# Patient Record
Sex: FEMALE | Race: WHITE
[De-identification: names, ages, dates, MRNs, and addresses within clinical notes are randomized per-mention and may not be internally consistent; named-entity substitution may affect disease eponyms.]

---

## 2017-09-17 NOTE — XR
EXAMINATION TYPE: XR chest 2V

 

DATE OF EXAM: 9/17/2017

 

COMPARISON: [facility] 3, 2015

 

HISTORY: Chest pain

 

TECHNIQUE:  Frontal and lateral views of the chest are obtained.

 

FINDINGS:  Artificial heart valve is again noted. There is a 2-lead pacer device identified. There is
 been a sternotomy. There is no pneumothorax or pleural effusion. The cardiac silhouette is within no
rmal limits.

 

IMPRESSION:  No acute cardiopulmonary process.

## 2017-09-17 NOTE — ED
SOB HPI





- General


Chief Complaint: Shortness of Breath


Stated Complaint: sob, hX CHF


Time Seen by Provider: 17 13:26


Source: patient, EMS


Mode of arrival: EMS


Limitations: no limitations





- History of Present Illness


Initial Comments: 





This is an 86-year-old female with a history of CHF, diabetes, hypertension or 

presents here department for shortness of breath or she states has been 

gradually worsening over the last day.  She says that she typically does not 

wear oxygen however has required it today.  She states it is worse with 

exertion and better with rest.  Does not seem to be related to position.  She 

has no associated chest pain however does complain of some lightheadedness at 

times.  She does admit to some lower extremity swelling however mild.  She 

states that she has not had a CHF exacerbation in 2 years.  Her cardiologist is 

Dr. Nina.  She denies any recent travel or surgeries.  No history of PE 

or DVT.  No other complaints.





- Related Data


 Home Medications











 Medication  Instructions  Recorded  Confirmed


 


Bimatoprost [Lumigan .01% Ophth 1 drop RIGHT EYE HS 14





Soln]   


 


Carvedilol [Coreg] 3.125 mg PO BID 14


 


Furosemide [Lasix] 20 mg PO DAILY 14


 


Levothyroxine Sodium [Synthroid] 50 mcg PO DAILY 14


 


Methocarbamol [Robaxin] 500 mg PO TID PRN 14


 


glipiZIDE [Glucotrol] 10 mg PO AC-BID 14


 


sitaGLIPtin [Januvia] 100 mg PO DAILY 14


 


Allopurinol [Allopurinol] 200 mg PO DAILY 06/03/15 09/17/17


 


Vit C/E/Zn/Coppr/Lutein/Zeaxan 1 cap PO BID 06/03/15 09/17/17





[Preservision Areds 2 Softgel]   


 


oxyCODONE-APAP 5-325MG [Percocet 1 tab PO TID PRN 17





5-325 mg]   











 Allergies











Allergy/AdvReac Type Severity Reaction Status Date / Time


 


aspirin Allergy  Unknown Verified 17 14:22


 


bee pollen [Bee Pollen] Allergy  Anaphylaxis Verified 17 14:22


 


celecoxib [From Celebrex] Allergy  Unknown Verified 17 14:22


 


URIC ACIDS AdvReac  GOUT Uncoded 17 13:23














Review of Systems


ROS Statement: 


Those systems with pertinent positive or pertinent negative responses have been 

documented in the HPI.





ROS Other: All systems not noted in ROS Statement are negative.





Past Medical History


Past Medical History: Coronary Artery Disease (CAD), Cancer, Heart Failure, 

Diabetes Mellitus, GI Bleed, Hyperlipidemia, Hypertension, Osteoarthritis (OA), 

Pulmonary Embolus (PE), Sleep Apnea/CPAP/BIPAP, Thyroid Disorder


Additional Past Medical History / Comment(s): gout, chronic pain in back hip 

and leg, diverticulitis, LT EYE BLIND,GLAUCOMA, MAC DEGEBERATION, MURMUR, 

DIVERTICULAR DX.OVARIAN CYSTS,ANEMIA,SKIN CA,ANEMIA, TRACHEBRONCHITIS WITH 

REACTIVE BRONCHOSPSM.used to use cpap machine but stopped  1 year ago d/t mask 

not fitting well.


History of Any Multi-Drug Resistant Organisms: None Reported


Past Surgical History: Appendectomy, Bowel Resection, Breast Surgery, 

Cholecystectomy, Coronary Bypass/CABG, Hysterectomy, Orthopedic Surgery, 

Pacemaker, Tonsillectomy


Additional Past Surgical History / Comment(s): pacemaker, AORTIC VALVE 

REPLACEMENT(TISSUE), CYST REMOVED FROM HAND/HEAD,BOWEL RESECTION D/T 

DIVERTICULITIS.GEENFIELD FILTER


Past Anesthesia/Blood Transfusion Reactions: No Reported Reaction


Type of Cardiac Device: Permanent Pacemaker


Device Placement Date:: 


Past Psychological History: Depression


Smoking Status: Former smoker


Past Alcohol Use History: None Reported


Past Drug Use History: None Reported





- Past Family History


  ** Father


Family Medical History: Cancer


Additional Family Medical History / Comment(s): ALCOHOLIC--  





  ** Mother


Family Medical History: Cancer, Coronary Artery Disease (CAD), Dementia, 

Diabetes Mellitus


Additional Family Medical History / Comment(s):  





General Exam





- General Exam Comments


Initial Comments: 





Constitutional: Awake alert Appears comfortable


Head: Normocephalic atraumatic 


Eyes: no conjunctival injection No scleral icterus EOMI


Neck: Mild JVD Supple


Heart: Regular rate rhythm normal S1-S2 no murmurs


Lungs: Clear to auscultation bilaterally No wheezing patient has decreased 

breath sounds bilaterally


Abdomen: Soft nondistended nontender


Extremities: Bilateral lower extremity edema that appears chronic DP pulses 

intact Radial pulses intact


Neuro: A&Ox3 No focal neurologic deficits


Psych: Appropriate mood and affect





Limitations: no limitations





Course


 Vital Signs











  17





  13:19


 


Temperature 98.1 F


 


Pulse Rate 84


 


Respiratory 20





Rate 


 


Blood Pressure 158/68


 


O2 Sat by Pulse 98





Oximetry 














- Reevaluation(s)


Reevaluation #1: 





17 14:13


EKG showing what appears to be atrial flutter with a controlled rate of 83.  

Rimma right bundle-branch block.  No abnormal ST segment changes or T-wave 

inversions.  QTC is 524.  Other intervals are normal


Reevaluation #2: 





17 15:07


Patient is comfortable at rest however is requiring 4 L of oxygen.  Her BNP is 

elevated from her baseline.  Chest x-ray was unremarkable.  She does have a 

history of PE and I am considering this as an etiology however the patient is 

unable to undergo a CTA because of her renal function.  I did put in for a V/Q 

however since she is also in what appears to be atrial flutter going to start 

her on heparin anyway.  Her rate is controlled.  Going to admit her for CHF 

exacerbation and possible PE for a VQ scan.





Medical Decision Making





- Medical Decision Making





This is an 86 show female who presents emergency department for shortness of 

breath.  She was requiring quite a bit of oxygen on arrival.  She is 

comfortable on 4 L at rest however gets very dyspneic with exertion.  BNP was 

elevated however chest x-ray was unremarkable.  With history of PID consider 

pulmonary embolism however the patient was unable to undergo CTA because of her 

renal function.  V/Q was put in for evaluation.  The patient was started on 

heparin because of what appears to be atrial flutter with a controlled rate and 

possibility of PE.  Otherwise she is hemodynamically stable.  We'll admit her 

for suspected CHF and possible PE.  She was given Lasix.  Dr. Nina was 

placed on consult.  Dr. Silvestre accepts the admission.





- Lab Data


Result diagrams: 


 17 13:55





 17 13:55


 Lab Results











  17 Range/Units





  13:55 13:55 13:55 


 


WBC    9.8  (3.8-10.6)  k/uL


 


RBC    3.69 L  (3.80-5.40)  m/uL


 


Hgb    10.2 L  (11.4-16.0)  gm/dL


 


Hct    31.7 L  (34.0-46.0)  %


 


MCV    85.8  (80.0-100.0)  fL


 


MCH    27.6  (25.0-35.0)  pg


 


MCHC    32.1  (31.0-37.0)  g/dL


 


RDW    15.8 H  (11.5-15.5)  %


 


Plt Count    197  (150-450)  k/uL


 


Neutrophils %    72  %


 


Lymphocytes %    16  %


 


Monocytes %    5  %


 


Eosinophils %    4  %


 


Basophils %    1  %


 


Neutrophils #    7.1  (1.3-7.7)  k/uL


 


Lymphocytes #    1.6  (1.0-4.8)  k/uL


 


Monocytes #    0.5  (0-1.0)  k/uL


 


Eosinophils #    0.4  (0-0.7)  k/uL


 


Basophils #    0.1  (0-0.2)  k/uL


 


Hypochromasia    Marked  


 


Poikilocytosis    Moderate  


 


PT  11.3    (9.0-12.0)  sec


 


INR  1.1    (<1.2)  


 


D-Dimer     (<0.60)  mg/L FEU


 


Sodium     (137-145)  mmol/L


 


Potassium     (3.5-5.1)  mmol/L


 


Chloride     ()  mmol/L


 


Carbon Dioxide     (22-30)  mmol/L


 


Anion Gap     mmol/L


 


BUN     (7-17)  mg/dL


 


Creatinine     (0.52-1.04)  mg/dL


 


Est GFR (MDRD) Af Amer     (>60 ml/min/1.73 sqM)  


 


Est GFR (MDRD) Non-Af     (>60 ml/min/1.73 sqM)  


 


Glucose     (74-99)  mg/dL


 


Calcium     (8.4-10.2)  mg/dL


 


Magnesium     (1.6-2.3)  mg/dL


 


Total Bilirubin     (0.2-1.3)  mg/dL


 


AST     (14-36)  U/L


 


ALT     (9-52)  U/L


 


Alkaline Phosphatase     ()  U/L


 


CK-MB (CK-2)   1.2   (0.0-2.4)  ng/mL


 


Troponin I   0.034   (0.000-0.034)  ng/mL


 


NT-Pro-B Natriuret Pep     pg/mL


 


Total Protein     (6.3-8.2)  g/dL


 


Albumin     (3.5-5.0)  g/dL














  17 Range/Units





  13:55 13:55 13:55 


 


WBC     (3.8-10.6)  k/uL


 


RBC     (3.80-5.40)  m/uL


 


Hgb     (11.4-16.0)  gm/dL


 


Hct     (34.0-46.0)  %


 


MCV     (80.0-100.0)  fL


 


MCH     (25.0-35.0)  pg


 


MCHC     (31.0-37.0)  g/dL


 


RDW     (11.5-15.5)  %


 


Plt Count     (150-450)  k/uL


 


Neutrophils %     %


 


Lymphocytes %     %


 


Monocytes %     %


 


Eosinophils %     %


 


Basophils %     %


 


Neutrophils #     (1.3-7.7)  k/uL


 


Lymphocytes #     (1.0-4.8)  k/uL


 


Monocytes #     (0-1.0)  k/uL


 


Eosinophils #     (0-0.7)  k/uL


 


Basophils #     (0-0.2)  k/uL


 


Hypochromasia     


 


Poikilocytosis     


 


PT     (9.0-12.0)  sec


 


INR     (<1.2)  


 


D-Dimer    0.92 H  (<0.60)  mg/L FEU


 


Sodium  138    (137-145)  mmol/L


 


Potassium  3.4 L    (3.5-5.1)  mmol/L


 


Chloride  106    ()  mmol/L


 


Carbon Dioxide  20 L    (22-30)  mmol/L


 


Anion Gap  12    mmol/L


 


BUN  24 H    (7-17)  mg/dL


 


Creatinine  1.20 H    (0.52-1.04)  mg/dL


 


Est GFR (MDRD) Af Amer  52    (>60 ml/min/1.73 sqM)  


 


Est GFR (MDRD) Non-Af  43    (>60 ml/min/1.73 sqM)  


 


Glucose  324 H    (74-99)  mg/dL


 


Calcium  8.8    (8.4-10.2)  mg/dL


 


Magnesium  2.0    (1.6-2.3)  mg/dL


 


Total Bilirubin  1.2    (0.2-1.3)  mg/dL


 


AST  38 H    (14-36)  U/L


 


ALT  29    (9-52)  U/L


 


Alkaline Phosphatase  179 H    ()  U/L


 


CK-MB (CK-2)     (0.0-2.4)  ng/mL


 


Troponin I     (0.000-0.034)  ng/mL


 


NT-Pro-B Natriuret Pep   2270   pg/mL


 


Total Protein  6.1 L    (6.3-8.2)  g/dL


 


Albumin  3.6    (3.5-5.0)  g/dL














Disposition


Clinical Impression: 


 Atrial flutter, Systolic congestive heart failure, Dyspnea





Disposition: ADMITTED AS IP TO THIS HOSP


Condition: Stable


Referrals: 


Ariela Mckay MD [Primary Care Provider] - 1-2 days

## 2017-09-17 NOTE — NM
EXAMINATION TYPE: NM pul vent and perfuse

 

DATE OF EXAM: 9/17/2017

 

COMPARISON: Chest x-ray obtained on the same day.

 

HISTORY: Chest pain

 

TECHNIQUE:  Utilizing inhalation of 66.4 mCi Tc 99m DTPA aerosol and intravenous injection of 5.5 mCi
 of Tc 99m MAA, ventilation and perfusion images are acquired post injection in multiple projections.


 

FINDINGS: 

Normal radiotracer distribution is noted in the lungs. There is no evidence of mismatched defects.

 

IMPRESSION: 

Low probability of pulmonary embolism.

## 2017-09-18 NOTE — P.CRDCN
History of Present Illness


Consult date: 17


Chief complaint: Shortness of breath


History of present illness: 





This is a pleasant 86-year-old  female patient who sees Dr. Nina in the office on regular basis with a past medical history 

significant for CAD and prior coronary artery bypass grafting with unknown 

details at this point, known severe ischemic cardiomyopathy and status post ICD

, diabetes, hypertension, dyslipidemia, and chronic atrial fibrillation 

presented to the hospital complaining of progressive dyspnea.





The patient has been experiencing progressive exertional dyspnea as well as 

orthopnea and also bilateral lower extremities edema started a few weeks ago.  

Her symptoms got worse over the last several days and she decided to come to 

the hospital.


She states that she was not having any chest discomfort to yesterday when she 

developed a brief episode of chest discomfort.





The patient stated that she was taking all her medications.  She was not 

compliant with her diet.  She did not have any recent history of upper 

respiratory infection.





The chest x-ray came in to be unremarkable.  The BNP came in to be elevated.  

The EKG showed atrial fibrillation was controlled heart rate with a right 

bundle branch block and nonspecific changes most prominent in the lateral leads.





The patient was started on Lasix.  Her hemoglobin is low.  She is not on any 

anticoagulation for the atrial fibrillation.  We will obtain the previous 

medical records from the office for further clarification white the patient is 

not on anticoagulation.  Currently she is on aspirin and heparin and we will 

continue both of them.  I would continue the heparin for additional 24 hours.  

She is on Coreg and we'll continue that.











Past Medical History


Past Medical History: Coronary Artery Disease (CAD), Cancer, Heart Failure, 

Diabetes Mellitus, GI Bleed, Hypertension, Osteoarthritis (OA), Pulmonary 

Embolus (PE), Sleep Apnea/CPAP/BIPAP, Thyroid Disorder


Additional Past Medical History / Comment(s): gout, chronic pain in back hip 

and leg, diverticulitis, LT EYE BLIND,GLAUCOMA, MAC DEGEBERATION, MURMUR, 

DIVERTICULAR DX.OVARIAN CYSTS,ANEMIA,SKIN CA,ANEMIA, TRACHEBRONCHITIS WITH 

REACTIVE BRONCHOSPSM.used to use cpap machine but stopped  1 year ago d/t mask 

not fitting well.


History of Any Multi-Drug Resistant Organisms: None Reported


Past Surgical History: Appendectomy, Bowel Resection, Breast Surgery, 

Cholecystectomy, Coronary Bypass/CABG, Hysterectomy, Orthopedic Surgery, 

Pacemaker, Tonsillectomy


Additional Past Surgical History / Comment(s): pacemaker, AORTIC VALVE 

REPLACEMENT(TISSUE), CYST REMOVED FROM HAND/HEAD,BOWEL RESECTION D/T 

DIVERTICULITIS.GEENFIELD FILTER


Past Anesthesia/Blood Transfusion Reactions: No Reported Reaction


Type of Cardiac Device: Permanent Pacemaker


Device Placement Date:: 


Smoking Status: Former smoker





- Past Family History


  ** Father


Family Medical History: Cancer


Additional Family Medical History / Comment(s): ALCOHOLIC--  





  ** Mother


Family Medical History: Cancer, Coronary Artery Disease (CAD), Dementia, 

Diabetes Mellitus


Additional Family Medical History / Comment(s):  





Medications and Allergies


 Home Medications











 Medication  Instructions  Recorded  Confirmed  Type


 


Bimatoprost [Lumigan .01% Ophth 1 drop RIGHT EYE HS 14 History





Soln]    


 


Carvedilol [Coreg] 3.125 mg PO BID 14 History


 


Furosemide [Lasix] 80 mg PO BID 14 History


 


glipiZIDE [Glucotrol] 10 mg PO AC-BID 14 History


 


sitaGLIPtin [Januvia] 100 mg PO DAILY 14 History


 


Allopurinol [Allopurinol] 200 mg PO BID 06/03/15 09/17/17 History


 


Vit C/E/Zn/Coppr/Lutein/Zeaxan 2 tab PO BID 06/03/15 09/17/17 History





[Preservision Areds 2 Softgel]    


 


Insulin Detemir [Levemir] 100 unit SQ AC-SUPPER 17 History











 Allergies











Allergy/AdvReac Type Severity Reaction Status Date / Time


 


aspirin Allergy  Unknown Verified 17 14:22


 


bee pollen [Bee Pollen] Allergy  Anaphylaxis Verified 17 14:22


 


celecoxib [From Celebrex] Allergy  Unknown Verified 17 14:22


 


URIC ACIDS AdvReac  GOUT Uncoded 17 13:23














Physical Exam


Vitals: 


 Vital Signs











  Temp Pulse Pulse Resp BP BP Pulse Ox


 


 17 08:00  97.3 F L   78  20   118/73  97


 


 17 04:16  97.9 F   82  20   133/67  97


 


 17 00:00    76  18   137/66  97


 


 17 20:00  97.1 F L   74  18   164/70  98


 


 17 17:20  97 F L   71  20   160/72  99


 


 17 15:27  98.0 F  88   18  154/72   99


 


 17 13:19  98.1 F  84   20  158/68   98








 Intake and Output











 17





 22:59 06:59 14:59


 


Intake Total 40 334.532 


 


Output Total 500 0 


 


Balance -460 334.532 


 


Intake:   


 


  IV 40  


 


    Heparin Sodium,Porcine/ 40  





    D5w Pmx 25,000 unit In   





    Dextrose/Water 1 500ml.   





    bag @ 11 UNITS/KG/HR 19.   





    95 mls/hr IV .Q24H [facility] Rx   





    #:[id number]   


 


  Intake, IV Titration  334.532 





  Amount   


 


    Heparin Sodium,Porcine/  334.532 





    D5w Pmx 25,000 unit In   





    Dextrose/Water 1 500ml.   





    bag @ 11 UNITS/KG/HR 19.   





    95 mls/hr IV .Q24H [facility] Rx   





    #:[id number]   


 


Output:   


 


  Urine 500 0 


 


Other:   


 


  Weight  93.8 kg 














- Constitutional


General appearance: no acute distress





- Respiratory


Respiratory: bilateral: CTA





- Cardiovascular


Rhythm: irregularly irregular


Heart sounds: normal: S1, S2


Abnormal Heart Sounds: systolic murmur





Results





 17 05:09





 17 05:09


 Cardiac Enzymes











  17 Range/Units





  13:55 13:55 22:05 


 


AST   38 H   (14-36)  U/L


 


CK-MB (CK-2)  1.2   1.6  (0.0-2.4)  ng/mL


 


Troponin I  0.034   0.037 H*  (0.000-0.034)  ng/mL














  17 Range/Units





  01:34 


 


AST   (14-36)  U/L


 


CK-MB (CK-2)  1.6  (0.0-2.4)  ng/mL


 


Troponin I  0.046 H*  (0.000-0.034)  ng/mL








 Coagulation











  17 Range/Units





  13:55 22:05 05:09 


 


PT  11.3    (9.0-12.0)  sec


 


APTT   35.0 H  40.2 H  (22.0-30.0)  sec








 CBC











  17 Range/Units





  13:55 05:09 


 


WBC  9.8  8.0  (3.8-10.6)  k/uL


 


RBC  3.69 L  3.15 L  (3.80-5.40)  m/uL


 


Hgb  10.2 L  8.7 L D  (11.4-16.0)  gm/dL


 


Hct  31.7 L  27.1 L  (34.0-46.0)  %


 


Plt Count  197  169  (150-450)  k/uL








 Comprehensive Metabolic Panel











  17 Range/Units





  13:55 05:09 


 


Sodium  138  139  (137-145)  mmol/L


 


Potassium  3.4 L  3.5  (3.5-5.1)  mmol/L


 


Chloride  106  107  ()  mmol/L


 


Carbon Dioxide  20 L  25  (22-30)  mmol/L


 


BUN  24 H  23 H  (7-17)  mg/dL


 


Creatinine  1.20 H  1.20 H  (0.52-1.04)  mg/dL


 


Glucose  324 H  166 H  (74-99)  mg/dL


 


Calcium  8.8  8.8  (8.4-10.2)  mg/dL


 


AST  38 H   (14-36)  U/L


 


ALT  29   (9-52)  U/L


 


Alkaline Phosphatase  179 H   ()  U/L


 


Total Protein  6.1 L   (6.3-8.2)  g/dL


 


Albumin  3.6   (3.5-5.0)  g/dL








 Current Medications











Generic Name Dose Route Start Last Admin





  Trade Name Freq  PRN Reason Stop Dose Admin


 


Acetaminophen  650 mg  17 03:57  17 04:30





  Tylenol Tab  PO   650 mg





  Q6HR PRN   Administration





  Fever and/ or Pain   


 


Allopurinol  200 mg  17 21:00  17 07:47





  Zyloprim  PO   200 mg





  BID [facility]   Administration


 


Carvedilol  3.125 mg  17 18:30  17 07:47





  Coreg  PO   3.125 mg





  BID-W/MEALS [facility]   Administration


 


Furosemide  20 mg  17 09:00  17 08:46





  Lasix  IV   20 mg





  Q12HR [facility]   Administration


 


Glipizide  10 mg  17 07:30  17 08:46





  Glucotrol  PO   10 mg





  AC-BID [facility]   Administration


 


Heparin Sodium (Porcine)  0 unit  17 18:17  





  Heparin  IV   





  PER PROTOCOL PRN   





  PER PROTOCOL   





  Protocol   


 


Heparin Sodium/Dextrose 25,000  500 mls @ 19.95 mls/hr  17 15:15   06:28





  unit/ IV Solution  IV   16 units/kg/hr





  .Q24H [facility]   29.02 mls/hr





  Protocol   Titration





  11 UNITS/KG/HR   


 


Insulin Detemir  100 unit  17 18:30  17 18:29





  Levemir  SQ   100 unit





  AC-SUPPER [facility]   Administration


 


Insulin Human Lispro  0 unit  17 21:00  17 07:48





  Humalog  SQ   2 unit





  ACHS [facility]   Administration





  Protocol   


 


Latanoprost  1 drops  17 21:00  17 20:40





  Xalatan 0.005%  RIGHT EYE   1 drops





  HS [facility]   Administration


 


Linagliptin  5 mg  17 09:00  17 08:46





  Tradjenta  PO   5 mg





  DAILY [facility]   Administration


 


Melatonin  6 mg  17 21:00  17 23:00





  Melatonin  PO   6 mg





  HS [facility]   Administration


 


Miscellaneous Information  1 each  17 14:54  





  Rx Info: Iv Contrast Was Given  MISCELLANE  17 14:55  





  DAILY PRN   





  Per Protocol   


 


Multivitamins/Minerals  2 each  17 18:30  17 07:47





  Ivite  PO   2 each





  BID-W/MEALS [facility]   Administration


 


Naloxone HCl  0.2 mg  17 15:12  





  Narcan  IV   





  Q2M PRN   





  Opioid Reversal   


 


Potassium Chloride  20 meq  17 09:00  17 07:47





  K-Dur 20  PO  17 09:01  20 meq





  BID [facility]   Administration








 Intake and Output











 17





 22:59 06:59 14:59


 


Intake Total 40 334.532 


 


Output Total 500 0 


 


Balance -460 334.532 


 


Intake:   


 


  IV 40  


 


    Heparin Sodium,Porcine/ 40  





    D5w Pmx 25,000 unit In   





    Dextrose/Water 1 500ml.   





    bag @ 11 UNITS/KG/HR 19.   





    95 mls/hr IV .Q24H [facility] Rx   





    #:[id number]   


 


  Intake, IV Titration  334.532 





  Amount   


 


    Heparin Sodium,Porcine/  334.532 





    D5w Pmx 25,000 unit In   





    Dextrose/Water 1 500ml.   





    bag @ 11 UNITS/KG/HR 19.   





    95 mls/hr IV .Q24H [facility] Rx   





    #:[id number]   


 


Output:   


 


  Urine 500 0 


 


Other:   


 


  Weight  93.8 kg 








 





 17 05:09 





 17 05:09 











Assessment and Plan


Plan: 





This is a pleasant 86-year-old female patient was known CAD and prior CABG as 

well as multiple comorbid conditions who presented to the hospital was 

progressive dyspnea and was diagnosed was congestive heart failure exacerbation.





I will obtain an echocardiogram to assess the LV function.  Continue diuresing 

the patient gently.  Continue monitor the kidney function and electrolytes.  

Obtain the previous medical records from the office.  Continue following up 

with her

## 2017-09-18 NOTE — P.PN
Subjective


Principal diagnosis: 





Acute CHF exacerbation.





Patient is a 86-year-old female with a known history of coronary artery disease 

status post bypass graft in 2011 and ACD placement, hypertension, diabetes type 

2 non-insulin-dependent and multiple other medical problems came to the 

hospital with complaints of worsening Short of breath since yesterday.  

Apparently patient has been having short of breath and dizziness for the past 

few months.  Denied any fever or chills.  Denied any chest pain.  No nausea or 

vomiting abdominal pain. She says that she typically does not wear oxygen 

however has required it today.  She states it is worse with exertion and better 

with rest.  Does not seem to be related to position.  She has no associated 

chest pain however does complain of some lightheadedness at times.  She does 

admit to some lower extremity swelling however mild.  She states that she has 

not had a CHF exacerbation in 2 years.  Her cardiologist is Dr. Nina.  

She denies any recent travel or surgeries.  No history of PE or DVT.  No other 

complaints.





BNP 2270, troponin 0.037.  Hemoglobin 10.2 potassium 3.4


EKG showed atrial flutter


Chest x-ray showed no acute process


VQ scan showed low powered for PE





09/18/2017


Patient says that her breathing is better today.  Continued on IV diuresis with 

20 mg of Lasix twice daily.  2-D echo report is pending.


Patient was found to have elevated TSH level and was started on thyroid 

supplements


Patient denied any chest pain or short of breath worsening.  No nausea vomiting 

or abdominal pain.  No acute otherwise overnight issues.





Objective





- Vital Signs


Vital signs: 


 Vital Signs











Temp  98.4 F   09/18/17 20:00


 


Pulse  90   09/18/17 20:00


 


Resp  18   09/18/17 20:00


 


BP  124/64   09/18/17 20:00


 


Pulse Ox  99   09/18/17 20:00








 Intake & Output











 09/18/17 09/18/17 09/19/17





 06:59 18:59 06:59


 


Intake Total 334.532 180.433 


 


Output Total 0  160


 


Balance 334.532 180.433 -160


 


Weight 93.8 kg 93.8 kg 


 


Intake:   


 


  IV  60 


 


    0.9 ns  60 


 


  Intake, IV Titration 334.532 120.433 





  Amount   


 


    Heparin Sodium,Porcine/ 334.532 120.433 





    D5w Pmx 25,000 unit In   





    Dextrose/Water 1 500ml.   





    bag @ 11 UNITS/KG/HR 19.   





    95 mls/hr IV .Q24H [facility] Rx   





    #:[id number]   


 


Output:   


 


  Urine 0  160


 


Other:   


 


  Voiding Method  Bedside Commode 


 


  # Voids  1 


 


  # Bowel Movements  0 














- Exam





PHYSICAL EXAMINATION:


 Patient is lying in the bed comfortably, no acute distress, awake alert and 

oriented.. 


HEENT: Normocephalic. Neck is supple. Pupils reactive. Nostrils clear. Oral 

cavity is moist. Ears reveal no drainage. 


Neck reveals no JVD, carotid bruits, or thyromegaly. 


CHEST EXAMINATION: Trachea is central. Symmetrical expansion. Lung fields clear 

to auscultation and percussion. 


CARDIAC: Normal S1, S2 with no gallops. No murmurs 


 ABDOMEN: Soft. Bowel sounds normal. No organomegaly. No abdominal bruits. 


Extremities 1+ edema.  No clubbing or cyanosis 


Neurologically awake, alert, oriented x3 with well-coordinated movements. 


Skin: no rash or skin lesions


Musculoskeletal: no joint swelling or deformity.








- Labs


CBC & Chem 7: 


 09/18/17 05:09





 09/18/17 05:09


Labs: 


 Abnormal Lab Results - Last 24 Hours (Table)











  09/17/17 09/17/17 09/17/17 Range/Units





  13:55 22:05 22:05 


 


RBC     (3.80-5.40)  m/uL


 


Hgb     (11.4-16.0)  gm/dL


 


Hct     (34.0-46.0)  %


 


RDW     (11.5-15.5)  %


 


APTT    35.0 H  (22.0-30.0)  sec


 


BUN     (7-17)  mg/dL


 


Creatinine     (0.52-1.04)  mg/dL


 


Glucose     (74-99)  mg/dL


 


POC Glucose (mg/dL)     (75-99)  mg/dL


 


Hemoglobin A1c  9.8 H    (4.2-6.1)  %


 


Total Creatine Kinase     ()  U/L


 


Troponin I   0.037 H*   (0.000-0.034)  ng/mL


 


TSH     (0.465-4.680)  mIU/L














  09/18/17 09/18/17 09/18/17 Range/Units





  01:34 05:06 05:09 


 


RBC     (3.80-5.40)  m/uL


 


Hgb     (11.4-16.0)  gm/dL


 


Hct     (34.0-46.0)  %


 


RDW     (11.5-15.5)  %


 


APTT    40.2 H  (22.0-30.0)  sec


 


BUN     (7-17)  mg/dL


 


Creatinine     (0.52-1.04)  mg/dL


 


Glucose     (74-99)  mg/dL


 


POC Glucose (mg/dL)   177 H   (75-99)  mg/dL


 


Hemoglobin A1c     (4.2-6.1)  %


 


Total Creatine Kinase  29 L    ()  U/L


 


Troponin I  0.046 H*    (0.000-0.034)  ng/mL


 


TSH     (0.465-4.680)  mIU/L














  09/18/17 09/18/17 09/18/17 Range/Units





  05:09 05:09 12:11 


 


RBC  3.15 L    (3.80-5.40)  m/uL


 


Hgb  8.7 L D    (11.4-16.0)  gm/dL


 


Hct  27.1 L    (34.0-46.0)  %


 


RDW  15.6 H    (11.5-15.5)  %


 


APTT    48.3 H  (22.0-30.0)  sec


 


BUN   23 H   (7-17)  mg/dL


 


Creatinine   1.20 H   (0.52-1.04)  mg/dL


 


Glucose   166 H   (74-99)  mg/dL


 


POC Glucose (mg/dL)     (75-99)  mg/dL


 


Hemoglobin A1c     (4.2-6.1)  %


 


Total Creatine Kinase     ()  U/L


 


Troponin I     (0.000-0.034)  ng/mL


 


TSH   7.800 H   (0.465-4.680)  mIU/L














  09/18/17 09/18/17 09/18/17 Range/Units





  12:33 16:40 20:48 


 


RBC     (3.80-5.40)  m/uL


 


Hgb     (11.4-16.0)  gm/dL


 


Hct     (34.0-46.0)  %


 


RDW     (11.5-15.5)  %


 


APTT     (22.0-30.0)  sec


 


BUN     (7-17)  mg/dL


 


Creatinine     (0.52-1.04)  mg/dL


 


Glucose     (74-99)  mg/dL


 


POC Glucose (mg/dL)  241 H  167 H  185 H  (75-99)  mg/dL


 


Hemoglobin A1c     (4.2-6.1)  %


 


Total Creatine Kinase     ()  U/L


 


Troponin I     (0.000-0.034)  ng/mL


 


TSH     (0.465-4.680)  mIU/L














Assessment and Plan


Plan: 





#1 shortness of breath secondary to acute CHF exacerbation.  EF ejection 

fraction unknown


#2 atrial flutter 


#3 history of coronary artery disease and bypass graft.  AICD placement as well


#4 history of aortic valve replacement


#4 hypokalemia


#5 hypertension


#6 diabetes type 2 non-insulin-dependent


#7 hypothyroidism


#8 obstructive sleep apnea on CPAP at home


#9 acute on chronic kidney disease stage III . creatinine 1.2


#10 obesity with BMI 34.3


#11 history of gout stable


#12 hypothyroidism.  Started on levothyroxine 75 g daily.





Plan:


Patient will be continued on IV Lasix 20 mg twice a day.  Continue with heparin 

drip for atrial flutter.  Continue with Coreg.  Patient is ALLERGIC to aspirin.

  Continue with telemetry monitoring and serial EKGs and troponins.  Cardiology 

is following.  VQ scan showed low probability for PE.  Continue to follow 

closely and further recommendations based on the clinical course.  Cardiology 

has been consulted.  Prognosis is guarded with multiple medical problems and 

comorbid conditions.





Time with Patient: Greater than 30

## 2017-09-18 NOTE — P.HPIM
History of Present Illness


H&P Date: 17


Chief Complaint: Shortness of breath





Recent is a 86-year-old female with a known history of coronary artery disease 

status post bypass graft in  and ACD placement, hypertension, diabetes type 

2 non-insulin-dependent and multiple other medical problems came to the 

hospital with complaints of worsening Short of breath since yesterday.  

Apparently patient has been having short of breath and dizziness for the past 

few months.  Denied any fever or chills.  Denied any chest pain.  No nausea or 

vomiting abdominal pain. She says that she typically does not wear oxygen 

however has required it today.  She states it is worse with exertion and better 

with rest.  Does not seem to be related to position.  She has no associated 

chest pain however does complain of some lightheadedness at times.  She does 

admit to some lower extremity swelling however mild.  She states that she has 

not had a CHF exacerbation in 2 years.  Her cardiologist is Dr. Nina.  

She denies any recent travel or surgeries.  No history of PE or DVT.  No other 

complaints.





BNP 2270, troponin 0.037.  Hemoglobin 10.2 potassium 3.4


EKG showed atrial flutter


Chest x-ray showed no acute process


VQ scan showed low powered for PE











Review of Systems








CONSTITUTIONAL: No fever, no loss of appetite.  Patient feels tired. 


HEENT: No recent visual problems or hearing problems. Denied any sore throat. 


CARDIOVASCULAR: No chest pain, shortness of breath present.  orthopnea, PND, no 

palpitations, no syncope. 


PULMONARY: , No cough or sputum production no hemoptysis.  Patient does have 

shortness of breath


GASTROINTESTINAL: No diarrhea, no nausea, no vomiting, no abdominal pain. 

Normoactive bowel sounds. 


NEUROLOGICAL: No headaches, no weakness, no numbness.  Dizziness.  No 

lightheadedness no numbness or tingling.


HEMATOLOGICAL: Denies any bleeding or petechiae. 


GENITOURINARY: Denies any burning micturition, frequency, or urgency. 


MUSCULOSKELETAL/RHEUMATOLOGICAL: Denies any joint pain, swelling, or any muscle 

pain. 


ENDOCRINE: Denies any polyuria or polydipsia. 





The rest of the 14-point review of systems is negative.





Past Medical History


Past Medical History: Coronary Artery Disease (CAD), Cancer, Heart Failure, 

Diabetes Mellitus, GI Bleed, Hypertension, Osteoarthritis (OA), Pulmonary 

Embolus (PE), Sleep Apnea/CPAP/BIPAP, Thyroid Disorder


Additional Past Medical History / Comment(s): gout, chronic pain in back hip 

and leg, diverticulitis, LT EYE BLIND,GLAUCOMA, MAC DEGEBERATION, MURMUR, 

DIVERTICULAR DX.OVARIAN CYSTS,ANEMIA,SKIN CA,ANEMIA, TRACHEBRONCHITIS WITH 

REACTIVE BRONCHOSPSM.used to use cpap machine but stopped  1 year ago d/t mask 

not fitting well.


History of Any Multi-Drug Resistant Organisms: None Reported


Past Surgical History: Appendectomy, Bowel Resection, Breast Surgery, 

Cholecystectomy, Coronary Bypass/CABG, Hysterectomy, Orthopedic Surgery, 

Pacemaker, Tonsillectomy


Additional Past Surgical History / Comment(s): pacemaker, AORTIC VALVE 

REPLACEMENT(TISSUE), CYST REMOVED FROM HAND/HEAD,BOWEL RESECTION D/T 

DIVERTICULITIS.GEENFIELD FILTER


Past Anesthesia/Blood Transfusion Reactions: No Reported Reaction


Type of Cardiac Device: Permanent Pacemaker


Device Placement Date:: 


Smoking Status: Former smoker





- Past Family History


  ** Father


Family Medical History: Cancer


Additional Family Medical History / Comment(s): ALCOHOLIC--  





  ** Mother


Family Medical History: Cancer, Coronary Artery Disease (CAD), Dementia, 

Diabetes Mellitus


Additional Family Medical History / Comment(s):  





Medications and Allergies


 Home Medications











 Medication  Instructions  Recorded  Confirmed  Type


 


Bimatoprost [Lumigan .01% Ophth 1 drop RIGHT EYE HS 14 History





Soln]    


 


Carvedilol [Coreg] 3.125 mg PO BID 14 History


 


Furosemide [Lasix] 80 mg PO BID 14 History


 


glipiZIDE [Glucotrol] 10 mg PO AC-BID 14 History


 


sitaGLIPtin [Januvia] 100 mg PO DAILY 14 History


 


Allopurinol [Allopurinol] 200 mg PO BID 06/03/15 09/17/17 History


 


Vit C/E/Zn/Coppr/Lutein/Zeaxan 2 tab PO BID 06/03/15 09/17/17 History





[Preservision Areds 2 Softgel]    


 


Insulin Detemir [Levemir] 100 unit SQ AC-SUPPER 17 History











 Allergies











Allergy/AdvReac Type Severity Reaction Status Date / Time


 


aspirin Allergy  Unknown Verified 17 14:22


 


bee pollen [Bee Pollen] Allergy  Anaphylaxis Verified 17 14:22


 


celecoxib [From Celebrex] Allergy  Unknown Verified 17 14:22


 


URIC ACIDS AdvReac  GOUT Uncoded 17 13:23














Physical Exam


Vitals: 


 Vital Signs











  Temp Pulse Pulse Resp BP BP Pulse Ox


 


 17 17:20  97 F L   71  20   160/72  99


 


 17 15:27  98.0 F  88   18  154/72   99


 


 17 13:19  98.1 F  84   20  158/68   98








 Intake and Output











 17





 06:59 14:59 22:59


 


Intake Total   40


 


Output Total   500


 


Balance   -460


 


Intake:   


 


  IV   40


 


    Heparin Sodium,Porcine/   40





    D5w Pmx 25,000 unit In   





    Dextrose/Water 1 500ml.   





    bag @ 11 UNITS/KG/HR 19.   





    95 mls/hr IV .Q24H [facility] Rx   





    #:[id number]   


 


Output:   


 


  Urine   500


 


Other:   


 


  Weight  90.718 kg 








 Patient Weight











 17





 06:59


 


Weight 90.718 kg














PHYSICAL EXAMINATION:


 Patient is lying in the bed comfortably, no acute distress, awake alert and 

oriented.. 


HEENT: Normocephalic. Neck is supple. Pupils reactive. Nostrils clear. Oral 

cavity is moist. Ears reveal no drainage. 


Neck reveals no JVD, carotid bruits, or thyromegaly. 


CHEST EXAMINATION: Trachea is central. Symmetrical expansion. Lung fields clear 

to auscultation and percussion. 


CARDIAC: Normal S1, S2 with no gallops. No murmurs 


 ABDOMEN: Soft. Bowel sounds normal. No organomegaly. No abdominal bruits. 


Extremities 1+ edema.  No clubbing or cyanosis 


Neurologically awake, alert, oriented x3 with well-coordinated movements. 


Skin: no rash or skin lesions


Musculoskeletal: no joint swelling or deformity.








Results


CBC & Chem 7: 


 17 13:55





 17 13:55


Labs: 


 Abnormal Lab Results - Last 24 Hours (Table)











  17 Range/Units





  13:55 13:55 13:55 


 


RBC  3.69 L    (3.80-5.40)  m/uL


 


Hgb  10.2 L    (11.4-16.0)  gm/dL


 


Hct  31.7 L    (34.0-46.0)  %


 


RDW  15.8 H    (11.5-15.5)  %


 


D-Dimer    0.92 H  (<0.60)  mg/L FEU


 


Potassium   3.4 L   (3.5-5.1)  mmol/L


 


Carbon Dioxide   20 L   (22-30)  mmol/L


 


BUN   24 H   (7-17)  mg/dL


 


Creatinine   1.20 H   (0.52-1.04)  mg/dL


 


Glucose   324 H   (74-99)  mg/dL


 


POC Glucose (mg/dL)     (75-99)  mg/dL


 


AST   38 H   (14-36)  U/L


 


Alkaline Phosphatase   179 H   ()  U/L


 


Total Protein   6.1 L   (6.3-8.2)  g/dL














  17 Range/Units





  17:16 20:28 


 


RBC    (3.80-5.40)  m/uL


 


Hgb    (11.4-16.0)  gm/dL


 


Hct    (34.0-46.0)  %


 


RDW    (11.5-15.5)  %


 


D-Dimer    (<0.60)  mg/L FEU


 


Potassium    (3.5-5.1)  mmol/L


 


Carbon Dioxide    (22-30)  mmol/L


 


BUN    (7-17)  mg/dL


 


Creatinine    (0.52-1.04)  mg/dL


 


Glucose    (74-99)  mg/dL


 


POC Glucose (mg/dL)  241 H  297 H  (75-99)  mg/dL


 


AST    (14-36)  U/L


 


Alkaline Phosphatase    ()  U/L


 


Total Protein    (6.3-8.2)  g/dL














Thrombosis Risk Factor Assmnt





- Choose All That Apply


Each Factor Represents 1 point: Heart failure (<1month)


Each Risk Factor Represents 3 Points: Age 75 years or older


Thrombosis Risk Factor Assessment Total Risk Factor Score: 4


Thrombosis Risk Factor Assessment Level: Moderate Risk





Assessment and Plan


Plan: 





#1 shortness of breath secondary to acute CHF exacerbation.  EF ejection 

fraction unknown


#2 atrial flutter 


#3 history of coronary artery disease and bypass graft.  AICD placement as well


#4 history of aortic valve replacement


#4 hypokalemia


#5 hypertension


#6 diabetes type 2 non-insulin-dependent


#7 hypothyroidism


#8 obstructive sleep apnea on CPAP at home


#9 acute on chronic kidney disease stage III . creatinine 1.2


#10 obesity with BMI 34.3


#11 history of gout stable





Plan:


Patient will be continued on IV Lasix 20 mg twice a day.  Continue with heparin 

drip for atrial flutter.  Continue with Coreg.  Patient is ALLERGIC to aspirin.

  Continue with telemetry monitoring and serial EKGs and troponins.  Cardiology 

has been consulted.  VQ scan showed low probability for PE.  We'll replace 

potassium.  Will check TSH level.  Continue to follow closely and further 

recommendations based on the clinical course.  Cardiology has been consulted.  

Prognosis is guarded with multiple medical problems and comorbid conditions.





Time with Patient: Greater than 30

## 2017-09-19 NOTE — P.PN
Subjective


Principal diagnosis: 





Acute CHF exacerbation.





Patient is a 86-year-old female with a known history of coronary artery disease 

status post bypass graft in 2011 and ACD placement, hypertension, diabetes type 

2 non-insulin-dependent and multiple other medical problems came to the 

hospital with complaints of worsening Short of breath since yesterday.  

Apparently patient has been having short of breath and dizziness for the past 

few months.  Denied any fever or chills.  Denied any chest pain.  No nausea or 

vomiting abdominal pain. She says that she typically does not wear oxygen 

however has required it today.  She states it is worse with exertion and better 

with rest.  Does not seem to be related to position.  She has no associated 

chest pain however does complain of some lightheadedness at times.  She does 

admit to some lower extremity swelling however mild.  She states that she has 

not had a CHF exacerbation in 2 years.  Her cardiologist is Dr. Nina.  

She denies any recent travel or surgeries.  No history of PE or DVT.  No other 

complaints.





BNP 2270, troponin 0.037.  Hemoglobin 10.2 potassium 3.4


EKG showed atrial flutter


Chest x-ray showed no acute process


VQ scan showed low powered for PE





09/18/2017


Patient says that her breathing is better today.  Continued on IV diuresis with 

20 mg of Lasix twice daily.  2-D echo report is pending.


Patient was found to have elevated TSH level and was started on thyroid 

supplements


Patient denied any chest pain or short of breath worsening.  No nausea vomiting 

or abdominal pain.  No acute otherwise overnight issues.





09/19/2017


Patient says that she has been having dizziness whenever she gets up and go to 

the bathroom and also complaints of neck pain.  Dizziness has been going on for 

a while as per the patient.  Patient says that she's been feeling grinding 

sensation in the lower neck.  


Orthostatic vitals as ordered.  We'll order carotid duplex and CT cervical 

spine without contrast.


We will also consult neurology for further evaluation of dizziness.  


No compressive chest pain.  No other acute overnight issues.





Objective





- Vital Signs


Vital signs: 


 Vital Signs











Temp  97.4 F L  09/19/17 08:20


 


Pulse  69   09/19/17 08:20


 


Resp  18   09/19/17 08:20


 


BP  102/54   09/19/17 08:20


 


Pulse Ox  96   09/19/17 08:20








 Intake & Output











 09/18/17 09/19/17 09/19/17





 18:59 06:59 18:59


 


Intake Total 180.433 500 236


 


Output Total  160 


 


Balance 180.433 340 236


 


Weight 93.8 kg 94.8 kg 


 


Intake:   


 


  IV 60  


 


    0.9 ns 60  


 


  Intake, IV Titration 120.433 500 





  Amount   


 


    Heparin Sodium,Porcine/ 120.433 500 





    D5w Pmx 25,000 unit In   





    Dextrose/Water 1 500ml.   





    bag @ 11 UNITS/KG/HR 19.   





    95 mls/hr IV .Q24H [facility]   





    #:[id number]   


 


  Oral   236


 


Output:   


 


  Urine  160 


 


Other:   


 


  Voiding Method Bedside Commode Bedside Commode Bedside Commode


 


  # Voids 1 2 


 


  # Bowel Movements 0  














- Exam





PHYSICAL EXAMINATION:


 Patient is lying in the bed comfortably, no acute distress, awake alert and 

oriented.. 


HEENT: Normocephalic. Neck is supple.  Cervical muscle tenderness in the back.  

Pupils reactive. Nostrils clear. Oral cavity is moist. Ears reveal no drainage. 


Neck reveals no JVD, carotid bruits, or thyromegaly. 


CHEST EXAMINATION: Trachea is central. Symmetrical expansion. Lung fields clear 

to auscultation and percussion. 


CARDIAC: Normal S1, S2 with no gallops. No murmurs 


 ABDOMEN: Soft. Bowel sounds normal. No organomegaly. No abdominal bruits. 


Extremities 1+ edema.  No clubbing or cyanosis 


Neurologically awake, alert, oriented x3 with well-coordinated movements. 


Skin: no rash or skin lesions


Musculoskeletal: no joint swelling or deformity.








- Labs


CBC & Chem 7: 


 09/19/17 06:02





 09/19/17 06:02


Labs: 


 Abnormal Lab Results - Last 24 Hours (Table)











  09/17/17 09/18/17 09/18/17 Range/Units





  13:55 12:11 12:33 


 


RBC     (3.80-5.40)  m/uL


 


Hgb     (11.4-16.0)  gm/dL


 


Hct     (34.0-46.0)  %


 


RDW     (11.5-15.5)  %


 


APTT   48.3 H   (22.0-30.0)  sec


 


BUN     (7-17)  mg/dL


 


Creatinine     (0.52-1.04)  mg/dL


 


POC Glucose (mg/dL)    241 H  (75-99)  mg/dL


 


Hemoglobin A1c  9.8 H    (4.2-6.1)  %














  09/18/17 09/18/17 09/19/17 Range/Units





  16:40 20:48 05:41 


 


RBC     (3.80-5.40)  m/uL


 


Hgb     (11.4-16.0)  gm/dL


 


Hct     (34.0-46.0)  %


 


RDW     (11.5-15.5)  %


 


APTT     (22.0-30.0)  sec


 


BUN     (7-17)  mg/dL


 


Creatinine     (0.52-1.04)  mg/dL


 


POC Glucose (mg/dL)  167 H  185 H  104 H  (75-99)  mg/dL


 


Hemoglobin A1c     (4.2-6.1)  %














  09/19/17 09/19/17 09/19/17 Range/Units





  06:02 06:02 06:02 


 


RBC  3.15 L    (3.80-5.40)  m/uL


 


Hgb  8.4 L    (11.4-16.0)  gm/dL


 


Hct  26.9 L    (34.0-46.0)  %


 


RDW  16.7 H    (11.5-15.5)  %


 


APTT    59.0 H  (22.0-30.0)  sec


 


BUN   24 H   (7-17)  mg/dL


 


Creatinine   1.38 H   (0.52-1.04)  mg/dL


 


POC Glucose (mg/dL)     (75-99)  mg/dL


 


Hemoglobin A1c     (4.2-6.1)  %














  09/19/17 Range/Units





  11:39 


 


RBC   (3.80-5.40)  m/uL


 


Hgb   (11.4-16.0)  gm/dL


 


Hct   (34.0-46.0)  %


 


RDW   (11.5-15.5)  %


 


APTT   (22.0-30.0)  sec


 


BUN   (7-17)  mg/dL


 


Creatinine   (0.52-1.04)  mg/dL


 


POC Glucose (mg/dL)  229 H  (75-99)  mg/dL


 


Hemoglobin A1c   (4.2-6.1)  %














Assessment and Plan


Plan: 





#1 shortness of breath secondary to acute CHF exacerbation.  EF ejection 

fraction unknown.  2-D echo report pending.


#2 dizziness.  Possible orthostatic hypotension.  Exact details unknown.  

Neurology was consulted


#2 chronic atrial fibrillation.  Not on anticoagulation at home.  Patient 

refused to take anti-correlation previously. 


#3 history of coronary artery disease and bypass graft.  AICD placement as well


#4 history of aortic valve replacement


#4 hypokalemia


#5 hypertension


#6 diabetes type 2 non-insulin-dependent


#7 hypothyroidism


#8 obstructive sleep apnea on CPAP at home


#9 acute on chronic kidney disease stage III . creatinine 1.2


#10 obesity with BMI 34.3


#11 history of gout stable


#12 hypothyroidism.  Started on levothyroxine 75 g daily.





Plan:


Patient will be continued on IV Lasix 20 mg twice a day for 1 more day.  

Creatinine increased to 1.38 today. Continue with heparin drip for atrial 

flutter.  Continue with Coreg.  Patient is ALLERGIC to aspirin.  Continue with 

telemetry monitoring and serial EKGs and troponins.  Cardiology is following.  

VQ scan showed low probability for PE.  Continue to follow closely and further 

recommendations based on the clinical course.  Will consult neurology for 

further evaluation of dizziness.


Prognosis is guarded with multiple medical problems and comorbid conditions.





Time with Patient: Greater than 30

## 2017-09-19 NOTE — P.PN
Subjective


Principal diagnosis: 





Shortness of breath


This is a pleasant 86-year-old  female who follows regularly in the 

office with Dr. Nina.  She has history of hyperlipidemia, hypertension, 

diabetes,  family history of premature coronary artery disease, CABG in 2011, 

chronic persistent atrial fibrillation, history of aortic valve replacement 

with tissue valve, COPD, moderate mitral regurgitation, history of prior GI 

bleeding, history of pacemaker implantation, history of Carley filter 

placement, hypothyroidism, sleep apnea.  She presented to the hospital with 

symptoms of progressive dyspnea.  Patient had been taking her medications on a 

regular basis but states that she has not been compliant with her diet and did 

have a recent upper respiratory infection.  BNP level on admission 2270.  TSH 

level 7.8, troponins 0.034, 0.037, 0.046.  D-dimer came back to be elevated, VQ 

scan low probability for PE.  Patient is currently on IV heparin, not on oral 

anticoagulation at home.  According to [facility] recommendations, 

anticoagulation therapy is indicated.  It appears that this is been 

discontinued in the past secondary to bleeding and also secondary to the fact 

the patient refused to take anticoagulation according to the office notes.  We 

will rediscuss this with the patient again at this time, she does have again a 

low hemoglobin this morning.  She is currently on IV Lasix.  Documented weight 

appears to be up from yesterday.  Hemoglobin on admission 10.2, 8.4 this 

morning.  BUN 24, creatinine 1.3.





Objective





- Vital Signs


Vital signs: 


 Vital Signs











Temp  97.4 F L  09/19/17 08:20


 


Pulse  69   09/19/17 08:20


 


Resp  18   09/19/17 08:20


 


BP  102/54   09/19/17 08:20


 


Pulse Ox  96   09/19/17 08:20








 Intake & Output











 09/18/17 09/19/17 09/19/17





 18:59 06:59 18:59


 


Intake Total 180.433 500 236


 


Output Total  160 


 


Balance 180.433 340 236


 


Weight 93.8 kg 94.8 kg 


 


Intake:   


 


  IV 60  


 


    0.9 ns 60  


 


  Intake, IV Titration 120.433 500 





  Amount   


 


    Heparin Sodium,Porcine/ 120.433 500 





    D5w Pmx 25,000 unit In   





    Dextrose/Water 1 500ml.   





    bag @ 11 UNITS/KG/HR 19.   





    95 mls/hr IV .Q24H [facility] Rx   





    #:[id number]   


 


  Oral   236


 


Output:   


 


  Urine  160 


 


Other:   


 


  Voiding Method Bedside Commode Bedside Commode Bedside Commode


 


  # Voids 1 2 


 


  # Bowel Movements 0  














- Exam


PHYSICAL EXAMINATION: 





HEENT: Head is atraumatic, normocephalic.  Pupils equal, round.  Neck is 

supple.  There is no elevated jugular venous pressure.





HEART EXAMINATION: Heart S1 and S2 irregularly irregular a systolic ejection 

murmur is heard.





CHEST EXAMINATION: Lungs are clear with mild diminished air entry to the bases





ABDOMEN:  Soft, nontender. Bowel sounds are heard. No organomegaly noted.


 


EXTREMITIES: 1+ peripheral pulses with  evidence of peripheral edema and no 

calf tenderness noted.





NEUROLOGIC patient is awake, alert and oriented -3.


 


.


 











- Labs


CBC & Chem 7: 


 09/19/17 06:02





 09/19/17 06:02


Labs: 


 Abnormal Lab Results - Last 24 Hours (Table)











  09/17/17 09/18/17 09/18/17 Range/Units





  13:55 12:11 12:33 


 


RBC     (3.80-5.40)  m/uL


 


Hgb     (11.4-16.0)  gm/dL


 


Hct     (34.0-46.0)  %


 


RDW     (11.5-15.5)  %


 


APTT   48.3 H   (22.0-30.0)  sec


 


BUN     (7-17)  mg/dL


 


Creatinine     (0.52-1.04)  mg/dL


 


POC Glucose (mg/dL)    241 H  (75-99)  mg/dL


 


Hemoglobin A1c  9.8 H    (4.2-6.1)  %














  09/18/17 09/18/17 09/19/17 Range/Units





  16:40 20:48 05:41 


 


RBC     (3.80-5.40)  m/uL


 


Hgb     (11.4-16.0)  gm/dL


 


Hct     (34.0-46.0)  %


 


RDW     (11.5-15.5)  %


 


APTT     (22.0-30.0)  sec


 


BUN     (7-17)  mg/dL


 


Creatinine     (0.52-1.04)  mg/dL


 


POC Glucose (mg/dL)  167 H  185 H  104 H  (75-99)  mg/dL


 


Hemoglobin A1c     (4.2-6.1)  %














  09/19/17 09/19/17 09/19/17 Range/Units





  06:02 06:02 06:02 


 


RBC  3.15 L    (3.80-5.40)  m/uL


 


Hgb  8.4 L    (11.4-16.0)  gm/dL


 


Hct  26.9 L    (34.0-46.0)  %


 


RDW  16.7 H    (11.5-15.5)  %


 


APTT    59.0 H  (22.0-30.0)  sec


 


BUN   24 H   (7-17)  mg/dL


 


Creatinine   1.38 H   (0.52-1.04)  mg/dL


 


POC Glucose (mg/dL)     (75-99)  mg/dL


 


Hemoglobin A1c     (4.2-6.1)  %














Assessment and Plan


(1) Diastolic CHF, acute on chronic


Status: Acute   





(2) Chronic a-fib


Status: Acute   





(3) Hx of CABG


Status: Acute   





(4) S/P AVR


Status: Acute   





(5) Diabetes


Status: Acute   





(6) Elevated troponin


Status: Acute   





(7) HTN (hypertension)


Status: Acute   





(8) Hx of CABG


Status: Acute   





(9) Hyperlipemia


Status: Acute   





(10) History of GI bleed


Status: Acute   





(11) Anemia


Status: Acute   


Plan: 


From cardiology standpoint, we'll continue the patient on her current dose of 

IV Lasix for 24 hours.  Check lytes BUN and creatinine in the morning.  We will 

also monitor her hemoglobin closely, and revisit the discussion regarding 

anticoagulation.








DNP note has been reviewed, I agree with a documented findings and plan of 

care.  Patient was seen and examined.

## 2017-09-19 NOTE — US
EXAMINATION TYPE: US carotid duplex BILAT

 

DATE OF EXAM: 9/19/2017

 

COMPARISON: NONE

 

CLINICAL HISTORY: dizziness. Dizziness, technically difficult study due to patient's heavy breathing

 

EXAM MEASUREMENTS: 

 

RIGHT:  Peak Systolic Velocity (PSV) cm/sec

----- Right CCA:  71.2  

----- Right ICA:  101.1     

----- Right ECA:  181.3**   

ICA/CCA ratio:  1.4    

 

RIGHT:  End Diastole cm/sec

----- Right CCA:  7.7   

----- Right ICA:  24.4      

----- Right ECA:  6.3     

 

LEFT:  Peak Systolic Velocity (PSV) cm/sec

----- Left CCA:  54.6  

----- Left ICA:  149.8**   

----- Left ECA:  135.7**  

ICA/CCA ratio:  2.7**  

 

LEFT:  End Diastole cm/sec

----- Left CCA:  8.4  

----- Left ICA:  41.7   

----- Left ECA:  0.0 

 

VERTEBRALS (direction of flow):

Right Vertebral: Antegrade

Left Vertebral: Antegrade

 

Rhythm:  Arrhythmia

 

 

 

 

 

IMPRESSION:  Bilateral intimal thickening, plaque bilateral bulb, elevated velocities: right mid ECA,
 left mid ICA, left distal ICA and left mid ECA, left ICA/CCA ratio of 2.7 for 50-69% stenosis. 

 

Criteria for Assigning % of Stenosis / Diameter reduction

(Estimation based on the indirect measurements of the internal carotid artery velocities (ICA PSV).

1.  Normal (no stenosis)=ICA PSV < 125 cm/s: ratio < 2.0: ICA EDV<40 cm/s.

2. Less than 50% stenosis=ICA PSV < 125 cm/s: ratio < 2.0: ICA EDV<40 cm/s.

3.  50 to 69% stenosis=ICA PSV of 125 to 230 cm/s: ration 2.0 ? 4.0: ICA EDV  cm/s.

4.  Greater than 70% stenosis to near occlusion= ICA PSV > 230 cm/s: ratio > 4.0: ICA EDV > 100 cm/s.
 

5.  Near occlusion= ICA PSV velocities may be low or undetectable: variable ratio and ICA EDV.

6.  Total occlusion=unable to detect flow.

## 2017-09-20 NOTE — CT
EXAMINATION TYPE: CT brain wo con

 

DATE OF EXAM: 9/20/2017

 

COMPARISON: NONE

 

HISTORY: Recurrent dizziness and occipital pain

 

CT DLP: 1047.10 mGycm

 

Unenhanced CT of the brain was performed.  

 

The ventricles, basal cisterns and sulci overlying the cerebral convexities demonstrate mild enlargem
ent. 

 

There is no evidence for intracranial hemorrhage or sulcal effacement.  

 

There is decreased attenuation about the periventricular white matter and deep white matter of both c
erebral hemispheres, compatible with chronic small vessel ischemia. Differential diagnosis does inclu
de demyelination. 

 

No mass effects are seen.No midline shift.

 

Osseous calvarium is intact.  

 

If symptoms persist consider MRI.  

 

IMPRESSION:

 

1. Age related atrophic and chronic small vessel ischemic change without acute intracranial process s
een at this time.

## 2017-09-20 NOTE — P.PN
Subjective


Principal diagnosis: 





Shortness of breath


This is a pleasant 86-year-old  female who follows regularly in the 

office with Dr. Nina.  She has history of hyperlipidemia, hypertension, 

diabetes,  family history of premature coronary artery disease, CABG in 2011, 

chronic persistent atrial fibrillation, history of aortic valve replacement 

with tissue valve, COPD, moderate mitral regurgitation, history of prior GI 

bleeding, history of pacemaker implantation, history of Carley filter 

placement, hypothyroidism, sleep apnea.  She presented to the hospital with 

symptoms of progressive dyspnea.  Patient had been taking her medications on a 

regular basis but states that she has not been compliant with her diet and did 

have a recent upper respiratory infection.  BNP level on admission 2270.  TSH 

level 7.8, troponins 0.034, 0.037, 0.046.  D-dimer came back to be elevated, VQ 

scan low probability for PE.  Patient is currently on IV heparin, not on oral 

anticoagulation at home.  According to [facility] recommendations, 

anticoagulation therapy is indicated.  It appears that this is been 

discontinued in the past secondary to bleeding and also secondary to the fact 

the patient refused to take anticoagulation according to the office notes.  We 

will rediscuss this with the patient again at this time, she does have again a 

low hemoglobin this morning.  She is currently on IV Lasix.  Documented weight 

appears to be up from yesterday.  Hemoglobin on admission 10.2, 8.4 this 

morning.  BUN 24, creatinine 1.3.











09/20/2017


Patient seen and examined this morning, sitting up in the chair at bedside.  

She has been putting out urine, overweight is as same as yesterday.  I did have 

a lengthy discussion with the patient this morning regarding the need for 

anticoagulation for stroke prevention, she is willing to be started on an oral 

anticoagulant.  She did not have any lab work this morning, we will order a CBC 

as well as lytes BUN and creatinine.  We will monitor the hemoglobin closely.  

Check to see if the patient has coverage, if so we will initiate Eliquis 2-1/2 

mg one tablet by mouth twice a day.   does understand the risk of bleeding 

but also understands the need for anticoagulation for stroke prevention





Objective





- Vital Signs


Vital signs: 


 Vital Signs











Temp  97.6 F   09/20/17 08:57


 


Pulse  96   09/20/17 11:05


 


Resp  18   09/20/17 08:57


 


BP  128/56   09/20/17 08:57


 


Pulse Ox  97   09/20/17 08:57








 Intake & Output











 09/19/17 09/20/17 09/20/17





 18:59 06:59 18:59


 


Intake Total 534 500 180


 


Output Total 800 500 


 


Balance -266 0 180


 


Weight  95.2 kg 95.2 kg


 


Intake:   


 


  Intake, IV Titration  500 





  Amount   


 


    Heparin Sodium,Porcine/  500 





    D5w Pmx 25,000 unit In   





    Dextrose/Water 1 500ml.   





    bag @ 11 UNITS/KG/HR 19.   





    95 mls/hr IV .Q24H [facility] Rx   





    #:[id number]   


 


  Oral 534  180


 


Output:   


 


  Urine 800 500 


 


Other:   


 


  Voiding Method Bedside Commode Bedside Commode Bedside Commode


 


  # Voids  2 














- Exam


PHYSICAL EXAMINATION: 





HEENT: Head is atraumatic, normocephalic.  Pupils equal, round.  Neck is 

supple.  There is no elevated jugular venous pressure.





HEART EXAMINATION: Heart S1 and S2 irregularly irregular a systolic ejection 

murmur is heard.





CHEST EXAMINATION: Lungs are clear with mild diminished air entry to the bases





ABDOMEN:  Soft, nontender. Bowel sounds are heard. No organomegaly noted.


 


EXTREMITIES: 1+ peripheral pulses with  evidence of peripheral edema and no 

calf tenderness noted.





NEUROLOGIC patient is awake, alert and oriented -3.


 


.


 











- Labs


CBC & Chem 7: 


 09/20/17 05:54





 09/20/17 05:54


Labs: 


 Abnormal Lab Results - Last 24 Hours (Table)











  09/19/17 09/19/17 09/20/17 Range/Units





  16:39 21:08 05:54 


 


RBC    3.14 L  (3.80-5.40)  m/uL


 


Hgb    8.4 L  (11.4-16.0)  gm/dL


 


Hct    27.0 L  (34.0-46.0)  %


 


RDW    15.6 H  (11.5-15.5)  %


 


APTT     (22.0-30.0)  sec


 


BUN     (7-17)  mg/dL


 


Creatinine     (0.52-1.04)  mg/dL


 


POC Glucose (mg/dL)  268 H  245 H   (75-99)  mg/dL














  09/20/17 09/20/17 09/20/17 Range/Units





  05:54 06:23 07:26 


 


RBC     (3.80-5.40)  m/uL


 


Hgb     (11.4-16.0)  gm/dL


 


Hct     (34.0-46.0)  %


 


RDW     (11.5-15.5)  %


 


APTT    55.5 H  (22.0-30.0)  sec


 


BUN  28 H    (7-17)  mg/dL


 


Creatinine  1.31 H    (0.52-1.04)  mg/dL


 


POC Glucose (mg/dL)   105 H   (75-99)  mg/dL














  09/20/17 Range/Units





  11:35 


 


RBC   (3.80-5.40)  m/uL


 


Hgb   (11.4-16.0)  gm/dL


 


Hct   (34.0-46.0)  %


 


RDW   (11.5-15.5)  %


 


APTT   (22.0-30.0)  sec


 


BUN   (7-17)  mg/dL


 


Creatinine   (0.52-1.04)  mg/dL


 


POC Glucose (mg/dL)  241 H  (75-99)  mg/dL














Assessment and Plan


(1) Diastolic CHF, acute on chronic


Status: Acute   





(2) Chronic a-fib


Status: Acute   





(3) Hx of CABG


Status: Acute   





(4) S/P AVR


Status: Acute   





(5) Diabetes


Status: Acute   





(6) Elevated troponin


Status: Acute   





(7) HTN (hypertension)


Status: Acute   





(8) Hx of CABG


Status: Acute   





(9) Hyperlipemia


Status: Acute   





(10) History of GI bleed


Status: Acute   





(11) Anemia


Status: Acute   


Plan: 


From cardiology standpoint, we'll discontinue the IV Lasix.  Increase home dose 

of Lasix.  Check on Eliquis for anticoagulation.


DNP note has been reviewed, I agree with a documented findings and plan of 

care.  Patient was seen and examined.

## 2017-09-20 NOTE — P.CNNES
History of Present Illness


Consult date: 17


Reason for Consult: Patient being evaluated for symptoms of dizziness.


History of Present Illness: 





This patient is a 86-year-old right-handed white female who was admitted to 

Hospital on 2017 for symptoms of shortness of breath and possible 

exacerbation of acute congestive heart failure.  She also has a history of 

chronic atrial fibrillation but is not on any anticoagulation.  Apparently the 

patient has refused to take anticoagulation previously.  Patient does have a 

long-standing history of multiple complex medical issues including placement of 

a AICD pacemaker and defibrillator in .  The patient was complaining of 

symptoms of dizziness which she describes as a sense of imbalance every time 

she stands up.  Apparently the dizziness is brought on by severe occipital 

headache pain.  The patient states the pain usually precedes her symptoms of 

feeling dizzy.  This was most noticed by her when she would stand from a 

sitting position.  Since admission to the hospital the symptoms also occur now 

when she is laying in bed.  The patient states that she does experience pain 

mostly in the suboccipital area of the head and neck region.  She also has some 

neck discomfort.  Due to the pain in the neck she was sent for a computed 

tomography scan of the cervical spine on 2017 which revealed no acute 

osseous abnormality.  Degenerative disc changes were noted.  The patient states 

that she has been having recurrent head and neck pain for the past several 

months.  The pain can be quite severe.  On examination today she is noted to 

have bilateral suboccipital pain on palpation which she rates as 7/10 in 

intensity.  The patient did not have a computed tomography scan of the brain on 

admission.  We will obtain a routine CT of the brain as she is unable to go for 

MRI due to her pacemaker and defibrillator.  The patient states that she does 

have a history of needing and sewing and does have chronic arthritic pain in 

the neck.  We did review the results of the computed tomography scan of the 

cervical spine today with the patient.  As noted she does have evidence of 

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  She states that the onset of her 

occipital pain brings on her sense of dizziness.  She is also being closely 

monitored for orthostatic hypotension.  She does follow with Dr. Kat Perry 

from cardiology who is also monitoring for orthostasis.  Due to the symptoms of 

dizziness neurology is now been consulted for further evaluation and 

recommendations.





Review of Systems


All systems: negative


Constitutional: Denies chills, Denies fever


Eyes: denies blurred vision, denies pain


Ears, nose, mouth and throat: Denies headache, Denies sore throat


Cardiovascular: Denies chest pain, Denies shortness of breath


Respiratory: Denies cough


Gastrointestinal: Denies abdominal pain, Denies diarrhea, Denies nausea, Denies 

vomiting


Genitourinary: Denies dysuria, Denies hematuria


Musculoskeletal: Denies myalgias


Integumentary: Denies pruritus, Denies rash


Neurological: Reports confusion, Reports headaches, Reports paresthesias, 

Reports tingling, Reports vertigo, Denies numbness, Denies weakness


Psychiatric: Denies anxiety, Denies depression





Past Medical History


Past Medical History: Coronary Artery Disease (CAD), Cancer, Heart Failure, 

Diabetes Mellitus, GI Bleed, Hypertension, Osteoarthritis (OA), Pulmonary 

Embolus (PE), Sleep Apnea/CPAP/BIPAP, Thyroid Disorder


Additional Past Medical History / Comment(s): gout, chronic pain in back hip 

and leg, diverticulitis, LT EYE BLIND,GLAUCOMA, MAC DEGEBERATION, MURMUR, 

DIVERTICULAR DX.OVARIAN CYSTS,ANEMIA,SKIN CA,ANEMIA, TRACHEBRONCHITIS WITH 

REACTIVE BRONCHOSPSM.used to use cpap machine but stopped  1 year ago d/t mask 

not fitting well.


History of Any Multi-Drug Resistant Organisms: None Reported


Past Surgical History: Appendectomy, Bowel Resection, Breast Surgery, 

Cholecystectomy, Coronary Bypass/CABG, Hysterectomy, Orthopedic Surgery, 

Pacemaker, Tonsillectomy


Additional Past Surgical History / Comment(s): pacemaker, AORTIC VALVE 

REPLACEMENT(TISSUE), CYST REMOVED FROM HAND/HEAD,BOWEL RESECTION D/T 

DIVERTICULITIS.GEENFIELD FILTER


Past Anesthesia/Blood Transfusion Reactions: No Reported Reaction


Type of Cardiac Device: Permanent Pacemaker


Device Placement Date:: 


Smoking Status: Former smoker





- Past Family History


  ** Father


Family Medical History: Cancer


Additional Family Medical History / Comment(s): ALCOHOLIC--  





  ** Mother


Family Medical History: Cancer, Coronary Artery Disease (CAD), Dementia, 

Diabetes Mellitus


Additional Family Medical History / Comment(s):  





Medications and Allergies


 Home Medications











 Medication  Instructions  Recorded  Confirmed  Type


 


Bimatoprost [Lumigan .01% Ophth 1 drop RIGHT EYE HS 14 History





Soln]    


 


Carvedilol [Coreg] 3.125 mg PO BID 14 History


 


Furosemide [Lasix] 80 mg PO BID 14 History


 


glipiZIDE [Glucotrol] 10 mg PO AC-BID 14 History


 


sitaGLIPtin [Januvia] 100 mg PO DAILY 14 History


 


Allopurinol [Allopurinol] 200 mg PO BID 06/03/15 09/17/17 History


 


Vit C/E/Zn/Coppr/Lutein/Zeaxan 2 tab PO BID 06/03/15 09/17/17 History





[Preservision Areds 2 Softgel]    


 


Insulin Detemir [Levemir] 100 unit SQ AC-SUPPER 17 History











 Allergies











Allergy/AdvReac Type Severity Reaction Status Date / Time


 


aspirin Allergy  Unknown Verified 17 14:22


 


bee pollen [Bee Pollen] Allergy  Anaphylaxis Verified 17 14:22


 


celecoxib [From Celebrex] Allergy  Unknown Verified 17 14:22


 


URIC ACIDS AdvReac  GOUT Uncoded 17 13:23














Physical Examination





- Vital Signs


Vital Signs: 


 Vital Signs











  Temp Pulse Pulse Resp BP BP BP


 


 17 20:45   68     


 


 17 19:59  96.8 F L   64  20    136/62


 


 17 16:46  97.8 F   72  16   118/57 


 


 17 12:16  97.7 F   76  18  128/60  


 


 17 08:20  97.4 F L   69  16  102/54  


 


 17 04:00  98.1 F   66  18   119/53 


 


 17 00:00  97.8 F   66  18    124/61














  Pulse Ox


 


 17 20:45 


 


 17 19:59  93 L


 


 17 16:46  98


 


 17 12:16  95


 


 17 08:20  96


 


 17 04:00  98


 


 17 00:00  95








 Intake and Output











 17





 06:59 14:59 22:59


 


Intake Total 500 416 118


 


Output Total  800 500


 


Balance 500 -384 -382


 


Intake:   


 


  Intake, IV Titration 500  





  Amount   


 


    Heparin Sodium,Porcine/ 500  





    D5w Pmx 25,000 unit In   





    Dextrose/Water 1 500ml.   





    bag @ 11 UNITS/KG/HR 19.   





    95 mls/hr IV [facility]   





    #:[id number]   


 


  Oral  416 118


 


Output:   


 


  Urine  800 500


 


Other:   


 


  Voiding Method Bedside Commode Bedside Commode Bedside Commode


 


  # Voids 2  2


 


  Weight 94.8 kg  














- Constitutional


General appearance: average body habitus, cooperative





- EENT


EENT: PERRL, mucous membranes moist





- Respiratory


Respiratory: lungs clear, normal breath sounds





- Cardiovascular


Cardiovascular: regular rate, normal S1, normal S2


Extremities: no peripheral edema bilaterally





- Gastrointestinal


Gastrointestinal: normoactive bowel sounds





- Integumentary


Integumentary: normal





- Neurologic


Cranial nerve examination: PERRL, EOMI, VFF, V1/V2/V3 grossly intact, face 

symmetric, intact shoulder shrug, intact gag reflex, intact corneal reflex, 

normal palatal elevation


Speech examination: intact


Sensorimotor examination: intact


Detailed motor examination: grossly full strength in all extremities


Motor examination - right side: 4/5: biceps, triceps, wrist flexion, wrist 

extension, , hip flexors, knee extensors, dorsiflexion, toe extension (EHL)

, plantarflexion


Motor examination - left side: 4/5: biceps, triceps, wrist flexion, wrist 

extension, , hip flexors, knee extensors, dorsiflexion, toe extension (EHL)

, plantarflexion


Detailed sensory examination: intact


Reflex and gait examination: intact


Reflexes: 1+: ankle, bicep, knee, tricep





- Musculoskeletal


Musculoskeletal: no pain





- Psychiatric


Psychiatric: mood/affect appropriate, cooperative





Results





- Laboratory Findings


CBC and BMP: 


 17 05:54





 17 05:54


Abnormal Lab Findings: 


 Abnormal Labs











  17





  13:55 13:55 13:55


 


RBC  3.69 L  


 


Hgb  10.2 L  


 


Hct  31.7 L  


 


RDW  15.8 H  


 


APTT   


 


D-Dimer    0.92 H


 


Potassium   3.4 L 


 


Carbon Dioxide   20 L 


 


BUN   24 H 


 


Creatinine   1.20 H 


 


Glucose   324 H 


 


POC Glucose (mg/dL)   


 


Hemoglobin A1c   


 


AST   38 H 


 


Alkaline Phosphatase   179 H 


 


Total Creatine Kinase   


 


Troponin I   


 


Total Protein   6.1 L 


 


TSH   














  17





  13:55 17:16 20:28


 


RBC   


 


Hgb   


 


Hct   


 


RDW   


 


APTT   


 


D-Dimer   


 


Potassium   


 


Carbon Dioxide   


 


BUN   


 


Creatinine   


 


Glucose   


 


POC Glucose (mg/dL)   241 H  297 H


 


Hemoglobin A1c  9.8 H  


 


AST   


 


Alkaline Phosphatase   


 


Total Creatine Kinase   


 


Troponin I   


 


Total Protein   


 


TSH   














  17





  22:05 22:05 01:34


 


RBC   


 


Hgb   


 


Hct   


 


RDW   


 


APTT   35.0 H 


 


D-Dimer   


 


Potassium   


 


Carbon Dioxide   


 


BUN   


 


Creatinine   


 


Glucose   


 


POC Glucose (mg/dL)   


 


Hemoglobin A1c   


 


AST   


 


Alkaline Phosphatase   


 


Total Creatine Kinase    29 L


 


Troponin I  0.037 H*   0.046 H*


 


Total Protein   


 


TSH   














  17





  05:06 05:09 05:09


 


RBC    3.15 L


 


Hgb    8.7 L D


 


Hct    27.1 L


 


RDW    15.6 H


 


APTT   40.2 H 


 


D-Dimer   


 


Potassium   


 


Carbon Dioxide   


 


BUN   


 


Creatinine   


 


Glucose   


 


POC Glucose (mg/dL)  177 H  


 


Hemoglobin A1c   


 


AST   


 


Alkaline Phosphatase   


 


Total Creatine Kinase   


 


Troponin I   


 


Total Protein   


 


TSH   














  17





  05:09 12:11 12:33


 


RBC   


 


Hgb   


 


Hct   


 


RDW   


 


APTT   48.3 H 


 


D-Dimer   


 


Potassium   


 


Carbon Dioxide   


 


BUN  23 H  


 


Creatinine  1.20 H  


 


Glucose  166 H  


 


POC Glucose (mg/dL)    241 H


 


Hemoglobin A1c   


 


AST   


 


Alkaline Phosphatase   


 


Total Creatine Kinase   


 


Troponin I   


 


Total Protein   


 


TSH  7.800 H  














  17





  16:40 20:48 05:41


 


RBC   


 


Hgb   


 


Hct   


 


RDW   


 


APTT   


 


D-Dimer   


 


Potassium   


 


Carbon Dioxide   


 


BUN   


 


Creatinine   


 


Glucose   


 


POC Glucose (mg/dL)  167 H  185 H  104 H


 


Hemoglobin A1c   


 


AST   


 


Alkaline Phosphatase   


 


Total Creatine Kinase   


 


Troponin I   


 


Total Protein   


 


TSH   














  17





  06:02 06:02 06:02


 


RBC  3.15 L  


 


Hgb  8.4 L  


 


Hct  26.9 L  


 


RDW  16.7 H  


 


APTT    59.0 H


 


D-Dimer   


 


Potassium   


 


Carbon Dioxide   


 


BUN   24 H 


 


Creatinine   1.38 H 


 


Glucose   


 


POC Glucose (mg/dL)   


 


Hemoglobin A1c   


 


AST   


 


Alkaline Phosphatase   


 


Total Creatine Kinase   


 


Troponin I   


 


Total Protein   


 


TSH   














  17





  11:39 16:39 21:08


 


RBC   


 


Hgb   


 


Hct   


 


RDW   


 


APTT   


 


D-Dimer   


 


Potassium   


 


Carbon Dioxide   


 


BUN   


 


Creatinine   


 


Glucose   


 


POC Glucose (mg/dL)  229 H  268 H  245 H


 


Hemoglobin A1c   


 


AST   


 


Alkaline Phosphatase   


 


Total Creatine Kinase   


 


Troponin I   


 


Total Protein   


 


TSH   














Assessment and Plan


(1) Occipital neuritis


Status: Acute   Code(s): M54.81 - OCCIPITAL NEURALGIA   





(2) Benign positional vertigo


Status: Acute   Code(s): H81.10 - BENIGN PAROXYSMAL VERTIGO, UNSPECIFIED EAR   





(3) Chronic a-fib


Status: Acute   Code(s): I48.2 - CHRONIC ATRIAL FIBRILLATION   





(4) S/P AVR


Status: Acute   Code(s): Z95.2 - PRESENCE OF PROSTHETIC HEART VALVE   





(5) Acute congestive heart failure


Status: Acute   Code(s): I50.9 - HEART FAILURE, UNSPECIFIED   


Plan: 





This patient is a 86-year-old female with multiple complex medical issues.  

Neurology was consulted due to evaluation of dizziness.  Patient has been 

having symptoms of disequilibrium and balance issues when standing.  More 

recently she is also been experiencing the same symptoms when laying in bed.  

She states the symptoms are always preceded by severe bilateral suboccipital 

headache pain.  Her neurological examination today reveals evidence of 

bilateral occipital neuritis.  We have recommended she undergo a bilateral 

occipital nerve block procedure for further treatment.  We will also obtain a 

routine computed tomography scan of the brain.  Patient's neurological 

examination otherwise is nonfocal.  She does have a history of chronic atrial 

fibrillation but has declined any use of anticoagulant medications for 

treatment.  We will await further recommendations from cardiology.  Patient 

should be closely monitored for orthostatic hypotension as well.  We will 

continue close neurological follow-up for this patient during this admission.  

Her overall prognosis at this time remains guarded.


Time with Patient: Greater than 30

## 2017-09-20 NOTE — P.PN
Subjective





This patient is a 86-year-old female who was seen in neurology consultation 

yesterday for dizziness and occipital headache pain.  Patient's neurological 

exam findings yesterday suggested she has bilateral occipital neuritis.  We 

requested a consultation from anesthesia for procedure of a bilateral occipital 

nerve block to be done for this patient.  We are still awaiting anesthesias 

consultation for the patient.  Patient has noticed increased symptoms of 

dizziness just prior to her experiencing severe occipital headache pain.  She 

does have evidence of occipital neuritis with significant tenderness in the 

suboccipital notch bilaterally.  She otherwise is also being monitored for 

evidence of orthostatic hypotension.  We will instruct the nursing staff to 

contact anesthesia tomorrow morning to reconsult them for the occipital nerve 

block procedure for this patient.  She still does demonstrate evidence of 

moderate degree of occipital neuritis on palpation.  We have recommended that 

she should apply moist heat to the head and neck region when she is discharged 

back to home.  The patient otherwise has been doing fairly well today.  She 

looks more awake and alert today as compared to yesterday.  We will continue 

close neurological follow-up of this patient during this admission.





Objective





- Vital Signs


Vital signs: 


 Vital Signs











Temp  97.8 F   09/20/17 16:35


 


Pulse  83   09/20/17 16:35


 


Resp  18   09/20/17 16:35


 


BP  115/55   09/20/17 16:35


 


Pulse Ox  97   09/20/17 16:35








 Intake & Output











 09/20/17 09/20/17 09/21/17





 06:59 18:59 06:59


 


Intake Total 500 560 


 


Output Total 500  


 


Balance 0 560 


 


Weight 95.2 kg 95.2 kg 


 


Intake:   


 


  Intake, IV Titration 500  





  Amount   


 


    Heparin Sodium,Porcine/ 500  





    D5w Pmx 25,000 unit In   





    Dextrose/Water 1 500ml.   





    bag @ 11 UNITS/KG/HR 19.   





    95 mls/hr IV .Q24H [facility] Rx   





    #:[id number]   


 


  Oral  560 


 


Output:   


 


  Urine 500  


 


Other:   


 


  Voiding Method Bedside Commode Bedside Commode 


 


  # Voids 2 5 














- Exam





Physical examination:





PHYSICAL EXAMINATION: Patient is resting comfortably in bed. 


VITAL SIGNS: Blood pressure is [115/55]. Heart rate is [60]. Respiration is [16]

. Temperature is [97.8].


HEENT: Head is atraumatic, neck is supple, there were no carotid bruits.


CHEST: Lungs are clear to auscultation and percussion.  


CARDIAC: S1, S2 normal rate and rhythm. There is no murmur.


ABDOMEN: Soft and nontender. Bowel sounds are present.


EXTREMITIES:  There is no pedal edema.  Peripheral pulses are present.





Neurological examination:





Patient's neurological examination is unchanged from yesterday.





- Labs


CBC & Chem 7: 


 09/20/17 05:54





 09/20/17 05:54


Labs: 


 Abnormal Lab Results - Last 24 Hours (Table)











  09/19/17 09/20/17 09/20/17 Range/Units





  21:08 05:54 05:54 


 


RBC    3.14 L  (3.80-5.40)  m/uL


 


Hgb    8.4 L  (11.4-16.0)  gm/dL


 


Hct    27.0 L  (34.0-46.0)  %


 


RDW    15.6 H  (11.5-15.5)  %


 


APTT     (22.0-30.0)  sec


 


BUN     (7-17)  mg/dL


 


Creatinine     (0.52-1.04)  mg/dL


 


POC Glucose (mg/dL)  245 H    (75-99)  mg/dL


 


Iron   19 L   ()  ug/dL


 


Iron Saturation   5.37 L   (12.00-45.00)   














  09/20/17 09/20/17 09/20/17 Range/Units





  05:54 06:23 07:26 


 


RBC     (3.80-5.40)  m/uL


 


Hgb     (11.4-16.0)  gm/dL


 


Hct     (34.0-46.0)  %


 


RDW     (11.5-15.5)  %


 


APTT    55.5 H  (22.0-30.0)  sec


 


BUN  28 H    (7-17)  mg/dL


 


Creatinine  1.31 H    (0.52-1.04)  mg/dL


 


POC Glucose (mg/dL)   105 H   (75-99)  mg/dL


 


Iron     ()  ug/dL


 


Iron Saturation     (12.00-45.00)   














  09/20/17 09/20/17 Range/Units





  11:35 16:35 


 


RBC    (3.80-5.40)  m/uL


 


Hgb    (11.4-16.0)  gm/dL


 


Hct    (34.0-46.0)  %


 


RDW    (11.5-15.5)  %


 


APTT    (22.0-30.0)  sec


 


BUN    (7-17)  mg/dL


 


Creatinine    (0.52-1.04)  mg/dL


 


POC Glucose (mg/dL)  241 H  310 H  (75-99)  mg/dL


 


Iron    ()  ug/dL


 


Iron Saturation    (12.00-45.00)   














Assessment and Plan


(1) Occipital neuritis


Status: Acute   Code(s): M54.81 - OCCIPITAL NEURALGIA   





(2) Benign positional vertigo


Status: Acute   Code(s): H81.10 - BENIGN PAROXYSMAL VERTIGO, UNSPECIFIED EAR   





(3) Chronic a-fib


Status: Acute   Code(s): I48.2 - CHRONIC ATRIAL FIBRILLATION   





(4) S/P AVR


Status: Acute   Code(s): Z95.2 - PRESENCE OF PROSTHETIC HEART VALVE   





(5) Acute congestive heart failure


Status: Acute   Code(s): I50.9 - HEART FAILURE, UNSPECIFIED   


Plan: 





This patient is a 86-year-old female with multiple complex medical issues.  

Neurology was consulted due to evaluation of dizziness.  Patient has been 

having symptoms of disequilibrium and balance issues when standing.  More 

recently she is also been experiencing the same symptoms when laying in bed.  

She states the symptoms are always preceded by severe bilateral suboccipital 

headache pain.  Her neurological examination today reveals evidence of 

bilateral occipital neuritis.  We have recommended she undergo a bilateral 

occipital nerve block procedure for further treatment.  We will also obtain a 

routine computed tomography scan of the brain.  Patient's neurological 

examination otherwise is nonfocal.  She does have a history of chronic atrial 

fibrillation but has declined any use of anticoagulant medications for 

treatment.  We will await further recommendations from cardiology.  We have 

instructed the nursing staff to contact anesthesia tomorrow morning for re-

consultation for a bilateral occipital nerve block procedure for this patient.  

Patient denies any new symptoms of headache or dizziness.  She has been able to 

stand did bedside and seems to be relatively stable.  She is to be monitored 

for orthostatic blood pressure changes.  The patient underwent computed 

tomography scan of the brain today which revealed age related atrophy and 

chronic small vessel ischemic changes.  No evidence of acute stroke or 

hemorrhage.  Patient should be closely monitored for orthostatic hypotension as 

well.  We will await anesthesia to see the patient tomorrow for occipital nerve 

block treatment and procedure.  We will monitor her response to this treatment 

and we'll give further recommendations at that time.  We will continue close 

neurological follow-up for this patient during this admission.  Her overall 

prognosis at this time remains guarded.

## 2017-09-21 NOTE — P.PN
Subjective


Principal diagnosis: 





CHF





This is a pleasant 86-year-old  female patient who sees Dr. Nina in the office on regular basis with a past medical history 

significant for CAD and prior coronary artery bypass grafting with unknown 

details at this point, known severe ischemic cardiomyopathy and status post ICD

, diabetes, hypertension, dyslipidemia, and chronic atrial fibrillation 

presented to the hospital complaining of progressive dyspnea.





The patient has been experiencing progressive exertional dyspnea as well as 

orthopnea and also bilateral lower extremities edema started a few weeks ago.  

Her symptoms got worse over the last several days and she decided to come to 

the hospital.


She states that she was not having any chest discomfort to yesterday when she 

developed a brief episode of chest discomfort.





The patient stated that she was taking all her medications.  She was not 

compliant with her diet.  She did not have any recent history of upper 

respiratory infection.





The chest x-ray came in to be unremarkable.  The BNP came in to be elevated.  

The EKG showed atrial fibrillation was controlled heart rate with a right 

bundle branch block and nonspecific changes most prominent in the lateral leads.





The patient was started on Lasix IV with significant improvement in these 

symptoms and then she was switched into Lasix by mouth.





On follow-up with her today on 09/21/2017, she seems to be doing good.  She 

denies having any chest pain or discomfort.  The shortness of breath is back to 

baseline.





The patient is not a candidate to receive any kind of anticoagulation because 

she received oral anticoagulation in the past and she developed significant GI 

bleeding.








Objective





- Vital Signs


Vital signs: 


 Vital Signs











Temp  98.7 F   09/21/17 04:00


 


Pulse  80   09/21/17 08:02


 


Resp  16   09/21/17 07:40


 


BP  124/63   09/21/17 04:00


 


Pulse Ox  97   09/21/17 04:00








 Intake & Output











 09/20/17 09/21/17 09/21/17





 18:59 06:59 18:59


 


Intake Total 560 20 180


 


Balance 560 20 180


 


Weight 95.2 kg 96 kg 


 


Intake:   


 


  IV  20 


 


    0.9 ns  20 


 


  Oral 560  180


 


Other:   


 


  Voiding Method Bedside Commode  


 


  # Voids 5 2 














- Constitutional


General appearance: Present: no acute distress





- Respiratory


Respiratory: bilateral: wheezing





- Cardiovascular


Rhythm: irregularly irregular





- Labs


CBC & Chem 7: 


 09/20/17 05:54





 09/20/17 05:54


Labs: 


 Abnormal Lab Results - Last 24 Hours (Table)











  09/20/17 09/20/17 09/20/17 Range/Units





  05:54 11:35 16:35 


 


POC Glucose (mg/dL)   241 H  310 H  (75-99)  mg/dL


 


Iron  19 L    ()  ug/dL


 


Iron Saturation  5.37 L    (12.00-45.00)   














  09/20/17 09/21/17 Range/Units





  20:59 06:06 


 


POC Glucose (mg/dL)  209 H  156 H  (75-99)  mg/dL


 


Iron    ()  ug/dL


 


Iron Saturation    (12.00-45.00)   














Assessment and Plan


Plan: 





At this point, I will continue oral diuretics.  Continue the current medical 

treatment.





From a perivascular standpoint of view, we'll see the patient on when necessary 

case.

## 2017-09-21 NOTE — P.PCN
Date of Procedure: 09/21/17


Preoperative Diagnosis: 


Bilateral occipital neuralgia


Myofascial pain in the cervical paravertebral musculature


Postoperative Diagnosis: 


Same as above


Procedure(s) Performed: 


Bilateral occipital nerve block


Trigger point injection in the right cervical paravertebral musculature and 

suboccipital area


Anesthesia: local, none


Surgeon: Michelle Castle


Pathology: none sent


Condition: stable


Disposition: PACU


Description of Procedure: 


The patient was seen and identified in the preoperative area. Risks, benefits, 

complications, and alternatives were discussed with the patient, the patient 

agreed to proceed with the procedure and signed the consent. IV was started. 

Vital signs remained stable throughout the procedure.





Patient was taken to the OR and time out was completed. The patient was placed 

in the prone  position on the procedure table. A pillow was placed under the 

patients chest to increase the cervical interlaminar space. The cervical area 

and B/L occiptial area were prepped with ChloraPrep. Critical pause was taken. 

Vital signs were closely monitored during the procedure. Conscious sedation was 

used during the procedure to decrease patients anxiety. 


.  The  the occipital exuberance and superior nuchal line were identified on 

each side of the occiput.  The greater occipital nerve location was estimated 

to be medial to the occipital artery and one third of the distance between the 

occipital exuberance and the  lesser occipital nerve was identified two thirds 

of the distance between the occipital exuberance and the mastoid.  I used 25-

gauge 1-1/2 inch needle to go through the skin at these 2 points and infiltrate 

2.5 MLS of a solution made up of 7 MLS Marcaine 0.5% +20 mg of Kenalog.  Only a 

total of 5 MLS of the solution was given for these blocks.  The solution was 

infiltrated down to the periosteum.  I then did trigger point injection on the 

right cervical paravertebral musculature and the suboccipital area using 25-

gauge 1-1/2 inch needle and infiltrated 2 MLS of the above-mentioned solution 

.Patient tolerated procedure well.

## 2017-09-21 NOTE — P.PN
Subjective


Principal diagnosis: 





Acute CHF exacerbation.





Patient is a 86-year-old female with a known history of coronary artery disease 

status post bypass graft in 2011 and ACD placement, hypertension, diabetes type 

2 non-insulin-dependent and multiple other medical problems came to the 

hospital with complaints of worsening Short of breath since yesterday.  

Apparently patient has been having short of breath and dizziness for the past 

few months.  Denied any fever or chills.  Denied any chest pain.  No nausea or 

vomiting abdominal pain. She says that she typically does not wear oxygen 

however has required it today.  She states it is worse with exertion and better 

with rest.  Does not seem to be related to position.  She has no associated 

chest pain however does complain of some lightheadedness at times.  She does 

admit to some lower extremity swelling however mild.  She states that she has 

not had a CHF exacerbation in 2 years.  Her cardiologist is Dr. Nina.  

She denies any recent travel or surgeries.  No history of PE or DVT.  No other 

complaints.





BNP 2270, troponin 0.037.  Hemoglobin 10.2 potassium 3.4


EKG showed atrial flutter


Chest x-ray showed no acute process


VQ scan showed low powered for PE





09/18/2017


Patient says that her breathing is better today.  Continued on IV diuresis with 

20 mg of Lasix twice daily.  2-D echo report is pending.


Patient was found to have elevated TSH level and was started on thyroid 

supplements


Patient denied any chest pain or short of breath worsening.  No nausea vomiting 

or abdominal pain.  No acute otherwise overnight issues.





09/19/2017


Patient says that she has been having dizziness whenever she gets up and go to 

the bathroom and also complaints of neck pain.  Dizziness has been going on for 

a while as per the patient.  Patient says that she's been feeling grinding 

sensation in the lower neck.  


Orthostatic vitals as ordered.  We'll order carotid duplex and CT cervical 

spine without contrast.


We will also consult neurology for further evaluation of dizziness.  


No compressive chest pain.  No other acute overnight issues.





09/20/2017


Patient is to accompany of suboccipital headaches and dizziness.  Neurology has 

seen the patient and suspected occipital neuritis and is planning for nerve 

block.  Patient with a status improved now and Lasix has been changed to by 

mouth.  Orthostatic vitals were negative.  Carotid duplex showed 50-69% 

stenosis.  CT cervical spine showed narrowed foramen and degenerative disc 

disease.  Patient denied any chest pain no fever no chills.  No acute overnight 

issues.





09/21/2017


Patient underwent occipital nerve block injections.  Today patient says that 

her dizziness is slightly better.  Otherwise patient is still complaining of 

shortness of breath when she ambulates.  Patient was able to sleep well last 

night with Restoril 15 mg.  No complaints of chest pain.  No overnight issues.


Cardiology recommends no anticoagulation due to previous GI bleed with blood 

thinners.  Patient also refused anticoagulation.











Current medications reviewed.





Objective





- Vital Signs


Vital signs: 


 Vital Signs











Temp  98.5 F   09/21/17 17:25


 


Pulse  87   09/21/17 17:25


 


Resp  16   09/21/17 17:25


 


BP  131/69   09/21/17 17:25


 


Pulse Ox  96   09/21/17 17:25








 Intake & Output











 09/21/17 09/21/17 09/22/17





 06:59 18:59 06:59


 


Intake Total 20 580 


 


Balance 20 580 


 


Weight 96 kg  


 


Intake:   


 


  IV 20  


 


    0.9 ns 20  


 


  Oral  580 


 


Other:   


 


  Voiding Method  Bedside Commode 


 


  # Voids 2 3 














- Exam





PHYSICAL EXAMINATION:


 Patient is lying in the bed comfortably, no acute distress, awake alert and 

oriented.. 


HEENT: Normocephalic. Neck is supple.  Cervical muscle tenderness in the back.  

Pupils reactive. Nostrils clear. Oral cavity is moist. Ears reveal no drainage. 


Neck reveals no JVD, carotid bruits, or thyromegaly. 


CHEST EXAMINATION: Trachea is central. Symmetrical expansion. Lung fields clear 

to auscultation and percussion. 


CARDIAC: Normal S1, S2 with no gallops. No murmurs 


 ABDOMEN: Soft. Bowel sounds normal. No organomegaly. No abdominal bruits. 


Extremities 1+ edema.  No clubbing or cyanosis 


Neurologically awake, alert, oriented x3 with well-coordinated movements. 


Skin: no rash or skin lesions


Musculoskeletal: no joint swelling or deformity.








- Labs


CBC & Chem 7: 


 09/20/17 05:54





 09/20/17 05:54


Labs: 


 Abnormal Lab Results - Last 24 Hours (Table)











  09/21/17 09/21/17 09/21/17 Range/Units





  06:06 11:30 16:46 


 


POC Glucose (mg/dL)  156 H  266 H  211 H  (75-99)  mg/dL














  09/21/17 Range/Units





  20:55 


 


POC Glucose (mg/dL)  186 H  (75-99)  mg/dL














Assessment and Plan


Plan: 





#1 shortness of breath secondary to acute CHF exacerbation.  EF ejection 

fraction unknown.  2-D echo report pending.


#2 dizziness and disequilibrium followed by Sub occipital headache.  Likely due 

to occipital neuritis.


#2 chronic atrial fibrillation.  Not on anticoagulation at home.  Patient 

refused to take anti-correlation previously. 


#3 history of coronary artery disease and bypass graft.  AICD placement as well


#4 history of aortic valve replacement


#4 hypokalemia


#5 hypertension


#6 diabetes type 2 non-insulin-dependent


#7 hypothyroidism


#8 obstructive sleep apnea on CPAP at home


#9 acute on chronic kidney disease stage III . creatinine 1.2


#10 obesity with BMI 34.3


#11 history of gout stable


#12 hypothyroidism.  Started on levothyroxine 75 g daily.





Plan:


Lasix has been changed to by mouth.


Planning for by mouth anticoagulation.  Continue with Coreg.  Patient is 

ALLERGIC to aspirin.  Continue with telemetry monitoring and serial EKGs and 

troponins.  Cardiology is following.  VQ scan showed low probability for PE.  

Neurology is planning for occipital nerve block.  Continued warm compresses.  

Continue to follow closely and further recommendations based on the clinical 

course.  


Prognosis is guarded with multiple medical problems and comorbid conditions.





Time with Patient: Greater than 30

## 2017-09-21 NOTE — P.PN
Subjective


Principal diagnosis: 





Acute CHF exacerbation.





Patient is a 86-year-old female with a known history of coronary artery disease 

status post bypass graft in 2011 and ACD placement, hypertension, diabetes type 

2 non-insulin-dependent and multiple other medical problems came to the 

hospital with complaints of worsening Short of breath since yesterday.  

Apparently patient has been having short of breath and dizziness for the past 

few months.  Denied any fever or chills.  Denied any chest pain.  No nausea or 

vomiting abdominal pain. She says that she typically does not wear oxygen 

however has required it today.  She states it is worse with exertion and better 

with rest.  Does not seem to be related to position.  She has no associated 

chest pain however does complain of some lightheadedness at times.  She does 

admit to some lower extremity swelling however mild.  She states that she has 

not had a CHF exacerbation in 2 years.  Her cardiologist is Dr. Nina.  

She denies any recent travel or surgeries.  No history of PE or DVT.  No other 

complaints.





BNP 2270, troponin 0.037.  Hemoglobin 10.2 potassium 3.4


EKG showed atrial flutter


Chest x-ray showed no acute process


VQ scan showed low powered for PE





09/18/2017


Patient says that her breathing is better today.  Continued on IV diuresis with 

20 mg of Lasix twice daily.  2-D echo report is pending.


Patient was found to have elevated TSH level and was started on thyroid 

supplements


Patient denied any chest pain or short of breath worsening.  No nausea vomiting 

or abdominal pain.  No acute otherwise overnight issues.





09/19/2017


Patient says that she has been having dizziness whenever she gets up and go to 

the bathroom and also complaints of neck pain.  Dizziness has been going on for 

a while as per the patient.  Patient says that she's been feeling grinding 

sensation in the lower neck.  


Orthostatic vitals as ordered.  We'll order carotid duplex and CT cervical 

spine without contrast.


We will also consult neurology for further evaluation of dizziness.  


No compressive chest pain.  No other acute overnight issues.





09/20/2017


Patient is to accompany of suboccipital headaches and dizziness.  Neurology has 

seen the patient and suspected occipital neuritis and is planning for nerve 

block.  Patient with a status improved now and Lasix has been changed to by 

mouth.  Orthostatic vitals were negative.  Carotid duplex showed 50-69% 

stenosis.  CT cervical spine showed narrowed foramen and degenerative disc 

disease.  Patient denied any chest pain no fever no chills.  No acute overnight 

issues.





Objective





- Vital Signs


Vital signs: 


 Vital Signs











Temp  97.1 F L  09/20/17 20:00


 


Pulse  96   09/20/17 21:14


 


Resp  18   09/20/17 20:00


 


BP  138/69   09/20/17 20:00


 


Pulse Ox  94 L  09/20/17 20:00








 Intake & Output











 09/20/17 09/20/17 09/21/17





 06:59 18:59 06:59


 


Intake Total 500 560 10


 


Output Total 500  


 


Balance 0 560 10


 


Weight 95.2 kg 95.2 kg 


 


Intake:   


 


  IV   10


 


    0.9 ns   10


 


  Intake, IV Titration 500  





  Amount   


 


    Heparin Sodium,Porcine/ 500  





    D5w Pmx 25,000 unit In   





    Dextrose/Water 1 500ml.   





    bag @ 11 UNITS/KG/HR 19.   





    95 mls/hr IV .Q24H DAVID Rx   





    #:[id number]   


 


  Oral  560 


 


Output:   


 


  Urine 500  


 


Other:   


 


  Voiding Method Bedside Commode Bedside Commode 


 


  # Voids 2 5 














- Exam





PHYSICAL EXAMINATION:


 Patient is lying in the bed comfortably, no acute distress, awake alert and 

oriented.. 


HEENT: Normocephalic. Neck is supple.  Cervical muscle tenderness in the back.  

Pupils reactive. Nostrils clear. Oral cavity is moist. Ears reveal no drainage. 


Neck reveals no JVD, carotid bruits, or thyromegaly. 


CHEST EXAMINATION: Trachea is central. Symmetrical expansion. Lung fields clear 

to auscultation and percussion. 


CARDIAC: Normal S1, S2 with no gallops. No murmurs 


 ABDOMEN: Soft. Bowel sounds normal. No organomegaly. No abdominal bruits. 


Extremities 1+ edema.  No clubbing or cyanosis 


Neurologically awake, alert, oriented x3 with well-coordinated movements. 


Skin: no rash or skin lesions


Musculoskeletal: no joint swelling or deformity.








- Labs


CBC & Chem 7: 


 09/20/17 05:54





 09/20/17 05:54


Labs: 


 Abnormal Lab Results - Last 24 Hours (Table)











  09/20/17 09/20/17 09/20/17 Range/Units





  05:54 05:54 05:54 


 


RBC   3.14 L   (3.80-5.40)  m/uL


 


Hgb   8.4 L   (11.4-16.0)  gm/dL


 


Hct   27.0 L   (34.0-46.0)  %


 


RDW   15.6 H   (11.5-15.5)  %


 


APTT     (22.0-30.0)  sec


 


BUN    28 H  (7-17)  mg/dL


 


Creatinine    1.31 H  (0.52-1.04)  mg/dL


 


POC Glucose (mg/dL)     (75-99)  mg/dL


 


Iron  19 L    ()  ug/dL


 


Iron Saturation  5.37 L    (12.00-45.00)   














  09/20/17 09/20/17 09/20/17 Range/Units





  06:23 07:26 11:35 


 


RBC     (3.80-5.40)  m/uL


 


Hgb     (11.4-16.0)  gm/dL


 


Hct     (34.0-46.0)  %


 


RDW     (11.5-15.5)  %


 


APTT   55.5 H   (22.0-30.0)  sec


 


BUN     (7-17)  mg/dL


 


Creatinine     (0.52-1.04)  mg/dL


 


POC Glucose (mg/dL)  105 H   241 H  (75-99)  mg/dL


 


Iron     ()  ug/dL


 


Iron Saturation     (12.00-45.00)   














  09/20/17 09/20/17 Range/Units





  16:35 20:59 


 


RBC    (3.80-5.40)  m/uL


 


Hgb    (11.4-16.0)  gm/dL


 


Hct    (34.0-46.0)  %


 


RDW    (11.5-15.5)  %


 


APTT    (22.0-30.0)  sec


 


BUN    (7-17)  mg/dL


 


Creatinine    (0.52-1.04)  mg/dL


 


POC Glucose (mg/dL)  310 H  209 H  (75-99)  mg/dL


 


Iron    ()  ug/dL


 


Iron Saturation    (12.00-45.00)   














Assessment and Plan


Plan: 





#1 shortness of breath secondary to acute CHF exacerbation.  EF ejection 

fraction unknown.  2-D echo report pending.


#2 dizziness and disequilibrium followed by Sub occipital headache.  Likely due 

to occipital neuritis.


#2 chronic atrial fibrillation.  Not on anticoagulation at home.  Patient 

refused to take anti-correlation previously. 


#3 history of coronary artery disease and bypass graft.  AICD placement as well


#4 history of aortic valve replacement


#4 hypokalemia


#5 hypertension


#6 diabetes type 2 non-insulin-dependent


#7 hypothyroidism


#8 obstructive sleep apnea on CPAP at home


#9 acute on chronic kidney disease stage III . creatinine 1.2


#10 obesity with BMI 34.3


#11 history of gout stable


#12 hypothyroidism.  Started on levothyroxine 75 g daily.





Plan:


Lasix has been changed to by mouth.


Planning for by mouth anticoagulation.  Continue with Coreg.  Patient is 

ALLERGIC to aspirin.  Continue with telemetry monitoring and serial EKGs and 

troponins.  Cardiology is following.  VQ scan showed low probability for PE.  

Neurology is planning for occipital nerve block.  Continued warm compresses.  

Continue to follow closely and further recommendations based on the clinical 

course.  


Prognosis is guarded with multiple medical problems and comorbid conditions.





Time with Patient: Greater than 30

## 2017-09-21 NOTE — P.PN
Subjective





This patient is a 86-year-old female who was seen in neurology consultation 

yesterday for dizziness and occipital headache pain.  Patient's neurological 

exam findings yesterday suggested she has bilateral occipital neuritis.  We 

requested a consultation from anesthesia for procedure of a bilateral occipital 

nerve block to be done for this patient.  We are still awaiting anesthesias 

consultation for the patient.  Patient has noticed increased symptoms of 

dizziness just prior to her experiencing severe occipital headache pain.  She 

does have evidence of occipital neuritis with significant tenderness in the 

suboccipital notch bilaterally.  She otherwise is also being monitored for 

evidence of orthostatic hypotension.  We will instruct the nursing staff to 

contact anesthesia tomorrow morning to reconsult them for the occipital nerve 

block procedure for this patient.  She still does demonstrate evidence of 

moderate degree of occipital neuritis on palpation.  We have recommended that 

she should apply moist heat to the head and neck region when she is discharged 

back to home.  The patient otherwise has been doing fairly well today.  She 

looks more awake and alert today as compared to yesterday.  We will continue 

close neurological follow-up of this patient during this admission.





Objective





- Vital Signs


Vital signs: 


 Vital Signs











Temp  98.5 F   09/21/17 17:25


 


Pulse  87   09/21/17 17:25


 


Resp  16   09/21/17 17:25


 


BP  131/69   09/21/17 17:25


 


Pulse Ox  96   09/21/17 17:25








 Intake & Output











 09/21/17 09/21/17 09/22/17





 06:59 18:59 06:59


 


Intake Total 20 580 


 


Balance 20 580 


 


Weight 96 kg  


 


Intake:   


 


  IV 20  


 


    0.9 ns 20  


 


  Oral  580 


 


Other:   


 


  Voiding Method  Bedside Commode 


 


  # Voids 2 3 














- Exam





Physical examination:





PHYSICAL EXAMINATION: Patient is resting comfortably in bed. 


VITAL SIGNS: Blood pressure is [131/69]. Heart rate is [87]. Respiration is [16]

. Temperature is [98.5].


HEENT: Head is atraumatic, neck is supple, there were no carotid bruits.


CHEST: Lungs are clear to auscultation and percussion.  


CARDIAC: S1, S2 normal rate and rhythm. There is no murmur.


ABDOMEN: Soft and nontender. Bowel sounds are present.


EXTREMITIES:  There is no pedal edema.  Peripheral pulses are present.





Neurological examination:





Patient's neurological examination is unchanged from yesterday.





- Labs


CBC & Chem 7: 


 09/20/17 05:54





 09/20/17 05:54


Labs: 


 Abnormal Lab Results - Last 24 Hours (Table)











  09/21/17 09/21/17 09/21/17 Range/Units





  06:06 11:30 16:46 


 


POC Glucose (mg/dL)  156 H  266 H  211 H  (75-99)  mg/dL














  09/21/17 Range/Units





  20:55 


 


POC Glucose (mg/dL)  186 H  (75-99)  mg/dL














Assessment and Plan


(1) Occipital neuritis


Status: Acute   Code(s): M54.81 - OCCIPITAL NEURALGIA   





(2) Benign positional vertigo


Status: Acute   Code(s): H81.10 - BENIGN PAROXYSMAL VERTIGO, UNSPECIFIED EAR   





(3) Chronic a-fib


Status: Acute   Code(s): I48.2 - CHRONIC ATRIAL FIBRILLATION   





(4) S/P AVR


Status: Acute   Code(s): Z95.2 - PRESENCE OF PROSTHETIC HEART VALVE   





(5) Acute congestive heart failure


Status: Acute   Code(s): I50.9 - HEART FAILURE, UNSPECIFIED   


Plan: 





This patient is a 86-year-old female with multiple complex medical issues.  

Neurology was consulted due to evaluation of dizziness.  Patient has been 

having symptoms of disequilibrium and balance issues when standing.  More 

recently she is also been experiencing the same symptoms when laying in bed.  

She states the symptoms are always preceded by severe bilateral suboccipital 

headache pain.  Her neurological examination today reveals evidence of 

bilateral occipital neuritis.  We have recommended she undergo a bilateral 

occipital nerve block procedure for further treatment.  We will also obtain a 

routine computed tomography scan of the brain.  Patient's neurological 

examination otherwise is nonfocal.  She does have a history of chronic atrial 

fibrillation but has declined any use of anticoagulant medications for 

treatment.  We will await further recommendations from cardiology.  We have 

instructed the nursing staff to contact anesthesia tomorrow morning for re-

consultation for a bilateral occipital nerve block procedure for this patient.  

Patient denies any new symptoms of headache or dizziness.  She has been able to 

stand did bedside and seems to be relatively stable.  She is to be monitored 

for orthostatic blood pressure changes.  The patient underwent computed 

tomography scan of the brain today which revealed age related atrophy and 

chronic small vessel ischemic changes.  No evidence of acute stroke or 

hemorrhage.  Patient should be closely monitored for orthostatic hypotension as 

well.  We will await anesthesia to see the patient tomorrow for occipital nerve 

block treatment and procedure.  We will monitor her response to this treatment 

and we'll give further recommendations at that time.  The patient was able to 

complete a occipital nerve block procedure today.  She feels there has been 

some improvement in terms of headache management following this procedure.  We 

reminded her that may take several days to really see the full effect of this 

treatment.  The patient does have a history of chronic atrial fibrillation.  

Cardiology feels she is not a candidate for anticoagulation if she does have a 

history of significant GI bleeding in the past.  We will need to continue to 

monitor her in terms of her headache symptoms and dizziness.  She does seem to 

be doing better today.  We will continue close neurological follow-up for this 

patient during this admission.  Her overall prognosis at this time remains 

guarded.

## 2017-09-21 NOTE — EEG
ELECTROENCEPHALOGRAM REPORT



DATE OF EE2017



INDICATION FOR EXAMINATION:

This patient is an 86-year-old female being evaluated for acute dizziness and

headaches.



AGE:

86 years.



EEG FINDINGS:

A routine 21-channel awake digital EEG recording was accomplished utilizing the 10-20

international system with bipolar and referential montages.  The background activity in

the most alert resting state consists of a low to medium amplitude, fairly well-

developed well-sustained 7-8 Hertz activity over the posterior head regions.  This

posterior rhythm attenuates to eye opening.  There is a small amount of low amplitude

18-20 Hertz beta activity seen maximally over the anterior head regions.  Muscle and

movement artifact was observed on a few occasions during the tracing.



Hyperventilation was not performed.  Photic stimulation at flash frequencies of 2-30

Hertz produced a good symmetrical occipital driving response.  No epileptiform

discharges were seen.



IMPRESSION:

This EEG is within normal limits for the patient's age.  The EEG failed to reveal any

focal, lateralized, or epileptiform abnormalities.  Clinical correlation is

recommended.





MMODL / IJN: [id number] / Job#: [id number]

## 2017-09-22 NOTE — ECHOF
Referral Reason:LV FUNCTION



MEASUREMENTS

--------

HEIGHT: 162.6 cm

WEIGHT: 90.7 kg

BP: 133/67

IVSd:   1.9 cm     (0.6 - 1.1)

LVIDd:   4.3 cm     (3.9 - 5.3)

LVPWd:   1.7 cm     (0.6 - 1.1)

IVSs:   2.1 cm

LVIDs:   3.9 cm

LVPWs:   1.5 cm

LAESV Index (A-L):   56.51 ml/m

Ao Diam:   2.9 cm     (2.0 - 3.7)

AV Cusp:   1.1 cm     (1.5 - 2.6)

LA Diam:   5.3 cm     (2.7 - 3.8)

MV EXCURSION:   15.965 mm     (> 18.000)

MV EF SLOPE:   44 mm/s     (70 - 150)

MV E Arsen:   1.58 m/s

MV DecT:   326 ms

MV A Arsen:   0.51 m/s

MV E/A Ratio:   3.07 

AV maxP.81 mmHg

AV meanP.31 mmHg

RAP:   5.00 mmHg

RVSP:   48.80 mmHg







FINDINGS

--------

Paced rhythm.

This was a technically adequate study.

There is severe concentric left ventricular 

hypertrophy.   Overall left ventricular systolic 

function is normal with, an EF between 55 - 60 %.

The right ventricle is normal in size.

LA is severely dilated >40 ml/m2

The right atrial size is normal.

Peak/mean gradient across the Aortic Valve is 51.81mmHg 

/ 28.31mmHg.   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 

bioprosthetic aortic valve.

Moderate mitral annular calcification present.   

Moderate mitral regurgitation is present.    The peak 

and mean MV gradients are 13.73mmHg 5.24mmHg as 

measured by doppler.

Mild tricuspid regurgitation present.   There is 

moderate pulmonary hypertension.   The right 

ventricular systolic pressure, as measured by Doppler, 

is 48.80mmHg.

Trace/mild (physiologic)  pulmonic regurgitation.

The aortic root size is normal.

There is no pericardial effusion.



CONCLUSIONS

--------

1. There is severe concentric left ventricular hypertrophy.

2. There is moderate pulmonary hypertension.

3. The right ventricular systolic pressure, as measured by 

Doppler, is 48.80mmHg.

4. Trace/mild (physiologic)  pulmonic regurgitation.

5. The aortic root size is normal.

6. There is no pericardial effusion.

7. Overall left ventricular systolic function is normal 

with, an EF between 55 - 60 %.

8. LA is severely dilated >40 ml/m2

9. Peak/mean gradient across the Aortic Valve is 51.81mmHg 

/ 28.31mmHg.

10. There is mild stenosis of the bioprosthetic aortic 

valve.

11. Moderate mitral annular calcification present.

12. Moderate mitral regurgitation is present.

13. The peak and mean MV gradients are 13.73mmHg 5.24mmHg 

as measured by doppler.

14. Mild tricuspid regurgitation present.





SONOGRAPHER: Naz Andersen RDCS

## 2018-01-30 ENCOUNTER — HOSPITAL ENCOUNTER (INPATIENT)
Dept: HOSPITAL 47 - EC | Age: 83
LOS: 7 days | Discharge: NURSING, REHAB OR LONG-TERM CARE | DRG: 280 | End: 2018-02-06
Attending: HOSPITALIST | Admitting: HOSPITALIST
Payer: MEDICARE

## 2018-01-30 VITALS — BODY MASS INDEX: 30.8 KG/M2

## 2018-01-30 DIAGNOSIS — Z90.710: ICD-10-CM

## 2018-01-30 DIAGNOSIS — I25.10: ICD-10-CM

## 2018-01-30 DIAGNOSIS — Z86.711: ICD-10-CM

## 2018-01-30 DIAGNOSIS — J44.9: ICD-10-CM

## 2018-01-30 DIAGNOSIS — Z85.828: ICD-10-CM

## 2018-01-30 DIAGNOSIS — N17.9: ICD-10-CM

## 2018-01-30 DIAGNOSIS — D63.8: ICD-10-CM

## 2018-01-30 DIAGNOSIS — N18.2: ICD-10-CM

## 2018-01-30 DIAGNOSIS — H40.9: ICD-10-CM

## 2018-01-30 DIAGNOSIS — E11.21: ICD-10-CM

## 2018-01-30 DIAGNOSIS — I50.33: ICD-10-CM

## 2018-01-30 DIAGNOSIS — Z82.49: ICD-10-CM

## 2018-01-30 DIAGNOSIS — I48.1: ICD-10-CM

## 2018-01-30 DIAGNOSIS — R09.02: ICD-10-CM

## 2018-01-30 DIAGNOSIS — G47.30: ICD-10-CM

## 2018-01-30 DIAGNOSIS — E11.22: ICD-10-CM

## 2018-01-30 DIAGNOSIS — I44.2: ICD-10-CM
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
## 2018-01-30 DIAGNOSIS — E03.9: ICD-10-CM

## 2018-01-30 DIAGNOSIS — Z95.1: ICD-10-CM

## 2018-01-30 DIAGNOSIS — I45.10: ICD-10-CM

## 2018-01-30 DIAGNOSIS — Z95.3: ICD-10-CM

## 2018-01-30 DIAGNOSIS — E78.5: ICD-10-CM

## 2018-01-30 DIAGNOSIS — Z83.3: ICD-10-CM

## 2018-01-30 DIAGNOSIS — I48.2: ICD-10-CM

## 2018-01-30 DIAGNOSIS — Z87.891: ICD-10-CM

## 2018-01-30 DIAGNOSIS — Z80.9: ICD-10-CM

## 2018-01-30 DIAGNOSIS — E11.65: ICD-10-CM

## 2018-01-30 DIAGNOSIS — E11.43: ICD-10-CM

## 2018-01-30 DIAGNOSIS — E86.1: ICD-10-CM

## 2018-01-30 DIAGNOSIS — I13.0: Primary | ICD-10-CM

## 2018-01-30 DIAGNOSIS — H35.30: ICD-10-CM

## 2018-01-30 DIAGNOSIS — I21.A1: ICD-10-CM

## 2018-01-30 DIAGNOSIS — H54.62: ICD-10-CM

## 2018-01-30 DIAGNOSIS — Z95.0: ICD-10-CM

## 2018-01-30 LAB
ALBUMIN SERPL-MCNC: 3.6 G/DL (ref 3.5–5)
ALP SERPL-CCNC: 143 U/L (ref 38–126)
ALT SERPL-CCNC: 31 U/L (ref 9–52)
ANION GAP SERPL CALC-SCNC: 11 MMOL/L
APTT BLD: 24.2 SEC (ref 22–30)
AST SERPL-CCNC: 53 U/L (ref 14–36)
BASOPHILS # BLD AUTO: 0 K/UL (ref 0–0.2)
BASOPHILS NFR BLD AUTO: 0 %
BUN SERPL-SCNC: 23 MG/DL (ref 7–17)
CALCIUM SPEC-MCNC: 9.5 MG/DL (ref 8.4–10.2)
CHLORIDE SERPL-SCNC: 106 MMOL/L (ref 98–107)
CO2 SERPL-SCNC: 24 MMOL/L (ref 22–30)
EOSINOPHIL # BLD AUTO: 0.1 K/UL (ref 0–0.7)
EOSINOPHIL NFR BLD AUTO: 1 %
ERYTHROCYTE [DISTWIDTH] IN BLOOD BY AUTOMATED COUNT: 4.74 M/UL (ref 3.8–5.4)
ERYTHROCYTE [DISTWIDTH] IN BLOOD: 18.3 % (ref 11.5–15.5)
GLUCOSE BLD-MCNC: 152 MG/DL (ref 75–99)
GLUCOSE BLD-MCNC: 215 MG/DL (ref 75–99)
GLUCOSE SERPL-MCNC: 150 MG/DL (ref 74–99)
HCT VFR BLD AUTO: 33.3 % (ref 34–46)
HGB BLD-MCNC: 8.8 GM/DL (ref 11.4–16)
INR PPP: 1.1 (ref ?–1.2)
LYMPHOCYTES # SPEC AUTO: 0.8 K/UL (ref 1–4.8)
LYMPHOCYTES NFR SPEC AUTO: 8 %
MAGNESIUM SPEC-SCNC: 1.9 MG/DL (ref 1.6–2.3)
MCH RBC QN AUTO: 18.6 PG (ref 25–35)
MCHC RBC AUTO-ENTMCNC: 26.5 G/DL (ref 31–37)
MCV RBC AUTO: 70.2 FL (ref 80–100)
MONOCYTES # BLD AUTO: 0.5 K/UL (ref 0–1)
MONOCYTES NFR BLD AUTO: 5 %
NEUTROPHILS # BLD AUTO: 8.6 K/UL (ref 1.3–7.7)
NEUTROPHILS NFR BLD AUTO: 86 %
PLATELET # BLD AUTO: 231 K/UL (ref 150–450)
POTASSIUM SERPL-SCNC: 4.6 MMOL/L (ref 3.5–5.1)
PROT SERPL-MCNC: 6.1 G/DL (ref 6.3–8.2)
PT BLD: 10.7 SEC (ref 9–12)
SODIUM SERPL-SCNC: 141 MMOL/L (ref 137–145)
WBC # BLD AUTO: 10 K/UL (ref 3.8–10.6)

## 2018-01-30 PROCEDURE — 86901 BLOOD TYPING SEROLOGIC RH(D): CPT

## 2018-01-30 PROCEDURE — 93306 TTE W/DOPPLER COMPLETE: CPT

## 2018-01-30 PROCEDURE — 80048 BASIC METABOLIC PNL TOTAL CA: CPT

## 2018-01-30 PROCEDURE — 85610 PROTHROMBIN TIME: CPT

## 2018-01-30 PROCEDURE — 70450 CT HEAD/BRAIN W/O DYE: CPT

## 2018-01-30 PROCEDURE — 87502 INFLUENZA DNA AMP PROBE: CPT

## 2018-01-30 PROCEDURE — 86850 RBC ANTIBODY SCREEN: CPT

## 2018-01-30 PROCEDURE — 83735 ASSAY OF MAGNESIUM: CPT

## 2018-01-30 PROCEDURE — 84484 ASSAY OF TROPONIN QUANT: CPT

## 2018-01-30 PROCEDURE — 99285 EMERGENCY DEPT VISIT HI MDM: CPT

## 2018-01-30 PROCEDURE — 83880 ASSAY OF NATRIURETIC PEPTIDE: CPT

## 2018-01-30 PROCEDURE — 85027 COMPLETE CBC AUTOMATED: CPT

## 2018-01-30 PROCEDURE — 94640 AIRWAY INHALATION TREATMENT: CPT

## 2018-01-30 PROCEDURE — 85730 THROMBOPLASTIN TIME PARTIAL: CPT

## 2018-01-30 PROCEDURE — 83036 HEMOGLOBIN GLYCOSYLATED A1C: CPT

## 2018-01-30 PROCEDURE — 86900 BLOOD TYPING SEROLOGIC ABO: CPT

## 2018-01-30 PROCEDURE — 85025 COMPLETE CBC W/AUTO DIFF WBC: CPT

## 2018-01-30 PROCEDURE — 80053 COMPREHEN METABOLIC PANEL: CPT

## 2018-01-30 PROCEDURE — 94760 N-INVAS EAR/PLS OXIMETRY 1: CPT

## 2018-01-30 PROCEDURE — 86920 COMPATIBILITY TEST SPIN: CPT

## 2018-01-30 PROCEDURE — 36415 COLL VENOUS BLD VENIPUNCTURE: CPT

## 2018-01-30 PROCEDURE — 93005 ELECTROCARDIOGRAM TRACING: CPT

## 2018-01-30 PROCEDURE — 71046 X-RAY EXAM CHEST 2 VIEWS: CPT

## 2018-01-30 RX ADMIN — Medication SCH MG: at 20:40

## 2018-01-30 RX ADMIN — CARVEDILOL SCH MG: 3.12 TABLET, FILM COATED ORAL at 18:13

## 2018-01-30 RX ADMIN — LATANOPROST SCH DROPS: 50 SOLUTION OPHTHALMIC at 20:40

## 2018-01-30 RX ADMIN — INSULIN ASPART SCH UNIT: 100 INJECTION, SOLUTION INTRAVENOUS; SUBCUTANEOUS at 20:42

## 2018-01-30 RX ADMIN — FUROSEMIDE SCH MG: 10 INJECTION, SOLUTION INTRAMUSCULAR; INTRAVENOUS at 20:40

## 2018-01-30 RX ADMIN — ACETAMINOPHEN AND CODEINE PHOSPHATE PRN EACH: 300; 30 TABLET ORAL at 23:33

## 2018-01-30 RX ADMIN — ALLOPURINOL SCH MG: 100 TABLET ORAL at 20:40

## 2018-01-30 RX ADMIN — INSULIN ASPART SCH UNIT: 100 INJECTION, SOLUTION INTRAVENOUS; SUBCUTANEOUS at 18:13

## 2018-01-30 RX ADMIN — INSULIN DETEMIR SCH UNIT: 100 INJECTION, SOLUTION SUBCUTANEOUS at 18:35

## 2018-01-30 NOTE — XR
EXAMINATION TYPE: XR chest 2V

 

DATE OF EXAM: 1/30/2018

 

COMPARISON: Chest x-ray November 29, 2017.

 

HISTORY: Redness of breath, bilateral leg edema.

 

TECHNIQUE:  Frontal and lateral views of the chest are obtained.

 

FINDINGS:  There is chronic interstitial changes redemonstrated without new focal air space opacity o
pacity or pneumothorax seen. There is suspected small posterior effusion on lateral view.  The cardia
c silhouette size is enlarged with dual lead pacemaker.   The osseous structures are demineralized. M
etallic aortic valve is noted.

 

IMPRESSION:  Chronic parenchymal changes and cardiomegaly without suspicious acute pulmonary process.

## 2018-01-30 NOTE — CT
EXAMINATION TYPE: CT brain wo con

 

DATE OF EXAM: 1/30/2018

 

COMPARISON: 9/20/2017

 

HISTORY: Weakness/SOB

 

CT DLP: 1138 mGycm

Automated exposure control for dose reduction was used.

 

TECHNIQUE: CT scan of the head is performed without contrast.

 

FINDINGS:   There is no acute intracranial hemorrhage or midline shift identified. There is diffuse v
entricular and sulcal prominence consistent with diffuse age-related cerebral atrophy.  No suspicious
 extra-axial fluid collection. Atherosclerosis is seen of the intracranial vasculature. There is low-
attenuation in the periventricular white matter consistent with chronic small vessel ischemic change.
  The globes are intact and the visualized sinuses are clear.   

 

IMPRESSION: 

1. No acute intracranial process.  

2. Redemonstration of diffuse age-related cerebral atrophy and nonspecific white matter change most c
ommonly on the basis of microangiopathy unchanged from the prior.

## 2018-01-30 NOTE — ED
General Adult HPI





- General


Chief complaint: Shortness of Breath


Stated complaint: weakness/SOB


Time Seen by Provider: 18 10:57


Source: patient, EMS, RN notes reviewed, old records reviewed


Mode of arrival: EMS


Limitations: no limitations





- History of Present Illness


Initial comments: 





87-year-old female presents by EMS status post fall.  Patient was getting out 

of bed, she slid out of bed and fell to her knees.  No significant injury.  

Patient initially called EMS for lift assist.  She was quite short of breath, 

she was encouraged by EMS to present to the emergency department for 

evaluation.  She has history of coronary artery disease status post open heart 

surgery , she has chronic peripheral edema which she states is improved 

from previous baseline.  She had an episode of anemia in the past.  She has 

weekly blood draws to follow her hemoglobin.  She denies cough.  Denies fever.  

States she was short of breath although this is at baseline.  She also 

complained of some mild lightheadedness over the past several days, as well as 

intermittent headache.





- Related Data


 Home Medications











 Medication  Instructions  Recorded  Confirmed


 


Bimatoprost [Lumigan .01% Ophth 1 drop RIGHT EYE HS 14





Soln]   


 


Carvedilol [Coreg] 3.125 mg PO BID 14


 


Furosemide [Lasix] 20 mg PO DAILY 14


 


glipiZIDE [Glucotrol] 10 mg PO AC-BID 14


 


sitaGLIPtin [Januvia] 100 mg PO DAILY 14


 


Allopurinol 200 mg PO BID 06/03/15 01/30/18


 


Insulin Detemir [Levemir] 100 unit SQ AC-SUPPER 17


 


Cholecalciferol [Vitamin D3] 1,000 unit PO DAILY 17


 


Cyanocobalamin [Vitamin B-12] 500 mcg PO DAILY 17


 


Ubidecarenone [Co Q-10] 100 mg PO DAILY 17


 


Acetaminophen-Codeine 300-30mg 1 tab PO Q6HR PRN 18





[Tylenol w/codeine #3]   


 


Albuterol Inhaler [Ventolin Hfa 1 - 2 puff INHALATION RT-Q6H PRN 18





Inhaler]   


 


Albuterol Nebulized [Ventolin 2.5 mg INHALATION RT-Q6H PRN 18





Nebulized]   


 


Furosemide [Lasix] 20 mg PO DAILY@1500 PRN 18


 


Furosemide [Lasix] 80 mg PO DAILY 18


 


Levothyroxine Sodium [Synthroid] 75 mcg PO DAILY 18


 


Melatonin 5 mg PO HS 18











 Allergies











Allergy/AdvReac Type Severity Reaction Status Date / Time


 


aspirin Allergy  Unknown Verified 18 11:22


 


bee pollen [Bee Pollen] Allergy  Anaphylaxis Verified 18 11:22


 


celecoxib [From Celebrex] Allergy  Unknown Verified 18 11:22


 


blood thinners Allergy Severe see comment Uncoded 18 11:22


 


URIC ACIDS AdvReac  GOUT Uncoded 18 11:22














Review of Systems


ROS Statement: 


Those systems with pertinent positive or pertinent negative responses have been 

documented in the HPI.





ROS Other: All systems not noted in ROS Statement are negative.





Past Medical History


Past Medical History: Coronary Artery Disease (CAD), Cancer, Heart Failure, 

Diabetes Mellitus, GI Bleed, Hypertension, Osteoarthritis (OA), Pulmonary 

Embolus (PE), Sleep Apnea/CPAP/BIPAP, Thyroid Disorder


Additional Past Medical History / Comment(s): gout, chronic pain in back hip 

and leg, diverticulitis, LT EYE BLIND,GLAUCOMA, MAC DEGEBERATION, MURMUR, 

DIVERTICULAR DX.OVARIAN CYSTS,ANEMIA,SKIN CA,ANEMIA, TRACHEBRONCHITIS WITH 

REACTIVE BRONCHOSPASM.used to use cpap machine but stopped  1 year ago d/t mask 

not fitting well. Increased shortness of breath with activity and at rest.


History of Any Multi-Drug Resistant Organisms: None Reported


Past Surgical History: Appendectomy, Bowel Resection, Breast Surgery, 

Cholecystectomy, Coronary Bypass/CABG, Hysterectomy, Orthopedic Surgery, 

Pacemaker, Tonsillectomy


Additional Past Surgical History / Comment(s): pacemaker, AORTIC VALVE 

REPLACEMENT(TISSUE), CYST REMOVED FROM HAND/HEAD,BOWEL RESECTION D/T 

DIVERTICULITIS.LEONCIO FILTER


Past Anesthesia/Blood Transfusion Reactions: No Reported Reaction


Type of Cardiac Device: Permanent Pacemaker


Device Placement Date:: 


Past Psychological History: Depression


Smoking Status: Former smoker


Past Alcohol Use History: None Reported


Past Drug Use History: None Reported





- Past Family History


  ** Brother(s)


History Unknown: Yes





  ** Father


Family Medical History: Cancer


Additional Family Medical History / Comment(s): ALCOHOLIC--  





  ** Mother


Family Medical History: Cancer, Coronary Artery Disease (CAD), Dementia, 

Diabetes Mellitus


Additional Family Medical History / Comment(s):  





General Exam


Limitations: no limitations


General appearance: alert, in no apparent distress


Head exam: Present: atraumatic, normocephalic


Eye exam: Present: normal appearance, PERRL


ENT exam: Present: normal exam


Neck exam: Present: normal inspection.  Absent: tenderness, meningismus


Respiratory exam: Present: decreased breath sounds.  Absent: respiratory 

distress, wheezes, accessory muscle use


Cardiovascular Exam: Present: regular rate, irregular rhythm


GI/Abdominal exam: Present: soft.  Absent: distended, tenderness


Extremities exam: Present: normal inspection, normal capillary refill.  Absent: 

full ROM, tenderness, pedal edema


Neurological exam: Present: alert, oriented X3, CN II-XII intact.  Absent: 

motor sensory deficit


Psychiatric exam: Present: normal affect, normal mood


Skin exam: Present: warm, dry, intact.  Absent: cyanosis, diaphoretic





Course


 Vital Signs











  18





  10:56 11:36 11:57


 


Temperature 98.7 F  


 


Pulse Rate 89  77


 


Respiratory 20 18 18





Rate   


 


Blood Pressure 130/60  156/70


 


O2 Sat by Pulse 98  100





Oximetry   














  18





  13:16 14:05


 


Temperature 98.3 F 


 


Pulse Rate 79 81


 


Respiratory 20 18





Rate  


 


Blood Pressure 173/76 150/67


 


O2 Sat by Pulse 100 98





Oximetry  














EKG Findings





- EKG Comments:


EKG Findings:: EKG shows atrial fibrillation, ventricular rate of 76, QRS 

duration 134, , there is right bundle branch block, no signs of acute 

ischemia.  Compared with previous EKG 2017 unchanged.





Medical Decision Making





- Medical Decision Making





87-year-old female presenting with mild dyspnea and lightheadedness as well as 

recurrent headache.  There was no specific injury in the fall.  Chest x-rays 

obtained, shows chronic parenchymal changes, with cardiomegaly.  Head CT is 

negative for any acute intracranial pathology.  White blood cell count normal 10

, hemoglobin is 8.8 which is improved from 8.5.  Electrolytes within normal 

limits.  Troponin is 0.028, this is improved from previous as well.  BNP is 

elevated at 3800 which is worsening from previous.  Creatinine 1.31 which is 

improved from previous 1.42.  Patient's dyspnea likely secondary to congestive 

heart failure.  She will be admitted for further evaluation and treatment.





- Lab Data


Result diagrams: 


 18 11:39





 18 11:39


 Lab Results











  18 Range/Units





  11:39 11:39 11:39 


 


WBC  10.0    (3.8-10.6)  k/uL


 


RBC  4.74    (3.80-5.40)  m/uL


 


Hgb  8.8 L    (11.4-16.0)  gm/dL


 


Hct  33.3 L    (34.0-46.0)  %


 


MCV  70.2 L D    (80.0-100.0)  fL


 


MCH  18.6 L    (25.0-35.0)  pg


 


MCHC  26.5 L    (31.0-37.0)  g/dL


 


RDW  18.3 H    (11.5-15.5)  %


 


Plt Count  231    (150-450)  k/uL


 


Neutrophils %  86    %


 


Lymphocytes %  8    %


 


Monocytes %  5    %


 


Eosinophils %  1    %


 


Basophils %  0    %


 


Neutrophils #  8.6 H    (1.3-7.7)  k/uL


 


Lymphocytes #  0.8 L    (1.0-4.8)  k/uL


 


Monocytes #  0.5    (0-1.0)  k/uL


 


Eosinophils #  0.1    (0-0.7)  k/uL


 


Basophils #  0.0    (0-0.2)  k/uL


 


Hypochromasia  Marked    


 


Poikilocytosis  Slight    


 


Anisocytosis  Slight    


 


Microcytosis  Marked    


 


PT     (9.0-12.0)  sec


 


INR     (<1.2)  


 


APTT     (22.0-30.0)  sec


 


Sodium   141   (137-145)  mmol/L


 


Potassium   4.6   (3.5-5.1)  mmol/L


 


Chloride   106   ()  mmol/L


 


Carbon Dioxide   24   (22-30)  mmol/L


 


Anion Gap   11   mmol/L


 


BUN   23 H   (7-17)  mg/dL


 


Creatinine   1.31 H   (0.52-1.04)  mg/dL


 


Est GFR (MDRD) Af Amer   47   (>60 ml/min/1.73 sqM)  


 


Est GFR (MDRD) Non-Af   38   (>60 ml/min/1.73 sqM)  


 


Glucose   150 H   (74-99)  mg/dL


 


Calcium   9.5   (8.4-10.2)  mg/dL


 


Magnesium   1.9   (1.6-2.3)  mg/dL


 


Total Bilirubin   1.3   (0.2-1.3)  mg/dL


 


AST   53 H   (14-36)  U/L


 


ALT   31   (9-52)  U/L


 


Alkaline Phosphatase   143 H   ()  U/L


 


Troponin I     (0.000-0.034)  ng/mL


 


NT-Pro-B Natriuret Pep    3810  pg/mL


 


Total Protein   6.1 L   (6.3-8.2)  g/dL


 


Albumin   3.6   (3.5-5.0)  g/dL














  18 Range/Units





  11:39 11:39 


 


WBC    (3.8-10.6)  k/uL


 


RBC    (3.80-5.40)  m/uL


 


Hgb    (11.4-16.0)  gm/dL


 


Hct    (34.0-46.0)  %


 


MCV    (80.0-100.0)  fL


 


MCH    (25.0-35.0)  pg


 


MCHC    (31.0-37.0)  g/dL


 


RDW    (11.5-15.5)  %


 


Plt Count    (150-450)  k/uL


 


Neutrophils %    %


 


Lymphocytes %    %


 


Monocytes %    %


 


Eosinophils %    %


 


Basophils %    %


 


Neutrophils #    (1.3-7.7)  k/uL


 


Lymphocytes #    (1.0-4.8)  k/uL


 


Monocytes #    (0-1.0)  k/uL


 


Eosinophils #    (0-0.7)  k/uL


 


Basophils #    (0-0.2)  k/uL


 


Hypochromasia    


 


Poikilocytosis    


 


Anisocytosis    


 


Microcytosis    


 


PT  10.7   (9.0-12.0)  sec


 


INR  1.1   (<1.2)  


 


APTT  24.2   (22.0-30.0)  sec


 


Sodium    (137-145)  mmol/L


 


Potassium    (3.5-5.1)  mmol/L


 


Chloride    ()  mmol/L


 


Carbon Dioxide    (22-30)  mmol/L


 


Anion Gap    mmol/L


 


BUN    (7-17)  mg/dL


 


Creatinine    (0.52-1.04)  mg/dL


 


Est GFR (MDRD) Af Amer    (>60 ml/min/1.73 sqM)  


 


Est GFR (MDRD) Non-Af    (>60 ml/min/1.73 sqM)  


 


Glucose    (74-99)  mg/dL


 


Calcium    (8.4-10.2)  mg/dL


 


Magnesium    (1.6-2.3)  mg/dL


 


Total Bilirubin    (0.2-1.3)  mg/dL


 


AST    (14-36)  U/L


 


ALT    (9-52)  U/L


 


Alkaline Phosphatase    ()  U/L


 


Troponin I   0.028  (0.000-0.034)  ng/mL


 


NT-Pro-B Natriuret Pep    pg/mL


 


Total Protein    (6.3-8.2)  g/dL


 


Albumin    (3.5-5.0)  g/dL














Disposition


Clinical Impression: 


 Dyspnea, CHF (congestive heart failure), Elevated troponin





Disposition: ADMITTED AS IP TO THIS [facility]


Condition: Stable


Referrals: 


Reji Ochoa MD [Primary Care Provider] - 1-2 days


Decision to Admit Reason: Admit from EC


Decision Date: 18


Decision Time: 14:19

## 2018-01-30 NOTE — HP
HISTORY AND PHYSICAL



DATE OF SERVICE:

01/30/18.



CHIEF COMPLAINT:

Fall.



BRIEF HISTORY:

Patient is an 87-year-old female patient brought to the ED by the EMS after a fall.

Patient according to records, patient was getting out of the bed when she slipped out

of bed and fell to her knees.  There were no significant injuries.  Patient could not

get up.  Initially she called EMS for assistance to get her up, but then she felt quite

short of breath and she was encouraged to present to the ED.  Patient consented.  She

has she also complained of some mild lightheadedness and some headache.



PAST MEDICAL HISTORY:

Significant for coronary artery disease, CHF, diabetes, GI bleed, hypertension,

osteoarthritis, history of PE, sleep apnea, hypothyroidism, left eye blindness,

glaucoma, diverticulosis and diverticulitis, macular degeneration, ovarian cyst,

anemia, and history of tracheobronchitis.



PAST SURGICAL HISTORY:

Significant for aortic valve replacement, pacemaker insertion.  [facility] [place] filter

insertion, bowel resection for diverticulitis.



SOCIAL HISTORY:

Patient is a former smoker.  No history of alcohol abuse or IV drug abuse history.



FAMILY HISTORY:

Significant for cancer in father.  Patient does not know what kind, and father was an

alcoholic and history of cancer and coronary artery disease, dementia and diabetes in

mother.



ALLERGIES:

PATIENT IS ALLERGIC TO ASPIRIN, BEE POLLEN, CELEBREX, BLOOD THINNERS, AND URIC ACID.



MEDICATIONS:

Patient is on taking Lumigan eyedrops 1 drop right eye q.h.s.  Coreg 3.125 b.i.d.,

Lasix 20 mg p.o. daily and glipizide 10 mg p.o. b.i.d. and Januvia 100 mg p.o. daily.

Allopurinol 200 mg b.i.d., Levemir subcu q.h.s., vitamin D3 1000 units daily, vitamin

B12 500 mg p.o. daily and Tylenol with codeine 1 q.6 hours p.r.n., Ventolin inhaler 2

puffs q.i.d., Lasix 20 mg q.h.s. and 80 mg in the morning.  Levothyroxine 75 mcg daily,

melatonin 5 mg daily.



REVIEW OF SYSTEM:

Constitutional:  Denies fevers, fever and chills.  Neck: No jugular venous distention.

HEENT denies vision or hearing loss.  Respiratory as described above.  Cardiovascular

no chest pain.  No palpitations.  GI/abdomen:  No nausea, vomiting or diarrhea.

Genitourinary:  No dysuria.  No hematuria.  Musculoskeletal:  Patient denies any joint

swelling or tenderness.  Neurological:  Does give history of feeling dizzy and

complains of headache.

ENDOCRINE: No polyuria, polydipsia, she has history of hypertension, hypothyroidism,

and diabetes.  Hematological:  History of anemia but no bleeding or coagulation

disorder.  Lymphatic system:  No palpable lymph nodes.  Rest of 14-point review of

system is unremarkable.



PHYSICAL EXAMINATION:

Vital signs:  Temperature of 98.7, pulse 89, respiration 20, blood pressure 130/60, O2

saturation 98%.

GENERAL:  Patient is awake, alert, oriented x3.  No acute distress.  HEENT atraumatic,

normocephalic.  Pupils equal and reactive to light.  Extraocular movements intact.

Buccal mucosa is moist.  NECK:  Supple.  No goiter or lymphadenopathy.  JVD is

negative.  No carotid bruit heard.  Lungs decreased breath sounds bilaterally.  No

rales, rhonchi, or wheezes.  Heart is regular rate and rhythm without murmurs, gallop

rhythm.  ABDOMEN:  Soft, nontender, nondistended.  Bowel sounds positive.  EXTREMITIES:

No edema, clubbing, cyanosis.  Pulses are palpable.  Neurological examination cranial

nerves 2-12 grossly intact.  No gross motor or sensory deficit.  Psychiatric

examination:  Patient has normal mood and affect.

SKIN:  Warm, dry, and intact.



EKG shows atrial fibrillation, ventricular rate of 76.



LAB:

CBC white blood count 10, hemoglobin 8.8,  hematocrit 33.3, and platelet count of 231.

Chemical profile sodium 141, potassium 4.6, chloride 106, bicarb 24, BUN 23, creatinine

1.3, glucose 150.  The patient's troponin was 0.028.  BNP was elevated at 3810.  INR

1.1.



ASSESSMENT:

1. Acute exacerbation congestive heart failure.

2. Elevated troponin; rule out acute coronary syndrome.

3. Dyspnea with hypoxemia secondary to congestive heart failure.

4. Acute on chronic kidney disease.

5. Anemia.

6. Hyperglycemia without acidosis.



PLAN:

Admit the patient to telemetry.  Monitor cardiac enzymes and EKG.  Consult Cardiology.

We will resume home medications.  We will start patient on IV Lasix.  Monitor I and Os,

renal function and electrolytes closely and make supplementations as needed.





MMODL / IJN: [id number] / Job#: [id number]

## 2018-01-31 LAB
ALBUMIN SERPL-MCNC: 3.2 G/DL (ref 3.5–5)
ALP SERPL-CCNC: 131 U/L (ref 38–126)
ALT SERPL-CCNC: 27 U/L (ref 9–52)
ANION GAP SERPL CALC-SCNC: 9 MMOL/L
AST SERPL-CCNC: 27 U/L (ref 14–36)
BASOPHILS # BLD AUTO: 0.1 K/UL (ref 0–0.2)
BASOPHILS NFR BLD AUTO: 1 %
BUN SERPL-SCNC: 25 MG/DL (ref 7–17)
CALCIUM SPEC-MCNC: 9.5 MG/DL (ref 8.4–10.2)
CHLORIDE SERPL-SCNC: 108 MMOL/L (ref 98–107)
CO2 SERPL-SCNC: 26 MMOL/L (ref 22–30)
EOSINOPHIL # BLD AUTO: 0.4 K/UL (ref 0–0.7)
EOSINOPHIL NFR BLD AUTO: 5 %
ERYTHROCYTE [DISTWIDTH] IN BLOOD BY AUTOMATED COUNT: 3.97 M/UL (ref 3.8–5.4)
ERYTHROCYTE [DISTWIDTH] IN BLOOD BY AUTOMATED COUNT: 4.06 M/UL (ref 3.8–5.4)
ERYTHROCYTE [DISTWIDTH] IN BLOOD: 18.2 % (ref 11.5–15.5)
ERYTHROCYTE [DISTWIDTH] IN BLOOD: 18.4 % (ref 11.5–15.5)
GLUCOSE BLD-MCNC: 134 MG/DL (ref 75–99)
GLUCOSE BLD-MCNC: 177 MG/DL (ref 75–99)
GLUCOSE BLD-MCNC: 243 MG/DL (ref 75–99)
GLUCOSE BLD-MCNC: 98 MG/DL (ref 75–99)
GLUCOSE SERPL-MCNC: 93 MG/DL (ref 74–99)
HBA1C MFR BLD: 5.8 % (ref 4–6)
HCT VFR BLD AUTO: 28.4 % (ref 34–46)
HCT VFR BLD AUTO: 29.2 % (ref 34–46)
HGB BLD-MCNC: 7.5 GM/DL (ref 11.4–16)
HGB BLD-MCNC: 7.7 GM/DL (ref 11.4–16)
LYMPHOCYTES # SPEC AUTO: 1.7 K/UL (ref 1–4.8)
LYMPHOCYTES NFR SPEC AUTO: 23 %
MAGNESIUM SPEC-SCNC: 2.1 MG/DL (ref 1.6–2.3)
MCH RBC QN AUTO: 19 PG (ref 25–35)
MCH RBC QN AUTO: 19.1 PG (ref 25–35)
MCHC RBC AUTO-ENTMCNC: 26.5 G/DL (ref 31–37)
MCHC RBC AUTO-ENTMCNC: 26.5 G/DL (ref 31–37)
MCV RBC AUTO: 71.5 FL (ref 80–100)
MCV RBC AUTO: 72 FL (ref 80–100)
MONOCYTES # BLD AUTO: 0.5 K/UL (ref 0–1)
MONOCYTES NFR BLD AUTO: 7 %
NEUTROPHILS # BLD AUTO: 4.7 K/UL (ref 1.3–7.7)
NEUTROPHILS NFR BLD AUTO: 62 %
PLATELET # BLD AUTO: 189 K/UL (ref 150–450)
PLATELET # BLD AUTO: 210 K/UL (ref 150–450)
POTASSIUM SERPL-SCNC: 3.6 MMOL/L (ref 3.5–5.1)
PROT SERPL-MCNC: 5.3 G/DL (ref 6.3–8.2)
SODIUM SERPL-SCNC: 143 MMOL/L (ref 137–145)
WBC # BLD AUTO: 10.4 K/UL (ref 3.8–10.6)
WBC # BLD AUTO: 7.6 K/UL (ref 3.8–10.6)

## 2018-01-31 RX ADMIN — CYANOCOBALAMIN TAB 500 MCG SCH MCG: 500 TAB at 12:57

## 2018-01-31 RX ADMIN — ISOSORBIDE MONONITRATE SCH MG: 30 TABLET, EXTENDED RELEASE ORAL at 12:56

## 2018-01-31 RX ADMIN — Medication SCH UNIT: at 12:57

## 2018-01-31 RX ADMIN — Medication SCH MG: at 22:30

## 2018-01-31 RX ADMIN — SPIRONOLACTONE SCH MG: 25 TABLET, FILM COATED ORAL at 12:56

## 2018-01-31 RX ADMIN — FUROSEMIDE SCH MG: 10 INJECTION, SOLUTION INTRAMUSCULAR; INTRAVENOUS at 09:18

## 2018-01-31 RX ADMIN — ACETAMINOPHEN AND CODEINE PHOSPHATE PRN EACH: 300; 30 TABLET ORAL at 09:21

## 2018-01-31 RX ADMIN — LATANOPROST SCH DROPS: 50 SOLUTION OPHTHALMIC at 22:30

## 2018-01-31 RX ADMIN — ALLOPURINOL SCH MG: 100 TABLET ORAL at 22:30

## 2018-01-31 RX ADMIN — INSULIN ASPART SCH UNIT: 100 INJECTION, SOLUTION INTRAVENOUS; SUBCUTANEOUS at 12:57

## 2018-01-31 RX ADMIN — INSULIN DETEMIR SCH UNIT: 100 INJECTION, SOLUTION SUBCUTANEOUS at 18:08

## 2018-01-31 RX ADMIN — ISODIUM CHLORIDE PRN MG: 0.03 SOLUTION RESPIRATORY (INHALATION) at 10:18

## 2018-01-31 RX ADMIN — PANTOPRAZOLE SODIUM SCH MG: 40 TABLET, DELAYED RELEASE ORAL at 18:30

## 2018-01-31 RX ADMIN — ALLOPURINOL SCH MG: 100 TABLET ORAL at 09:18

## 2018-01-31 RX ADMIN — INSULIN ASPART SCH: 100 INJECTION, SOLUTION INTRAVENOUS; SUBCUTANEOUS at 22:31

## 2018-01-31 RX ADMIN — CARVEDILOL SCH MG: 3.12 TABLET, FILM COATED ORAL at 18:09

## 2018-01-31 RX ADMIN — ACETAMINOPHEN AND CODEINE PHOSPHATE PRN EACH: 300; 30 TABLET ORAL at 15:37

## 2018-01-31 RX ADMIN — FUROSEMIDE SCH MG: 10 INJECTION, SOLUTION INTRAMUSCULAR; INTRAVENOUS at 22:30

## 2018-01-31 RX ADMIN — INSULIN ASPART SCH: 100 INJECTION, SOLUTION INTRAVENOUS; SUBCUTANEOUS at 09:17

## 2018-01-31 RX ADMIN — CARVEDILOL SCH MG: 3.12 TABLET, FILM COATED ORAL at 09:18

## 2018-01-31 RX ADMIN — INSULIN ASPART SCH UNIT: 100 INJECTION, SOLUTION INTRAVENOUS; SUBCUTANEOUS at 18:09

## 2018-01-31 RX ADMIN — LEVOTHYROXINE SODIUM SCH MCG: 75 TABLET ORAL at 06:08

## 2018-01-31 NOTE — P.CRDCN
History of Present Illness


Consult date: 18


Consult reason: congestive heart failure


History of present illness: 





Mrs. Ortiz is a pleasant 87-year-old  past medical history 

significant for coronary artery disease with subsequent single vessel bypass 

grafting, dual chamber pacemaker for sick sinus syndrome and complete heart 

block, bioprosthetic aortic valve replacement, chronic persistent atrial 

fibrillation not on long term anticoagulation secondary to chronic anemia, COPD

, hypertension, dyslipidemia, diastolic heart failure and diabetes mellitus. 

She follows with Dr. Nina in the office. We have been asked to see her 

in consultation for evidence of possible heart failure. She states she was 

trying to get up out of bed yesterday when she became mildly dizzy and fell out 

of bed onto her left side. She denies symptoms of chest pain, shortness of 

breath, dizziness, palpitations, diaphoresis, nausea or vomiting.  When she 

called EMS for assistance they noted her to be short of breath and recommended 

she come to ED for evaluation. 





EKG on arrival reveals atrial fibrillation with controlled ventricular response 

with underlying right bundle branch block. This is consistent with old EKG's. 


Chest xray shows chronic parenchymal changes and cardiomegaly without 

suspicious acute pulmonary process. 


Brain CT negative for an acute intracranial process with diffuse age related 

cerebral atrophy. 


Laboratory data reviewed, hemoglobin 7.5, platelets 189, potassium 3.6, 

magnesium 2.1, BUN 25, creatinine 1.5, troponin negative 1, proBNP 3810.


Current cardiac medications include Lasix 20 mg daily and carvedilol 3.125 BID. 

At her last office visit she was on aldactone 25 mg, lasix 80 mg BID and imdur 

30 mg daily. 





Most recent echocardiogram performed 2017 reveals preserved LV systolic 

function with EF 55-60%. Moderate concentric hypertrophy. Moderate MR, normal 

functioning aortic valve peak/mean gradient 38/22 mmHg, severely dilated left 

and right atrium. 


Most recent catheterization was  revealed mild ostial disease of RCA and 

patent SVG-RCA.  





Review of Systems





At this time my exam:


CONSTITUTIONAL: Denies fever. Denies chills.


EYES: Denies blurred vision. Denies vision changes. Denies eye pain.


EARS, NOSE, MOUTH & THROAT: Denies headache. Denies sore throat. Denies ear 

pain.


CARDIOVASCULAR: Denies chest pain. Denies shortness of breath. Denies 

orthopnea. Denies PND. Denies palpitations.


RESPIRATORY: Denies cough. 


GASTROINTESTINAL: Denies abdominal pain. Denies diarrhea. Denies constipation. 

Denies nausea. Denies vomiting.


MUSCULOSKELETAL: Complains of left hip, leg and knee pain.


INTEGUMENTARY: Denies pruitis. Denies rash.


NEUROLOGIC: Denies numbness. Denies tingling. Denies weakness.


PSYCHIATRIC: Denies anxiety. Denies depression.


ENDOCRINE: Denies fatigue. Denies weight change. Denies polydipsia. Denies 

polyurina.


GENITOURINARY: Denies burning, hematuria or urgency with micturation.


HEMATOLOGIC: Denies history of anemia. Denies bleeding. 








Past Medical History


Past Medical History: Coronary Artery Disease (CAD), Cancer, Heart Failure, 

Diabetes Mellitus, GI Bleed, Hypertension, Osteoarthritis (OA), Pulmonary 

Embolus (PE), Sleep Apnea/CPAP/BIPAP, Thyroid Disorder


Additional Past Medical History / Comment(s): gout, chronic pain in back hip 

and leg, diverticulitis, LT EYE BLIND,GLAUCOMA, MAC DEGENERATION RT EYE, MURMUR

, DIVERTICULAR DX.OVARIAN CYSTS,ANEMIA,SKIN CA,ANEMIA, TRACHEBRONCHITIS WITH 

REACTIVE BRONCHOSPASM.used to use cpap machine .  Increased shortness of breath 

with activity and at rest. ZEESHAN CARPAL TUNNEL, skin caner


History of Any Multi-Drug Resistant Organisms: None Reported


Past Surgical History: Appendectomy, Bowel Resection, Breast Surgery, Cardiac 

Valve Replacement, Cholecystectomy, Coronary Bypass/CABG, Heart Catheterization

, Hysterectomy, Orthopedic Surgery, Pacemaker, Tonsillectomy


Additional Past Surgical History / Comment(s): pacemaker, AORTIC VALVE 

REPLACEMENT(TISSUE) AND 1 VESSEL BYPASS, CYST REMOVED FROM HAND/HEAD,BOWEL 

RESECTION D/T DIVERTICULITIS.LEONCIO FILTER, COLONOCOSPY/POLYPECTOMY/EGD, 

PAST LT EYE SX-DAMAGED THE OPTIC NERVE-BLIND LT EYE.lt knee arthrosopy, skin 

cancer removed from hand/head


Past Anesthesia/Blood Transfusion Reactions: No Reported Reaction


Type of Cardiac Device: Permanent Pacemaker


Device Placement Date:: 


Smoking Status: Former smoker





- Past Family History


  ** Brother(s)


History Unknown: Yes





  ** Father


Family Medical History: Cancer


Additional Family Medical History / Comment(s): ALCOHOLIC--  





  ** Mother


Family Medical History: Cancer, Coronary Artery Disease (CAD), Dementia, 

Diabetes Mellitus


Additional Family Medical History / Comment(s):  





Medications and Allergies


 Home Medications











 Medication  Instructions  Recorded  Confirmed  Type


 


Bimatoprost [Lumigan .01% Ophth 1 drop RIGHT EYE HS 14 History





Soln]    


 


Carvedilol [Coreg] 3.125 mg PO BID 14 History


 


Furosemide [Lasix] 20 mg PO DAILY 14 History


 


glipiZIDE [Glucotrol] 10 mg PO AC-BID 14 History


 


sitaGLIPtin [Januvia] 100 mg PO DAILY 14 History


 


Allopurinol 200 mg PO BID 06/03/15 01/30/18 History


 


Insulin Detemir [Levemir] 100 unit SQ AC-SUPPER 17 History


 


Cholecalciferol [Vitamin D3] 1,000 unit PO DAILY 17 History


 


Cyanocobalamin [Vitamin B-12] 500 mcg PO DAILY 17 History


 


Ubidecarenone [Co Q-10] 100 mg PO DAILY 17 History


 


Acetaminophen-Codeine 300-30mg 1 tab PO Q6HR PRN 18 History





[Tylenol w/codeine #3]    


 


Albuterol Inhaler [Ventolin Hfa 1 - 2 puff INHALATION RT-Q6H PRN 18 History





Inhaler]    


 


Albuterol Nebulized [Ventolin 2.5 mg INHALATION RT-Q6H PRN 18 

History





Nebulized]    


 


Furosemide [Lasix] 20 mg PO DAILY@1500 PRN 18 History


 


Furosemide [Lasix] 80 mg PO DAILY 18 History


 


Levothyroxine Sodium [Synthroid] 75 mcg PO DAILY 18 History


 


Melatonin 5 mg PO HS 18 History











 Allergies











Allergy/AdvReac Type Severity Reaction Status Date / Time


 


aspirin Allergy  Unknown Verified 18 11:22


 


bee pollen [Bee Pollen] Allergy  Anaphylaxis Verified 18 11:22


 


celecoxib [From Celebrex] Allergy  Unknown Verified 18 11:22


 


blood thinners Allergy Severe see comment Uncoded 18 11:22


 


URIC ACIDS AdvReac  GOUT Uncoded 18 11:22














Physical Exam


Vitals: 


 Vital Signs











  Temp Pulse Pulse Resp BP BP Pulse Ox


 


 18 10:19   88     


 


 18 08:00  97.6 F   69  18   131/64  99


 


 18 04:00  97.9 F   71  16   114/53  99


 


 18 00:00     16   


 


 18 23:49  98.4 F   79  16   121/71  98


 


 18 20:00  98.3 F   93  18   137/59  97


 


 18 16:00    93  18   


 


 18 15:53  97.5 F L   93  18   131/64  96


 


 18 15:00  97.6 F  81   18  125/60   99


 


 18 14:05   81   18  150/67   98


 


 18 13:16  98.3 F  79   20  173/76   100


 


 18 11:57   77   18  156/70   100


 


 18 11:36     18   


 


 18 10:56  98.7 F  89   20  130/60   98








 Intake and Output











 18





 22:59 06:59 14:59


 


Intake Total 320 250 320


 


Output Total   250


 


Balance 320 250 70


 


Intake:   


 


  Oral 320 250 320


 


Output:   


 


  Urine   250


 


Other:   


 


  Voiding Method Toilet Toilet Toilet


 


  # Voids 2 2 


 


  Weight 83.4 kg 83 kg 83 kg








 Patient Weight











 18





 06:59


 


Weight 83 kg














Blood pressure 114/53 heart rate 71 afebrile


GENERAL: This is a 87-year-old  female in no apparent distress at the 

time of my examination.


HEENT: Head is atraumatic, normocephalic. Pupils are equal, round. Sclerae 

anicteric. Conjunctivae are clear. Mucous membranes of the mouth are moist. 

Neck is supple. There is no jugular venous distention. No carotid bruit is 

heard.


LUNGS: Faint bibasilar rales, no wheezes or rhonchi. No chest wall tenderness 

is noted on palpation or with deep breathing.


HEART: Irregular rate and rhythm with systolic ejection murmur at the base, no 

rubs or gallops. S1 and S2 heard.


ABDOMEN: Soft, nontender. Bowel sounds are heard. No organomegaly noted.


EXTREMITIES: Moderate b/l lower extremity 2+ peripheral edema and no calf 

tenderness noted.


VASCULAR: Radial and dorsalis pedis pulses palpated, no evidence of clubbing.  


NEUROLOGIC: Patient is awake, alert and oriented x3.


 








Results





 18 07:28





 18 07:28


 Cardiac Enzymes











  18 Range/Units





  11:39 11:39 07:28 


 


AST  53 H   27  (14-36)  U/L


 


Troponin I   0.028   (0.000-0.034)  ng/mL








 Coagulation











  18 Range/Units





  11:39 


 


PT  10.7  (9.0-12.0)  sec


 


APTT  24.2  (22.0-30.0)  sec








 CBC











  18 Range/Units





  11:39 07:28 


 


WBC  10.0  7.6  (3.8-10.6)  k/uL


 


RBC  4.74  3.97  (3.80-5.40)  m/uL


 


Hgb  8.8 L  7.5 L  (11.4-16.0)  gm/dL


 


Hct  33.3 L  28.4 L  (34.0-46.0)  %


 


Plt Count  231  189  (150-450)  k/uL








 Comprehensive Metabolic Panel











  18 Range/Units





  11:39 07:28 


 


Sodium  141  143  (137-145)  mmol/L


 


Potassium  4.6  3.6  (3.5-5.1)  mmol/L


 


Chloride  106  108 H  ()  mmol/L


 


Carbon Dioxide  24  26  (22-30)  mmol/L


 


BUN  23 H  25 H  (7-17)  mg/dL


 


Creatinine  1.31 H  1.50 H  (0.52-1.04)  mg/dL


 


Glucose  150 H  93  (74-99)  mg/dL


 


Calcium  9.5  9.5  (8.4-10.2)  mg/dL


 


AST  53 H  27  (14-36)  U/L


 


ALT  31  27  (9-52)  U/L


 


Alkaline Phosphatase  143 H  131 H  ()  U/L


 


Total Protein  6.1 L  5.3 L  (6.3-8.2)  g/dL


 


Albumin  3.6  3.2 L  (3.5-5.0)  g/dL








 Current Medications











Generic Name Dose Route Start Last Admin





  Trade Name Freq  PRN Reason Stop Dose Admin


 


Acetaminophen  650 mg  18 14:12  





  Tylenol Tab  PO   





  Q6HR PRN   





  Mild Pain or Fever > 100.5   


 


Acetaminophen/Codeine Phosphate  1 each  18 17:04  18 09:21





  Tylenol #3  PO   1 each





  Q6HR PRN   Administration





  Moderate Pain   


 


Albuterol Sulfate  2.5 mg  18 17:04  18 10:18





  Ventolin Nebulized  INHALATION   2.5 mg





  RT-Q6H PRN   Administration





  sob   


 


Allopurinol  200 mg  18 21:00  18 09:18





  Zyloprim  PO   200 mg





  BID DAVID   Administration


 


Carvedilol  3.125 mg  18 17:30  18 09:18





  Coreg  PO   3.125 mg





  BID-W/MEALS DAVID   Administration


 


Cholecalciferol  1,000 unit  18 12:00  





  Vitamin D3  PO   





  1200 DAVID   


 


Cyanocobalamin  500 mcg  18 12:00  





  Vitamin B-12  PO   





  1200 DAVID   


 


Furosemide  20 mg  18 21:00  18 09:18





  Lasix  IV   20 mg





  Q12HR DAVID   Administration


 


Insulin Aspart  0 unit  18 17:30  18 09:17





  Novolog  SQ  18 17:31  Not Given





  [facility]   





  Protocol   


 


Insulin Detemir  100 unit  18 17:30  18 18:35





  Levemir  SQ   50 unit





  AC-SUPPER [facility]   Administration


 


Latanoprost  1 drops  18 21:00  18 20:40





  Xalatan 0.005%  RIGHT EYE   1 drops





  HS DAVID   Administration


 


Levothyroxine Sodium  75 mcg  18 06:30  18 06:08





  Synthroid  PO   75 mcg





  0630 DAVID   Administration


 


Melatonin  5 mg  18 21:00  18 20:40





  Melatonin  PO   5 mg





  HS DAVID   Administration


 


Naloxone HCl  0.2 mg  18 14:12  





  Narcan  IV   





  Q2M PRN   





  Opioid Reversal   








 Intake and Output











 18





 22:59 06:59 14:59


 


Intake Total 320 250 320


 


Output Total   250


 


Balance 320 250 70


 


Intake:   


 


  Oral 320 250 320


 


Output:   


 


  Urine   250


 


Other:   


 


  Voiding Method Toilet Toilet Toilet


 


  # Voids 2 2 


 


  Weight 83.4 kg 83 kg 83 kg








 Patient Weight











 18





 06:59


 


Weight 83 kg








 





 18 07:28 





 18 07:28 











Assessment and Plan


Assessment: 





ASSESSMENT


1. Mild diastolic heart failure exacerbation. 


2. Chronic persistent atrial fibrillation not on long term anticoagulation. 


3. Stable coronary artery disease s/p bypass grafting


4. History of aortic valve replacement with bioprosthetic valve


5. Dual-chamber pacemaker in place secondary to sick sinus syndrome and 

complete heart block


6. Dyslipidemia


7. Hypertension


8. Diabetes mellitus





PLAN


Home medications should be clarified with the patient. 


We will resume her on aldactone 25 mg daily and imdur 30 mg daily. 


Continue with carvedilol as ordered. 


Lasix 20 mg BID was initiated in ED and we agree with continuing this for the 

next 24 hours. 


We will not repeat an echocardiogram at this time. 


Daily weights with strict intake and output. 


Further recommendations will be based upon clinical course. 








Nurse Practitioner note has been reviewed, I agree with a documented findings 

and plan of care.  Patient was seen and examined.

## 2018-01-31 NOTE — PN
PROGRESS NOTE



DATE OF SERVICE:

01/31/2018.



BRIEF HISTORY:

Patient is an 87-year-old lady with history of coronary artery disease and coronary

artery bypass grafting with a dual-chamber pacemaker for sick sinus syndrome.  Brought

into the hospital after complaint of fall.  She was getting out of bed when she slipped

and fell down to bed.  The patient was having some trouble breathing.  She was brought

to the ER.  In the ED patient was found to be in acute CHF.  She is admitted for

further treatment.  Claims the breathing is better than yesterday.



VITAL SIGNS: Temperature of 97.6, pulse 88, respirations 18, blood pressure 131/64, O2

saturation 99%.  The patient is awake, alert, oriented x3.  No acute distress.

HEENT: Atraumatic, normocephalic.  Pupils equal, round and reactive to light.

Extraocular movements intact.  Buccal mucosa is fair. Neck is supple.  No goiter,

lymphadenopathy.  JVD is negative.  No carotid bruit heard.  Lungs positive bibasilar

crackles.  Heart is regular rate and rhythm without any murmurs or gallop rhythm.

Abdomen is soft, nontender, nondistended.  Bowel sounds positive.  Extremities:  No

edema, clubbing, cyanosis.



LABS:

CBC white blood count 7.6, hemoglobin 7.5, hematocrit 28.4, and platelet count of 189.

Chemical profile sodium 140, potassium 3.6, chloride 108, bicarb 26, BUN 25, creatinine

1.50.



ASSESSMENT:

1. Acute exacerbation of diastolic congestive heart failure.

2. Severe anemia without any overt bleed.

3. Acute on chronic renal failure.

4. Elevated troponin.  Acute coronary syndrome ruled out.

5. History of chronic anemia.

6. Hyperglycemia without acidosis.

The patient remains on IV Lasix, has been seen by Cardiology.

The patient is continued on home medications and Coreg.  Plan is to do 2D

echocardiogram and monitor daily weights and strict I's and Os, renal function and

electrolytes.  The patient's hemoglobin has dropped substantially from admission.  Will

monitor H and H regularly.  Stool occult blood x3.  Will add proton pump inhibitors.

We will consult Gastroenterology if stool occult blood is positive.





MMODL / IJN: [id number] / Job#: [id number]

## 2018-02-01 LAB
ANION GAP SERPL CALC-SCNC: 9 MMOL/L
BASOPHILS # BLD AUTO: 0.1 K/UL (ref 0–0.2)
BASOPHILS # BLD AUTO: 0.1 K/UL (ref 0–0.2)
BASOPHILS NFR BLD AUTO: 1 %
BASOPHILS NFR BLD AUTO: 1 %
BUN SERPL-SCNC: 25 MG/DL (ref 7–17)
CALCIUM SPEC-MCNC: 9.1 MG/DL (ref 8.4–10.2)
CHLORIDE SERPL-SCNC: 108 MMOL/L (ref 98–107)
CO2 SERPL-SCNC: 24 MMOL/L (ref 22–30)
EOSINOPHIL # BLD AUTO: 0.4 K/UL (ref 0–0.7)
EOSINOPHIL # BLD AUTO: 0.4 K/UL (ref 0–0.7)
EOSINOPHIL NFR BLD AUTO: 4 %
EOSINOPHIL NFR BLD AUTO: 5 %
ERYTHROCYTE [DISTWIDTH] IN BLOOD BY AUTOMATED COUNT: 3.68 M/UL (ref 3.8–5.4)
ERYTHROCYTE [DISTWIDTH] IN BLOOD BY AUTOMATED COUNT: 4.24 M/UL (ref 3.8–5.4)
ERYTHROCYTE [DISTWIDTH] IN BLOOD: 19.2 % (ref 11.5–15.5)
ERYTHROCYTE [DISTWIDTH] IN BLOOD: 19.4 % (ref 11.5–15.5)
GLUCOSE BLD-MCNC: 165 MG/DL (ref 75–99)
GLUCOSE BLD-MCNC: 187 MG/DL (ref 75–99)
GLUCOSE BLD-MCNC: 189 MG/DL (ref 75–99)
GLUCOSE SERPL-MCNC: 123 MG/DL (ref 74–99)
HCT VFR BLD AUTO: 26.1 % (ref 34–46)
HCT VFR BLD AUTO: 31.3 % (ref 34–46)
HGB BLD-MCNC: 6.9 GM/DL (ref 11.4–16)
HGB BLD-MCNC: 8.5 GM/DL (ref 11.4–16)
LYMPHOCYTES # SPEC AUTO: 1.2 K/UL (ref 1–4.8)
LYMPHOCYTES # SPEC AUTO: 1.5 K/UL (ref 1–4.8)
LYMPHOCYTES NFR SPEC AUTO: 15 %
LYMPHOCYTES NFR SPEC AUTO: 16 %
MCH RBC QN AUTO: 18.8 PG (ref 25–35)
MCH RBC QN AUTO: 20 PG (ref 25–35)
MCHC RBC AUTO-ENTMCNC: 26.5 G/DL (ref 31–37)
MCHC RBC AUTO-ENTMCNC: 27.1 G/DL (ref 31–37)
MCV RBC AUTO: 71 FL (ref 80–100)
MCV RBC AUTO: 73.9 FL (ref 80–100)
MONOCYTES # BLD AUTO: 0.5 K/UL (ref 0–1)
MONOCYTES # BLD AUTO: 0.6 K/UL (ref 0–1)
MONOCYTES NFR BLD AUTO: 6 %
MONOCYTES NFR BLD AUTO: 7 %
NEUTROPHILS # BLD AUTO: 5.4 K/UL (ref 1.3–7.7)
NEUTROPHILS # BLD AUTO: 6.8 K/UL (ref 1.3–7.7)
NEUTROPHILS NFR BLD AUTO: 70 %
NEUTROPHILS NFR BLD AUTO: 71 %
PLATELET # BLD AUTO: 161 K/UL (ref 150–450)
PLATELET # BLD AUTO: 183 K/UL (ref 150–450)
POTASSIUM SERPL-SCNC: 3.9 MMOL/L (ref 3.5–5.1)
SODIUM SERPL-SCNC: 141 MMOL/L (ref 137–145)
WBC # BLD AUTO: 7.8 K/UL (ref 3.8–10.6)
WBC # BLD AUTO: 9.5 K/UL (ref 3.8–10.6)

## 2018-02-01 PROCEDURE — 30233N1 TRANSFUSION OF NONAUTOLOGOUS RED BLOOD CELLS INTO PERIPHERAL VEIN, PERCUTANEOUS APPROACH: ICD-10-PCS

## 2018-02-01 RX ADMIN — ISODIUM CHLORIDE PRN MG: 0.03 SOLUTION RESPIRATORY (INHALATION) at 19:44

## 2018-02-01 RX ADMIN — FUROSEMIDE SCH MG: 10 INJECTION, SOLUTION INTRAMUSCULAR; INTRAVENOUS at 20:15

## 2018-02-01 RX ADMIN — ISOSORBIDE MONONITRATE SCH MG: 30 TABLET, EXTENDED RELEASE ORAL at 08:25

## 2018-02-01 RX ADMIN — FUROSEMIDE SCH MG: 10 INJECTION, SOLUTION INTRAMUSCULAR; INTRAVENOUS at 09:45

## 2018-02-01 RX ADMIN — CARVEDILOL SCH MG: 3.12 TABLET, FILM COATED ORAL at 18:05

## 2018-02-01 RX ADMIN — ALLOPURINOL SCH MG: 100 TABLET ORAL at 08:25

## 2018-02-01 RX ADMIN — LEVOTHYROXINE SODIUM SCH MCG: 75 TABLET ORAL at 06:13

## 2018-02-01 RX ADMIN — SPIRONOLACTONE SCH MG: 25 TABLET, FILM COATED ORAL at 08:25

## 2018-02-01 RX ADMIN — PANTOPRAZOLE SODIUM SCH MG: 40 TABLET, DELAYED RELEASE ORAL at 18:05

## 2018-02-01 RX ADMIN — PANTOPRAZOLE SODIUM SCH MG: 40 TABLET, DELAYED RELEASE ORAL at 08:25

## 2018-02-01 RX ADMIN — ACETAMINOPHEN AND CODEINE PHOSPHATE PRN EACH: 300; 30 TABLET ORAL at 18:05

## 2018-02-01 RX ADMIN — CARVEDILOL SCH MG: 3.12 TABLET, FILM COATED ORAL at 08:25

## 2018-02-01 RX ADMIN — ACETAMINOPHEN AND CODEINE PHOSPHATE PRN EACH: 300; 30 TABLET ORAL at 00:50

## 2018-02-01 RX ADMIN — ISODIUM CHLORIDE PRN MG: 0.03 SOLUTION RESPIRATORY (INHALATION) at 14:53

## 2018-02-01 RX ADMIN — INSULIN DETEMIR SCH UNIT: 100 INJECTION, SOLUTION SUBCUTANEOUS at 21:42

## 2018-02-01 RX ADMIN — INSULIN ASPART SCH UNIT: 100 INJECTION, SOLUTION INTRAVENOUS; SUBCUTANEOUS at 18:04

## 2018-02-01 RX ADMIN — ACETAMINOPHEN AND CODEINE PHOSPHATE PRN EACH: 300; 30 TABLET ORAL at 10:51

## 2018-02-01 RX ADMIN — INSULIN ASPART SCH UNIT: 100 INJECTION, SOLUTION INTRAVENOUS; SUBCUTANEOUS at 13:21

## 2018-02-01 RX ADMIN — INSULIN ASPART SCH UNIT: 100 INJECTION, SOLUTION INTRAVENOUS; SUBCUTANEOUS at 21:42

## 2018-02-01 RX ADMIN — Medication SCH MG: at 20:15

## 2018-02-01 RX ADMIN — ALLOPURINOL SCH MG: 100 TABLET ORAL at 20:15

## 2018-02-01 RX ADMIN — BUDESONIDE AND FORMOTEROL FUMARATE DIHYDRATE SCH PUFF: 160; 4.5 AEROSOL RESPIRATORY (INHALATION) at 19:44

## 2018-02-01 RX ADMIN — CYANOCOBALAMIN TAB 500 MCG SCH MCG: 500 TAB at 13:20

## 2018-02-01 RX ADMIN — INSULIN ASPART SCH: 100 INJECTION, SOLUTION INTRAVENOUS; SUBCUTANEOUS at 08:05

## 2018-02-01 RX ADMIN — LATANOPROST SCH DROPS: 50 SOLUTION OPHTHALMIC at 20:16

## 2018-02-01 RX ADMIN — Medication SCH UNIT: at 13:20

## 2018-02-01 NOTE — XR
EXAMINATION TYPE: XR chest 2V

 

DATE OF EXAM: 2/1/2018

 

COMPARISON: 1/30/2018

 

HISTORY: Short of breath

 

TECHNIQUE:  Frontal and lateral views of the chest are obtained.

 

FINDINGS:  Heart is enlarged. There is mild pulmonary congestion. There is slight coarsening of inter
stitial markings. There is slight blunting of the costophrenic angles. There are sternal wires. Thora
cic aorta is atheromatous. There is left axillary pacemaker with the lead tips in the right ventricle
.

 

IMPRESSION:  I think there is new mild heart failure compared to last recent exam. Pulmonary fibrosis
.

## 2018-02-01 NOTE — P.PN
Subjective


87-year-old female came in status of breath and anemia patient received 2 units 

of blood transfusion and patient still short short of breath and wheezing doesn'

t have any history of COPD but was started on Symbicort patient may have 

cardiac asthma because of which I'll obtain a chest x-ray patient the had 

history of CABG in the past history of congestive heart failure chronic 

diastolic dysfunction history of atrial fibrillation, had a bioprosthetic valve 

in the past in spite of atrial fibrillation patient is not on any 

anticoagulation because of her previous GI bleeds at this point of time patient 

doesn't have any GI bleed but still quite short of breath patient uses around 

100 mg of oral Lasix at home patient will be started on 60 IV twice a day of IV 

Lasix patient does have elevated JVD.  Patient dementia of breath





Constitutional: Denied any fatigue denied any fever.


Cardio vascular: denied any chest pain, palpitations


Gastrointestinal denied any nausea vomiting


Pulmonary: As mentioned in HPI


Neurologic denied any new focal deficits





Objective





- Vital Signs


Vital signs: 


 Vital Signs











Temp  98 F   02/01/18 16:00


 


Pulse  72   02/01/18 16:00


 


Resp  16   02/01/18 16:00


 


BP  129/65   02/01/18 16:00


 


Pulse Ox  98   02/01/18 16:00








 Intake & Output











 01/31/18 02/01/18 02/01/18





 18:59 06:59 18:59


 


Intake Total 920  310


 


Output Total 575 400 


 


Balance 345 -400 310


 


Weight 83 kg  84.3 kg


 


Intake:   


 


  Oral 920  


 


  Blood Product   310


 


    Rc As-1  Unit   310





    [id number]   


 


Output:   


 


  Urine 575 400 


 


Other:   


 


  Voiding Method Toilet Toilet Toilet














- Exam


PHYSICAL EXAMINATION: 





GENERAL: The patient is alert and oriented x3, not in any acute distress. Well 

developed, well nourished. 


HEENT: Pupils are round and equally reacting to light. EOMI. No scleral 

icterus. No conjunctival pallor. Normocephalic, atraumatic. No pharyngeal 

erythema. No thyromegaly. 


CARDIOVASCULAR: S1 and S2 present.  Patient appears to have elevated JVD is low


PULMONARY: Good air entry into bilateral lung fields significant expiratory 

wheezing was appreciated


ABDOMEN: Soft, nontender, nondistended, normoactive bowel sounds. No palpable 

organomegaly. 


MUSCULOSKELETAL: No joint swelling or deformity.


EXTREMITIES: No cyanosis, clubbing, or pedal edema. 


NEUROLOGICAL: Gross neurological examination did not reveal any focal deficits. 


SKIN: No rashes. 











- Labs


CBC & Chem 7: 


 02/01/18 14:20





 02/01/18 06:33


Labs: 


 Abnormal Lab Results - Last 24 Hours (Table)











  01/31/18 01/31/18 02/01/18 Range/Units





  18:12 20:42 06:33 


 


RBC    3.68 L  (3.80-5.40)  m/uL


 


Hgb  7.7 L   6.9 L*  (11.4-16.0)  gm/dL


 


Hct  29.2 L   26.1 L  (34.0-46.0)  %


 


MCV  72.0 L   71.0 L  (80.0-100.0)  fL


 


MCH  19.1 L   18.8 L  (25.0-35.0)  pg


 


MCHC  26.5 L   26.5 L  (31.0-37.0)  g/dL


 


RDW  18.2 H   19.4 H  (11.5-15.5)  %


 


Chloride     ()  mmol/L


 


BUN     (7-17)  mg/dL


 


Creatinine     (0.52-1.04)  mg/dL


 


Glucose     (74-99)  mg/dL


 


POC Glucose (mg/dL)   134 H   (75-99)  mg/dL


 


Crossmatch     














  02/01/18 02/01/18 02/01/18 Range/Units





  06:33 08:25 12:05 


 


RBC     (3.80-5.40)  m/uL


 


Hgb     (11.4-16.0)  gm/dL


 


Hct     (34.0-46.0)  %


 


MCV     (80.0-100.0)  fL


 


MCH     (25.0-35.0)  pg


 


MCHC     (31.0-37.0)  g/dL


 


RDW     (11.5-15.5)  %


 


Chloride  108 H    ()  mmol/L


 


BUN  25 H    (7-17)  mg/dL


 


Creatinine  1.47 H    (0.52-1.04)  mg/dL


 


Glucose  123 H    (74-99)  mg/dL


 


POC Glucose (mg/dL)    165 H  (75-99)  mg/dL


 


Crossmatch   See Detail   














  02/01/18 Range/Units





  14:20 


 


RBC   (3.80-5.40)  m/uL


 


Hgb  8.5 L D  (11.4-16.0)  gm/dL


 


Hct  31.3 L  (34.0-46.0)  %


 


MCV  73.9 L  (80.0-100.0)  fL


 


MCH  20.0 L  (25.0-35.0)  pg


 


MCHC  27.1 L  (31.0-37.0)  g/dL


 


RDW  19.2 H  (11.5-15.5)  %


 


Chloride   ()  mmol/L


 


BUN   (7-17)  mg/dL


 


Creatinine   (0.52-1.04)  mg/dL


 


Glucose   (74-99)  mg/dL


 


POC Glucose (mg/dL)   (75-99)  mg/dL


 


Crossmatch   














Assessment and Plan


Plan: 


-Congestive heart failure chronic vessel dysfunction with acute exacerbation IV 

Lasix management as mentioned above


-Anemia contributing to shortness of breath received 2 units of blood 

transfusion


-Elevated troponin secondary to heart failure and type II non-ST elevation 

microinfarction secondary to anemia


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


-Acute renal dysfunction: Secondary to congestive heart failure exacerbation IV 

Lasix as mentioned above


-History of PE in the past


-Sleep apnea


-Hypothyroidism


-Chronic anemia from her previous GI bleeds her anemia of chronic disease


-Atrial fibrillation appears to be valvular A. fib rate controlled at this time


-History of mitral valve replacement with bioprosthetic valve

## 2018-02-01 NOTE — CDI
Last Revision, December 2017





               Documentation Clarification Form



Date: 2/1/2018 11:52:00 AM

From: Becky Mustafa RN, CCDS

Phone: [phone]

MRN: [id number]

Admit Date: 2/1/2018 7:48:00 AM

Patient Name: Christina Ortiz 

Visit Number: [id number]



ATTENTION: The Clinical Documentation Specialists (CDI) and [facility] Coding Staff 
appreciate your assistance in clarifying documentation. Please respond to the 
clarification below the line at the bottom and electronically sign. The CDI & 
[facility] Coding staff will review the response and follow-up if needed. Please note: 
Queries are made part of the Legal Health Record. If you have any questions, 
please contact the author of this message via ITS.



Dr. Patino



History/Risk Factors:

CHF, acute kidney disease, ARF, diverticulosis



Clinical Indicators:

Current BUN: 23/25

CR: 1.31/1.5/1.47

GFR: 38/33/34

1/2/18 Patients Baseline: BUN/CR/GFR: 27/1.42/35



Treatment:

Patients medications include:

IVF: none



In order to capture the severity of condition, please clarify if the condition 
signifies:



CKD Stage 1 (GFR > 90)

CKD Stage 2 (GFR 60-89)

CKD Stage 3 (GFR 30-59)

CKD Stage 4 (GFR 15-29)

CKD Stage 5 (GFR <15)

ESRD

Other, please specify 

Unable to determine



Please continue to document in your progress notes and discharge summary in 
order to capture severity of illness and risk of mortality. Include clinical 
findings that support your diagnosis.

___________________________________________________________________

CKD Stage 3 (GFR 30-59)
MTDD

## 2018-02-01 NOTE — CDI
Last Revision, December 2017





            Documentation Clarification Form



Date: 2/1/2018 11:40:00 AM

From: Becky Mustafa RN, CCDS

Phone: [phone]

MRN: [id number]

Admit Date: 2/1/2018 7:48:00 AM

Patient Name: Christina Ortiz 

Visit Number: [id number]



ATTENTION: The Clinical Documentation Specialists (CDI) and [facility] Coding Staff 
appreciate your assistance in clarifying documentation. Please respond to the 
clarification below the line at the bottom and electronically sign. The CDI & 
[facility] Coding staff will review the response and follow-up if needed. Please note: 
Queries are made part of the Legal Health Record. If you have any questions, 
please contact the author of this message via ITS.



Dr. Sukumar SAHU diagnosis of anemia lacks specificity to accurately reflect your patients 
severity of condition and clarification is needed.  



History/Risk Factors:

CAD, CHF, GI Bleed, HTN, PE, AZUL, Hypothyroid, Diverticulosis, Anemia, AVR, PPM



Clinical indicators: 

Hemoglobin: 8.8/7.7/6.9

Hematocrit: 33.3/29.2/26.1



Treatment:        

1 Unit PC TX

Labs Q 12 hrs



In order to capture the severity of condition, please clarify the type of 
anemia and etiology if known:



Acute blood loss anemia

Acute on chronic blood loss anemia

Chronic blood loss anemia

Iron deficiency anemia

Hemolytic anemia

Drug induced anemia

Anemia due to malignancy

Nutritional anemia

Anemia of chronic kidney disease

Anemia of chronic disease

Unable to determine

Other, please specify 



Please continue to document in your progress notes and discharge summary in 
order to capture severity of illness and risk of mortality. Include clinical 
findings that support your diagnosis.

___________________________________________________________________

Unable to determine
MTDD

## 2018-02-01 NOTE — P.PN
Subjective


Progress Note Date: 02/01/18


Principal diagnosis: 





Shortness of breath/anemia





This is a pleasant 87-year-old  female patient with a known history of 

coronary artery disease and prior CABG, sick sinus syndrome and status post 

pacemaker, aortic valve replacement using bioprosthetic valve, chronic atrial 

fibrillation not on anticoagulation in view of history of bleeding, was 

admitted to the hospital with shortness of breath.  The patient does follow was 

Dr. Nina in the office as an outpatient.





She was found to be severely anemic and she is receiving blood at this point.  

She is getting workup for the anemia.





Clinically she denies having any chest pain or discomfort but she continues to 

have exertional dyspnea seems to be unchanged compared to before.  No dizziness 

or lightheadedness.  No syncope.  Overall she feels tired and fatigued.





Objective





- Vital Signs


Vital signs: 


 Vital Signs











Temp  98.1 F   02/01/18 11:34


 


Pulse  72   02/01/18 11:34


 


Resp  16   02/01/18 11:34


 


BP  125/59   02/01/18 11:34


 


Pulse Ox  97   02/01/18 11:34








 Intake & Output











 01/31/18 02/01/18 02/01/18





 18:59 06:59 18:59


 


Intake Total 920  0


 


Output Total 575 400 


 


Balance 345 -400 0


 


Weight 83 kg  84.3 kg


 


Intake:   


 


  Oral 920  


 


  Blood Product   0


 


    Rc As-1  Unit   0





    [id number]   


 


Output:   


 


  Urine 575 400 


 


Other:   


 


  Voiding Method Toilet Toilet Toilet














- Constitutional


General appearance: Present: no acute distress





- Respiratory


Respiratory: bilateral: CTA





- Cardiovascular


Heart sounds: normal: S1, S2


Abnormal Heart Sounds: Present: systolic murmur





- Labs


CBC & Chem 7: 


 02/01/18 06:33





 02/01/18 06:33


Labs: 


 Abnormal Lab Results - Last 24 Hours (Table)











  01/31/18 01/31/18 01/31/18 Range/Units





  12:01 17:02 18:12 


 


RBC     (3.80-5.40)  m/uL


 


Hgb    7.7 L  (11.4-16.0)  gm/dL


 


Hct    29.2 L  (34.0-46.0)  %


 


MCV    72.0 L  (80.0-100.0)  fL


 


MCH    19.1 L  (25.0-35.0)  pg


 


MCHC    26.5 L  (31.0-37.0)  g/dL


 


RDW    18.2 H  (11.5-15.5)  %


 


Chloride     ()  mmol/L


 


BUN     (7-17)  mg/dL


 


Creatinine     (0.52-1.04)  mg/dL


 


Glucose     (74-99)  mg/dL


 


POC Glucose (mg/dL)  243 H  177 H   (75-99)  mg/dL


 


Crossmatch     














  01/31/18 02/01/18 02/01/18 Range/Units





  20:42 06:33 06:33 


 


RBC   3.68 L   (3.80-5.40)  m/uL


 


Hgb   6.9 L*   (11.4-16.0)  gm/dL


 


Hct   26.1 L   (34.0-46.0)  %


 


MCV   71.0 L   (80.0-100.0)  fL


 


MCH   18.8 L   (25.0-35.0)  pg


 


MCHC   26.5 L   (31.0-37.0)  g/dL


 


RDW   19.4 H   (11.5-15.5)  %


 


Chloride    108 H  ()  mmol/L


 


BUN    25 H  (7-17)  mg/dL


 


Creatinine    1.47 H  (0.52-1.04)  mg/dL


 


Glucose    123 H  (74-99)  mg/dL


 


POC Glucose (mg/dL)  134 H    (75-99)  mg/dL


 


Crossmatch     














  02/01/18 Range/Units





  08:25 


 


RBC   (3.80-5.40)  m/uL


 


Hgb   (11.4-16.0)  gm/dL


 


Hct   (34.0-46.0)  %


 


MCV   (80.0-100.0)  fL


 


MCH   (25.0-35.0)  pg


 


MCHC   (31.0-37.0)  g/dL


 


RDW   (11.5-15.5)  %


 


Chloride   ()  mmol/L


 


BUN   (7-17)  mg/dL


 


Creatinine   (0.52-1.04)  mg/dL


 


Glucose   (74-99)  mg/dL


 


POC Glucose (mg/dL)   (75-99)  mg/dL


 


Crossmatch  See Detail  














Assessment and Plan


Assessment: 





Assessment


#1 severe anemia of unknown etiology at this point


#2 known CAD and status post CABG


#3 status post aortic valve replacement


#4 chronic atrial fibrillation


#5 multiple comorbid conditions





Plan


#1 continue the current medical treatment


#2 continue monitor the hemoglobin and the patient is receiving the blood


#3 from the cardiac standpoint of view she denies having any chest pain or 

discomfort at this point.

## 2018-02-02 LAB
ANION GAP SERPL CALC-SCNC: 8 MMOL/L
BASOPHILS # BLD AUTO: 0.1 K/UL (ref 0–0.2)
BASOPHILS NFR BLD AUTO: 1 %
BUN SERPL-SCNC: 25 MG/DL (ref 7–17)
CALCIUM SPEC-MCNC: 9.5 MG/DL (ref 8.4–10.2)
CHLORIDE SERPL-SCNC: 106 MMOL/L (ref 98–107)
CO2 SERPL-SCNC: 28 MMOL/L (ref 22–30)
EOSINOPHIL # BLD AUTO: 0.6 K/UL (ref 0–0.7)
EOSINOPHIL NFR BLD AUTO: 6 %
ERYTHROCYTE [DISTWIDTH] IN BLOOD BY AUTOMATED COUNT: 4.29 M/UL (ref 3.8–5.4)
ERYTHROCYTE [DISTWIDTH] IN BLOOD: 19.8 % (ref 11.5–15.5)
GLUCOSE BLD-MCNC: 159 MG/DL (ref 75–99)
GLUCOSE BLD-MCNC: 166 MG/DL (ref 75–99)
GLUCOSE BLD-MCNC: 170 MG/DL (ref 75–99)
GLUCOSE BLD-MCNC: 184 MG/DL (ref 75–99)
GLUCOSE BLD-MCNC: 75 MG/DL (ref 75–99)
GLUCOSE SERPL-MCNC: 69 MG/DL (ref 74–99)
HCT VFR BLD AUTO: 30.6 % (ref 34–46)
HGB BLD-MCNC: 8.8 GM/DL (ref 11.4–16)
LYMPHOCYTES # SPEC AUTO: 1.6 K/UL (ref 1–4.8)
LYMPHOCYTES NFR SPEC AUTO: 16 %
MCH RBC QN AUTO: 20.6 PG (ref 25–35)
MCHC RBC AUTO-ENTMCNC: 28.8 G/DL (ref 31–37)
MCV RBC AUTO: 71.4 FL (ref 80–100)
MONOCYTES # BLD AUTO: 0.7 K/UL (ref 0–1)
MONOCYTES NFR BLD AUTO: 7 %
NEUTROPHILS # BLD AUTO: 6.8 K/UL (ref 1.3–7.7)
NEUTROPHILS NFR BLD AUTO: 69 %
PLATELET # BLD AUTO: 185 K/UL (ref 150–450)
POTASSIUM SERPL-SCNC: 3.7 MMOL/L (ref 3.5–5.1)
SODIUM SERPL-SCNC: 142 MMOL/L (ref 137–145)
WBC # BLD AUTO: 9.9 K/UL (ref 3.8–10.6)

## 2018-02-02 RX ADMIN — INSULIN ASPART SCH UNIT: 100 INJECTION, SOLUTION INTRAVENOUS; SUBCUTANEOUS at 21:27

## 2018-02-02 RX ADMIN — ISOSORBIDE MONONITRATE SCH MG: 30 TABLET, EXTENDED RELEASE ORAL at 08:43

## 2018-02-02 RX ADMIN — ACETAMINOPHEN AND CODEINE PHOSPHATE PRN EACH: 300; 30 TABLET ORAL at 17:35

## 2018-02-02 RX ADMIN — CYANOCOBALAMIN TAB 500 MCG SCH MCG: 500 TAB at 17:14

## 2018-02-02 RX ADMIN — PANTOPRAZOLE SODIUM SCH MG: 40 TABLET, DELAYED RELEASE ORAL at 06:37

## 2018-02-02 RX ADMIN — BUDESONIDE AND FORMOTEROL FUMARATE DIHYDRATE SCH: 160; 4.5 AEROSOL RESPIRATORY (INHALATION) at 09:15

## 2018-02-02 RX ADMIN — FUROSEMIDE SCH MG: 10 INJECTION, SOLUTION INTRAMUSCULAR; INTRAVENOUS at 19:50

## 2018-02-02 RX ADMIN — ACETAMINOPHEN AND CODEINE PHOSPHATE PRN EACH: 300; 30 TABLET ORAL at 06:45

## 2018-02-02 RX ADMIN — ALLOPURINOL SCH MG: 100 TABLET ORAL at 19:50

## 2018-02-02 RX ADMIN — Medication SCH MG: at 19:51

## 2018-02-02 RX ADMIN — INSULIN ASPART SCH UNIT: 100 INJECTION, SOLUTION INTRAVENOUS; SUBCUTANEOUS at 17:30

## 2018-02-02 RX ADMIN — LATANOPROST SCH DROPS: 50 SOLUTION OPHTHALMIC at 19:51

## 2018-02-02 RX ADMIN — Medication SCH UNIT: at 17:14

## 2018-02-02 RX ADMIN — LEVOTHYROXINE SODIUM SCH MCG: 75 TABLET ORAL at 06:37

## 2018-02-02 RX ADMIN — ACETAMINOPHEN AND CODEINE PHOSPHATE PRN EACH: 300; 30 TABLET ORAL at 00:00

## 2018-02-02 RX ADMIN — INSULIN DETEMIR SCH UNIT: 100 INJECTION, SOLUTION SUBCUTANEOUS at 18:19

## 2018-02-02 RX ADMIN — INSULIN ASPART SCH: 100 INJECTION, SOLUTION INTRAVENOUS; SUBCUTANEOUS at 06:04

## 2018-02-02 RX ADMIN — FUROSEMIDE SCH MG: 10 INJECTION, SOLUTION INTRAMUSCULAR; INTRAVENOUS at 08:42

## 2018-02-02 RX ADMIN — BUDESONIDE AND FORMOTEROL FUMARATE DIHYDRATE SCH: 160; 4.5 AEROSOL RESPIRATORY (INHALATION) at 20:50

## 2018-02-02 RX ADMIN — INSULIN ASPART SCH: 100 INJECTION, SOLUTION INTRAVENOUS; SUBCUTANEOUS at 13:24

## 2018-02-02 RX ADMIN — CARVEDILOL SCH MG: 3.12 TABLET, FILM COATED ORAL at 06:37

## 2018-02-02 RX ADMIN — ALLOPURINOL SCH MG: 100 TABLET ORAL at 08:42

## 2018-02-02 RX ADMIN — PANTOPRAZOLE SODIUM SCH: 40 TABLET, DELAYED RELEASE ORAL at 17:16

## 2018-02-02 RX ADMIN — SPIRONOLACTONE SCH MG: 25 TABLET, FILM COATED ORAL at 08:43

## 2018-02-02 RX ADMIN — CARVEDILOL SCH MG: 3.12 TABLET, FILM COATED ORAL at 17:14

## 2018-02-02 NOTE — ECHOF
Referral Reason:sob



MEASUREMENTS

--------

HEIGHT: 162.6 cm

WEIGHT: 83.9 kg

BP: 116/58

IVSd:   1.5 cm     (0.6 - 1.1)

LVIDd:   4.2 cm     (3.9 - 5.3)

LVPWd:   1.7 cm     (0.6 - 1.1)

IVSs:   2.3 cm

LVIDs:   1.6 cm

LVPWs:   2.1 cm

LAESV Index (A-L):   94.72 ml/m

Ao Diam:   2.6 cm     (2.0 - 3.7)

AV Cusp:   1.1 cm     (1.5 - 2.6)

LA Diam:   5.1 cm     (2.7 - 3.8)

MV EXCURSION:   11.353 mm     (> 18.000)

MV EF SLOPE:   39 mm/s     (70 - 150)

EPSS:   0.2 cm

MV E Arsen:   1.79 m/s

MV DecT:   339 ms

MV A Arsen:   0.50 m/s

MV E/A Ratio:   3.55 

AV maxP.52 mmHg

AV meanP.20 mmHg

AR PHT:   186 ms

RAP:   15.00 mmHg

RVSP:   69.20 mmHg







FINDINGS

--------

Pacemaker

This was a technically good study.

The left ventricular size is normal.   There is severe concentric left ventricular hypertrophy.   Ove
rall left ventricular systolic function is normal with, an EF between 55 - 60 %.   Possible LVOT Obst
ruction with a max gradient of 26mmHg.

The right ventricle is normal in size and function.

LA is severely dilated >40 ml/m2

The right atrium is normal in size.

Aortic valve is trileaflet and is severely thickened.   Trace amount of aortic regurgitation.    Ther
e is moderate aortic stenosis present.   Peak/mean gradient across the Aortic Valve is 60.52mmHg / 30
.20mmHg.   Normally functioning bioprosthetic valve.

The mitral valve leaflets are moderately thickened.   Moderate mitral annular calcification present. 
  Severe mitral regurgitation is present.    The peak and mean MV gradients are 26.31mmHg 6.46mmHg as
 measured by doppler.   Moderate mitral stenosis.

Moderate to severe tricuspid regurgitation present.   There is moderate to severe pulmonary hypertens
ion.   The right ventricular systolic pressure, as measured by Doppler, is 69.20mmHg.

Trace/mild (physiologic)  pulmonic regurgitation.

The aortic root size is normal.

The inferior vena cava is mildly dilated.

The pericardium is normal.



CONCLUSIONS

--------

1. Pacemaker

2. This was a technically good study.

3. The left ventricular size is normal.

4. There is severe concentric left ventricular hypertrophy.

5. Overall left ventricular systolic function is normal with, an EF between 55 - 60 %.

6. Possible LVOT Obstruction with a max gradient of 26mmHg

7. The right ventricle is normal in size and function.

8. LA is severely dilated >40 ml/m2

9. The right atrium is normal in size.

10. Aortic valve is trileaflet and is severely thickened.

11. Trace amount of aortic regurgitation.

12. There is moderate aortic stenosis present.

13. Peak/mean gradient across the Aortic Valve is 60.52mmHg / 30.20mmHg.

14. Normally functioning bioprosthetic valve.

15. The mitral valve leaflets are moderately thickened.

16. Moderate mitral annular calcification present.

17. Severe mitral regurgitation is present.

18. The peak and mean MV gradients are 26.31mmHg 6.46mmHg as measured by doppler.

19. Moderate mitral stenosis.

20. Moderate to severe tricuspid regurgitation present.

21. There is moderate to severe pulmonary hypertension.

22. The right ventricular systolic pressure, as measured by Doppler, is 69.20mmHg.

23. Trace/mild (physiologic)  pulmonic regurgitation.

24. The aortic root size is normal.

25. The inferior vena cava is mildly dilated.

26. The pericardium is normal.





SONOGRAPHER: Vero Rapp RDCS

## 2018-02-02 NOTE — P.PN
Subjective


Progress Note Date: 02/02/18





Mrs. Ortiz is a pleasant 87-year-old  past medical history 

significant for coronary artery disease with subsequent single vessel bypass 

grafting, dual chamber pacemaker for sick sinus syndrome and complete heart 

block, bioprosthetic aortic valve replacement, chronic persistent atrial 

fibrillation not on long term anticoagulation secondary to chronic anemia, COPD

, hypertension, dyslipidemia, diastolic heart failure and diabetes mellitus. 

She follows with Dr. Nina in the office. We have been asked to see her 

in consultation for evidence of possible heart failure. She states she was 

trying to get up out of bed yesterday when she became mildly dizzy and fell out 

of bed onto her left side. She denies symptoms of chest pain, shortness of 

breath, dizziness, palpitations, diaphoresis, nausea or vomiting.  EKG on 

arrival reveals atrial fibrillation with controlled ventricular response with 

underlying right bundle branch block. This is consistent with old EKG's.  

Patient was found to be severely anemic, status post blood transfusion.








02/02/2018


Patient was seen and examined this morning, states overall that her breathing 

is improved today.  Complaining of mild dizziness and feeling tired today.  

Blood pressure 120/60 with a heart rate in the 60s.  White blood cell count 9.9

, hemoglobin 8.8, potassium 3.7, BUN 25, creatinine 1.2.





Objective





- Vital Signs


Vital signs: 


 Vital Signs











Temp  96.9 F L  02/02/18 08:00


 


Pulse  68   02/02/18 08:00


 


Resp  18   02/02/18 04:00


 


BP  120/59   02/02/18 08:00


 


Pulse Ox  90 L  02/02/18 08:00








 Intake & Output











 02/01/18 02/02/18 02/02/18





 18:59 06:59 18:59


 


Intake Total 310 600 420


 


Output Total 350 600 


 


Balance -40 0 420


 


Weight 84.3 kg  


 


Intake:   


 


  Oral  600 420


 


  Blood Product 310  


 


    Rc As-1  Unit 310  





    [id number]   


 


Output:   


 


  Urine 350 600 


 


Other:   


 


  Voiding Method Toilet  














- Exam





PHYSICAL EXAMINATION: 





HEENT: Head is atraumatic, normocephalic.  Pupils equal, round.  Neck is 

supple.  There is no elevated jugular venous pressure.





HEART EXAMINATION: Heart S1 and S2 with systolic murmur is heard





CHEST EXAMINATION: Lungs are clear with diminished air entry to bilateral bases 

ABDOMEN:  Soft, nontender. Bowel sounds are heard. No organomegaly noted.


 


EXTREMITIES: 2+ peripheral pulses with no evidence of peripheral edema and no 

calf tenderness noted.





NEUROLOGIC patient is awake, alert and oriented -3.


 


.


 








- Labs


CBC & Chem 7: 


 02/02/18 06:30





 02/02/18 06:30


Labs: 


 Abnormal Lab Results - Last 24 Hours (Table)











  02/01/18 02/01/18 02/01/18 Range/Units





  14:20 17:18 21:00 


 


Hgb  8.5 L D    (11.4-16.0)  gm/dL


 


Hct  31.3 L    (34.0-46.0)  %


 


MCV  73.9 L    (80.0-100.0)  fL


 


MCH  20.0 L    (25.0-35.0)  pg


 


MCHC  27.1 L    (31.0-37.0)  g/dL


 


RDW  19.2 H    (11.5-15.5)  %


 


BUN     (7-17)  mg/dL


 


Creatinine     (0.52-1.04)  mg/dL


 


Glucose     (74-99)  mg/dL


 


POC Glucose (mg/dL)   189 H  187 H  (75-99)  mg/dL














  02/02/18 02/02/18 02/02/18 Range/Units





  06:30 06:30 08:47 


 


Hgb  8.8 L    (11.4-16.0)  gm/dL


 


Hct  30.6 L    (34.0-46.0)  %


 


MCV  71.4 L    (80.0-100.0)  fL


 


MCH  20.6 L    (25.0-35.0)  pg


 


MCHC  28.8 L    (31.0-37.0)  g/dL


 


RDW  19.8 H    (11.5-15.5)  %


 


BUN   25 H   (7-17)  mg/dL


 


Creatinine   1.27 H   (0.52-1.04)  mg/dL


 


Glucose   69 L   (74-99)  mg/dL


 


POC Glucose (mg/dL)    166 H  (75-99)  mg/dL














  02/02/18 Range/Units





  11:38 


 


Hgb   (11.4-16.0)  gm/dL


 


Hct   (34.0-46.0)  %


 


MCV   (80.0-100.0)  fL


 


MCH   (25.0-35.0)  pg


 


MCHC   (31.0-37.0)  g/dL


 


RDW   (11.5-15.5)  %


 


BUN   (7-17)  mg/dL


 


Creatinine   (0.52-1.04)  mg/dL


 


Glucose   (74-99)  mg/dL


 


POC Glucose (mg/dL)  184 H  (75-99)  mg/dL














Assessment and Plan


Plan: 





Assessment and plan





#1 anemia, requiring blood transfusion


#2 chronic kidney disease stage II


#3 sleep apnea


#4 history of PE


#5 chronic persistent atrial fibrillation


#6 known history of coronary artery disease with prior bypass surgery


#7Bioprosthetic aortic valve


#8 prior pacemaker implantation


#9 diabetes 


#10 hyperlipidemia








Plan


From cardiology's perspective, we'll recommend to continue the patient on her 

current medications.  Continue IV Lasix for 24 hours, check lytes BUN and 

creatinine, daily weights, intake and output.








DNP note has been reviewed, I agree with a documented findings and plan of 

care.  Patient was seen and examined.

## 2018-02-02 NOTE — P.PN
Subjective


Progress Note Date: 02/02/18


Progress note being dictated for Dr. Patino.














Interval history:87-year-old female came in status of breath and anemia patient 

received 2 units of blood transfusion and patient still short short of breath 

and wheezing doesn't have any history of COPD but was started on Symbicort 

patient may have cardiac asthma because of which I'll obtain a chest x-ray 

patient the had history of CABG in the past history of congestive heart failure 

chronic diastolic dysfunction history of atrial fibrillation, had a 

bioprosthetic valve in the past in spite of atrial fibrillation patient is not 

on any anticoagulation because of her previous GI bleeds at this point of time 

patient doesn't have any GI bleed but still quite short of breath patient uses 

around 100 mg of oral Lasix at home patient will be started on 60 IV twice a 

day of IV Lasix patient does have elevated JVD.  Patient dementia of breath





Constitutional: Denied any fatigue denied any fever.


Cardio vascular: denied any chest pain, palpitations


Gastrointestinal denied any nausea vomiting


Pulmonary: As mentioned in HPI


Neurologic denied any new focal deficits





2/2/18  status post 1 unit of packed RBCs yesterday, hemoglobin currently 8.8.  

Breathing significantly improved. Complaining of headache, dizziness which 

patient states is chronic, and feeling more tired today.  Afebrile, normal WBC.

  Diuresing well on Lasix IV push. Renal function improving.








Objective





- Vital Signs


Vital signs: 


 Vital Signs











Temp  96.9 F L  02/02/18 08:00


 


Pulse  68   02/02/18 08:00


 


Resp  18   02/02/18 04:00


 


BP  120/59   02/02/18 08:00


 


Pulse Ox  90 L  02/02/18 08:00








 Intake & Output











 02/01/18 02/02/18 02/02/18





 18:59 06:59 18:59


 


Intake Total 310 600 420


 


Output Total 350 600 


 


Balance -40 0 420


 


Weight 84.3 kg  


 


Intake:   


 


  Oral  600 420


 


  Blood Product 310  


 


    Rc As-1  Unit 310  





    [id number]   


 


Output:   


 


  Urine 350 600 


 


Other:   


 


  Voiding Method Toilet  














- Exam





GENERAL: The patient is alert and oriented x3, not in any acute distress. Well 

developed, well nourished. 


HEENT: Pupils are round and equally reacting to light. EOMI. No scleral 

icterus. No conjunctival pallor. Normocephalic, atraumatic. No pharyngeal 

erythema. No thyromegaly. 


CARDIOVASCULAR: S1 and S2 present.  Positive systolic murmur .No JVD.


PULMONARY: Good air entry into bilateral lung fields significant expiratory 

wheezing was appreciated


ABDOMEN: Soft, nontender, nondistended, normoactive bowel sounds. No palpable 

organomegaly. 


MUSCULOSKELETAL: No joint swelling or deformity.


EXTREMITIES: No cyanosis, clubbing, or pedal edema. 


NEUROLOGICAL: Gross neurological examination did not reveal any focal deficits. 


SKIN: No rashes. 








- Labs


CBC & Chem 7: 


 02/02/18 06:30





 02/02/18 06:30


Labs: 


 Abnormal Lab Results - Last 24 Hours (Table)











  02/01/18 02/01/18 02/02/18 Range/Units





  17:18 21:00 06:30 


 


Hgb    8.8 L  (11.4-16.0)  gm/dL


 


Hct    30.6 L  (34.0-46.0)  %


 


MCV    71.4 L  (80.0-100.0)  fL


 


MCH    20.6 L  (25.0-35.0)  pg


 


MCHC    28.8 L  (31.0-37.0)  g/dL


 


RDW    19.8 H  (11.5-15.5)  %


 


BUN     (7-17)  mg/dL


 


Creatinine     (0.52-1.04)  mg/dL


 


Glucose     (74-99)  mg/dL


 


POC Glucose (mg/dL)  189 H  187 H   (75-99)  mg/dL














  02/02/18 02/02/18 02/02/18 Range/Units





  06:30 08:47 11:38 


 


Hgb     (11.4-16.0)  gm/dL


 


Hct     (34.0-46.0)  %


 


MCV     (80.0-100.0)  fL


 


MCH     (25.0-35.0)  pg


 


MCHC     (31.0-37.0)  g/dL


 


RDW     (11.5-15.5)  %


 


BUN  25 H    (7-17)  mg/dL


 


Creatinine  1.27 H    (0.52-1.04)  mg/dL


 


Glucose  69 L    (74-99)  mg/dL


 


POC Glucose (mg/dL)   166 H  184 H  (75-99)  mg/dL














Assessment and Plan


Assessment: 


-Congestive heart failure chronic vessel dysfunction with acute exacerbation IV 

Lasix management as mentioned above


-Anemia contributing to shortness of breath received 2 units of blood 

transfusion


-Elevated troponin secondary to heart failure and type II non-ST elevation 

microinfarction secondary to anemia


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


-Acute renal dysfunction: Secondary to congestive heart failure exacerbation IV 

Lasix as mentioned above


-History of PE in the past


-Sleep apnea


-Hypothyroidism


-Chronic anemia from her previous GI bleeds her anemia of chronic disease


-Atrial fibrillation appears to be valvular A. fib rate controlled at this time


-History of mitral valve replacement with bioprosthetic valve














Plan: Continue current medication regime ,Lasix,monitoring and symptomatic 

treatment.  Orthostatic vitals every shift.  Close monitoring of renal function

, electrolytes, hgb with repeat labs ordered for a.m.  Significant clinical 

improvement, discharge planning in progress for tomorrow pending cardiology 

clearance.

















The impression and plan of care has been dictated as directed.





:


I performed a history and examination of this patient,  discussed the same with 

the dictator.  I agree with the dictator's note ,documented as a scribe.  Any 

additional findings or plans will be noted.

## 2018-02-03 LAB
ANION GAP SERPL CALC-SCNC: 9 MMOL/L
BASOPHILS # BLD AUTO: 0.1 K/UL (ref 0–0.2)
BASOPHILS NFR BLD AUTO: 1 %
BUN SERPL-SCNC: 23 MG/DL (ref 7–17)
CALCIUM SPEC-MCNC: 9.7 MG/DL (ref 8.4–10.2)
CHLORIDE SERPL-SCNC: 106 MMOL/L (ref 98–107)
CO2 SERPL-SCNC: 28 MMOL/L (ref 22–30)
EOSINOPHIL # BLD AUTO: 0.2 K/UL (ref 0–0.7)
EOSINOPHIL NFR BLD AUTO: 2 %
ERYTHROCYTE [DISTWIDTH] IN BLOOD BY AUTOMATED COUNT: 4.48 M/UL (ref 3.8–5.4)
ERYTHROCYTE [DISTWIDTH] IN BLOOD: 20.7 % (ref 11.5–15.5)
GLUCOSE BLD-MCNC: 128 MG/DL (ref 75–99)
GLUCOSE BLD-MCNC: 146 MG/DL (ref 75–99)
GLUCOSE BLD-MCNC: 199 MG/DL (ref 75–99)
GLUCOSE BLD-MCNC: 215 MG/DL (ref 75–99)
GLUCOSE BLD-MCNC: 56 MG/DL (ref 75–99)
GLUCOSE BLD-MCNC: 57 MG/DL (ref 75–99)
GLUCOSE BLD-MCNC: 84 MG/DL (ref 75–99)
GLUCOSE SERPL-MCNC: 38 MG/DL (ref 74–99)
HCT VFR BLD AUTO: 32.2 % (ref 34–46)
HGB BLD-MCNC: 8.8 GM/DL (ref 11.4–16)
LYMPHOCYTES # SPEC AUTO: 1.2 K/UL (ref 1–4.8)
LYMPHOCYTES NFR SPEC AUTO: 11 %
MAGNESIUM SPEC-SCNC: 1.8 MG/DL (ref 1.6–2.3)
MCH RBC QN AUTO: 19.6 PG (ref 25–35)
MCHC RBC AUTO-ENTMCNC: 27.2 G/DL (ref 31–37)
MCV RBC AUTO: 71.8 FL (ref 80–100)
MONOCYTES # BLD AUTO: 0.6 K/UL (ref 0–1)
MONOCYTES NFR BLD AUTO: 5 %
NEUTROPHILS # BLD AUTO: 8.9 K/UL (ref 1.3–7.7)
NEUTROPHILS NFR BLD AUTO: 80 %
PLATELET # BLD AUTO: 200 K/UL (ref 150–450)
POTASSIUM SERPL-SCNC: 3.2 MMOL/L (ref 3.5–5.1)
SODIUM SERPL-SCNC: 143 MMOL/L (ref 137–145)
WBC # BLD AUTO: 11.2 K/UL (ref 3.8–10.6)

## 2018-02-03 RX ADMIN — INSULIN ASPART SCH: 100 INJECTION, SOLUTION INTRAVENOUS; SUBCUTANEOUS at 21:09

## 2018-02-03 RX ADMIN — SPIRONOLACTONE SCH MG: 25 TABLET, FILM COATED ORAL at 09:34

## 2018-02-03 RX ADMIN — BUDESONIDE AND FORMOTEROL FUMARATE DIHYDRATE SCH: 160; 4.5 AEROSOL RESPIRATORY (INHALATION) at 09:19

## 2018-02-03 RX ADMIN — INSULIN ASPART SCH: 100 INJECTION, SOLUTION INTRAVENOUS; SUBCUTANEOUS at 06:26

## 2018-02-03 RX ADMIN — PANTOPRAZOLE SODIUM SCH MG: 40 TABLET, DELAYED RELEASE ORAL at 06:26

## 2018-02-03 RX ADMIN — FUROSEMIDE SCH MG: 20 TABLET ORAL at 17:06

## 2018-02-03 RX ADMIN — Medication SCH MG: at 19:50

## 2018-02-03 RX ADMIN — ACETAMINOPHEN PRN MG: 325 TABLET, FILM COATED ORAL at 09:35

## 2018-02-03 RX ADMIN — FUROSEMIDE SCH MG: 10 INJECTION, SOLUTION INTRAMUSCULAR; INTRAVENOUS at 09:35

## 2018-02-03 RX ADMIN — CYANOCOBALAMIN TAB 500 MCG SCH MCG: 500 TAB at 12:18

## 2018-02-03 RX ADMIN — INSULIN ASPART SCH UNIT: 100 INJECTION, SOLUTION INTRAVENOUS; SUBCUTANEOUS at 17:05

## 2018-02-03 RX ADMIN — Medication SCH UNIT: at 12:18

## 2018-02-03 RX ADMIN — ALLOPURINOL SCH MG: 100 TABLET ORAL at 19:50

## 2018-02-03 RX ADMIN — CARVEDILOL SCH MG: 3.12 TABLET, FILM COATED ORAL at 06:26

## 2018-02-03 RX ADMIN — INSULIN ASPART SCH: 100 INJECTION, SOLUTION INTRAVENOUS; SUBCUTANEOUS at 12:17

## 2018-02-03 RX ADMIN — ALLOPURINOL SCH MG: 100 TABLET ORAL at 09:34

## 2018-02-03 RX ADMIN — PANTOPRAZOLE SODIUM SCH MG: 40 TABLET, DELAYED RELEASE ORAL at 17:06

## 2018-02-03 RX ADMIN — BUDESONIDE AND FORMOTEROL FUMARATE DIHYDRATE SCH: 160; 4.5 AEROSOL RESPIRATORY (INHALATION) at 19:10

## 2018-02-03 RX ADMIN — LATANOPROST SCH DROPS: 50 SOLUTION OPHTHALMIC at 19:50

## 2018-02-03 RX ADMIN — CARVEDILOL SCH MG: 3.12 TABLET, FILM COATED ORAL at 17:06

## 2018-02-03 RX ADMIN — ISOSORBIDE MONONITRATE SCH MG: 30 TABLET, EXTENDED RELEASE ORAL at 09:34

## 2018-02-03 RX ADMIN — LEVOTHYROXINE SODIUM SCH MCG: 75 TABLET ORAL at 06:26

## 2018-02-03 NOTE — P.PN
Subjective


87-year-old female came in status of breath and anemia patient received 2 units 

of blood transfusion and patient still short short of breath and wheezing doesn'

t have any history of COPD but was started on Symbicort patient may have 

cardiac asthma because of which I'll obtain a chest x-ray patient the had 

history of CABG in the past history of congestive heart failure chronic 

diastolic dysfunction history of atrial fibrillation, had a bioprosthetic valve 

in the past in spite of atrial fibrillation patient is not on any 

anticoagulation because of her previous GI bleeds at this point of time patient 

doesn't have any GI bleed but still quite short of breath patient uses around 

100 mg of oral Lasix at home patient will be started on 60 IV twice a day of IV 

Lasix patient does have elevated JVD.  Patient dementia of breath





02/03/2018


Patient is comparing of dizziness which is chronic.  She doesn't feel good





Blood sugars has dropped down because of which I'm holding her discharge 

patient was switched to oral Lasix patient's Levemir will be changed from 100 

units to 75 units at nighttime








Constitutional: Denied any fatigue denied any fever.


Cardio vascular: denied any chest pain, palpitations


Gastrointestinal denied any nausea vomiting


Pulmonary: As mentioned in HPI


Neurologic denied any new focal deficits





Objective





- Vital Signs


Vital signs: 


 Vital Signs











Temp  98.1 F   02/03/18 12:00


 


Pulse  90   02/03/18 12:00


 


Resp  18   02/03/18 12:00


 


BP  128/65   02/03/18 12:00


 


Pulse Ox  93 L  02/03/18 08:00








 Intake & Output











 02/02/18 02/03/18 02/03/18





 18:59 06:59 18:59


 


Intake Total 600 120 200


 


Output Total  800 


 


Balance 600 -680 200


 


Weight  84.4 kg 


 


Intake:   


 


  Oral 600 120 200


 


Output:   


 


  Urine  800 


 


Other:   


 


  Voiding Method  Toilet Toilet














- Exam


PHYSICAL EXAMINATION: 





GENERAL: The patient is alert and oriented x3, not in any acute distress. Well 

developed, well nourished. 


HEENT: Pupils are round and equally reacting to light. EOMI. No scleral 

icterus. No conjunctival pallor. Normocephalic, atraumatic. No pharyngeal 

erythema. No thyromegaly. 


CARDIOVASCULAR: S1 and S2 present. No murmurs, rubs, or gallops. 


PULMONARY: Chest is clear to auscultation, no wheezing or crackles. 


ABDOMEN: Soft, nontender, nondistended, normoactive bowel sounds. No palpable 

organomegaly. 


MUSCULOSKELETAL: No joint swelling or deformity.


EXTREMITIES: No cyanosis, clubbing, or pedal edema. 


NEUROLOGICAL: Gross neurological examination did not reveal any focal deficits. 


SKIN: No rashes. 














- Labs


CBC & Chem 7: 


 02/03/18 05:29





 02/03/18 05:29


Labs: 


 Abnormal Lab Results - Last 24 Hours (Table)











  02/02/18 02/02/18 02/03/18 Range/Units





  16:54 21:12 05:29 


 


WBC    11.2 H  (3.8-10.6)  k/uL


 


Hgb    8.8 L  (11.4-16.0)  gm/dL


 


Hct    32.2 L  (34.0-46.0)  %


 


MCV    71.8 L  (80.0-100.0)  fL


 


MCH    19.6 L  (25.0-35.0)  pg


 


MCHC    27.2 L  (31.0-37.0)  g/dL


 


RDW    20.7 H  (11.5-15.5)  %


 


Neutrophils #    8.9 H  (1.3-7.7)  k/uL


 


Potassium     (3.5-5.1)  mmol/L


 


BUN     (7-17)  mg/dL


 


Creatinine     (0.52-1.04)  mg/dL


 


Glucose     (74-99)  mg/dL


 


POC Glucose (mg/dL)  170 H  159 H   (75-99)  mg/dL














  02/03/18 02/03/18 02/03/18 Range/Units





  05:29 06:23 06:39 


 


WBC     (3.8-10.6)  k/uL


 


Hgb     (11.4-16.0)  gm/dL


 


Hct     (34.0-46.0)  %


 


MCV     (80.0-100.0)  fL


 


MCH     (25.0-35.0)  pg


 


MCHC     (31.0-37.0)  g/dL


 


RDW     (11.5-15.5)  %


 


Neutrophils #     (1.3-7.7)  k/uL


 


Potassium  3.2 L    (3.5-5.1)  mmol/L


 


BUN  23 H    (7-17)  mg/dL


 


Creatinine  1.21 H    (0.52-1.04)  mg/dL


 


Glucose  38 L*    (74-99)  mg/dL


 


POC Glucose (mg/dL)   57 L  56 L  (75-99)  mg/dL














  02/03/18 02/03/18 02/03/18 Range/Units





  09:41 11:34 16:24 


 


WBC     (3.8-10.6)  k/uL


 


Hgb     (11.4-16.0)  gm/dL


 


Hct     (34.0-46.0)  %


 


MCV     (80.0-100.0)  fL


 


MCH     (25.0-35.0)  pg


 


MCHC     (31.0-37.0)  g/dL


 


RDW     (11.5-15.5)  %


 


Neutrophils #     (1.3-7.7)  k/uL


 


Potassium     (3.5-5.1)  mmol/L


 


BUN     (7-17)  mg/dL


 


Creatinine     (0.52-1.04)  mg/dL


 


Glucose     (74-99)  mg/dL


 


POC Glucose (mg/dL)  128 H  146 H  215 H  (75-99)  mg/dL














Assessment and Plan


Plan: 


-Congestive heart failure chronic diastolic dysfunction it acute exacerbation 

improved patient is on oral Lasix at this time


-Anemia contributing to shortness of breath received 2 units of blood 

transfusion


-Elevated troponin secondary to heart failure and type II non-ST elevation 

myocardial infarction secondary to anemia


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


-Acute renal dysfunction: Secondary to congestive heart failure exacerbation IV 

Lasix as mentioned above


-History of PE in the past


-Sleep apnea


-Hypothyroidism


-Chronic anemia from her previous GI bleeds her anemia of chronic disease


-Atrial fibrillation appears to be valvular A. fib rate controlled at this time


-History of mitral valve replacement with bioprosthetic valve


-Type 2 diabetes mellitus: Low blood sugars, change in level overdosing as 

mentioned in the interval history

## 2018-02-03 NOTE — P.PN
Subjective


Progress Note Date: 02/03/18





Mrs. Ortiz is a pleasant 87-year-old  past medical history 

significant for coronary artery disease with subsequent single vessel bypass 

grafting, dual chamber pacemaker for sick sinus syndrome and complete heart 

block, bioprosthetic aortic valve replacement, chronic persistent atrial 

fibrillation not on long term anticoagulation secondary to chronic anemia, COPD

, hypertension, dyslipidemia, diastolic heart failure and diabetes mellitus. 

She follows with Dr. Nina in the office. We have been asked to see her 

in consultation for evidence of possible heart failure. She states she was 

trying to get up out of bed yesterday when she became mildly dizzy and fell out 

of bed onto her left side. She denies symptoms of chest pain, shortness of 

breath, dizziness, palpitations, diaphoresis, nausea or vomiting.  EKG on 

arrival reveals atrial fibrillation with controlled ventricular response with 

underlying right bundle branch block. This is consistent with old EKG's.  

Patient was found to be severely anemic, status post blood transfusion.








02/02/2018


Patient was seen and examined this morning, states overall that her breathing 

is improved today.  Complaining of mild dizziness and feeling tired today.  

Blood pressure 120/60 with a heart rate in the 60s.  White blood cell count 9.9

, hemoglobin 8.8, potassium 3.7, BUN 25, creatinine 1.2.








02/03/2018





Patient seen and examined this morning, denies any shortness of breath.  LV 

function is normal.  Her main complaint is that of headache.  She also had a 

low blood sugar in the 30s this morning.  She also complains that when she sits 

up she is dizzy, patient has been experiencing dizziness and headache for 

several years ,  we will check orthostatics.  Blood pressure this morning 138/

80 with a heart rate in the 70s.





Objective





- Vital Signs


Vital signs: 


 Vital Signs











Temp  98.1 F   02/03/18 04:00


 


Pulse  71   02/03/18 08:00


 


Resp  14   02/03/18 08:00


 


BP  138/80   02/03/18 08:00


 


Pulse Ox  93 L  02/03/18 08:00








 Intake & Output











 02/02/18 02/03/18 02/03/18





 18:59 06:59 18:59


 


Intake Total 600 120 


 


Output Total  800 


 


Balance 600 -680 


 


Weight  84.4 kg 


 


Intake:   


 


  Oral 600 120 


 


Output:   


 


  Urine  800 


 


Other:   


 


  Voiding Method  Toilet Toilet














- Exam





PHYSICAL EXAMINATION: 





HEENT: Head is atraumatic, normocephalic.  Pupils equal, round.  Neck is 

supple.  There is no elevated jugular venous pressure.





HEART EXAMINATION: Heart S1 and S2 with systolic murmur is heard





CHEST EXAMINATION: Lungs are clear with diminished air entry to bilateral bases 

ABDOMEN:  Soft, nontender. Bowel sounds are heard. No organomegaly noted.


 


EXTREMITIES: 2+ peripheral pulses with no evidence of peripheral edema and no 

calf tenderness noted.





NEUROLOGIC patient is awake, alert and oriented -3.


 


.


 








- Labs


CBC & Chem 7: 


 02/03/18 05:29





 02/03/18 05:29


Labs: 


 Abnormal Lab Results - Last 24 Hours (Table)











  02/02/18 02/02/18 02/03/18 Range/Units





  16:54 21:12 05:29 


 


WBC    11.2 H  (3.8-10.6)  k/uL


 


Hgb    8.8 L  (11.4-16.0)  gm/dL


 


Hct    32.2 L  (34.0-46.0)  %


 


MCV    71.8 L  (80.0-100.0)  fL


 


MCH    19.6 L  (25.0-35.0)  pg


 


MCHC    27.2 L  (31.0-37.0)  g/dL


 


RDW    20.7 H  (11.5-15.5)  %


 


Neutrophils #    8.9 H  (1.3-7.7)  k/uL


 


Potassium     (3.5-5.1)  mmol/L


 


BUN     (7-17)  mg/dL


 


Creatinine     (0.52-1.04)  mg/dL


 


Glucose     (74-99)  mg/dL


 


POC Glucose (mg/dL)  170 H  159 H   (75-99)  mg/dL














  02/03/18 02/03/18 02/03/18 Range/Units





  05:29 06:23 06:39 


 


WBC     (3.8-10.6)  k/uL


 


Hgb     (11.4-16.0)  gm/dL


 


Hct     (34.0-46.0)  %


 


MCV     (80.0-100.0)  fL


 


MCH     (25.0-35.0)  pg


 


MCHC     (31.0-37.0)  g/dL


 


RDW     (11.5-15.5)  %


 


Neutrophils #     (1.3-7.7)  k/uL


 


Potassium  3.2 L    (3.5-5.1)  mmol/L


 


BUN  23 H    (7-17)  mg/dL


 


Creatinine  1.21 H    (0.52-1.04)  mg/dL


 


Glucose  38 L*    (74-99)  mg/dL


 


POC Glucose (mg/dL)   57 L  56 L  (75-99)  mg/dL














  02/03/18 02/03/18 Range/Units





  09:41 11:34 


 


WBC    (3.8-10.6)  k/uL


 


Hgb    (11.4-16.0)  gm/dL


 


Hct    (34.0-46.0)  %


 


MCV    (80.0-100.0)  fL


 


MCH    (25.0-35.0)  pg


 


MCHC    (31.0-37.0)  g/dL


 


RDW    (11.5-15.5)  %


 


Neutrophils #    (1.3-7.7)  k/uL


 


Potassium    (3.5-5.1)  mmol/L


 


BUN    (7-17)  mg/dL


 


Creatinine    (0.52-1.04)  mg/dL


 


Glucose    (74-99)  mg/dL


 


POC Glucose (mg/dL)  128 H  146 H  (75-99)  mg/dL














Assessment and Plan


Plan: 





Assessment and plan





#1 anemia, requiring blood transfusion


#2 chronic kidney disease stage II


#3 sleep apnea


#4 history of PE


#5 chronic persistent atrial fibrillation


#6 known history of coronary artery disease with prior bypass surgery


#7Bioprosthetic aortic valve


#8 prior pacemaker implantation


#9 diabetes 


#10 hyperlipidemia








Plan


From cardiology's perspective, we'll discontinue the IV Lasix.  Check 

orthostatics.  Check lytes BUN and creatinine daily.





DNP note has been reviewed, I agree with a documented findings and plan of 

care.  Patient was seen and examined.

## 2018-02-04 LAB
ANION GAP SERPL CALC-SCNC: 9 MMOL/L
BASOPHILS # BLD AUTO: 0.1 K/UL (ref 0–0.2)
BASOPHILS NFR BLD AUTO: 1 %
BUN SERPL-SCNC: 22 MG/DL (ref 7–17)
CALCIUM SPEC-MCNC: 9.6 MG/DL (ref 8.4–10.2)
CHLORIDE SERPL-SCNC: 104 MMOL/L (ref 98–107)
CO2 SERPL-SCNC: 29 MMOL/L (ref 22–30)
EOSINOPHIL # BLD AUTO: 0.5 K/UL (ref 0–0.7)
EOSINOPHIL NFR BLD AUTO: 5 %
ERYTHROCYTE [DISTWIDTH] IN BLOOD BY AUTOMATED COUNT: 4.59 M/UL (ref 3.8–5.4)
ERYTHROCYTE [DISTWIDTH] IN BLOOD: 19.6 % (ref 11.5–15.5)
GLUCOSE BLD-MCNC: 155 MG/DL (ref 75–99)
GLUCOSE BLD-MCNC: 192 MG/DL (ref 75–99)
GLUCOSE BLD-MCNC: 257 MG/DL (ref 75–99)
GLUCOSE BLD-MCNC: 63 MG/DL (ref 75–99)
GLUCOSE BLD-MCNC: 66 MG/DL (ref 75–99)
GLUCOSE BLD-MCNC: 90 MG/DL (ref 75–99)
GLUCOSE BLD-MCNC: 91 MG/DL (ref 75–99)
GLUCOSE SERPL-MCNC: 55 MG/DL (ref 74–99)
HCT VFR BLD AUTO: 33.1 % (ref 34–46)
HGB BLD-MCNC: 9 GM/DL (ref 11.4–16)
LYMPHOCYTES # SPEC AUTO: 2.3 K/UL (ref 1–4.8)
LYMPHOCYTES NFR SPEC AUTO: 25 %
MCH RBC QN AUTO: 19.7 PG (ref 25–35)
MCHC RBC AUTO-ENTMCNC: 27.3 G/DL (ref 31–37)
MCV RBC AUTO: 72.2 FL (ref 80–100)
MONOCYTES # BLD AUTO: 0.7 K/UL (ref 0–1)
MONOCYTES NFR BLD AUTO: 8 %
NEUTROPHILS # BLD AUTO: 5.4 K/UL (ref 1.3–7.7)
NEUTROPHILS NFR BLD AUTO: 58 %
PLATELET # BLD AUTO: 220 K/UL (ref 150–450)
POTASSIUM SERPL-SCNC: 3.6 MMOL/L (ref 3.5–5.1)
SODIUM SERPL-SCNC: 142 MMOL/L (ref 137–145)
WBC # BLD AUTO: 9.3 K/UL (ref 3.8–10.6)

## 2018-02-04 RX ADMIN — INSULIN ASPART SCH: 100 INJECTION, SOLUTION INTRAVENOUS; SUBCUTANEOUS at 05:54

## 2018-02-04 RX ADMIN — INSULIN ASPART SCH: 100 INJECTION, SOLUTION INTRAVENOUS; SUBCUTANEOUS at 16:51

## 2018-02-04 RX ADMIN — BUDESONIDE AND FORMOTEROL FUMARATE DIHYDRATE SCH: 160; 4.5 AEROSOL RESPIRATORY (INHALATION) at 20:03

## 2018-02-04 RX ADMIN — PANTOPRAZOLE SODIUM SCH MG: 40 TABLET, DELAYED RELEASE ORAL at 06:15

## 2018-02-04 RX ADMIN — ALLOPURINOL SCH MG: 100 TABLET ORAL at 10:10

## 2018-02-04 RX ADMIN — CYANOCOBALAMIN TAB 500 MCG SCH MCG: 500 TAB at 16:58

## 2018-02-04 RX ADMIN — FUROSEMIDE SCH MG: 40 TABLET ORAL at 16:58

## 2018-02-04 RX ADMIN — ACETAMINOPHEN PRN MG: 325 TABLET, FILM COATED ORAL at 17:46

## 2018-02-04 RX ADMIN — Medication SCH UNIT: at 12:39

## 2018-02-04 RX ADMIN — BUDESONIDE AND FORMOTEROL FUMARATE DIHYDRATE SCH: 160; 4.5 AEROSOL RESPIRATORY (INHALATION) at 08:13

## 2018-02-04 RX ADMIN — Medication SCH MG: at 19:50

## 2018-02-04 RX ADMIN — INSULIN ASPART SCH: 100 INJECTION, SOLUTION INTRAVENOUS; SUBCUTANEOUS at 12:35

## 2018-02-04 RX ADMIN — ALLOPURINOL SCH MG: 100 TABLET ORAL at 19:50

## 2018-02-04 RX ADMIN — SPIRONOLACTONE SCH MG: 25 TABLET, FILM COATED ORAL at 10:10

## 2018-02-04 RX ADMIN — ISOSORBIDE MONONITRATE SCH MG: 30 TABLET, EXTENDED RELEASE ORAL at 10:11

## 2018-02-04 RX ADMIN — LATANOPROST SCH DROPS: 50 SOLUTION OPHTHALMIC at 19:50

## 2018-02-04 RX ADMIN — LEVOTHYROXINE SODIUM SCH MCG: 75 TABLET ORAL at 06:15

## 2018-02-04 RX ADMIN — PANTOPRAZOLE SODIUM SCH MG: 40 TABLET, DELAYED RELEASE ORAL at 16:57

## 2018-02-04 RX ADMIN — CARVEDILOL SCH MG: 3.12 TABLET, FILM COATED ORAL at 06:15

## 2018-02-04 RX ADMIN — CARVEDILOL SCH MG: 3.12 TABLET, FILM COATED ORAL at 16:58

## 2018-02-04 RX ADMIN — FUROSEMIDE SCH MG: 20 TABLET ORAL at 10:10

## 2018-02-04 RX ADMIN — INSULIN DETEMIR SCH UNIT: 100 INJECTION, SOLUTION SUBCUTANEOUS at 17:47

## 2018-02-04 NOTE — P.PN
Subjective


87-year-old female came in status of breath and anemia patient received 2 units 

of blood transfusion and patient still short short of breath and wheezing doesn'

t have any history of COPD but was started on Symbicort patient may have 

cardiac asthma because of which I'll obtain a chest x-ray patient the had 

history of CABG in the past history of congestive heart failure chronic 

diastolic dysfunction history of atrial fibrillation, had a bioprosthetic valve 

in the past in spite of atrial fibrillation patient is not on any 

anticoagulation because of her previous GI bleeds at this point of time patient 

doesn't have any GI bleed but still quite short of breath patient uses around 

100 mg of oral Lasix at home patient will be started on 60 IV twice a day of IV 

Lasix patient does have elevated JVD.  Patient dementia of breath





02/03/2018


Patient is comparing of dizziness which is chronic.  She doesn't feel good





Blood sugars has dropped down because of which I'm holding her discharge 

patient was switched to oral Lasix patient's Levemir will be changed from 100 

units to 75 units at nighttime








02/04/2018


Patient blood sugars are still low decrease the Levemir to 50 units PT and OT 

consultation patient is still comparing of dizziness patient appears to be 

mildly on the hypovolemic side cut down the Lasix to 40 oral twice a day from 

60 twice a day mildly worsening kidney function Will repeat basic metabolic 

profile tomorrow.








Constitutional: Denied any fatigue denied any fever.


Cardio vascular: denied any chest pain, palpitations


Gastrointestinal denied any nausea vomiting


Pulmonary: As mentioned in HPI


Neurologic denied any new focal deficits





Objective





- Vital Signs


Vital signs: 


 Vital Signs











Temp  96.9 F L  02/04/18 07:52


 


Pulse  80   02/04/18 07:54


 


Resp  18   02/04/18 07:54


 


BP  126/67   02/04/18 07:52


 


Pulse Ox  96   02/04/18 07:52








 Intake & Output











 02/03/18 02/04/18 02/04/18





 18:59 06:59 18:59


 


Intake Total 200  236


 


Balance 200  236


 


Weight  82.1 kg 


 


Intake:   


 


  Oral 200  236


 


Other:   


 


  Voiding Method Toilet Toilet Toilet














- Exam


PHYSICAL EXAMINATION: 





GENERAL: The patient is alert and oriented x3, not in any acute distress. Well 

developed, well nourished. 


HEENT: Pupils are round and equally reacting to light. EOMI. No scleral 

icterus. No conjunctival pallor. Normocephalic, atraumatic. No pharyngeal 

erythema. No thyromegaly. 


CARDIOVASCULAR: S1 and S2 present. No murmurs, rubs, or gallops. 


PULMONARY: Chest is clear to auscultation, no wheezing or crackles. 


ABDOMEN: Soft, nontender, nondistended, normoactive bowel sounds. No palpable 

organomegaly. 


MUSCULOSKELETAL: No joint swelling or deformity.


EXTREMITIES: No cyanosis, clubbing, or pedal edema. 


NEUROLOGICAL: Gross neurological examination did not reveal any focal deficits. 


SKIN: No rashes. 














- Labs


CBC & Chem 7: 


 02/04/18 05:42





 02/04/18 05:31


Labs: 


 Abnormal Lab Results - Last 24 Hours (Table)











  02/03/18 02/03/18 02/04/18 Range/Units





  16:24 20:42 05:31 


 


Hgb     (11.4-16.0)  gm/dL


 


Hct     (34.0-46.0)  %


 


MCV     (80.0-100.0)  fL


 


MCH     (25.0-35.0)  pg


 


MCHC     (31.0-37.0)  g/dL


 


RDW     (11.5-15.5)  %


 


BUN    22 H  (7-17)  mg/dL


 


Creatinine    1.30 H  (0.52-1.04)  mg/dL


 


Glucose    55 L  (74-99)  mg/dL


 


POC Glucose (mg/dL)  215 H  199 H   (75-99)  mg/dL














  02/04/18 02/04/18 02/04/18 Range/Units





  05:42 05:51 06:08 


 


Hgb  9.0 L    (11.4-16.0)  gm/dL


 


Hct  33.1 L    (34.0-46.0)  %


 


MCV  72.2 L    (80.0-100.0)  fL


 


MCH  19.7 L    (25.0-35.0)  pg


 


MCHC  27.3 L    (31.0-37.0)  g/dL


 


RDW  19.6 H    (11.5-15.5)  %


 


BUN     (7-17)  mg/dL


 


Creatinine     (0.52-1.04)  mg/dL


 


Glucose     (74-99)  mg/dL


 


POC Glucose (mg/dL)   63 L  66 L  (75-99)  mg/dL














  02/04/18 Range/Units





  11:47 


 


Hgb   (11.4-16.0)  gm/dL


 


Hct   (34.0-46.0)  %


 


MCV   (80.0-100.0)  fL


 


MCH   (25.0-35.0)  pg


 


MCHC   (31.0-37.0)  g/dL


 


RDW   (11.5-15.5)  %


 


BUN   (7-17)  mg/dL


 


Creatinine   (0.52-1.04)  mg/dL


 


Glucose   (74-99)  mg/dL


 


POC Glucose (mg/dL)  155 H  (75-99)  mg/dL














Assessment and Plan


Plan: 


-Congestive heart failure chronic diastolic dysfunction it acute exacerbation 

improved patient is on oral Lasix at this time, mildly on the hypovolemic side 

because of which her oral Lasix dose is being decreased air patient does have 

chronic dizziness etiology is unclear probably related to autonomic dysfunction


-Anemia contributing to shortness of breath received 2 units of blood 

transfusion


-Elevated troponin secondary to heart failure and type II non-ST elevation 

myocardial infarction secondary to anemia


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


-Acute renal dysfunction: Secondary to congestive heart failure exacerbation IV 

Lasix as mentioned above


-History of PE in the past


-Sleep apnea


-Hypothyroidism


-Chronic anemia from her previous GI bleeds her anemia of chronic disease


-Atrial fibrillation appears to be valvular A. fib rate controlled at this time


-History of mitral valve replacement with bioprosthetic valve


-Type 2 diabetes mellitus: Low blood sugars, change in Levemir dose as 

mentioned in the interval history

## 2018-02-04 NOTE — P.PN
Subjective


Progress Note Date: 02/04/18





Mrs. Ortiz is a pleasant 87-year-old  past medical history 

significant for coronary artery disease with subsequent single vessel bypass 

grafting, dual chamber pacemaker for sick sinus syndrome and complete heart 

block, bioprosthetic aortic valve replacement, chronic persistent atrial 

fibrillation not on long term anticoagulation secondary to chronic anemia, COPD

, hypertension, dyslipidemia, diastolic heart failure and diabetes mellitus. 

She follows with Dr. Nina in the office. We have been asked to see her 

in consultation for evidence of possible heart failure. She states she was 

trying to get up out of bed yesterday when she became mildly dizzy and fell out 

of bed onto her left side. She denies symptoms of chest pain, shortness of 

breath, dizziness, palpitations, diaphoresis, nausea or vomiting.  EKG on 

arrival reveals atrial fibrillation with controlled ventricular response with 

underlying right bundle branch block. This is consistent with old EKG's.  

Patient was found to be severely anemic, status post blood transfusion.








02/02/2018


Patient was seen and examined this morning, states overall that her breathing 

is improved today.  Complaining of mild dizziness and feeling tired today.  

Blood pressure 120/60 with a heart rate in the 60s.  White blood cell count 9.9

, hemoglobin 8.8, potassium 3.7, BUN 25, creatinine 1.2.








02/03/2018





Patient seen and examined this morning, denies any shortness of breath.  LV 

function is normal.  Her main complaint is that of headache.  She also had a 

low blood sugar in the 30s this morning.  She also complains that when she sits 

up she is dizzy, patient has been experiencing dizziness and headache for 

several years ,  we will check orthostatics.  Blood pressure this morning 138/

80 with a heart rate in the 70s.





2/4/2018


Mrs. Ortiz seen and exammined this morning. She is resting comfortably in 

bed. Continues to complain of weakness and fatigue. Blood sugar are still low 

in the 60s. Orthostatic blood pressures performed yesterday were negative 

although she continues to complain of postural dizziness. Telemetry tracings 

reveal persistent atrial fibrillation with controlled ventricular response. 

Creatinine today up to 1.3, hgb 9.  





Objective





- Vital Signs


Vital signs: 


 Vital Signs











Temp  96.9 F L  02/04/18 07:52


 


Pulse  80   02/04/18 07:54


 


Resp  18   02/04/18 07:54


 


BP  126/67   02/04/18 07:52


 


Pulse Ox  96   02/04/18 07:52








 Intake & Output











 02/03/18 02/04/18 02/04/18





 18:59 06:59 18:59


 


Intake Total 200  236


 


Balance 200  236


 


Weight  82.1 kg 


 


Intake:   


 


  Oral 200  236


 


Other:   


 


  Voiding Method Toilet Toilet Toilet














- Exam





Blood pressure 126/67 heart rate 88 afebrile


GENERAL: Well-appearing, well-nourished and in no acute distress.


NECK: Supple without JVD or thyromegaly.


LUNGS: Breath sounds clear to auscultation bilaterally. Respiration equal and 

unlabored.  No wheezes, rales or rhonchi.


HEART: Irregular rate and rhythm with systolic ejection murmur, no rubs or 

gallops. S1 and S2 heard.


EXTREMITIES: Normal range of motion, no edema.  No clubbing or cyanosis. 

Peripheral pulses intact and strong.





- Labs


CBC & Chem 7: 


 02/04/18 05:42





 02/04/18 05:31


Labs: 


 Abnormal Lab Results - Last 24 Hours (Table)











  02/03/18 02/03/18 02/03/18 Range/Units





  11:34 16:24 20:42 


 


Hgb     (11.4-16.0)  gm/dL


 


Hct     (34.0-46.0)  %


 


MCV     (80.0-100.0)  fL


 


MCH     (25.0-35.0)  pg


 


MCHC     (31.0-37.0)  g/dL


 


RDW     (11.5-15.5)  %


 


BUN     (7-17)  mg/dL


 


Creatinine     (0.52-1.04)  mg/dL


 


Glucose     (74-99)  mg/dL


 


POC Glucose (mg/dL)  146 H  215 H  199 H  (75-99)  mg/dL














  02/04/18 02/04/18 02/04/18 Range/Units





  05:31 05:42 05:51 


 


Hgb   9.0 L   (11.4-16.0)  gm/dL


 


Hct   33.1 L   (34.0-46.0)  %


 


MCV   72.2 L   (80.0-100.0)  fL


 


MCH   19.7 L   (25.0-35.0)  pg


 


MCHC   27.3 L   (31.0-37.0)  g/dL


 


RDW   19.6 H   (11.5-15.5)  %


 


BUN  22 H    (7-17)  mg/dL


 


Creatinine  1.30 H    (0.52-1.04)  mg/dL


 


Glucose  55 L    (74-99)  mg/dL


 


POC Glucose (mg/dL)    63 L  (75-99)  mg/dL














  02/04/18 Range/Units





  06:08 


 


Hgb   (11.4-16.0)  gm/dL


 


Hct   (34.0-46.0)  %


 


MCV   (80.0-100.0)  fL


 


MCH   (25.0-35.0)  pg


 


MCHC   (31.0-37.0)  g/dL


 


RDW   (11.5-15.5)  %


 


BUN   (7-17)  mg/dL


 


Creatinine   (0.52-1.04)  mg/dL


 


Glucose   (74-99)  mg/dL


 


POC Glucose (mg/dL)  66 L  (75-99)  mg/dL














Assessment and Plan


Assessment: 





ASSESSMENT


1. Mild diastolic heart failure exacerbation, resolved currently on oral Lasix.


2. Chronic persistent atrial fibrillation not on long term anticoagulation. 


3. Stable coronary artery disease s/p bypass grafting


4. History of aortic valve replacement with bioprosthetic valve


5. Dual-chamber pacemaker in place secondary to sick sinus syndrome and 

complete heart block


6. Dyslipidemia


7. Hypertension


8. Diabetes mellitus


9.  Anemia, requiring blood transfusion


10.  Chronic kidney disease stage II





PLAN


Continue cardiac medications as was previously ordered.  She is stable from a 

cardiac perspective.  Ongoing management of low blood sugars per primary 

medical team.  Follow-up with Dr. Nina as an outpatient.





Nurse Practitioner note has been reviewed, I agree with a documented findings 

and plan of care.  Patient was seen and examined.

## 2018-02-05 VITALS — RESPIRATION RATE: 16 BRPM

## 2018-02-05 LAB
ANION GAP SERPL CALC-SCNC: 10 MMOL/L
BUN SERPL-SCNC: 24 MG/DL (ref 7–17)
CALCIUM SPEC-MCNC: 9.4 MG/DL (ref 8.4–10.2)
CHLORIDE SERPL-SCNC: 102 MMOL/L (ref 98–107)
CO2 SERPL-SCNC: 27 MMOL/L (ref 22–30)
GLUCOSE BLD-MCNC: 102 MG/DL (ref 75–99)
GLUCOSE BLD-MCNC: 119 MG/DL (ref 75–99)
GLUCOSE BLD-MCNC: 146 MG/DL (ref 75–99)
GLUCOSE BLD-MCNC: 202 MG/DL (ref 75–99)
GLUCOSE BLD-MCNC: 214 MG/DL (ref 75–99)
GLUCOSE SERPL-MCNC: 181 MG/DL (ref 74–99)
POTASSIUM SERPL-SCNC: 3.8 MMOL/L (ref 3.5–5.1)
SODIUM SERPL-SCNC: 139 MMOL/L (ref 137–145)

## 2018-02-05 RX ADMIN — CYANOCOBALAMIN TAB 500 MCG SCH MCG: 500 TAB at 07:51

## 2018-02-05 RX ADMIN — PANTOPRAZOLE SODIUM SCH MG: 40 TABLET, DELAYED RELEASE ORAL at 17:15

## 2018-02-05 RX ADMIN — Medication SCH MG: at 21:19

## 2018-02-05 RX ADMIN — Medication SCH UNIT: at 07:51

## 2018-02-05 RX ADMIN — FUROSEMIDE SCH MG: 40 TABLET ORAL at 07:49

## 2018-02-05 RX ADMIN — ALLOPURINOL SCH MG: 100 TABLET ORAL at 07:49

## 2018-02-05 RX ADMIN — INSULIN ASPART SCH: 100 INJECTION, SOLUTION INTRAVENOUS; SUBCUTANEOUS at 05:45

## 2018-02-05 RX ADMIN — INSULIN ASPART SCH UNIT: 100 INJECTION, SOLUTION INTRAVENOUS; SUBCUTANEOUS at 12:07

## 2018-02-05 RX ADMIN — ACETAMINOPHEN AND CODEINE PHOSPHATE PRN EACH: 300; 30 TABLET ORAL at 10:24

## 2018-02-05 RX ADMIN — CARVEDILOL SCH MG: 3.12 TABLET, FILM COATED ORAL at 06:06

## 2018-02-05 RX ADMIN — INSULIN ASPART SCH: 100 INJECTION, SOLUTION INTRAVENOUS; SUBCUTANEOUS at 00:26

## 2018-02-05 RX ADMIN — BUDESONIDE AND FORMOTEROL FUMARATE DIHYDRATE SCH: 160; 4.5 AEROSOL RESPIRATORY (INHALATION) at 20:10

## 2018-02-05 RX ADMIN — LEVOTHYROXINE SODIUM SCH MCG: 75 TABLET ORAL at 06:06

## 2018-02-05 RX ADMIN — BUDESONIDE AND FORMOTEROL FUMARATE DIHYDRATE SCH: 160; 4.5 AEROSOL RESPIRATORY (INHALATION) at 08:29

## 2018-02-05 RX ADMIN — ALLOPURINOL SCH MG: 100 TABLET ORAL at 19:49

## 2018-02-05 RX ADMIN — INSULIN DETEMIR SCH UNIT: 100 INJECTION, SOLUTION SUBCUTANEOUS at 17:57

## 2018-02-05 RX ADMIN — LATANOPROST SCH DROPS: 50 SOLUTION OPHTHALMIC at 19:49

## 2018-02-05 RX ADMIN — FUROSEMIDE SCH MG: 20 TABLET ORAL at 17:13

## 2018-02-05 RX ADMIN — PANTOPRAZOLE SODIUM SCH MG: 40 TABLET, DELAYED RELEASE ORAL at 06:06

## 2018-02-05 RX ADMIN — CARVEDILOL SCH MG: 3.12 TABLET, FILM COATED ORAL at 17:14

## 2018-02-05 RX ADMIN — SPIRONOLACTONE SCH MG: 25 TABLET, FILM COATED ORAL at 07:50

## 2018-02-05 RX ADMIN — ACETAMINOPHEN AND CODEINE PHOSPHATE PRN EACH: 300; 30 TABLET ORAL at 21:29

## 2018-02-05 RX ADMIN — INSULIN ASPART SCH UNIT: 100 INJECTION, SOLUTION INTRAVENOUS; SUBCUTANEOUS at 21:19

## 2018-02-05 RX ADMIN — ISOSORBIDE MONONITRATE SCH MG: 30 TABLET, EXTENDED RELEASE ORAL at 07:49

## 2018-02-05 RX ADMIN — INSULIN ASPART SCH UNIT: 100 INJECTION, SOLUTION INTRAVENOUS; SUBCUTANEOUS at 17:14

## 2018-02-05 NOTE — P.PN
Subjective


Progress Note Date: 02/05/18





Mrs. Ortiz is a pleasant 87-year-old  past medical history 

significant for coronary artery disease with subsequent single vessel bypass 

grafting, dual chamber pacemaker for sick sinus syndrome and complete heart 

block, bioprosthetic aortic valve replacement, chronic persistent atrial 

fibrillation not on long term anticoagulation secondary to chronic anemia, COPD

, hypertension, dyslipidemia, diastolic heart failure and diabetes mellitus. 

She follows with Dr. Nian in the office. We have been asked to see her 

in consultation for evidence of possible heart failure. She states she was 

trying to get up out of bed yesterday when she became mildly dizzy and fell out 

of bed onto her left side. She denies symptoms of chest pain, shortness of 

breath, dizziness, palpitations, diaphoresis, nausea or vomiting.  EKG on 

arrival reveals atrial fibrillation with controlled ventricular response with 

underlying right bundle branch block. This is consistent with old EKG's.  

Patient was found to be severely anemic, status post blood transfusion.








02/02/2018


Patient was seen and examined this morning, states overall that her breathing 

is improved today.  Complaining of mild dizziness and feeling tired today.  

Blood pressure 120/60 with a heart rate in the 60s.  White blood cell count 9.9

, hemoglobin 8.8, potassium 3.7, BUN 25, creatinine 1.2.








02/03/2018





Patient seen and examined this morning, denies any shortness of breath.  LV 

function is normal.  Her main complaint is that of headache.  She also had a 

low blood sugar in the 30s this morning.  She also complains that when she sits 

up she is dizzy, patient has been experiencing dizziness and headache for 

several years ,  we will check orthostatics.  Blood pressure this morning 138/

80 with a heart rate in the 70s.








02/05/2018


Patient seen and examined this morning, breathing  stable overall.  Patient 

still complains of dizziness when sitting up.  No significant orthostatics noted

, no arrhythmias noted.  We will continue the patient on 60 mg of Lasix by 

mouth twice a day we will also  start the patient on Antivert to see if that 

helps with her dizziness.





Objective





- Vital Signs


Vital signs: 


 Vital Signs











Temp  97.1 F L  02/05/18 07:57


 


Pulse  65   02/05/18 12:00


 


Resp  18   02/05/18 12:00


 


BP  135/63   02/05/18 12:00


 


Pulse Ox  93 L  02/05/18 12:00








 Intake & Output











 02/04/18 02/05/18 02/05/18





 18:59 06:59 18:59


 


Intake Total 472  


 


Output Total 1  


 


Balance 471  


 


Weight  81.5 kg 


 


Intake:   


 


  Oral 472  


 


Output:   


 


  Urine 1  


 


Other:   


 


  Voiding Method Toilet Toilet 


 


  # Bowel Movements 2  














- Exam





PHYSICAL EXAMINATION: 





HEENT: Head is atraumatic, normocephalic.  Pupils equal, round.  Neck is 

supple.  There is no elevated jugular venous pressure.





HEART EXAMINATION: Heart S1 and S2 with systolic murmur is heard





CHEST EXAMINATION: Lungs are clear with diminished air entry to bilateral bases 

ABDOMEN:  Soft, nontender. Bowel sounds are heard. No organomegaly noted.


 


EXTREMITIES: 2+ peripheral pulses with no evidence of peripheral edema and no 

calf tenderness noted.





NEUROLOGIC patient is awake, alert and oriented -3.


 


.


 








- Labs


CBC & Chem 7: 


 02/04/18 05:42





 02/05/18 11:50


Labs: 


 Abnormal Lab Results - Last 24 Hours (Table)











  02/04/18 02/04/18 02/05/18 Range/Units





  16:36 20:37 01:54 


 


BUN     (7-17)  mg/dL


 


Creatinine     (0.52-1.04)  mg/dL


 


Glucose     (74-99)  mg/dL


 


POC Glucose (mg/dL)  192 H  257 H  119 H  (75-99)  mg/dL














  02/05/18 02/05/18 02/05/18 Range/Units





  05:41 11:34 11:50 


 


BUN    24 H  (7-17)  mg/dL


 


Creatinine    1.30 H  (0.52-1.04)  mg/dL


 


Glucose    181 H  (74-99)  mg/dL


 


POC Glucose (mg/dL)  102 H  202 H   (75-99)  mg/dL














Assessment and Plan


Plan: 





Assessment and plan





#1 anemia, requiring blood transfusion


#2 chronic kidney disease stage II


#3 sleep apnea


#4 history of PE


#5 chronic persistent atrial fibrillation


#6 known history of coronary artery disease with prior bypass surgery


#7 Bioprosthetic aortic valve


#8 prior pacemaker implantation


#9 diabetes 


#10 hyperlipidemia








Plan


From cardiology's perspective, we will start the patient on Lasix 60 mg one 

tablet by mouth twice a day.  Start Antivert for the dizziness.  Follow-up with 

Dr. Nina post discharge.





DNP note has been reviewed, I agree with a documented findings and plan of 

care.  Patient was seen and examined.

## 2018-02-06 VITALS — SYSTOLIC BLOOD PRESSURE: 132 MMHG | TEMPERATURE: 98.1 F | HEART RATE: 78 BPM | DIASTOLIC BLOOD PRESSURE: 67 MMHG

## 2018-02-06 LAB
GLUCOSE BLD-MCNC: 103 MG/DL (ref 75–99)
GLUCOSE BLD-MCNC: 80 MG/DL (ref 75–99)
GLUCOSE BLD-MCNC: 90 MG/DL (ref 75–99)

## 2018-02-06 RX ADMIN — BUDESONIDE AND FORMOTEROL FUMARATE DIHYDRATE SCH: 160; 4.5 AEROSOL RESPIRATORY (INHALATION) at 12:09

## 2018-02-06 RX ADMIN — CYANOCOBALAMIN TAB 500 MCG SCH MCG: 500 TAB at 08:52

## 2018-02-06 RX ADMIN — INSULIN ASPART SCH: 100 INJECTION, SOLUTION INTRAVENOUS; SUBCUTANEOUS at 11:44

## 2018-02-06 RX ADMIN — ACETAMINOPHEN AND CODEINE PHOSPHATE PRN EACH: 300; 30 TABLET ORAL at 08:51

## 2018-02-06 RX ADMIN — ALLOPURINOL SCH MG: 100 TABLET ORAL at 08:53

## 2018-02-06 RX ADMIN — ACETAMINOPHEN AND CODEINE PHOSPHATE PRN EACH: 300; 30 TABLET ORAL at 03:09

## 2018-02-06 RX ADMIN — PANTOPRAZOLE SODIUM SCH MG: 40 TABLET, DELAYED RELEASE ORAL at 08:52

## 2018-02-06 RX ADMIN — LEVOTHYROXINE SODIUM SCH MCG: 75 TABLET ORAL at 07:02

## 2018-02-06 RX ADMIN — Medication SCH UNIT: at 08:52

## 2018-02-06 RX ADMIN — CARVEDILOL SCH MG: 3.12 TABLET, FILM COATED ORAL at 08:52

## 2018-02-06 RX ADMIN — INSULIN ASPART SCH: 100 INJECTION, SOLUTION INTRAVENOUS; SUBCUTANEOUS at 07:57

## 2018-02-06 RX ADMIN — SPIRONOLACTONE SCH MG: 25 TABLET, FILM COATED ORAL at 08:52

## 2018-02-06 RX ADMIN — FUROSEMIDE SCH MG: 20 TABLET ORAL at 08:53

## 2018-02-06 NOTE — XR
EXAMINATION TYPE: XR chest 2V

 

DATE OF EXAM: 2/6/2018

 

COMPARISON: 2/1/2018

 

HISTORY: Fall with chest pain

 

TECHNIQUE:  Frontal and lateral views of the chest are obtained.

 

FINDINGS:  There is a new trace right pleural effusion. No displaced rib fractures are identified. Ca
rdiomediastinal silhouette is enlarged with post cardiac valvular replacement and left-sided 2-lead c
ardiac device. No new focal consolidation, pulmonary vascular congestion or pneumothorax is seen. Pul
monary hyperinflation is compatible with underlying COPD. Moderate multilevel degenerative changes of
 the thoracic spine and diffuse osteopenia are noted.

 

IMPRESSION:  New trace right pleural effusion. Otherwise unchanged exam from the prior.

## 2018-02-06 NOTE — P.DS
Providers


Date of admission: 


02/01/18 07:48





Expected date of discharge: 02/06/18


Attending physician: 


Luciano Patino


Consults: 





 





01/30/18 17:09


Consult Physician Routine 


   Consulting Provider: Carlota uRbio


   Consult Reason/Comments: CHF


   Do you want consulting provider notified?: Yes











Primary care physician: 


[facility] Course: 





Final Diagnoses:








-Congestive heart failure chronic diastolic dysfunction it acute exacerbation 

improved patient is on oral Lasix at this time, mildly on the hypovolemic side 

because of which her oral Lasix dose is being decreased air patient does have 

chronic dizziness etiology is unclear probably related to autonomic dysfunction


-Anemia contributing to shortness of breath received 2 units of blood 

transfusion


-Elevated troponin secondary to heart failure and type II non-ST elevation 

myocardial infarction secondary to anemia


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


-Acute renal dysfunction: Secondary to congestive heart failure exacerbation IV 

Lasix as mentioned above


-History of PE in the past


-Sleep apnea


-Hypothyroidism


-Chronic anemia from her previous GI bleeds her anemia of chronic disease


-Atrial fibrillation appears to be valvular A. fib rate controlled at this time


-History of mitral valve replacement with bioprosthetic valve


-Type 2 diabetes mellitus: Low blood sugars, change in Levemir dose as 

mentioned in the interval history























Hospital course:87-year-old female came in status of breath and anemia patient 

received 2 units of blood transfusion and patient still short short of breath 

and wheezing doesn't have any history of COPD but was started on Symbicort 

patient may have cardiac asthma because of which I'll obtain a chest x-ray 

patient the had history of CABG in the past history of congestive heart failure 

chronic diastolic dysfunction history of atrial fibrillation, had a 

bioprosthetic valve in the past in spite of atrial fibrillation patient is not 

on any anticoagulation because of her previous GI bleeds at this point of time 

patient doesn't have any GI bleed but still quite short of breath patient uses 

around 100 mg of oral Lasix at home patient will be started on 60 IV twice a 

day of IV Lasix patient does have elevated JVD.  Patient dementia of breath.








02/03/2018


Patient is comparing of dizziness which is chronic.  She doesn't feel good





Blood sugars has dropped down because of which I'm holding her discharge 

patient was switched to oral Lasix patient's Levemir will be changed from 100 

units to 75 units at nighttime








02/04/2018


Patient blood sugars are still low decrease the Levemir to 50 units PT and OT 

consultation patient is still comparing of dizziness patient appears to be 

mildly on the hypovolemic side cut down the Lasix to 40 oral twice a day from 

60 twice a day mildly worsening kidney function Will repeat basic metabolic 

profile tomorrow.








Constitutional: Denied any fatigue denied any fever.


Cardio vascular: denied any chest pain, palpitations


Gastrointestinal denied any nausea vomiting


Pulmonary: As mentioned in HPI


Neurologic denied any new focal deficits











Physical exam:GENERAL: The patient is alert and oriented x3, not in any acute 

distress. Well developed, well nourished. 


HEENT: Pupils are round and equally reacting to light. EOMI. No scleral 

icterus. No conjunctival pallor. Normocephalic, atraumatic. No pharyngeal 

erythema. No thyromegaly. 


CARDIOVASCULAR: S1 and S2 present. No murmurs, rubs, or gallops. 


PULMONARY: Chest is clear to auscultation, no wheezing or crackles. 


ABDOMEN: Soft, nontender, nondistended, normoactive bowel sounds. No palpable 

organomegaly. 


MUSCULOSKELETAL: No joint swelling or deformity.


EXTREMITIES: No cyanosis, clubbing, or pedal edema. 


NEUROLOGICAL: Gross neurological examination did not reveal any focal deficits. 


SKIN: No rashes. 























Significant clinical improvement.  Cleared by consults for discharge.  Patient 

is being discharged [facility] in a stable condition with guarded 

prognosis.

















Time taken: Greater than 35 minutes





Patient Condition at Discharge: Stable





Plan - Discharge Summary


Discharge Rx Participant: Yes


New Discharge Prescriptions: 


New


   Budesonide-Formot 160-4.5 Mcg [Symbicort 160-4.5 Mcg Inhaler] 2 puff 

INHALATION RT-BID  puff


   Furosemide [Lasix] 60 mg PO BID@0900,1600  tab


   Insulin Detemir [Levemir] 35 unit SQ AC-SUPPER  syr


   INSULIN LISPRO (HumaLOG) [humaLOG] 0 unit SQ ACHS #1 vial


   Meclizine [Antivert] 12.5 mg PO BID PRN  tab


     PRN Reason: Vertigo


   Pantoprazole [Protonix] 40 mg PO AC-BID  tablet.


   Spironolactone [Aldactone] 25 mg PO DAILY  tab





Continue


   Carvedilol [Coreg] 3.125 mg PO BID


   Bimatoprost [Lumigan .01% Ophth Soln] 1 drop RIGHT EYE HS


   Allopurinol 200 mg PO BID


   Insulin Detemir [Levemir] 100 unit SQ AC-SUPPER


   Ubidecarenone [Co Q-10] 100 mg PO DAILY


   Cyanocobalamin [Vitamin B-12] 500 mcg PO DAILY


   Cholecalciferol [Vitamin D3] 1,000 unit PO DAILY


   Albuterol Inhaler [Ventolin Hfa Inhaler] 1 - 2 puff INHALATION RT-Q6H PRN


     PRN Reason: sob


   Albuterol Nebulized [Ventolin Nebulized] 2.5 mg INHALATION RT-Q6H PRN


     PRN Reason: sob


   Levothyroxine Sodium [Synthroid] 75 mcg PO DAILY


   Melatonin 5 mg PO HS


   Acetaminophen-Codeine 300-30mg [Tylenol w/codeine #3] 1 tab PO Q6HR PRN #20 

tab


     PRN Reason: Moderate Pain





Discontinued


   sitaGLIPtin [Januvia] 100 mg PO DAILY


   glipiZIDE [Glucotrol] 10 mg PO AC-BID


   Furosemide [Lasix] 20 mg PO BID


   Furosemide [Lasix] 80 mg PO BID


Discharge Medication List





Bimatoprost [Lumigan .01% Ophth Soln] 1 drop RIGHT EYE HS 06/21/14 [History]


Carvedilol [Coreg] 3.125 mg PO BID 06/21/14 [History]


Allopurinol 200 mg PO BID 06/03/15 [History]


Insulin Detemir [Levemir] 100 unit SQ AC-SUPPER 09/17/17 [History]


Cholecalciferol [Vitamin D3] 1,000 unit PO DAILY 11/24/17 [History]


Cyanocobalamin [Vitamin B-12] 500 mcg PO DAILY 11/24/17 [History]


Ubidecarenone [Co Q-10] 100 mg PO DAILY 11/24/17 [History]


Albuterol Inhaler [Ventolin Hfa Inhaler] 1 - 2 puff INHALATION RT-Q6H PRN 01/30/ 18 [History]


Albuterol Nebulized [Ventolin Nebulized] 2.5 mg INHALATION RT-Q6H PRN 01/30/18 [

History]


Levothyroxine Sodium [Synthroid] 75 mcg PO DAILY 01/30/18 [History]


Melatonin 5 mg PO HS 01/30/18 [History]


Acetaminophen-Codeine 300-30mg [Tylenol w/codeine #3] 1 tab PO Q6HR PRN #20 tab 

02/06/18 [Rx]


Budesonide-Formot 160-4.5 Mcg [Symbicort 160-4.5 Mcg Inhaler] 2 puff INHALATION 

RT-BID  puff 02/06/18 [Rx]


Furosemide [Lasix] 60 mg PO BID@0900,1600  tab 02/06/18 [Rx]


INSULIN LISPRO (HumaLOG) [humaLOG] 0 unit SQ ACHS #1 vial 02/06/18 [Rx]


Insulin Detemir [Levemir] 35 unit SQ AC-SUPPER  syr 02/06/18 [Rx]


Meclizine [Antivert] 12.5 mg PO BID PRN  tab 02/06/18 [Rx]


Pantoprazole [Protonix] 40 mg PO AC-BID  tablet. 02/06/18 [Rx]


Spironolactone [Aldactone] 25 mg PO DAILY  tab 02/06/18 [Rx]








Follow up Appointment(s)/Referral(s): 


Juan Jarvis MD [REFERRING] - 3 Days (while at [facility])


Reji Ochoa MD [Primary Care Provider] - 1 Week (DC from subacute rehab)


Rashard Nina MD [STAFF PHYSICIAN] - 02/21/18 1:45 pm (This is an 

appointment patient already had set up, please keep as hospital follow up)


[facility] Visiting Nurse, [NON-STAFF] - 


Patient Instructions/Handouts:  Hypoglycemia in a Person with Diabetes (DC), 

Dizziness (GEN), Anemia (DC)


Activity/Diet/Wound Care/Special Instructions: 


cbc,bmp in 3 days

## 2018-03-23 ENCOUNTER — HOSPITAL ENCOUNTER (INPATIENT)
Dept: HOSPITAL 47 - EC | Age: 83
LOS: 4 days | Discharge: HOME HEALTH | DRG: 392 | End: 2018-03-27
Payer: MEDICARE

## 2018-03-23 DIAGNOSIS — E87.2: ICD-10-CM

## 2018-03-23 DIAGNOSIS — I25.10: ICD-10-CM

## 2018-03-23 DIAGNOSIS — Z95.0: ICD-10-CM

## 2018-03-23 DIAGNOSIS — E11.9: ICD-10-CM

## 2018-03-23 DIAGNOSIS — Z82.49: ICD-10-CM

## 2018-03-23 DIAGNOSIS — Z95.828: ICD-10-CM

## 2018-03-23 DIAGNOSIS — Z79.4: ICD-10-CM

## 2018-03-23 DIAGNOSIS — A08.4: Primary | ICD-10-CM

## 2018-03-23 DIAGNOSIS — H40.9: ICD-10-CM

## 2018-03-23 DIAGNOSIS — I45.10: ICD-10-CM

## 2018-03-23 DIAGNOSIS — E86.0: ICD-10-CM

## 2018-03-23 DIAGNOSIS — I49.5: ICD-10-CM

## 2018-03-23 DIAGNOSIS — R26.9: ICD-10-CM

## 2018-03-23 DIAGNOSIS — Z79.899: ICD-10-CM

## 2018-03-23 DIAGNOSIS — Z91.030: ICD-10-CM

## 2018-03-23 DIAGNOSIS — I11.0: ICD-10-CM

## 2018-03-23 DIAGNOSIS — M15.9: ICD-10-CM

## 2018-03-23 DIAGNOSIS — H35.30: ICD-10-CM

## 2018-03-23 DIAGNOSIS — Z90.710: ICD-10-CM

## 2018-03-23 DIAGNOSIS — Z83.3: ICD-10-CM

## 2018-03-23 DIAGNOSIS — I50.32: ICD-10-CM

## 2018-03-23 DIAGNOSIS — Z81.1: ICD-10-CM

## 2018-03-23 DIAGNOSIS — E03.9: ICD-10-CM

## 2018-03-23 DIAGNOSIS — M10.9: ICD-10-CM

## 2018-03-23 DIAGNOSIS — Z79.51: ICD-10-CM

## 2018-03-23 DIAGNOSIS — I27.29: ICD-10-CM

## 2018-03-23 DIAGNOSIS — H54.62: ICD-10-CM

## 2018-03-23 DIAGNOSIS — Z86.711: ICD-10-CM

## 2018-03-23 DIAGNOSIS — Z88.8: ICD-10-CM

## 2018-03-23 DIAGNOSIS — Z90.89: ICD-10-CM

## 2018-03-23 DIAGNOSIS — Z95.1: ICD-10-CM

## 2018-03-23 DIAGNOSIS — Z81.8: ICD-10-CM

## 2018-03-23 DIAGNOSIS — Z86.59: ICD-10-CM

## 2018-03-23 DIAGNOSIS — I48.2: ICD-10-CM

## 2018-03-23 DIAGNOSIS — Z87.891: ICD-10-CM

## 2018-03-23 DIAGNOSIS — I08.3: ICD-10-CM

## 2018-03-23 DIAGNOSIS — Z95.2: ICD-10-CM

## 2018-03-23 DIAGNOSIS — G47.30: ICD-10-CM

## 2018-03-23 DIAGNOSIS — Z85.828: ICD-10-CM

## 2018-03-23 DIAGNOSIS — Z90.49: ICD-10-CM

## 2018-03-23 LAB
ALBUMIN SERPL-MCNC: 2.8 G/DL (ref 3.5–5)
ALP SERPL-CCNC: 220 U/L (ref 38–126)
ALT SERPL-CCNC: 28 U/L (ref 9–52)
AMYLASE SERPL-CCNC: <30 U/L (ref 30–110)
ANION GAP SERPL CALC-SCNC: 10 MMOL/L
AST SERPL-CCNC: 56 U/L (ref 14–36)
BASOPHILS # BLD MANUAL: 0.05 K/UL (ref 0–0.2)
BUN SERPL-SCNC: 25 MG/DL (ref 7–17)
CALCIUM SPEC-MCNC: 8.8 MG/DL (ref 8.4–10.2)
CELLS COUNTED: 100
CHLORIDE SERPL-SCNC: 103 MMOL/L (ref 98–107)
CK SERPL-CCNC: 24 U/L (ref 30–135)
CO2 SERPL-SCNC: 20 MMOL/L (ref 22–30)
ERYTHROCYTE [DISTWIDTH] IN BLOOD BY AUTOMATED COUNT: 4.94 M/UL (ref 3.8–5.4)
ERYTHROCYTE [DISTWIDTH] IN BLOOD: 21 % (ref 11.5–15.5)
GLUCOSE SERPL-MCNC: 146 MG/DL (ref 74–99)
HCT VFR BLD AUTO: 33.3 % (ref 34–46)
HGB BLD-MCNC: 9.7 GM/DL (ref 11.4–16)
LIPASE SERPL-CCNC: 160 U/L (ref 23–300)
LYMPHOCYTES # BLD MANUAL: 0.7 K/UL (ref 1–4.8)
MCH RBC QN AUTO: 19.7 PG (ref 25–35)
MCHC RBC AUTO-ENTMCNC: 29.1 G/DL (ref 31–37)
MCV RBC AUTO: 67.5 FL (ref 80–100)
MONOCYTES # BLD MANUAL: 0.25 K/UL (ref 0–1)
NEUTROPHILS NFR BLD MANUAL: 77 %
NEUTS SEG # BLD MANUAL: 4 K/UL (ref 1.3–7.7)
PLATELET # BLD AUTO: 161 K/UL (ref 150–450)
POTASSIUM SERPL-SCNC: 4.1 MMOL/L (ref 3.5–5.1)
PROT SERPL-MCNC: 5.6 G/DL (ref 6.3–8.2)
SODIUM SERPL-SCNC: 133 MMOL/L (ref 137–145)
TROPONIN I SERPL-MCNC: 0.06 NG/ML (ref 0–0.03)
WBC # BLD AUTO: 5 K/UL (ref 3.8–10.6)

## 2018-03-23 PROCEDURE — 80048 BASIC METABOLIC PNL TOTAL CA: CPT

## 2018-03-23 PROCEDURE — 74022 RADEX COMPL AQT ABD SERIES: CPT

## 2018-03-23 PROCEDURE — 94640 AIRWAY INHALATION TREATMENT: CPT

## 2018-03-23 PROCEDURE — 87045 FECES CULTURE AEROBIC BACT: CPT

## 2018-03-23 PROCEDURE — 99285 EMERGENCY DEPT VISIT HI MDM: CPT

## 2018-03-23 PROCEDURE — 82553 CREATINE MB FRACTION: CPT

## 2018-03-23 PROCEDURE — 96374 THER/PROPH/DIAG INJ IV PUSH: CPT

## 2018-03-23 PROCEDURE — 83735 ASSAY OF MAGNESIUM: CPT

## 2018-03-23 PROCEDURE — 96361 HYDRATE IV INFUSION ADD-ON: CPT

## 2018-03-23 PROCEDURE — 87186 SC STD MICRODIL/AGAR DIL: CPT

## 2018-03-23 PROCEDURE — 87046 STOOL CULTR AEROBIC BACT EA: CPT

## 2018-03-23 PROCEDURE — 80053 COMPREHEN METABOLIC PANEL: CPT

## 2018-03-23 PROCEDURE — 82150 ASSAY OF AMYLASE: CPT

## 2018-03-23 PROCEDURE — 85027 COMPLETE CBC AUTOMATED: CPT

## 2018-03-23 PROCEDURE — 36415 COLL VENOUS BLD VENIPUNCTURE: CPT

## 2018-03-23 PROCEDURE — 85025 COMPLETE CBC W/AUTO DIFF WBC: CPT

## 2018-03-23 PROCEDURE — 87040 BLOOD CULTURE FOR BACTERIA: CPT

## 2018-03-23 PROCEDURE — 87324 CLOSTRIDIUM AG IA: CPT

## 2018-03-23 PROCEDURE — 87077 CULTURE AEROBIC IDENTIFY: CPT

## 2018-03-23 PROCEDURE — 82550 ASSAY OF CK (CPK): CPT

## 2018-03-23 PROCEDURE — 96372 THER/PROPH/DIAG INJ SC/IM: CPT

## 2018-03-23 PROCEDURE — 87086 URINE CULTURE/COLONY COUNT: CPT

## 2018-03-23 PROCEDURE — 81001 URINALYSIS AUTO W/SCOPE: CPT

## 2018-03-23 PROCEDURE — 84484 ASSAY OF TROPONIN QUANT: CPT

## 2018-03-23 PROCEDURE — 93005 ELECTROCARDIOGRAM TRACING: CPT

## 2018-03-23 PROCEDURE — 83690 ASSAY OF LIPASE: CPT

## 2018-03-23 PROCEDURE — 96375 TX/PRO/DX INJ NEW DRUG ADDON: CPT

## 2018-03-23 PROCEDURE — 83036 HEMOGLOBIN GLYCOSYLATED A1C: CPT

## 2018-03-23 PROCEDURE — 83605 ASSAY OF LACTIC ACID: CPT

## 2018-03-23 PROCEDURE — 80061 LIPID PANEL: CPT

## 2018-03-23 NOTE — XR
EXAMINATION TYPE: XR abdomen acute w cxr, 4 views

 

DATE OF EXAM: 3/23/2018

 

COMPARISON: NONE

 

HISTORY: Pain and diarrhea

 

TECHNIQUE:  Supine, upright, and left side down lateral decubitus views of the abdomen are obtained.

 

FINDINGS:  

Chest: Cardiac pacemaker and EKG leads and sternal sutures. The radiograph shows the heart to be righ
t-sided - but this is likely due to 180 degree rotated digital imaging issue. There is mild enlargeme
nt of the cardiac silhouette. The lungs are clear and the pleural spaces are negative. Skeletal struc
tures are unremarkable.

 

Abdomen: Supine and upright views were obtained. There is no pneumoperitoneum.

The bowel gas pattern is unremarkable as there is air throughout nondilated small and large bowel.  N
o sizeable air fluid levels.No mass effects are seen.  No unusual calcifications.  

 

IMPRESSION: 

No acute radiographic process.

## 2018-03-23 NOTE — ED
Abdominal Pain HPI





- General


Chief Complaint: Abdominal Pain


Stated Complaint: Diarrhea


Time Seen by Provider: 18 16:29


Source: patient, EMS


Mode of arrival: EMS


Limitations: no limitations





- History of Present Illness


Initial Comments: 


87 years Old female presents with a diarrhea since Monday she said she had 

multiple loose stools and also complaining about lower abdominal pain she also 

had a fever she denies any blood in the stool she denies any mucus.  Denies any 

sick contacts or any family member having similar symptoms, she stated no 

recent antibiotics were used she does have a history of complex some diseases 

she has ischemic heart disease history of congestive heart failure cancer 

diabetes GI bleed pulmonary embolism sleep apnea and gout she said that she 

status post appendectomy cholecystectomy and hysterectomy, review of system is 

unremarkable otherwise








- Related Data


 Home Medications











 Medication  Instructions  Recorded  Confirmed


 


Bimatoprost [Lumigan .01% Ophth 1 drop RIGHT EYE HS 14





Soln]   


 


Carvedilol [Coreg] 3.125 mg PO BID 14


 


Allopurinol 200 mg PO BID 06/03/15 03/23/18


 


Cholecalciferol [Vitamin D3] 1,000 unit PO DAILY 17


 


Cyanocobalamin [Vitamin B-12] 500 mcg PO DAILY 17


 


Ubidecarenone [Co Q-10] 100 mg PO DAILY 17


 


Albuterol Inhaler [Ventolin Hfa 1 - 2 puff INHALATION RT-Q6H PRN 18





Inhaler]   


 


Albuterol Nebulized [Ventolin 2.5 mg INHALATION RT-Q6H PRN 18





Nebulized]   


 


Levothyroxine Sodium [Synthroid] 75 mcg PO DAILY 18


 


Melatonin 5 mg PO HS 18


 


INSULIN LISPRO (HumaLOG) [humaLOG] See Protocol SQ ACHS 18








 Previous Rx's











 Medication  Instructions  Recorded


 


Acetaminophen-Codeine 300-30mg 1 tab PO Q6HR PRN #20 tab 18





[Tylenol w/codeine #3]  


 


Budesonide-Formot 160-4.5 Mcg 2 puff INHALATION RT-BID  puff 18





[Symbicort 160-4.5 Mcg Inhaler]  


 


Furosemide [Lasix] 60 mg PO BID@0900,1600  tab 18


 


Insulin Detemir [Levemir] 35 unit SQ AC-SUPPER  syr 18


 


Meclizine [Antivert] 12.5 mg PO BID PRN  tab 18


 


Pantoprazole [Protonix] 40 mg PO AC-BID  tablet. 18


 


Spironolactone [Aldactone] 25 mg PO DAILY  tab 18











 Allergies











Allergy/AdvReac Type Severity Reaction Status Date / Time


 


aspirin Allergy  Unknown Verified 18 16:34


 


bee pollen [Bee Pollen] Allergy  Anaphylaxis Verified 18 16:34


 


celecoxib [From Celebrex] Allergy  Unknown Verified 18 16:34


 


blood thinners Allergy Severe see comment Uncoded 18 16:32


 


URIC ACIDS AdvReac  GOUT Uncoded 18 16:32














Review of Systems


ROS Statement: 


Those systems with pertinent positive or pertinent negative responses have been 

documented in the HPI.





ROS Other: All systems not noted in ROS Statement are negative.





Past Medical History


Past Medical History: Coronary Artery Disease (CAD), Cancer, Heart Failure, 

Diabetes Mellitus, GI Bleed, Hypertension, Osteoarthritis (OA), Pulmonary 

Embolus (PE), Sleep Apnea/CPAP/BIPAP, Thyroid Disorder


Additional Past Medical History / Comment(s): gout, chronic pain in back hip 

and leg, diverticulitis, LT EYE BLIND,GLAUCOMA, MAC DEGENERATION RT EYE, MURMUR

, DIVERTICULAR DX.OVARIAN CYSTS,ANEMIA,SKIN CA,ANEMIA, TRACHEBRONCHITIS WITH 

REACTIVE BRONCHOSPASM.used to use cpap machine .  Increased shortness of breath 

with activity and at rest. ZEESHAN CARPAL TUNNEL, skin caner


History of Any Multi-Drug Resistant Organisms: None Reported


Past Surgical History: Appendectomy, Bowel Resection, Breast Surgery, Cardiac 

Valve Replacement, Cholecystectomy, Coronary Bypass/CABG, Heart Catheterization

, Hysterectomy, Orthopedic Surgery, Pacemaker, Tonsillectomy


Additional Past Surgical History / Comment(s): pacemaker, AORTIC VALVE 

REPLACEMENT(TISSUE) AND 1 VESSEL BYPASS, CYST REMOVED FROM HAND/HEAD,BOWEL 

RESECTION D/T DIVERTICULITIS.LEONCIO FILTER, COLONOCOSPY/POLYPECTOMY/EGD, 

PAST LT EYE SX-DAMAGED THE OPTIC NERVE-BLIND LT EYE.lt knee arthrosopy, skin 

cancer removed from hand/head


Past Anesthesia/Blood Transfusion Reactions: No Reported Reaction


Type of Cardiac Device: Permanent Pacemaker


Device Placement Date:: 


Past Psychological History: Depression


Smoking Status: Former smoker





- Past Family History


  ** Brother(s)


History Unknown: Yes





  ** Father


Family Medical History: Cancer


Additional Family Medical History / Comment(s): ALCOHOLIC--  





  ** Mother


Family Medical History: Cancer, Coronary Artery Disease (CAD), Dementia, 

Diabetes Mellitus


Additional Family Medical History / Comment(s):  





General Exam





- General Exam Comments


Initial Comments: 


General:  The patient is awake and alert, in no distress, and does not appear 

acutely ill. 


Skin:  Skin is warm and dry and no rashes or lesions are noted. 


Eye:  Pupils are equal, round and reactive to light, extra-ocular movements are 

intact; there is normal conjunctiva bilaterally.  


Ears, nose, mouth and throat:  There are moist mucous membranes and no oral 

lesions. 


Neck:  The neck is supple, there is no tenderness  or JVD.  


Cardiovascular:  There is a regular rate and rhythm. No murmur, rub or gallop 

is appreciated.


Respiratory: To auscultation bilateral, no wheezing no rhonchi no distress  

respiratory wise noticed


Gastrointestinal:  Soft, non-distended, non-tender abdomen without masses or 

organomegaly noted. There is no rebound or guarding present. Bowel sounds are 

unremarkable. 


Back:  There is no tenderness to palpation in the midline. There is no obvious 

deformity.


Musculoskeletal:  Normal ROM, no tenderness, There is no pedal edema. There is 

no calf tenderness or swelling. No cords were appreciated.  


Neurological:  CN II-XII intact, Cranial nerves III through XII are intact. 

There are no obvious motor or sensory deficits. Coordination appears grossly 

intact. Speech is normal.


Psychiatric:  Cooperative, appropriate mood & affect, normal judgment.  








Limitations: no limitations





Course


 Vital Signs











  18





  16:30 18:16


 


Temperature 100.8 F H 99.2 F


 


Pulse Rate 98 82


 


Respiratory 20 18





Rate  


 


Blood Pressure 135/67 116/55


 


O2 Sat by Pulse 98 99





Oximetry  








Patient was reassessed at term 1818, troponin is positive CBC, CMP are 

unremarkable creatinine is slightly elevated 1.0 CO2 is 20 reflecting a mild 

metabolic acidosis total bili is 1.9 acute abdominal series is unremarkable 

home patient came in with a fever diarrhea abdominal pain and considering she 

has a history of ischemic heart disease status post CABG she be admitted 

considering her age is 87 with a laceration to head and heparinize her or this 

bump included a negative troponin is secondary to mild renal insufficiency or 

mild heart failure we will leave that to cardiology and given one dose of 

Lovenox 0.75 mg/kg then admitted under Dr. Riddle's service cardiology be 

consulted at this point C. diff is pending I will continue the Flagyl by mouth 

as well as antibiotics to cover the gram-negative infection





Medical Decision Making





- Lab Data


Result diagrams: 


 18 16:55





 18 16:55


 Lab Results











  18 Range/Units





  16:55 16:55 16:55 


 


WBC   5.0   (3.8-10.6)  k/uL


 


RBC   4.94   (3.80-5.40)  m/uL


 


Hgb   9.7 L   (11.4-16.0)  gm/dL


 


Hct   33.3 L   (34.0-46.0)  %


 


MCV   67.5 L   (80.0-100.0)  fL


 


MCH   19.7 L   (25.0-35.0)  pg


 


MCHC   29.1 L   (31.0-37.0)  g/dL


 


RDW   21.0 H   (11.5-15.5)  %


 


Plt Count   161   (150-450)  k/uL


 


Neutrophils % (Manual)   77   %


 


Band Neutrophils %   3   %


 


Lymphocytes % (Manual)   14   %


 


Monocytes % (Manual)   5   %


 


Basophils % (Manual)   1   %


 


Neutrophils # (Manual)   4.00   (1.3-7.7)  k/uL


 


Lymphocytes # (Manual)   0.70 L   (1.0-4.8)  k/uL


 


Monocytes # (Manual)   0.25   (0-1.0)  k/uL


 


Basophils # (Manual)   0.05   (0-0.2)  k/uL


 


Nucleated RBCs   0   (0-0)  /100 WBC


 


Toxic Granulation   Present   


 


Polychromasia   Present   


 


Hypochromasia   Marked   


 


Poikilocytosis   Moderate   


 


Poikilocytosis (manual   Present   


 


Anisocytosis   Moderate   


 


Microcytosis   Marked   


 


Target Cells   Present   


 


Sodium  133 L    (137-145)  mmol/L


 


Potassium  4.1    (3.5-5.1)  mmol/L


 


Chloride  103    ()  mmol/L


 


Carbon Dioxide  20 L    (22-30)  mmol/L


 


Anion Gap  10    mmol/L


 


BUN  25 H    (7-17)  mg/dL


 


Creatinine  1.20 H    (0.52-1.04)  mg/dL


 


Est GFR (CKD-EPI)AfAm  47    (>60 ml/min/1.73 sqM)  


 


Est GFR (CKD-EPI)NonAf  41    (>60 ml/min/1.73 sqM)  


 


Glucose  146 H    (74-99)  mg/dL


 


Plasma Lactic Acid Rock    2.0  (0.7-2.0)  mmol/L


 


Calcium  8.8    (8.4-10.2)  mg/dL


 


Total Bilirubin  1.9 H    (0.2-1.3)  mg/dL


 


AST  56 H    (14-36)  U/L


 


ALT  28    (9-52)  U/L


 


Alkaline Phosphatase  220 H    ()  U/L


 


Troponin I     (0.000-0.034)  ng/mL


 


Total Protein  5.6 L    (6.3-8.2)  g/dL


 


Albumin  2.8 L    (3.5-5.0)  g/dL


 


Amylase  <30 L    ()  U/L


 


Lipase  160    ()  U/L














  18 Range/Units





  16:55 


 


WBC   (3.8-10.6)  k/uL


 


RBC   (3.80-5.40)  m/uL


 


Hgb   (11.4-16.0)  gm/dL


 


Hct   (34.0-46.0)  %


 


MCV   (80.0-100.0)  fL


 


MCH   (25.0-35.0)  pg


 


MCHC   (31.0-37.0)  g/dL


 


RDW   (11.5-15.5)  %


 


Plt Count   (150-450)  k/uL


 


Neutrophils % (Manual)   %


 


Band Neutrophils %   %


 


Lymphocytes % (Manual)   %


 


Monocytes % (Manual)   %


 


Basophils % (Manual)   %


 


Neutrophils # (Manual)   (1.3-7.7)  k/uL


 


Lymphocytes # (Manual)   (1.0-4.8)  k/uL


 


Monocytes # (Manual)   (0-1.0)  k/uL


 


Basophils # (Manual)   (0-0.2)  k/uL


 


Nucleated RBCs   (0-0)  /100 WBC


 


Toxic Granulation   


 


Polychromasia   


 


Hypochromasia   


 


Poikilocytosis   


 


Poikilocytosis (manual   


 


Anisocytosis   


 


Microcytosis   


 


Target Cells   


 


Sodium   (137-145)  mmol/L


 


Potassium   (3.5-5.1)  mmol/L


 


Chloride   ()  mmol/L


 


Carbon Dioxide   (22-30)  mmol/L


 


Anion Gap   mmol/L


 


BUN   (7-17)  mg/dL


 


Creatinine   (0.52-1.04)  mg/dL


 


Est GFR (CKD-EPI)AfAm   (>60 ml/min/1.73 sqM)  


 


Est GFR (CKD-EPI)NonAf   (>60 ml/min/1.73 sqM)  


 


Glucose   (74-99)  mg/dL


 


Plasma Lactic Acid Rock   (0.7-2.0)  mmol/L


 


Calcium   (8.4-10.2)  mg/dL


 


Total Bilirubin   (0.2-1.3)  mg/dL


 


AST   (14-36)  U/L


 


ALT   (9-52)  U/L


 


Alkaline Phosphatase   ()  U/L


 


Troponin I  0.053 H*  (0.000-0.034)  ng/mL


 


Total Protein   (6.3-8.2)  g/dL


 


Albumin   (3.5-5.0)  g/dL


 


Amylase   ()  U/L


 


Lipase   ()  U/L














Disposition


Referrals: 


Reji Ochoa MD [Primary Care Provider] - 1-2 days

## 2018-03-24 LAB
ANION GAP SERPL CALC-SCNC: 9 MMOL/L
BUN SERPL-SCNC: 24 MG/DL (ref 7–17)
CALCIUM SPEC-MCNC: 8.5 MG/DL (ref 8.4–10.2)
CHLORIDE SERPL-SCNC: 106 MMOL/L (ref 98–107)
CHOLEST SERPL-MCNC: 191 MG/DL (ref ?–200)
CK SERPL-CCNC: 27 U/L (ref 30–135)
CO2 SERPL-SCNC: 20 MMOL/L (ref 22–30)
ERYTHROCYTE [DISTWIDTH] IN BLOOD BY AUTOMATED COUNT: 4.44 M/UL (ref 3.8–5.4)
ERYTHROCYTE [DISTWIDTH] IN BLOOD: 22 % (ref 11.5–15.5)
GLUCOSE BLD-MCNC: 187 MG/DL (ref 75–99)
GLUCOSE BLD-MCNC: 216 MG/DL (ref 75–99)
GLUCOSE BLD-MCNC: 242 MG/DL (ref 75–99)
GLUCOSE SERPL-MCNC: 92 MG/DL (ref 74–99)
HBA1C MFR BLD: 7.4 % (ref 4–6)
HCT VFR BLD AUTO: 30.6 % (ref 34–46)
HDLC SERPL-MCNC: 26 MG/DL (ref 40–60)
HGB BLD-MCNC: 8.9 GM/DL (ref 11.4–16)
LDLC SERPL CALC-MCNC: 127 MG/DL (ref 0–99)
MAGNESIUM SPEC-SCNC: 2.2 MG/DL (ref 1.6–2.3)
MCH RBC QN AUTO: 19.9 PG (ref 25–35)
MCHC RBC AUTO-ENTMCNC: 29 G/DL (ref 31–37)
MCV RBC AUTO: 68.9 FL (ref 80–100)
PH UR: 5.5 [PH] (ref 5–8)
PLATELET # BLD AUTO: 140 K/UL (ref 150–450)
POTASSIUM SERPL-SCNC: 3.8 MMOL/L (ref 3.5–5.1)
SODIUM SERPL-SCNC: 135 MMOL/L (ref 137–145)
SP GR UR: 1.01 (ref 1–1.03)
SQUAMOUS UR QL AUTO: 1 /HPF (ref 0–4)
TRIGL SERPL-MCNC: 191 MG/DL (ref ?–150)
TROPONIN I SERPL-MCNC: 0.05 NG/ML (ref 0–0.03)
UROBILINOGEN UR QL STRIP: <2 MG/DL (ref ?–2)
WBC # BLD AUTO: 4 K/UL (ref 3.8–10.6)
WBC #/AREA URNS HPF: 2 /HPF (ref 0–5)

## 2018-03-24 RX ADMIN — CYANOCOBALAMIN TAB 500 MCG SCH MCG: 500 TAB at 12:10

## 2018-03-24 RX ADMIN — ACETAMINOPHEN AND CODEINE PHOSPHATE PRN EACH: 300; 30 TABLET ORAL at 07:21

## 2018-03-24 RX ADMIN — MECLIZINE PRN MG: 12.5 TABLET ORAL at 20:39

## 2018-03-24 RX ADMIN — CARVEDILOL SCH: 3.12 TABLET, FILM COATED ORAL at 07:02

## 2018-03-24 RX ADMIN — BUDESONIDE AND FORMOTEROL FUMARATE DIHYDRATE SCH: 160; 4.5 AEROSOL RESPIRATORY (INHALATION) at 07:01

## 2018-03-24 RX ADMIN — INSULIN DETEMIR SCH UNIT: 100 INJECTION, SOLUTION SUBCUTANEOUS at 20:56

## 2018-03-24 RX ADMIN — Medication SCH: at 07:02

## 2018-03-24 RX ADMIN — INSULIN ASPART SCH UNIT: 100 INJECTION, SOLUTION INTRAVENOUS; SUBCUTANEOUS at 18:25

## 2018-03-24 RX ADMIN — ALLOPURINOL SCH MG: 100 TABLET ORAL at 08:39

## 2018-03-24 RX ADMIN — CEFTRIAXONE SODIUM SCH MG: 2 INJECTION, POWDER, FOR SOLUTION INTRAMUSCULAR; INTRAVENOUS at 19:28

## 2018-03-24 RX ADMIN — PANTOPRAZOLE SODIUM SCH MG: 40 TABLET, DELAYED RELEASE ORAL at 07:21

## 2018-03-24 RX ADMIN — LATANOPROST SCH: 50 SOLUTION OPHTHALMIC at 07:01

## 2018-03-24 RX ADMIN — FUROSEMIDE SCH MG: 20 TABLET ORAL at 08:39

## 2018-03-24 RX ADMIN — ALLOPURINOL SCH: 100 TABLET ORAL at 07:01

## 2018-03-24 RX ADMIN — CARVEDILOL SCH MG: 3.12 TABLET, FILM COATED ORAL at 17:47

## 2018-03-24 RX ADMIN — ALLOPURINOL SCH MG: 100 TABLET ORAL at 20:38

## 2018-03-24 RX ADMIN — Medication SCH MG: at 20:38

## 2018-03-24 RX ADMIN — CARVEDILOL SCH MG: 3.12 TABLET, FILM COATED ORAL at 07:21

## 2018-03-24 RX ADMIN — Medication SCH UNIT: at 08:42

## 2018-03-24 RX ADMIN — INSULIN ASPART SCH UNIT: 100 INJECTION, SOLUTION INTRAVENOUS; SUBCUTANEOUS at 20:38

## 2018-03-24 RX ADMIN — LEVOTHYROXINE SODIUM SCH MCG: 75 TABLET ORAL at 07:21

## 2018-03-24 RX ADMIN — INSULIN ASPART SCH UNIT: 100 INJECTION, SOLUTION INTRAVENOUS; SUBCUTANEOUS at 12:02

## 2018-03-24 RX ADMIN — ACETAMINOPHEN AND CODEINE PHOSPHATE PRN EACH: 300; 30 TABLET ORAL at 19:27

## 2018-03-24 RX ADMIN — SPIRONOLACTONE SCH MG: 25 TABLET, FILM COATED ORAL at 08:42

## 2018-03-24 RX ADMIN — BUDESONIDE AND FORMOTEROL FUMARATE DIHYDRATE SCH PUFF: 160; 4.5 AEROSOL RESPIRATORY (INHALATION) at 08:16

## 2018-03-24 RX ADMIN — LATANOPROST SCH DROPS: 50 SOLUTION OPHTHALMIC at 22:14

## 2018-03-24 RX ADMIN — PANTOPRAZOLE SODIUM SCH MG: 40 TABLET, DELAYED RELEASE ORAL at 18:26

## 2018-03-24 RX ADMIN — FUROSEMIDE SCH: 20 TABLET ORAL at 17:47

## 2018-03-24 NOTE — HP
HISTORY AND PHYSICAL



DATE OF ADMISSION:

03/23/2018



PRESENTING COMPLAINT:

Weak, tired, diarrhea.



HISTORY OF PRESENTING COMPLAINT:

This is a pleasant 87-year-old patient of visiting physician, Dr. Ochoa.  Lives by

herself.  Uses a walker.  Rather extensive medical history.  Chronic stable medical

conditions include coronary artery disease, congestive heart failure, diabetes type 2,

hypertension, osteoarthritis, hypothyroid, gout.  Patient 5 days ago not feeling well,

started having 1 or 2 loose stools, abdominal discomfort.  No nausea, vomiting, tired,

rundown, decreased appetite, not feeling well.  Hence, decided to present to the ER.

There was no shortness of breath.  No chest pain, admitted for the same.  Because there

was some troponin leak, patient is admitted to the cardiology floor to get a Cardiology

opinion.  Patient's last diarrhea was over 24 hours ago.  Patient did tolerate a light

diet this morning.  Still feeling tired, run down.



REVIEW OF SYSTEMS:

CONSTITUTIONAL: Weak and tired.

HEENT:  None.

RESPIRATORY:  None.

CARDIOVASCULAR:  None.

GASTROINTESTINAL:  As above.

GENITOURINARY:  None.

MUSCULOSKELETAL: Arthritic pain in different joints.

DERMATOLOGICAL:  None.

HEMATOLOGICAL:  None.

LYMPHATICS:  None.

PSYCHIATRY:  None.

NEUROLOGICAL:  None.



PAST MEDICAL HISTORY:

Coronary artery disease, congestive heart failure, diabetes type 2, hypertension, GI

bleed, osteoarthritis, pulmonary embolism, hypothyroid, gout, chronic pain in the back,

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

diverticulosis, ovarian cyst, anemia, skin cancer, tracheobronchitis, reactive

bronchospasm, bilateral carpal tunnel syndrome.



PAST SURGICAL HISTORY:

Appendectomy, bowel resection, breast surgery, cardiac valve replacement,

cholecystectomy, coronary artery bypass, pacemaker, tonsillectomy, aortic valve

replacement, tissue type 1 vessel coronary bypass, Carley filter, _____polypectomy.

Left eye is damaged with optic nerve and moving blind, skin cancer removed from the

head and forehead, permanent pacemaker.



PSYCH:

History of depression.



SOCIAL HISTORY:

The patient is a , lives alone, has a walker.  Patient smoked for about 22 years,

1 or 2 packs a day stopped in 2002.  No alcohol.



FAMILY HISTORY:

Cancer, type unknown.



HOME MEDICATIONS:

1. CO Q-10 one hundred mg p.o. daily.

2. Aldactone 25 mg p.o. daily.

3. Protonix 40 mg p.o. b.i.d.

4. Melatonin 5 mg q.h.s.

5. Antivert 12.5 p.o. b.i.d. p.r.n.

6. Synthroid 75 mcg p.o. daily.

7. Levemir 35 units with supper.

8. Humalog per scale.

9. Lasix 60 mg p.o. b.i.d.

10.Vitamin B12 five hundred mcg p.o. daily.

11.Vitamin D3 one thousand units p.o. daily.

12.Coreg 3.125 p.o. b.i.d.

13.Symbicort 160/4.5 two puffs b.i.d.

14.Lumigan 1 drop right eye q.h.s.

15.Allopurinol 200 mg b.i.d.

16.Ventolin 2.5 q.6 p.r.n.

17.Tylenol 3 one tablet q.6 p.r.n.



ALLERGIES:

To ASPIRIN, BEE POLLEN, CELEBREX, BLOOD THINNERS, URIC ACID.



On examination, vital signs on presentation, temperature 100.8, pulse 98, respiratory

20, blood pressure 130/67, pulse ox 98% on room air.

GENERAL APPEARANCE:  Well built, 30.4, sitting up, very tired-appearing.

EYES:  Pupils equal, conjunctivae normal.

HEENT:  Oral cavity dry.

NECK:  JVD not raised, mass not palpable.

RESPIRATORY:  Effort normal.

LUNGS:  Fair entry.

CARDIOVASCULAR:  First and second sounds are normal.  No edema.

ABDOMEN:  Soft, nontender. Liver and spleen not palpable.

LYMPHATIC:  No lymph node palpable in neck or axillae.

PSYCHIATRY:  Alert and oriented x3. Mood and affect normal.

NEUROLOGICAL:  Pupils equal, cranial nerves grossly intact. Power and sensation grossly

intact.

MUSCULOSKELETAL:  Evidence of osteoarthritis, especially hands and knees.



INVESTIGATIONS:

White count 5, hemoglobin 9.7, platelets 161.  Potassium 4.1, BUN 25, creatinine 1.20,

troponin 0.053, 0.056.  EKG shows atrial fibrillation, rate controlled in the right

bundle branch block pattern.



ASSESSMENT:

1. Acute gastroenteritis, likely viral with no bowel movement in the last 24 hours.

2. Clinically dehydration in this elderly patient with multiple medical problems,

    making her extremely weak and tired.

3. Chronic congestive heart failure from diastolic dysfunction, ejection fraction 55%

    to 60%.

4. Hypertensive heart disease.

5. Moderate aortic stenosis, nonrheumatic.

6. Severe mitral regurgitation, nonrheumatic.

7. Moderate mitral stenosis, nonrheumatic.

8. Moderate to severe pulmonary hypertension, secondary to congestive heart failure.

9. Moderate to severe tricuspid regurgitation, nonrheumatic.

10.Diabetes mellitus type 2, chronically on insulin.

11.Essential hypertension.

12.Primary osteoarthritis in multiple joints, bilateral.

13.Hypothyroid.

14.Blind in the left eye.

15.Coronary artery disease, prior history of coronary artery bypass.

16.History of [place] filter.

17.Chronic gait dysfunction, uses a walker.

18.Troponin leak likely due to hemodynamic mismatch, this is not acute coronary

    syndrome.



PLAN:

Cardiology was consulted.  Patient will be gently hydrated.  Patient has some metabolic

acidosis.  Will give some sodium bicarb.  Will have  involved. Care was

discussed with the patient.





MMODL / IJN: [id number] / Job#: [id number]

## 2018-03-24 NOTE — CONS
CONSULTATION



CHIEF COMPLAINT:

Fatigue, tiredness and not feeling well.



HISTORY OF PRESENT ILLNESS:

Christina is an 87-year-old lady who comes in complaining of diarrhea and lower abdominal

discomfort for the last several days.  For unclear reasons in the ER, they did

troponins on her which came back slightly elevated due to which they admitted her to

the hospital.  She does not have chest pain.  There is history of GI bleed and

pulmonary embolism and has a Carley filter in place.  The patient's history is also

significant for prior coronary artery disease, chronic atrial fibrillation, prior

bypass surgery and a pacemaker.  At the time of my evaluation this morning, patient

appears comfortable at rest and is free of significant symptoms.  She is being hydrated

with significant improvement in her symptoms.



MEDICATIONS:

At home included:  Aldactone, Protonix, melatonin, Antivert, Synthroid, Levemir, Lasix,

Coreg, Symbicort, allopurinol, Ventolin.



ALLERGIES:

ALLERGIC TO ASPIRIN AND ANTICOAGULANTS ALONG WITH CELEBREX.



FAMILY HISTORY:

Negative for premature coronary artery disease.



SOCIAL HISTORY:

Negative for current smoking or history of drug abuse.



REVIEW OF SYSTEMS:

HEENT is significant for a impaired vision.

CARDIAC:  As described above.

RESPIRATORY:  Negative.  GI as described above.  Genitourinary negative.  Allergy

negative.  Hematological: Negative.  Skin negative.  Musculoskeletal significant for

arthritis.  Psychosocial negative.  Endocrine negative,  Derm: Negative.  Neurological

negative.  Rest of the systems review is not relevant.



PHYSICAL EXAM:

Patient is comfortable at rest.  Afebrile.  Heart rate is 79 beats per minute,

irregular.  Blood pressure is 110/50, respiratory rate is 18, O2 sat is 95%.  There is

no jugular venous distention.  Carotid upstroke is diminished.  There is no bruit.

Chest exam reveals good air entry bilaterally.  Heart exam reveals first and second

heart sounds.  No gallop. Has a systolic murmur at the left lower sternal border.

There is an ejection systolic murmur in the aortic area and a systolic murmur at the

apex.  Abdomen is soft.  Examination of extremities did not reveal any edema.

Peripheral pulses are palpable.



The patient had an echo in February that showed normal LV systolic function.  Moderate

aortic stenosis, severe mitral regurgitation and severe pulmonary hypertension.  EKG

this admission showed atrial fibrillation with nonspecific ST-T wave changes.



LABS:

Labs show a hemoglobin of 8.9 potassium is 3.8.  Troponin is in the gray zone at 0.05,

0.05, 05 with an elevated creatinine of 1.2.



ASSESSMENT:

1. Elevated troponin probably related to renal insufficiency.

2. Diarrhea, probably related to recent viral infection.

3. Coronary artery disease status post coronary artery bypass grafting.

4. Chronic atrial fibrillation.

5. Aortic stenosis.

6. Mitral regurgitation.

7. Sick sinus syndrome, status post permanent pacemaker.



PLAN:

1. The patient does not require further evaluation for the elevated troponin.

2. She is not a candidate for anticoagulation for atrial fibrillation because of

    history of gastrointestinal bleed.

3. She has extensive valvular heart diseases but not does not need any intervention.



Thank you for allowing us to participate in this pleasant lady.  We are going to see

her on an as-needed basis at this time.





MMODL / IJN: [id number] / Job#: [id number]

## 2018-03-25 LAB
ANION GAP SERPL CALC-SCNC: 10 MMOL/L
BUN SERPL-SCNC: 28 MG/DL (ref 7–17)
CALCIUM SPEC-MCNC: 8.8 MG/DL (ref 8.4–10.2)
CHLORIDE SERPL-SCNC: 110 MMOL/L (ref 98–107)
CO2 SERPL-SCNC: 21 MMOL/L (ref 22–30)
GLUCOSE BLD-MCNC: 163 MG/DL (ref 75–99)
GLUCOSE BLD-MCNC: 179 MG/DL (ref 75–99)
GLUCOSE BLD-MCNC: 200 MG/DL (ref 75–99)
GLUCOSE BLD-MCNC: 64 MG/DL (ref 75–99)
GLUCOSE BLD-MCNC: 72 MG/DL (ref 75–99)
GLUCOSE SERPL-MCNC: 50 MG/DL (ref 74–99)
POTASSIUM SERPL-SCNC: 4.2 MMOL/L (ref 3.5–5.1)
SODIUM SERPL-SCNC: 141 MMOL/L (ref 137–145)

## 2018-03-25 RX ADMIN — ALLOPURINOL SCH MG: 100 TABLET ORAL at 09:02

## 2018-03-25 RX ADMIN — CYANOCOBALAMIN TAB 500 MCG SCH MCG: 500 TAB at 09:03

## 2018-03-25 RX ADMIN — FUROSEMIDE SCH: 20 TABLET ORAL at 09:04

## 2018-03-25 RX ADMIN — Medication SCH: at 09:04

## 2018-03-25 RX ADMIN — INSULIN ASPART SCH UNIT: 100 INJECTION, SOLUTION INTRAVENOUS; SUBCUTANEOUS at 20:51

## 2018-03-25 RX ADMIN — INSULIN ASPART SCH: 100 INJECTION, SOLUTION INTRAVENOUS; SUBCUTANEOUS at 08:12

## 2018-03-25 RX ADMIN — PANTOPRAZOLE SODIUM SCH MG: 40 TABLET, DELAYED RELEASE ORAL at 09:02

## 2018-03-25 RX ADMIN — FUROSEMIDE SCH: 20 TABLET ORAL at 17:38

## 2018-03-25 RX ADMIN — CARVEDILOL SCH MG: 3.12 TABLET, FILM COATED ORAL at 09:03

## 2018-03-25 RX ADMIN — CARVEDILOL SCH MG: 3.12 TABLET, FILM COATED ORAL at 17:38

## 2018-03-25 RX ADMIN — MECLIZINE PRN MG: 12.5 TABLET ORAL at 20:12

## 2018-03-25 RX ADMIN — INSULIN ASPART SCH UNIT: 100 INJECTION, SOLUTION INTRAVENOUS; SUBCUTANEOUS at 17:38

## 2018-03-25 RX ADMIN — ACETAMINOPHEN AND CODEINE PHOSPHATE PRN EACH: 300; 30 TABLET ORAL at 16:30

## 2018-03-25 RX ADMIN — ACETAMINOPHEN AND CODEINE PHOSPHATE PRN EACH: 300; 30 TABLET ORAL at 09:12

## 2018-03-25 RX ADMIN — MECLIZINE PRN MG: 12.5 TABLET ORAL at 09:02

## 2018-03-25 RX ADMIN — PANTOPRAZOLE SODIUM SCH: 40 TABLET, DELAYED RELEASE ORAL at 19:00

## 2018-03-25 RX ADMIN — ALLOPURINOL SCH MG: 100 TABLET ORAL at 20:12

## 2018-03-25 RX ADMIN — ACETAMINOPHEN AND CODEINE PHOSPHATE PRN EACH: 300; 30 TABLET ORAL at 23:28

## 2018-03-25 RX ADMIN — CEFTRIAXONE SODIUM SCH MG: 2 INJECTION, POWDER, FOR SOLUTION INTRAMUSCULAR; INTRAVENOUS at 20:55

## 2018-03-25 RX ADMIN — INSULIN ASPART SCH: 100 INJECTION, SOLUTION INTRAVENOUS; SUBCUTANEOUS at 12:33

## 2018-03-25 RX ADMIN — BUDESONIDE AND FORMOTEROL FUMARATE DIHYDRATE SCH PUFF: 160; 4.5 AEROSOL RESPIRATORY (INHALATION) at 09:04

## 2018-03-25 RX ADMIN — INSULIN DETEMIR SCH: 100 INJECTION, SOLUTION SUBCUTANEOUS at 17:46

## 2018-03-25 RX ADMIN — Medication SCH MG: at 20:12

## 2018-03-25 RX ADMIN — SPIRONOLACTONE SCH MG: 25 TABLET, FILM COATED ORAL at 09:03

## 2018-03-25 RX ADMIN — LATANOPROST SCH DROPS: 50 SOLUTION OPHTHALMIC at 20:12

## 2018-03-25 RX ADMIN — BUDESONIDE AND FORMOTEROL FUMARATE DIHYDRATE SCH: 160; 4.5 AEROSOL RESPIRATORY (INHALATION) at 00:12

## 2018-03-25 RX ADMIN — CEFTRIAXONE SODIUM SCH: 2 INJECTION, POWDER, FOR SOLUTION INTRAMUSCULAR; INTRAVENOUS at 19:00

## 2018-03-25 RX ADMIN — LEVOTHYROXINE SODIUM SCH MCG: 75 TABLET ORAL at 09:03

## 2018-03-25 RX ADMIN — BUDESONIDE AND FORMOTEROL FUMARATE DIHYDRATE SCH PUFF: 160; 4.5 AEROSOL RESPIRATORY (INHALATION) at 20:07

## 2018-03-25 RX ADMIN — CEFTRIAXONE SODIUM SCH MG: 2 INJECTION, POWDER, FOR SOLUTION INTRAMUSCULAR; INTRAVENOUS at 17:38

## 2018-03-26 LAB
ANION GAP SERPL CALC-SCNC: 8 MMOL/L
BUN SERPL-SCNC: 21 MG/DL (ref 7–17)
CALCIUM SPEC-MCNC: 8.4 MG/DL (ref 8.4–10.2)
CHLORIDE SERPL-SCNC: 110 MMOL/L (ref 98–107)
CO2 SERPL-SCNC: 20 MMOL/L (ref 22–30)
GLUCOSE BLD-MCNC: 136 MG/DL (ref 75–99)
GLUCOSE BLD-MCNC: 145 MG/DL (ref 75–99)
GLUCOSE BLD-MCNC: 179 MG/DL (ref 75–99)
GLUCOSE BLD-MCNC: 186 MG/DL (ref 75–99)
GLUCOSE SERPL-MCNC: 165 MG/DL (ref 74–99)
POTASSIUM SERPL-SCNC: 4.6 MMOL/L (ref 3.5–5.1)
SODIUM SERPL-SCNC: 138 MMOL/L (ref 137–145)

## 2018-03-26 RX ADMIN — LEVOTHYROXINE SODIUM SCH MCG: 75 TABLET ORAL at 05:42

## 2018-03-26 RX ADMIN — CARVEDILOL SCH MG: 3.12 TABLET, FILM COATED ORAL at 17:53

## 2018-03-26 RX ADMIN — CHOLESTYRAMINE SCH: 4 POWDER, FOR SUSPENSION ORAL at 17:54

## 2018-03-26 RX ADMIN — INSULIN ASPART SCH UNIT: 100 INJECTION, SOLUTION INTRAVENOUS; SUBCUTANEOUS at 12:56

## 2018-03-26 RX ADMIN — INSULIN ASPART SCH UNIT: 100 INJECTION, SOLUTION INTRAVENOUS; SUBCUTANEOUS at 17:54

## 2018-03-26 RX ADMIN — Medication SCH MG: at 20:55

## 2018-03-26 RX ADMIN — PANTOPRAZOLE SODIUM SCH MG: 40 TABLET, DELAYED RELEASE ORAL at 07:36

## 2018-03-26 RX ADMIN — LATANOPROST SCH DROPS: 50 SOLUTION OPHTHALMIC at 20:54

## 2018-03-26 RX ADMIN — FUROSEMIDE SCH: 20 TABLET ORAL at 16:21

## 2018-03-26 RX ADMIN — PANTOPRAZOLE SODIUM SCH MG: 40 TABLET, DELAYED RELEASE ORAL at 17:53

## 2018-03-26 RX ADMIN — BUDESONIDE AND FORMOTEROL FUMARATE DIHYDRATE SCH: 160; 4.5 AEROSOL RESPIRATORY (INHALATION) at 08:56

## 2018-03-26 RX ADMIN — FUROSEMIDE SCH: 20 TABLET ORAL at 08:20

## 2018-03-26 RX ADMIN — INSULIN ASPART SCH: 100 INJECTION, SOLUTION INTRAVENOUS; SUBCUTANEOUS at 21:00

## 2018-03-26 RX ADMIN — Medication SCH UNIT: at 08:19

## 2018-03-26 RX ADMIN — ALLOPURINOL SCH MG: 100 TABLET ORAL at 08:19

## 2018-03-26 RX ADMIN — ALLOPURINOL SCH MG: 100 TABLET ORAL at 20:54

## 2018-03-26 RX ADMIN — INSULIN DETEMIR SCH: 100 INJECTION, SOLUTION SUBCUTANEOUS at 18:26

## 2018-03-26 RX ADMIN — MECLIZINE PRN MG: 12.5 TABLET ORAL at 08:19

## 2018-03-26 RX ADMIN — CARVEDILOL SCH MG: 3.12 TABLET, FILM COATED ORAL at 07:36

## 2018-03-26 RX ADMIN — CEFTRIAXONE SODIUM SCH MG: 2 INJECTION, POWDER, FOR SOLUTION INTRAMUSCULAR; INTRAVENOUS at 20:54

## 2018-03-26 RX ADMIN — ACETAMINOPHEN AND CODEINE PHOSPHATE PRN EACH: 300; 30 TABLET ORAL at 16:20

## 2018-03-26 RX ADMIN — BUDESONIDE AND FORMOTEROL FUMARATE DIHYDRATE SCH: 160; 4.5 AEROSOL RESPIRATORY (INHALATION) at 21:46

## 2018-03-26 RX ADMIN — ACETAMINOPHEN AND CODEINE PHOSPHATE PRN EACH: 300; 30 TABLET ORAL at 06:56

## 2018-03-26 RX ADMIN — SPIRONOLACTONE SCH MG: 25 TABLET, FILM COATED ORAL at 08:20

## 2018-03-26 RX ADMIN — CYANOCOBALAMIN TAB 500 MCG SCH MCG: 500 TAB at 08:19

## 2018-03-26 RX ADMIN — INSULIN ASPART SCH UNIT: 100 INJECTION, SOLUTION INTRAVENOUS; SUBCUTANEOUS at 07:36

## 2018-03-26 NOTE — PN
PROGRESS NOTE



DATE OF SERVICE:

03/25/2018.



PRESENTING COMPLAINT:

Weak, tired.



INTERVAL HISTORY:

The patient presented with acute gastroenteritis symptoms, which has improved.  The

patient does feel a bit tired, but the patient's blood cultures came back showing gram-

positive cocci for which patient was started on vancomycin.



REVIEW OF SYSTEMS:

Done for constitutional, cardiovascular, GI, pulmonary; relevant findings as above.



CURRENT MEDICATIONS:

Include IV vancomycin.



PHYSICAL EXAMINATION:

Afebrile, pulse 82, respiration 16, blood pressure 98/50, pulse ox 98% on room air.

GENERAL APPEARANCE:  Sitting up, tired appearing.

EYES:  Pupils equal.  Conjunctivae are normal.

HEENT:  External appearance of nose and ears normal.  Oral cavity normal.

NECK:  JVD not raised.  Mass not palpable.  Respiratory effort _____.

LUNGS:  Fair air entry.

CARDIOVASCULAR:  First and seconds sounds, no edema.

ABDOMEN:  Soft, nontender.  Liver and spleen not palpable.

PSYCHIATRY:  Alert and oriented x3. Mood and affect normal.



INVESTIGATIONS:

Potassium 4.2, BUN 28, creatinine 1.20.



ASSESSMENT:

1. Acute gastritis, likely viral, improved.

2. Clinical dehydration present on admission.

3. Positive blood cultures showing gram-positive cocci in cultures.  Await further

    input, although the patient has been rather been afebrile, overall feeling better.

4. Chronic congestive heart failure from diastolic dysfunction.  Ejection fraction 55%

    to 60%.

5. Hypertensive heart disease.

6. Moderate aortic stenosis, severe mitral regurgitation, moderate mitral stenosis,

    moderate-to-severe pulmonary hypertension, severe secondary to congestive heart

    failure.

7. Moderate-to-severe tricuspid regurgitation, nonrheumatic.

8. Diabetes mellitus type 2, chronically on insulin.

9. Essential hypertension.

10.Primary osteoarthritis in multiple joints bilateral.

11.Hypothyroid.

12.Blind in the left eye.

13.Coronary artery disease, prior history of coronary bypass.

14.History of [place] filter.

15.Chronic gait dysfunction, uses a walker.

16.Troponin leak due to hemodynamic mismatch, not acute coronary syndrome.



PLAN:

Patient is started on IV vancomycin.  We will see how the patient does.  The patient

otherwise has no fever.





MMODL / IJN: [id number] / Job#: [id number]

## 2018-03-26 NOTE — PN
PROGRESS NOTE



DATE OF SERVICE:

03/26/18



PRESENTING COMPLAINT:

Tired.



INTERVAL HISTORY:

This patient presented with acute gastroenteritis.  Blood cultures did come back

positive, now felt to be contaminant.  Overall, patient is feeling better.  Did

tolerate some diet.



REVIEW OF SYSTEMS:

Done for constitutional, cardiovascular, GI, pulmonary and findings as above.



CURRENT MEDICATIONS:

Reviewed.



EXAMINATION:

Afebrile, pulse 75, respiration 16, blood pressure 118/56, pulse ox 99%.  GENERAL:

Sitting up, looking a bit better.  Eyes pupils equal. Conjunctivae are normal.  HEENT

external appearance of nose and ears normal.  Oral cavity normal.  Neck: Jugular venous

distention not raised.  .  Mass not palpable.  Respiratory effort lungs fair entry.

CARDIOVASCULAR: First and second sounds normal. No edema.

ABDOMEN:  Soft, nontender.  Liver and spleen not palpable.  Psychiatry:  Alert and

oriented x3. Mood and affect normal.



INVESTIGATIONS:

Potassium 4.6, chloride 110, BUN 21, creatinine 1.18.  Accu-Cheks noted.  Blood

cultures showing contaminant.



ASSESSMENT:

1. Acute gastroenteritis, likely viral.

2. Clinical dehydration on presentation.

3. Positive blood cultures now felt to be a contaminant.

4. Chronic congestive heart failure from diastolic dysfunction.  Ejection fraction 55-

    60% from underlying hypertensive heart disease.

5. Moderate aortic stenosis, severe mitral regurgitation, moderate mitral stenosis,

    moderate to severe tricuspid regurgitation all nonrheumatic.

6. Moderate to severe sepsis secondary pulmonary hypertension secondary to CHF.

7. Diabetes mellitus type 2, chronically on insulin.

8. Essential hypertension.

9. Primary osteoarthritis multiple joints bilateral.

10.Hypothyroid.

11.Blind in the left eye.

12.Coronary artery disease with prior history of coronary bypass.

13.History of [place] filter.

14.Chronic gait dysfunction uses a walker.

15.Troponin leak due to hemodynamic mismatch.



PLAN:

Vancomycin will be discontinued.  Will gently hydrate the patient overnight.  We will

stop the patient's Aldactone and also cut back the dose of Lasix.  Care was discussed

with the patient.  Hopefully can be discharged tomorrow.





MMODL / IJN: [id number] / Job#: [id number]

## 2018-03-26 NOTE — CONS
CONSULTATION



DATE OF CONSULTATION:

03/26/2018.



REASON FOR CONSULTATION:

1. Positive blood culture.

2. Gastroenteritis viral versus bacterial.



HISTORY OF PRESENT ILLNESS:

The patient is an 87-year-old  female, who presented to the [facility]

ER on 03/23/2028 with chief complaints of diarrhea that has been going on since Monday

prior to presentation to hospital.  The patient did have multiple loose stools with

mucus but no blood.  He did have a crampy lower abdominal pain more of colicky in

nature same duration 3 to 4/10, and no radiation.  The patient did have associated

nauseated but no vomiting.  The patient did not have any family member with the same

illnesses and denies having any antibiotic use prior to her diarrhea starting.  The

patient on arrival to the ER did have acute abdominal series which was nonconclusive.

The patient did have a low-grade fever of 100.9.  She did have blood cultures drawn.

UA was _____significant positive with only trace leukocyte esterases.  She did have a

stool for C. difficile, which came back negative.  She has been treated with Rocephin.

Blood cultures were done.  Blood cultures coming back positive gram-positive cocci.

Hence, infectious disease was consulted for further recommendation regarding antibiotic

therapy.  The patient admits to having diarrhea about 3 loose  stools per day.  No

blood or mucus in it.



REVIEW OF SYSTEMS:

Constitutional: Positive for weakness and low-grade fever.

EYES:  No complaint.  ENT no complaint.  Respiratory no complaint.  Cardiovascular no

complaint. Genitourinary no complaint. Gastrointestinal: as per HPI.  Musculoskeletal

no complaint. Integumentary: No complaint. Psychological no complaint.  Endocrine no

complaint.  Neurological no complaint.



PAST MEDICAL HISTORY:

History of coronary artery disease, heart failure, diabetes mellitus, hypertension,

osteoarthritis, pulmonary: Positive sleep apnea, hypothyroidism, gout, chronic pain,

diverticular disease, ovarian cyst, tracheobronchitis, and skin cancer.



PAST SURGICAL HISTORY:

Appendectomy, bowel resection, breast surgery, aortic valve placement, cholecystectomy,

coronary artery bypass grafting, heart catheterization, hysterectomy, pacemaker

placement and tonsillectomy.



SOCIAL HISTORY:

Former smoker.  No drinking or drug use.



FAMILY HISTORY:

Father history of alcoholism, mother with history of diabetes, dementia and coronary

artery disease.



ALLERGIES:

TO ASPIRIN, CELEBREX,_____.



MEDICATIONS:

Medication include the patient is currently on:  Tylenol, Ventolin, Zyloprim,

Symbicort, Coreg, Rocephin 2 g daily, vitamin D3 and B12, Lasix, NovoLog, Levemir,

Synthroid, Antivert, melatonin, Flagyl, morphine sulfate, _____Protonix, Aldactone and

vancomycin pharmacy to dose.



EXAMINATION:

Blood pressure 124/50 with a pulse of 70, temperature 97.3.  She is 95% on room air.

General description is an elderly female up in the bed in no distress with no

tachypnea, or accessory muscles of respiration use.

HEENT:  Shows slight pallor.  No scleral icterus.  Oral mucosa is dry.  No pharyngeal

erythema.  No thrush.  Neck trachea is central.  No thyromegaly.  Lungs unlabored

breathing with decreased breath sounds in the bases.  No wheeze.  Heart S1, S2.

Regular rate and rhythm.  No murmur.  The left chest wall pacemaker site looks clean

with no erythema.

ABDOMEN:  Soft. Mild tenderness in the left lower quadrant area.  No guarding.  No

rigidity.  No organomegaly.

EXTREMITIES:  No edema of the feet.  Skin examination no rash or mass palpable.

Neurological:  Patient is awake, alert, oriented. Mood and affect normal.



LABS:

Hemoglobin 8.8, white count 4.0, BUN of 21, creatinine is 1.18.  Electrolytes has been

normal.  Urine was negative.  Stool for C difficile was negative.  The patient did have

an acute abdominal series.  No acute radiographic process.



DIAGNOSTIC IMPRESSION AND PLAN:

1. Patient who do have admitted t6o the hospital mostly with enteritis symptoms with

    significant diarrhea, questionably viral etiology.  Patient with exposure to

    antibiotics.  Stool for C difficile has been negative.  Currently the patient is

    low for bacterial infection.  Recommend symptomatic treatment only.

2. Patient with a positive blood cultures with coagulase negative Staph, likely skin

    contamination as the patient has no clinical disease to go along with it.  The only

    hardware she has in her body is the pacemaker and the pacemaker site looks clean.



PLAN:

1. Discontinue the vancomycin.

2. Discontinue Rocephin as low risk factor for a bacterial enteritis.

3. We will add Questran for symptomatic relief.

4. Depending on her clinical condition, we will adjust her medications further if

    needed.

Thank you for this consultation.  Will follow the patient along with you.





MMODL / IJN: [id number] / Job#: [id number]

## 2018-03-27 VITALS
DIASTOLIC BLOOD PRESSURE: 73 MMHG | RESPIRATION RATE: 20 BRPM | TEMPERATURE: 97.5 F | HEART RATE: 77 BPM | SYSTOLIC BLOOD PRESSURE: 141 MMHG

## 2018-03-27 LAB
ANION GAP SERPL CALC-SCNC: 9 MMOL/L
BUN SERPL-SCNC: 18 MG/DL (ref 7–17)
CALCIUM SPEC-MCNC: 8.6 MG/DL (ref 8.4–10.2)
CHLORIDE SERPL-SCNC: 112 MMOL/L (ref 98–107)
CO2 SERPL-SCNC: 19 MMOL/L (ref 22–30)
GLUCOSE BLD-MCNC: 149 MG/DL (ref 75–99)
GLUCOSE BLD-MCNC: 174 MG/DL (ref 75–99)
GLUCOSE SERPL-MCNC: 135 MG/DL (ref 74–99)
POTASSIUM SERPL-SCNC: 5.4 MMOL/L (ref 3.5–5.1)
SODIUM SERPL-SCNC: 140 MMOL/L (ref 137–145)

## 2018-03-27 RX ADMIN — CYANOCOBALAMIN TAB 500 MCG SCH MCG: 500 TAB at 08:10

## 2018-03-27 RX ADMIN — MECLIZINE PRN MG: 12.5 TABLET ORAL at 02:13

## 2018-03-27 RX ADMIN — ACETAMINOPHEN AND CODEINE PHOSPHATE PRN EACH: 300; 30 TABLET ORAL at 02:13

## 2018-03-27 RX ADMIN — LEVOTHYROXINE SODIUM SCH MCG: 75 TABLET ORAL at 05:51

## 2018-03-27 RX ADMIN — INSULIN ASPART SCH UNIT: 100 INJECTION, SOLUTION INTRAVENOUS; SUBCUTANEOUS at 08:06

## 2018-03-27 RX ADMIN — CARVEDILOL SCH MG: 3.12 TABLET, FILM COATED ORAL at 08:10

## 2018-03-27 RX ADMIN — BUDESONIDE AND FORMOTEROL FUMARATE DIHYDRATE SCH: 160; 4.5 AEROSOL RESPIRATORY (INHALATION) at 07:05

## 2018-03-27 RX ADMIN — Medication SCH UNIT: at 08:10

## 2018-03-27 RX ADMIN — ALLOPURINOL SCH MG: 100 TABLET ORAL at 08:10

## 2018-03-27 RX ADMIN — PANTOPRAZOLE SODIUM SCH MG: 40 TABLET, DELAYED RELEASE ORAL at 08:09

## 2018-03-27 RX ADMIN — ACETAMINOPHEN AND CODEINE PHOSPHATE PRN EACH: 300; 30 TABLET ORAL at 09:05

## 2018-03-27 RX ADMIN — CHOLESTYRAMINE SCH GM: 4 POWDER, FOR SUSPENSION ORAL at 08:09

## 2018-03-27 RX ADMIN — INSULIN ASPART SCH UNIT: 100 INJECTION, SOLUTION INTRAVENOUS; SUBCUTANEOUS at 13:22

## 2018-03-27 NOTE — PN
PROGRESS NOTE



DATE OF SERVICE:

03/27/2018



REASON FOR FOLLOWUP:

1. Positive blood culture, likely contamination.

2. Diarrhea, likely viral gastroenteritis.



INTERVAL HISTORY:

The patient was seen on rounds this morning.  The patient has been afebrile.  She is

breathing comfortably.  Denies having any chest pain or shortness of breath, cough,

abdominal pain. Still complaining of diarrhea.  However, the nurse aide did mention she

did have solid bowel movement and not runny stool.



PHYSICAL EXAMINATION:

Blood pressure 141/73 with a pulse of 77, temperature 97.5. She is 97% on room air.

General description is an elderly female lying in bed in no distress.

RESPIRATORY SYSTEM: Unlabored breathing. Clear to auscultation anteriorly.

HEART: S1, S2.  Regular rate and rhythm.

ABDOMEN: Soft. No tenderness.



LABS:

BUN of 18, creatinine 1.11.



DIAGNOSTIC IMPRESSION AND PLAN:

1. Patient with a positive blood culture with Staphylococcus epidermidis, likely

    contamination. No need for therapy for the same.

2. Patient with acute diarrhea, likely viral; responding to the Questran, and that

    will be continued for another 2 days.  No need for any systemic antibiotic.

    Continue supportive care.





MMODL / IJN: [id number] / Job#: [id number]

## 2018-03-29 NOTE — DS
DISCHARGE SUMMARY



DATE OF ADMISSION:

March 24, 2018.



DATE OF DISCHARGE:

March 27, 2018.



FINAL DIAGNOSES:

1. Acute gastroenteritis, likely viral present on admission.

2. Dehydration present on admission.

3. Positive blood cultures, which are contaminant.

4. Chronic congestive heart failure from diastolic dysfunction.  Ejection fraction 55-

    60% from hypertensive heart disease.

5. Moderate aortic stenosis, severe mitral regurgitation, moderate mitral stenosis,

    moderate to severe tricuspid regurgitation.  All nonrheumatic.

6. Moderate to severe secondary pulmonary hypertension secondary to congestive heart

    failure.

7. Diabetes mellitus type 2, chronically on insulin.

8. Essential hypertension.

9. Primary osteoarthritis multiple joints bilateral.

10.Hypothyroid.

11.Blind in the left eye.

12.Coronary artery disease with prior history of coronary bypass.

13.History of [place] filter.

14.Chronic gait dysfunction uses a walker.

15.Troponin leak due to hemodynamic mismatch.



CONSULTATION:

Dr. FERNANDO Roman from Cardiology, Dr. Santana from Infectious Disease.



HOSPITAL COURSE:

This patient presented with nausea, vomiting, diarrhea, felt to be gastroenteritis.

Initially cultures were felt to be positive but came back to be as contaminant.  On the

day of discharge I did speak at length with the patient that at the long-term she will

need more support and maybe should have a family member stay with her or she should

stay with a family member.   is involved in discharge planning.



DISCHARGE MEDICATIONS:

1. Lumigan 0.01% 1 drop to right eye q.h.s.

2. Coreg 3.125 p.o. b.i.d.

3. Allopurinol 200 mg b.i.d.

4. Vitamin D3 1000 units p.o. daily.

5. Vitamin B12 500 mcg p.o. daily.

6. CO Q 10 100 mg p.o. daily.

7. Ventolin HFA 1 or 2 puffs q.6h p.r.n.

8. Ventolin 2.5 mg q.6 p.r.n.

9. Synthroid 75 mcg p.o. daily.

10.Melatonin 5 mg q.h.s.

11.Symbicort 160/4.5, 2 puffs b.i.d.

12.Protonix 40 mg a.c. b.i.d.

13.Humalog 5 units a.c. t.i.d. p.r.n.

14.Metamucil 6 g p.o. daily.

15.Tylenol 3 one tab q.6h p.r.n.

16.Lasix 60 mg b.i.d.

17.NovoLog per scale.

18.Levemir 28 units subcu with supper.

19.Sodium bicarb 650 mg p.o. t.i.d.



DISPOSITION:

Home with family support.



FOLLOWUP:

Follow up with Dr. Ochoa in 3 days.  BMP in 3 days.



PHYSICAL EXAMINATION:

On examination:  Lungs decreased breath sounds.  Cardiovascular 1st and 2nd sounds

normal.  Psych AO x3.



Patient also given a handout for low-potassium diet.  Copy to visiting physician Dr. Ochoa.





MMSHEYLAL / IJN: [id number] / Job#: [id number]

## 2019-08-17 ENCOUNTER — HOSPITAL ENCOUNTER (INPATIENT)
Dept: HOSPITAL 47 - EC | Age: 84
LOS: 4 days | Discharge: HOME | DRG: 291 | End: 2019-08-21
Attending: INTERNAL MEDICINE | Admitting: INTERNAL MEDICINE
Payer: MEDICARE

## 2019-08-17 VITALS — BODY MASS INDEX: 31.1 KG/M2

## 2019-08-17 DIAGNOSIS — Z66: ICD-10-CM

## 2019-08-17 DIAGNOSIS — H35.30: ICD-10-CM

## 2019-08-17 DIAGNOSIS — I50.33: ICD-10-CM

## 2019-08-17 DIAGNOSIS — E78.5: ICD-10-CM

## 2019-08-17 DIAGNOSIS — Z95.1: ICD-10-CM

## 2019-08-17 DIAGNOSIS — I08.3: ICD-10-CM

## 2019-08-17 DIAGNOSIS — Z95.828: ICD-10-CM

## 2019-08-17 DIAGNOSIS — M10.9: ICD-10-CM

## 2019-08-17 DIAGNOSIS — Z95.2: ICD-10-CM

## 2019-08-17 DIAGNOSIS — G47.30: ICD-10-CM

## 2019-08-17 DIAGNOSIS — J44.9: ICD-10-CM

## 2019-08-17 DIAGNOSIS — Z90.89: ICD-10-CM

## 2019-08-17 DIAGNOSIS — Z95.0: ICD-10-CM

## 2019-08-17 DIAGNOSIS — H54.62: ICD-10-CM

## 2019-08-17 DIAGNOSIS — I48.1: ICD-10-CM

## 2019-08-17 DIAGNOSIS — N17.9: ICD-10-CM

## 2019-08-17 DIAGNOSIS — Z82.49: ICD-10-CM

## 2019-08-17 DIAGNOSIS — Z85.828: ICD-10-CM

## 2019-08-17 DIAGNOSIS — Z79.899: ICD-10-CM

## 2019-08-17 DIAGNOSIS — E83.41: ICD-10-CM

## 2019-08-17 DIAGNOSIS — Z83.3: ICD-10-CM

## 2019-08-17 DIAGNOSIS — E03.9: ICD-10-CM

## 2019-08-17 DIAGNOSIS — Z90.49: ICD-10-CM

## 2019-08-17 DIAGNOSIS — E11.22: ICD-10-CM

## 2019-08-17 DIAGNOSIS — Z88.8: ICD-10-CM

## 2019-08-17 DIAGNOSIS — Z88.6: ICD-10-CM

## 2019-08-17 DIAGNOSIS — D64.9: ICD-10-CM

## 2019-08-17 DIAGNOSIS — I25.10: ICD-10-CM

## 2019-08-17 DIAGNOSIS — Z99.89: ICD-10-CM

## 2019-08-17 DIAGNOSIS — Z90.710: ICD-10-CM

## 2019-08-17 DIAGNOSIS — Z79.890: ICD-10-CM

## 2019-08-17 DIAGNOSIS — Z79.4: ICD-10-CM

## 2019-08-17 DIAGNOSIS — Z87.891: ICD-10-CM

## 2019-08-17 DIAGNOSIS — N18.9: ICD-10-CM

## 2019-08-17 DIAGNOSIS — Z91.030: ICD-10-CM

## 2019-08-17 DIAGNOSIS — I27.20: ICD-10-CM

## 2019-08-17 DIAGNOSIS — H40.9: ICD-10-CM

## 2019-08-17 DIAGNOSIS — I13.0: Primary | ICD-10-CM

## 2019-08-17 DIAGNOSIS — Z86.711: ICD-10-CM

## 2019-08-17 DIAGNOSIS — I49.5: ICD-10-CM

## 2019-08-17 DIAGNOSIS — F32.9: ICD-10-CM

## 2019-08-17 LAB
ALBUMIN SERPL-MCNC: 3.9 G/DL (ref 3.5–5)
ALP SERPL-CCNC: 338 U/L (ref 38–126)
ALT SERPL-CCNC: 24 U/L (ref 9–52)
ANION GAP SERPL CALC-SCNC: 11 MMOL/L
APTT BLD: 28 SEC (ref 22–30)
AST SERPL-CCNC: 47 U/L (ref 14–36)
BASOPHILS # BLD AUTO: 0.1 K/UL (ref 0–0.2)
BASOPHILS NFR BLD AUTO: 1 %
BUN SERPL-SCNC: 30 MG/DL (ref 7–17)
CALCIUM SPEC-MCNC: 9.7 MG/DL (ref 8.4–10.2)
CHLORIDE SERPL-SCNC: 105 MMOL/L (ref 98–107)
CO2 SERPL-SCNC: 23 MMOL/L (ref 22–30)
EOSINOPHIL # BLD AUTO: 0.6 K/UL (ref 0–0.7)
EOSINOPHIL NFR BLD AUTO: 6 %
ERYTHROCYTE [DISTWIDTH] IN BLOOD BY AUTOMATED COUNT: 4.29 M/UL (ref 3.8–5.4)
ERYTHROCYTE [DISTWIDTH] IN BLOOD: 15.9 % (ref 11.5–15.5)
GLUCOSE BLD-MCNC: 143 MG/DL (ref 75–99)
GLUCOSE SERPL-MCNC: 121 MG/DL (ref 74–99)
HCT VFR BLD AUTO: 40.5 % (ref 34–46)
HGB BLD-MCNC: 13.1 GM/DL (ref 11.4–16)
INR PPP: 0.9 (ref ?–1.2)
LYMPHOCYTES # SPEC AUTO: 1.2 K/UL (ref 1–4.8)
LYMPHOCYTES NFR SPEC AUTO: 13 %
MAGNESIUM SPEC-SCNC: 2.5 MG/DL (ref 1.6–2.3)
MCH RBC QN AUTO: 30.5 PG (ref 25–35)
MCHC RBC AUTO-ENTMCNC: 32.3 G/DL (ref 31–37)
MCV RBC AUTO: 94.5 FL (ref 80–100)
MONOCYTES # BLD AUTO: 0.5 K/UL (ref 0–1)
MONOCYTES NFR BLD AUTO: 5 %
NEUTROPHILS # BLD AUTO: 7 K/UL (ref 1.3–7.7)
NEUTROPHILS NFR BLD AUTO: 74 %
PLATELET # BLD AUTO: 183 K/UL (ref 150–450)
POTASSIUM SERPL-SCNC: 4.6 MMOL/L (ref 3.5–5.1)
PROT SERPL-MCNC: 6.7 G/DL (ref 6.3–8.2)
PT BLD: 10 SEC (ref 9–12)
SODIUM SERPL-SCNC: 139 MMOL/L (ref 137–145)
WBC # BLD AUTO: 9.5 K/UL (ref 3.8–10.6)

## 2019-08-17 PROCEDURE — 80053 COMPREHEN METABOLIC PANEL: CPT

## 2019-08-17 PROCEDURE — 96374 THER/PROPH/DIAG INJ IV PUSH: CPT

## 2019-08-17 PROCEDURE — 93306 TTE W/DOPPLER COMPLETE: CPT

## 2019-08-17 PROCEDURE — 85730 THROMBOPLASTIN TIME PARTIAL: CPT

## 2019-08-17 PROCEDURE — 93005 ELECTROCARDIOGRAM TRACING: CPT

## 2019-08-17 PROCEDURE — 80048 BASIC METABOLIC PNL TOTAL CA: CPT

## 2019-08-17 PROCEDURE — 84484 ASSAY OF TROPONIN QUANT: CPT

## 2019-08-17 PROCEDURE — 83735 ASSAY OF MAGNESIUM: CPT

## 2019-08-17 PROCEDURE — 71046 X-RAY EXAM CHEST 2 VIEWS: CPT

## 2019-08-17 PROCEDURE — 99285 EMERGENCY DEPT VISIT HI MDM: CPT

## 2019-08-17 PROCEDURE — 36415 COLL VENOUS BLD VENIPUNCTURE: CPT

## 2019-08-17 PROCEDURE — 83880 ASSAY OF NATRIURETIC PEPTIDE: CPT

## 2019-08-17 PROCEDURE — 85610 PROTHROMBIN TIME: CPT

## 2019-08-17 PROCEDURE — 85025 COMPLETE CBC W/AUTO DIFF WBC: CPT

## 2019-08-17 PROCEDURE — 96361 HYDRATE IV INFUSION ADD-ON: CPT

## 2019-08-17 NOTE — XR
EXAMINATION TYPE: XR chest 2V

 

DATE OF EXAM: 8/17/2019

 

COMPARISON: 3/27/2018

 

HISTORY: Short of breath

 

TECHNIQUE:  Frontal and lateral views of the chest are obtained.

 

FINDINGS:  There is mild blunting of the costophrenic angles. There is no gross heart failure. Heart 
is slightly enlarged. There is left axillary pacemaker. There are sternal wires. There are chest lead
s.

 

IMPRESSION:  Small pleural effusions appear increased compared to last exam. No overt heart failure.

No pulmonary consolidation.

## 2019-08-17 NOTE — ED
General Adult HPI





- General


Chief complaint: Shortness of Breath


Stated complaint: MARIA T


Time Seen by Provider: 19 16:24


Source: patient, EMS


Mode of arrival: EMS


Limitations: no limitations





- History of Present Illness


Initial comments: 





Dictation was produced using dragon dictation software. please excuse any 

grammatical, word or spelling errors. 





Chief Complaint: 88-year-old female presents with chief complaint of shortness 

of breath.





History of Present Illness: This is an 80-year-old female she has past medical h

istory of heart failure, atrial fibrillation diabetes.  She presents today with 

shortness of breath since yesterday.  Patient is multiple comorbidities.  She 

has this history of shortness of breath.  She states that, in intermittent 

episodes.  Today she felt like her shortness of breath particularly go away.  He

normally last for less than 1 day.  She states her symptoms started yesterday 

she still considerably short of breath today.  Patient denies any coughing.  

Denies any fever, chills or night sweats.  Patient denies any chest pain.  She 

feels slightly worse when lying flat.  Eyes any lower extremity symptoms.  She 

has history of IVC filter.  Patient is not on any anticoagulation medications 

given that she had significant GI bleed in the past.








The ROS documented in this emergency department record has been reviewed and 

confirmed by me.  Those systems with pertinent positive or negative responses 

have been documented in the HPI.  All other systems are other negative and/or 

noncontributory.








PHYSICAL EXAM:


General Impression: Alert and oriented x3, not in acute distress


HEENT: Normocephalic atraumatic, extra-ocular movements intact, pupils equal and

reactive to light bilaterally, mucous membranes moist.


Cardiovascular: Heart regular rate and rhythm, S1&S2 audible, no murmurs, rubs 

or gallops


Chest: Lungs clear to auscultation bilaterally, no rhonchi, no wheeze, no rales


Abdomen: Bowel sounds present, abdomen soft, non-tender, non-distended, no 

organomegaly


Musculoskeletal: Pulses present and equal in all extremities, no peripheral 

edema


Motor:  no focal deficits noted


Neurological: CN II-XII grossly intact, no focal motor or sensory deficits noted


Skin: Intact with no visualized rashes


Psych: Normal affect and mood





ED course:-year-old female presents with chief complaint of shortness of breath.

 All signs upon arrival are within acceptable limits.  Patient does not show any

significant signs of respiratory distress she is able to complete a sentence 

speaking.  Medications were reviewed.  Lungs are clear to 

auscultation.Laboratory evaluation obtained.  CBC, coag panel unremarkable.  

Metabolic panel shows slight elevation in renal markers which appears to be 

patient's baseline.  Troponin is 0.033 likely secondary to troponin leak.  

Patient's brain atretic peptide is 5800.  Chest x-ray shows findings suspicious 

for congestive heart failure.  Over there is no findings of overt heart failure.

 No other acute processes noted.  Patient some Lasix.  She states she is too 

short winded to go home.  She prefers to be admitted to observation for gentle 

diuresis.  Click or presentation is consistent with congestive heart failure.  

More history was obtained from patient.  She's been eating a lot of fast food 

recently.  She is not showing any respiratory distress at rest however when 

ambulated bathroom she was tachypneic.








EKG interpretation: Ventricular rate 87, demand pacemaker, care is 136, .

No SC prolongation, no QTC prolongation, no ST or T-wave changes noted.  EKG 

compared to reports 20 2018 showing no changes.  Overall, this EKG is 

unremarkable








- Related Data


                                Home Medications











 Medication  Instructions  Recorded  Confirmed


 


Carvedilol [Coreg] 3.125 mg PO BID 14


 


Allopurinol 100 mg PO BID 06/03/15 08/17/19


 


Levothyroxine Sodium [Synthroid] 75 mcg PO DAILY 18


 


Insulin Lispro [humaLOG Kwikpen] 5 unit SQ AC-TID PRN 18


 


Ferrous Sulfate [Feosol] 325 mg PO DAILY 19


 


Furosemide [Lasix] 80 mg PO BID 19


 


Insulin Detemir (Levemir) [Levemir] 20 unit SQ AC-BRKFST 19


 


Potassium Chloride ER [K-Dur 10] 10 meq PO BID 19


 


Spironolactone [Aldactone] 25 mg PO DAILY 19


 


Vit C/E/Zn/Coppr/Lutein/Zeaxan 1 cap PO BID 19





[Preservision Areds 2 Softgel]   








                                  Previous Rx's











 Medication  Instructions  Recorded


 


Sodium Bicarbonate Tab 650 mg PO TID  tab 18











                                    Allergies











Allergy/AdvReac Type Severity Reaction Status Date / Time


 


aspirin Allergy  Unknown Verified 19 18:02


 


bee pollen [Bee Pollen] Allergy  Anaphylaxis Verified 19 18:02


 


celecoxib [From Celebrex] Allergy  Unknown Verified 19 18:02


 


blood thinners AdvReac Severe see comment Uncoded 19 18:02


 


URIC ACIDS AdvReac  GOUT Uncoded 19 18:02














Review of Systems


ROS Statement: 


Those systems with pertinent positive or pertinent negative responses have been 

documented in the HPI.





ROS Other: All systems not noted in ROS Statement are negative.





Past Medical History


Past Medical History: Atrial Fibrillation, Coronary Artery Disease (CAD), 

Cancer, Heart Failure, Diabetes Mellitus, GI Bleed, Hypertension, Osteoarthritis

(OA), Pulmonary Embolus (PE), Sleep Apnea/CPAP/BIPAP, Thyroid Disorder


Additional Past Medical History / Comment(s): gout, chronic pain in back hip and

leg, diverticulitis, LT EYE BLIND,GLAUCOMA, MAC DEGENERATION RT EYE, MURMUR, 

DIVERTICULAR DX.OVARIAN CYSTS,ANEMIA,SKIN CA,ANEMIA, TRACHEBRONCHITIS WITH 

REACTIVE BRONCHOSPASM.used to use cpap machine .  Increased shortness of breath 

with activity and at rest. ZEESHAN CARPAL TUNNEL.


History of Any Multi-Drug Resistant Organisms: None Reported


Past Surgical History: Appendectomy, Bowel Resection, Breast Surgery, Cardiac 

Valve Replacement, Cholecystectomy, Coronary Bypass/CABG, Heart Catheterization,

Hysterectomy, Orthopedic Surgery, Pacemaker, Tonsillectomy


Additional Past Surgical History / Comment(s): pacemaker, AORTIC VALVE 

REPLACEMENT(TISSUE) AND 1 VESSEL BYPASS, CYST REMOVED FROM HAND/HEAD,BOWEL 

RESECTION D/T DIVERTICULITIS.LEONCIO FILTER, COLONOCOSPY/POLYPECTOMY/EGD, 

PAST LT EYE SX-DAMAGED THE OPTIC NERVE-BLIND LT EYE.lt knee arthrosopy, skin 

cancer removed from hand/head/back


Past Anesthesia/Blood Transfusion Reactions: No Reported Reaction


Type of Cardiac Device: Permanent Pacemaker


Device Placement Date:: 


Past Psychological History: Depression


Smoking Status: Former smoker


Past Alcohol Use History: None Reported





- Past Family History


  ** Brother(s)


History Unknown: Yes





  ** Father


Family Medical History: Cancer


Additional Family Medical History / Comment(s): ALCOHOLIC--  





  ** Mother


Family Medical History: Cancer, Coronary Artery Disease (CAD), Dementia, 

Diabetes Mellitus


Additional Family Medical History / Comment(s):  





General Exam


Limitations: no limitations





Course


                                   Vital Signs











  19





  16:15 16:22


 


Temperature 99.4 F 


 


Pulse Rate 86 


 


Respiratory 22 23





Rate  


 


Blood Pressure 137/91 


 


O2 Sat by Pulse 92 L 





Oximetry  














Medical Decision Making





- Lab Data


Result diagrams: 


                                 19 16:34





                                 19 16:34


                                   Lab Results











  19 Range/Units





  16:34 16:34 16:34 


 


WBC  9.5    (3.8-10.6)  k/uL


 


RBC  4.29    (3.80-5.40)  m/uL


 


Hgb  13.1    (11.4-16.0)  gm/dL


 


Hct  40.5    (34.0-46.0)  %


 


MCV  94.5    (80.0-100.0)  fL


 


MCH  30.5    (25.0-35.0)  pg


 


MCHC  32.3    (31.0-37.0)  g/dL


 


RDW  15.9 H    (11.5-15.5)  %


 


Plt Count  183    (150-450)  k/uL


 


Neutrophils %  74    %


 


Lymphocytes %  13    %


 


Monocytes %  5    %


 


Eosinophils %  6    %


 


Basophils %  1    %


 


Neutrophils #  7.0    (1.3-7.7)  k/uL


 


Lymphocytes #  1.2    (1.0-4.8)  k/uL


 


Monocytes #  0.5    (0-1.0)  k/uL


 


Eosinophils #  0.6    (0-0.7)  k/uL


 


Basophils #  0.1    (0-0.2)  k/uL


 


PT     (9.0-12.0)  sec


 


INR     (<1.2)  


 


APTT     (22.0-30.0)  sec


 


Sodium   139   (137-145)  mmol/L


 


Potassium   4.6   (3.5-5.1)  mmol/L


 


Chloride   105   ()  mmol/L


 


Carbon Dioxide   23   (22-30)  mmol/L


 


Anion Gap   11   mmol/L


 


BUN   30 H   (7-17)  mg/dL


 


Creatinine   1.24 H   (0.52-1.04)  mg/dL


 


Est GFR (CKD-EPI)AfAm   45   (>60 ml/min/1.73 sqM)  


 


Est GFR (CKD-EPI)NonAf   39   (>60 ml/min/1.73 sqM)  


 


Glucose   121 H   (74-99)  mg/dL


 


Calcium   9.7   (8.4-10.2)  mg/dL


 


Magnesium   2.5 H   (1.6-2.3)  mg/dL


 


Total Bilirubin   1.9 H   (0.2-1.3)  mg/dL


 


AST   47 H   (14-36)  U/L


 


ALT   24   (9-52)  U/L


 


Alkaline Phosphatase   338 H   ()  U/L


 


Troponin I     (0.000-0.034)  ng/mL


 


NT-Pro-B Natriuret Pep    5800  pg/mL


 


Total Protein   6.7   (6.3-8.2)  g/dL


 


Albumin   3.9   (3.5-5.0)  g/dL














  19 Range/Units





  16:34 16:34 


 


WBC    (3.8-10.6)  k/uL


 


RBC    (3.80-5.40)  m/uL


 


Hgb    (11.4-16.0)  gm/dL


 


Hct    (34.0-46.0)  %


 


MCV    (80.0-100.0)  fL


 


MCH    (25.0-35.0)  pg


 


MCHC    (31.0-37.0)  g/dL


 


RDW    (11.5-15.5)  %


 


Plt Count    (150-450)  k/uL


 


Neutrophils %    %


 


Lymphocytes %    %


 


Monocytes %    %


 


Eosinophils %    %


 


Basophils %    %


 


Neutrophils #    (1.3-7.7)  k/uL


 


Lymphocytes #    (1.0-4.8)  k/uL


 


Monocytes #    (0-1.0)  k/uL


 


Eosinophils #    (0-0.7)  k/uL


 


Basophils #    (0-0.2)  k/uL


 


PT  10.0   (9.0-12.0)  sec


 


INR  0.9   (<1.2)  


 


APTT  28.0   (22.0-30.0)  sec


 


Sodium    (137-145)  mmol/L


 


Potassium    (3.5-5.1)  mmol/L


 


Chloride    ()  mmol/L


 


Carbon Dioxide    (22-30)  mmol/L


 


Anion Gap    mmol/L


 


BUN    (7-17)  mg/dL


 


Creatinine    (0.52-1.04)  mg/dL


 


Est GFR (CKD-EPI)AfAm    (>60 ml/min/1.73 sqM)  


 


Est GFR (CKD-EPI)NonAf    (>60 ml/min/1.73 sqM)  


 


Glucose    (74-99)  mg/dL


 


Calcium    (8.4-10.2)  mg/dL


 


Magnesium    (1.6-2.3)  mg/dL


 


Total Bilirubin    (0.2-1.3)  mg/dL


 


AST    (14-36)  U/L


 


ALT    (9-52)  U/L


 


Alkaline Phosphatase    ()  U/L


 


Troponin I   0.033  (0.000-0.034)  ng/mL


 


NT-Pro-B Natriuret Pep    pg/mL


 


Total Protein    (6.3-8.2)  g/dL


 


Albumin    (3.5-5.0)  g/dL














Disposition


Clinical Impression: 


 Heart failure





Disposition: ADMITTED AS IP TO THIS HOSP


Condition: Fair


Referrals: 


Reji Ochoa MD [Primary Care Provider] - 1-2 days


Decision Time: 18:23

## 2019-08-18 LAB
ALBUMIN SERPL-MCNC: 3.2 G/DL (ref 3.5–5)
ALP SERPL-CCNC: 264 U/L (ref 38–126)
ALT SERPL-CCNC: 20 U/L (ref 9–52)
ANION GAP SERPL CALC-SCNC: 7 MMOL/L
AST SERPL-CCNC: 31 U/L (ref 14–36)
BUN SERPL-SCNC: 30 MG/DL (ref 7–17)
CALCIUM SPEC-MCNC: 9.2 MG/DL (ref 8.4–10.2)
CHLORIDE SERPL-SCNC: 107 MMOL/L (ref 98–107)
CO2 SERPL-SCNC: 26 MMOL/L (ref 22–30)
GLUCOSE BLD-MCNC: 134 MG/DL (ref 75–99)
GLUCOSE BLD-MCNC: 137 MG/DL (ref 75–99)
GLUCOSE BLD-MCNC: 163 MG/DL (ref 75–99)
GLUCOSE BLD-MCNC: 96 MG/DL (ref 75–99)
GLUCOSE SERPL-MCNC: 135 MG/DL (ref 74–99)
POTASSIUM SERPL-SCNC: 4.3 MMOL/L (ref 3.5–5.1)
PROT SERPL-MCNC: 5.7 G/DL (ref 6.3–8.2)
SODIUM SERPL-SCNC: 140 MMOL/L (ref 137–145)

## 2019-08-18 RX ADMIN — INSULIN DETEMIR SCH UNIT: 100 INJECTION, SOLUTION SUBCUTANEOUS at 08:16

## 2019-08-18 RX ADMIN — HEPARIN SODIUM SCH UNIT: 5000 INJECTION, SOLUTION INTRAVENOUS; SUBCUTANEOUS at 01:11

## 2019-08-18 RX ADMIN — POTASSIUM CHLORIDE SCH MEQ: 750 TABLET, EXTENDED RELEASE ORAL at 21:04

## 2019-08-18 RX ADMIN — INSULIN ASPART SCH: 100 INJECTION, SOLUTION INTRAVENOUS; SUBCUTANEOUS at 07:15

## 2019-08-18 RX ADMIN — Medication SCH MG: at 17:42

## 2019-08-18 RX ADMIN — CARVEDILOL SCH MG: 3.12 TABLET, FILM COATED ORAL at 08:24

## 2019-08-18 RX ADMIN — Medication SCH MG: at 08:17

## 2019-08-18 RX ADMIN — HEPARIN SODIUM SCH: 5000 INJECTION, SOLUTION INTRAVENOUS; SUBCUTANEOUS at 01:16

## 2019-08-18 RX ADMIN — ALLOPURINOL SCH MG: 100 TABLET ORAL at 21:04

## 2019-08-18 RX ADMIN — LEVOTHYROXINE SODIUM SCH MCG: 75 TABLET ORAL at 06:18

## 2019-08-18 RX ADMIN — INSULIN ASPART SCH: 100 INJECTION, SOLUTION INTRAVENOUS; SUBCUTANEOUS at 11:44

## 2019-08-18 RX ADMIN — Medication SCH MG: at 01:11

## 2019-08-18 RX ADMIN — ALLOPURINOL SCH MG: 100 TABLET ORAL at 08:24

## 2019-08-18 RX ADMIN — HEPARIN SODIUM SCH: 5000 INJECTION, SOLUTION INTRAVENOUS; SUBCUTANEOUS at 07:16

## 2019-08-18 RX ADMIN — SPIRONOLACTONE SCH MG: 25 TABLET, FILM COATED ORAL at 08:16

## 2019-08-18 RX ADMIN — HEPARIN SODIUM SCH: 5000 INJECTION, SOLUTION INTRAVENOUS; SUBCUTANEOUS at 20:37

## 2019-08-18 RX ADMIN — POTASSIUM CHLORIDE SCH MEQ: 750 TABLET, EXTENDED RELEASE ORAL at 08:16

## 2019-08-18 RX ADMIN — HEPARIN SODIUM SCH: 5000 INJECTION, SOLUTION INTRAVENOUS; SUBCUTANEOUS at 11:44

## 2019-08-18 RX ADMIN — Medication SCH MG: at 08:16

## 2019-08-18 RX ADMIN — CARVEDILOL SCH MG: 3.12 TABLET, FILM COATED ORAL at 01:11

## 2019-08-18 RX ADMIN — CARVEDILOL SCH MG: 3.12 TABLET, FILM COATED ORAL at 17:42

## 2019-08-18 RX ADMIN — POTASSIUM CHLORIDE SCH MEQ: 750 TABLET, EXTENDED RELEASE ORAL at 01:13

## 2019-08-18 RX ADMIN — ALLOPURINOL SCH MG: 100 TABLET ORAL at 01:11

## 2019-08-18 RX ADMIN — INSULIN ASPART SCH: 100 INJECTION, SOLUTION INTRAVENOUS; SUBCUTANEOUS at 17:09

## 2019-08-18 RX ADMIN — INSULIN ASPART SCH UNIT: 100 INJECTION, SOLUTION INTRAVENOUS; SUBCUTANEOUS at 08:18

## 2019-08-18 RX ADMIN — Medication SCH MG: at 21:04

## 2019-08-18 NOTE — P.CRDCN
<Inez Monsivais - Last Filed: 19 13:02>





History of Present Illness


History of present illness: 


This is Inez Monsivais PA-C dictating a consult on this patient


The patient was interviewed and examined by me as well as by Dr. Joe


Case discussed with Dr. Joe and he agrees with the plan of care





IMPRESSION / ASSESSMENT: 


Shortness of breath, possibly secondary to diastolic CHF exacerbation versus 

valvular heart disease, chest x-ray showing small pleural effusions, BNP 

elevated, troponins negative


CAD status post CABG


History of aVR


Sick sinus syndrome status post pacemaker placement


Persistent atrial fibrillation not on anticoagulation for unknown reason


COPD


Hypertension


Dyslipidemia


Acute kidney injury, creatinine 1.41 from 1.24





PLAN:


Repeat echocardiogram to evaluate heart structure and function


Reduce IV Lasix to 40 mg daily, plan to transition to oral Lasix tomorrow, 

monitor kidney function closely





HPI


Patient is an 88-year-old female with a past medical history of CAD status post 

CABG, diastolic CHF, sick sinus syndrome status post pacer pacemaker, valvular 

heart disease status post aortic valve replacement, persistent atrial 

fibrillation, COPD, hypertension, dyslipidemia who presented to the emergency 

department with complaints of shortness of breath.  Patient states she has been 

progressively more short of breath over the last week.  Her shortness of breath 

is worse in the morning.  She tried taking her inhalers and they did not help.  

Denies orthopnea or lower extremity edema.  Denies chest pain, palpitations.  On

admission her vital signs were stable. Chest x-ray showed small pleural 

effusions bilaterally, no pulmonary consolidation. EKG showed atrial fib

rillation with a right bundle branch block, ST depressions and T-wave inversions

inferiorly unchanged from prior EKGs.  She was started on IV Lasix.  Patient 

seen and examined resting comfortably in bed.  States her breathing has improved

somewhat but is not 100% back to her baseline.  Denies chest pain or 

palpitations.





ROS: 


No fevers, chills or rigors, 


Positive for dry cough


no nausea, vomiting or diarrhea, 


no hematuria, dysuria, 


no musculoskeletal complaints, 


no strokes or seizures, 


no skin lesions.





EXAMINATION:


Temperature 99.3F, pulse 91, respirations 18, blood pressure 113/63, oxygen 

saturation 95% on 2 L nasal cannula


Patient seen and examined resting in bed, in no acute distress


Lungs with few scattered crackles at the bases


Heart is irregular, systolic murmur appreciated


Trace bilateral lower extremity edema


No elevated JVD noted





REVIEW OF LABS, ECG & MEDICAL DATA


WBC 9.5, hemoglobin 13.1, platelets 183, potassium 4.6, BUN 30, creatinine 1.41,

magnesium 2.5,


BNP elevated at 5800


Troponin normal 2


Chest x-ray showed small pleural effusions bilaterally, no pulmonary 

consolidation


EKG showed atrial fibrillation with a right bundle branch block, ST depressions 

and T-wave inversions inferiorly unchanged from prior EKGs


Last echocardiogram in 2018 showed preserved LV systolic function, EF 

55-60%





Past Medical History


Past Medical History: Atrial Fibrillation, Coronary Artery Disease (CAD), 

Cancer, Heart Failure, Diabetes Mellitus, GI Bleed, Hypertension, Osteoarthritis

(OA), Pneumonia, Pulmonary Embolus (PE), Sleep Apnea/CPAP/BIPAP, Thyroid 

Disorder


Additional Past Medical History / Comment(s): gout, chronic pain in back hip and

leg, diverticulitis, LT EYE BLIND,GLAUCOMA, MAC DEGENERATION RT EYE, MURMUR, 

DIVERTICULAR DX.OVARIAN CYSTS,SKIN CA,ANEMIA, TRACHEBRONCHITIS WITH REACTIVE BRO

NCHOSPASM.used to use cpap machine .  Increased shortness of breath with 

activity and at rest. ZEESHAN CARPAL TUNNEL.


History of Any Multi-Drug Resistant Organisms: None Reported


Past Surgical History: Appendectomy, Bowel Resection, Breast Surgery, Cardiac 

Valve Replacement, Cholecystectomy, Coronary Bypass/CABG, Heart Catheterization,

Hysterectomy, Orthopedic Surgery, Pacemaker, Tonsillectomy


Additional Past Surgical History / Comment(s): pacemaker, AORTIC VALVE 

REPLACEMENT(TISSUE) AND 1 VESSEL BYPASS, CYST REMOVED FROM HAND/HEAD,BOWEL 

RESECTION D/T DIVERTICULITIS.LEONCIO FILTER, COLONOCOSPY/POLYPECTOMY/EGD, 

PAST LT EYE SX-DAMAGED THE OPTIC NERVE-BLIND LT EYE.lt knee arthrosopy, skin 

cancer removed from hand/head/back


Past Anesthesia/Blood Transfusion Reactions: No Reported Reaction


Type of Cardiac Device: Permanent Pacemaker


Device Placement Date:: 


Past Psychological History: Depression


Additional Psychological History / Comment(s): history of depression, denies now


Smoking Status: Former smoker


Past Alcohol Use History: None Reported


Past Drug Use History: None Reported





- Past Family History


  ** Brother(s)


History Unknown: Yes





  ** Father


Family Medical History: Cancer


Additional Family Medical History / Comment(s): ALCOHOLIC--  





  ** Mother


Family Medical History: Cancer, Coronary Artery Disease (CAD), Dementia, 

Diabetes Mellitus


Additional Family Medical History / Comment(s):  





Medications and Allergies


                                Home Medications











 Medication  Instructions  Recorded  Confirmed  Type


 


Carvedilol [Coreg] 3.125 mg PO BID 14 History


 


Allopurinol 100 mg PO BID 06/03/15 08/17/19 History


 


Levothyroxine Sodium [Synthroid] 75 mcg PO DAILY 18 History


 


Insulin Lispro [humaLOG Kwikpen] 5 unit SQ AC-TID PRN 18 History


 


Sodium Bicarbonate Tab 650 mg PO TID  tab 18 Rx


 


Ferrous Sulfate [Feosol] 325 mg PO DAILY 19 History


 


Furosemide [Lasix] 80 mg PO BID 19 History


 


Insulin Detemir (Levemir) [Levemir] 20 unit SQ AC-BRKFST 19 

History


 


Potassium Chloride ER [K-Dur 10] 10 meq PO BID 19 History


 


Spironolactone [Aldactone] 25 mg PO DAILY 19 History


 


Vit C/E/Zn/Coppr/Lutein/Zeaxan 1 cap PO BID 19 History





[Preservision Areds 2 Softgel]    








                                    Allergies











Allergy/AdvReac Type Severity Reaction Status Date / Time


 


aspirin Allergy  Unknown Verified 19 20:51


 


bee pollen [Bee Pollen] Allergy  Anaphylaxis Verified 19 20:51


 


celecoxib [From Celebrex] Allergy  Unknown Verified 19 20:51


 


blood thinners AdvReac Severe see comment Uncoded 19 20:51


 


URIC ACIDS AdvReac  GOUT Uncoded 19 20:51














Physical Exam


Vitals: 


                                   Vital Signs











  Temp Pulse Pulse Pulse Pulse Resp BP


 


 19 12:00  99.3 F    91   18 


 


 19 08:02      67  18 


 


 19 07:31  97.7 F     66  


 


 19 04:00  98.0 F   67    16 


 


 19 23:51  98.0 F   63    15 


 


 08/17/19 20:34   70     17  118/75


 


 08/17/19 20:00  98.0 F     67  15 


 


 19 19:21   74     19  120/82


 


 19 18:30  98.5 F  69     19  157/76


 


 19 16:22       23 


 


 19 16:15  99.4 F  86     22  137/91














  BP BP Pulse Ox


 


 19 12:00   113/63  95


 


 19 08:02  141/79   97


 


 19 07:31   87/59  98


 


 19 04:00   119/53  98


 


 19 23:51   132/67  95


 


 19 20:34    97


 


 19 20:00   136/69  96


 


 19 19:21    97


 


 19 18:30    97


 


 19 16:22   


 


 19 16:15    92 L








                                Intake and Output











 19





 22:59 06:59 14:59


 


Output Total  725 602


 


Balance  -725 -602


 


Output:   


 


  Urine  725 602


 


Other:   


 


  Voiding Method Bedside Commode Bedside Commode Bedside Commode


 


  # Voids  0 1


 


  Weight 81.647 kg 82.3 kg 














Results





                                 19 16:34





                                 19 07:45


                                 Cardiac Enzymes











  19 Range/Units





  16:34 16:34 07:45 


 


AST  47 H   31  (14-36)  U/L


 


Troponin I   0.033   (0.000-0.034)  ng/mL














  19 Range/Units





  07:45 


 


AST   (14-36)  U/L


 


Troponin I  0.033  (0.000-0.034)  ng/mL








                                   Coagulation











  19 Range/Units





  16:34 


 


PT  10.0  (9.0-12.0)  sec


 


APTT  28.0  (22.0-30.0)  sec








                                       CBC











  19 Range/Units





  16:34 


 


WBC  9.5  (3.8-10.6)  k/uL


 


RBC  4.29  (3.80-5.40)  m/uL


 


Hgb  13.1  (11.4-16.0)  gm/dL


 


Hct  40.5  (34.0-46.0)  %


 


Plt Count  183  (150-450)  k/uL








                          Comprehensive Metabolic Panel











  19 Range/Units





  16:34 07:45 


 


Sodium  139  140  (137-145)  mmol/L


 


Potassium  4.6  4.3  (3.5-5.1)  mmol/L


 


Chloride  105  107  ()  mmol/L


 


Carbon Dioxide  23  26  (22-30)  mmol/L


 


BUN  30 H  30 H  (7-17)  mg/dL


 


Creatinine  1.24 H  1.41 H  (0.52-1.04)  mg/dL


 


Glucose  121 H  135 H  (74-99)  mg/dL


 


Calcium  9.7  9.2  (8.4-10.2)  mg/dL


 


AST  47 H  31  (14-36)  U/L


 


ALT  24  20  (9-52)  U/L


 


Alkaline Phosphatase  338 H  264 H  ()  U/L


 


Total Protein  6.7  5.7 L  (6.3-8.2)  g/dL


 


Albumin  3.9  3.2 L  (3.5-5.0)  g/dL








                               Current Medications











Generic Name Dose Route Start Last Admin





  Trade Name Freq  PRN Reason Stop Dose Admin


 


Allopurinol  100 mg  19 00:21  19 08:24





  Zyloprim  PO   100 mg





  BID DAVID   Administration


 


Carvedilol  3.125 mg  19 00:30  19 08:24





  Coreg  PO   3.125 mg





  AC-BID DAVID   Administration


 


Ferrous Sulfate  325 mg  19 09:00  19 08:16





  Feosol  PO   325 mg





  DAILY DAVID   Administration


 


Furosemide  40 mg  19 09:00  19 08:16





  Lasix  IV   40 mg





  Q12HR DAVID   Administration


 


Heparin Sodium (Porcine)  5,000 unit  19 00:00  19 11:44





  Heparin  SQ   Not Given





  Q8HR DAVID  


 


Insulin Aspart  5 unit  19 07:30  19 11:44





  Novolog  SQ   Not Given





  AC-TID DAVID  


 


Insulin Detemir  15 unit  19 07:00  19 08:16





  Levemir  SQ   15 unit





  AC-BRKFST@0700 DAVID   Administration


 


Levothyroxine Sodium  75 mcg  19 06:30  19 06:18





  Synthroid  PO   75 mcg





  DAILY@0630 DAVID   Administration


 


Potassium Chloride  10 meq  19 21:00  19 08:16





  K-Dur 10  PO   10 meq





  BID DAVID   Administration


 


Sodium Bicarbonate  650 mg  19 00:21  19 08:17





  Sodium Bicarbonate Tab  PO   650 mg





  TID DAVID   Administration


 


Spironolactone  25 mg  19 09:00  19 08:16





  Aldactone  PO   25 mg





  DAILY DAVID   Administration








                                Intake and Output











 19





 22:59 06:59 14:59


 


Output Total  725 602


 


Balance  -725 -602


 


Output:   


 


  Urine  725 602


 


Other:   


 


  Voiding Method Bedside Commode Bedside Commode Bedside Commode


 


  # Voids  0 1


 


  Weight 81.647 kg 82.3 kg 








                                        





                                 19 16:34 





                                 19 07:45 











<Frank Joe - Last Filed: 19 13:54>





History of Present Illness


History of present illness: 





Patient is normal anticoagulation as she had a massive internal bleed on ELIQUIS





Physical Exam


Vitals: 


                                   Vital Signs











  Temp Pulse Pulse Pulse Pulse Resp BP


 


 19 12:00  99.3 F    91   18 


 


 19 08:02      67  18 


 


 19 07:31  97.7 F     66  


 


 19 04:00  98.0 F   67    16 


 


 19 23:51  98.0 F   63    15 


 


 19 20:34   70     17  118/75


 


 19 20:00  98.0 F     67  15 


 


 19 19:21   74     19  120/82


 


 19 18:30  98.5 F  69     19  157/76


 


 19 16:22       23 


 


 19 16:15  99.4 F  86     22  137/91














  BP BP Pulse Ox


 


 19 12:00   113/63  95


 


 19 08:02  141/79   97


 


 19 07:31   87/59  98


 


 19 04:00   119/53  98


 


 19 23:51   132/67  95


 


 19 20:34    97


 


 19 20:00   136/69  96


 


 19 19:21    97


 


 19 18:30    97


 


 19 16:22   


 


 19 16:15    92 L








                                Intake and Output











 19





 22:59 06:59 14:59


 


Output Total  725 602


 


Balance  -725 -602


 


Output:   


 


  Urine  725 602


 


Other:   


 


  Voiding Method Bedside Commode Bedside Commode Toilet





   Bedside Commode


 


  # Voids  0 1


 


  Weight 81.647 kg 82.3 kg 82.3 kg














Results





                                 19 16:34





                                 19 07:45


                                 Cardiac Enzymes











  19 Range/Units





  16:34 16:34 07:45 


 


AST  47 H   31  (14-36)  U/L


 


Troponin I   0.033   (0.000-0.034)  ng/mL














  19 Range/Units





  07:45 


 


AST   (14-36)  U/L


 


Troponin I  0.033  (0.000-0.034)  ng/mL








                                   Coagulation











  19 Range/Units





  16:34 


 


PT  10.0  (9.0-12.0)  sec


 


APTT  28.0  (22.0-30.0)  sec








                                       CBC











  19 Range/Units





  16:34 


 


WBC  9.5  (3.8-10.6)  k/uL


 


RBC  4.29  (3.80-5.40)  m/uL


 


Hgb  13.1  (11.4-16.0)  gm/dL


 


Hct  40.5  (34.0-46.0)  %


 


Plt Count  183  (150-450)  k/uL








                          Comprehensive Metabolic Panel











  19 Range/Units





  16:34 07:45 


 


Sodium  139  140  (137-145)  mmol/L


 


Potassium  4.6  4.3  (3.5-5.1)  mmol/L


 


Chloride  105  107  ()  mmol/L


 


Carbon Dioxide  23  26  (22-30)  mmol/L


 


BUN  30 H  30 H  (7-17)  mg/dL


 


Creatinine  1.24 H  1.41 H  (0.52-1.04)  mg/dL


 


Glucose  121 H  135 H  (74-99)  mg/dL


 


Calcium  9.7  9.2  (8.4-10.2)  mg/dL


 


AST  47 H  31  (14-36)  U/L


 


ALT  24  20  (9-52)  U/L


 


Alkaline Phosphatase  338 H  264 H  ()  U/L


 


Total Protein  6.7  5.7 L  (6.3-8.2)  g/dL


 


Albumin  3.9  3.2 L  (3.5-5.0)  g/dL








                               Current Medications











Generic Name Dose Route Start Last Admin





  Trade Name Freq  PRN Reason Stop Dose Admin


 


Allopurinol  100 mg  19 00:21  19 08:24





  Zyloprim  PO   100 mg





  BID DAVID   Administration


 


Carvedilol  3.125 mg  19 00:30  19 08:24





  Coreg  PO   3.125 mg





  AC-BID DAVID   Administration


 


Ferrous Sulfate  325 mg  19 09:00  19 08:16





  Feosol  PO   325 mg





  DAILY DAVID   Administration


 


Furosemide  40 mg  19 09:00 





  Lasix  IV  





  DAILY DVAID  


 


Heparin Sodium (Porcine)  5,000 unit  19 00:00  19 11:44





  Heparin  SQ   Not Given





  Q8HR DAVID  


 


Insulin Aspart  5 unit  19 07:30  19 11:44





  Novolog  SQ   Not Given





  AC-TID [facility]  


 


Insulin Detemir  15 unit  19 07:00  19 08:16





  Levemir  SQ   15 unit





  AC-BRKFST@0700 DAVID   Administration


 


Levothyroxine Sodium  75 mcg  19 06:30  19 06:18





  Synthroid  PO   75 mcg





  DAILY@0630 DAVID   Administration


 


Potassium Chloride  10 meq  19 21:00  19 08:16





  K-Dur 10  PO   10 meq





  BID DAVID   Administration


 


Sodium Bicarbonate  650 mg  19 00:21  19 08:17





  Sodium Bicarbonate Tab  PO   650 mg





  TID DAVID   Administration


 


Spironolactone  25 mg  19 09:00  19 08:16





  Aldactone  PO   25 mg





  DAILY DAVID   Administration








                                Intake and Output











 19





 22:59 06:59 14:59


 


Output Total  725 602


 


Balance  -725 602


 


Output:   


 


  Urine  725 602


 


Other:   


 


  Voiding Method Bedside Commode Bedside Commode Toilet





   Bedside Commode


 


  # Voids  0 1


 


  Weight 81.647 kg 82.3 kg 82.3 kg








                                 Patient Weight











 19





 06:59


 


Weight 82.3 kg








                                        





                                 19 16:34 





                                 19 07:45

## 2019-08-18 NOTE — P.PN
Subjective


Progress Note Date: 08/18/19 (delayed charting seen at 1030)


Principal diagnosis: 





shortness of breath


Patient is an 88-year-old  female with past medical history of coronary

artery disease status post CABG, diastolic congestive heart failure, pacemaker 

secondary to sick sinus syndrome, atrial fibrillation, and prior aortic valve 

replacement who presented to the emergency department with complaints of 

shortness of breath.  On arrival to the ER she underwent an extensive 

evaluation.  Her initial vital signs were within normal limits.  Initial 

laboratory analysis demonstrated an elevated BNP at 5800 as well as an elevated 

creatinine at 1.246 consistent with her baseline.  Chest x-ray showed small 

pleural effusions.  Troponin was negative.  There is concern for congestive 

heart failure and she was started on IV Lasix.  She was placed in observation 

for further monitoring.  On the morning of 8/18 she had had significant 

improvement in her breathing which was not yet back to baseline.  Cardiology had

been consulted.  The plan was to maintain IV Lasix and repeat echocardiogram in 

the morning to assess for structure and function.





Patient seen and examined at bedside.  She states that her breathing is much 

better than yesterday but still not back to her baseline.  She has had some 

intermittent chest "twinges".  She has these at home but did have one episode 

this morning.  She denies any lightheadedness or dizziness.  She has had good 

urine output.  She follows with Dr. Milian in the office but has not seen him 

in approximately 3 years and is overdue for pacemaker check.








Objective





- Vital Signs


Vital signs: 


                                   Vital Signs











Temp  99.3 F   08/18/19 12:00


 


Pulse  91   08/18/19 12:00


 


Resp  18   08/18/19 12:00


 


BP  113/63   08/18/19 12:00


 


Pulse Ox  95   08/18/19 12:00








                                 Intake & Output











 08/17/19 08/18/19 08/18/19





 18:59 06:59 18:59


 


Output Total  725 602


 


Balance  -725 -602


 


Weight 81.647 kg 82.3 kg 82.3 kg


 


Output:   


 


  Urine  725 602


 


Other:   


 


  Voiding Method  Bedside Commode Toilet





   Bedside Commode


 


  # Voids  0 1














- Exam


General: non toxic, no distress, appears younger than stated age


Derm: Multiple areas of ecchymosis, warm, dry


Head: atraumatic, normocephalic, symmetric


Eyes: EOMI, no lid lag, anicteric sclera


Mouth: no lip lesion, mucus membranes moist


Cardiovascular: S1S2 reg, no murmur, positive posterior tibial pulse bilateral, 


Lungs: Faint crackles bilateral bases , no accessory muscle use


Abdominal: soft,  nontender to palpation, no guarding, no appreciable 

organomegaly


Ext: no gross muscle atrophy, edema bilateral, no contractures


Neuro:  CN II-XI grossly intact, no focal neuro deficits


Psych: Alert, oriented, appropriate affect 











- Labs


CBC & Chem 7: 


                                 08/17/19 16:34





                                 08/18/19 07:45


Labs: 


                  Abnormal Lab Results - Last 24 Hours (Table)











  08/17/19 08/17/19 08/17/19 Range/Units





  16:34 16:34 22:28 


 


RDW  15.9 H    (11.5-15.5)  %


 


BUN   30 H   (7-17)  mg/dL


 


Creatinine   1.24 H   (0.52-1.04)  mg/dL


 


Glucose   121 H   (74-99)  mg/dL


 


POC Glucose (mg/dL)    143 H  (75-99)  mg/dL


 


Magnesium   2.5 H   (1.6-2.3)  mg/dL


 


Total Bilirubin   1.9 H   (0.2-1.3)  mg/dL


 


AST   47 H   (14-36)  U/L


 


Alkaline Phosphatase   338 H   ()  U/L


 


Total Protein     (6.3-8.2)  g/dL


 


Albumin     (3.5-5.0)  g/dL














  08/18/19 08/18/19 Range/Units





  06:38 07:45 


 


RDW    (11.5-15.5)  %


 


BUN   30 H  (7-17)  mg/dL


 


Creatinine   1.41 H  (0.52-1.04)  mg/dL


 


Glucose   135 H  (74-99)  mg/dL


 


POC Glucose (mg/dL)  134 H   (75-99)  mg/dL


 


Magnesium    (1.6-2.3)  mg/dL


 


Total Bilirubin   1.7 H  (0.2-1.3)  mg/dL


 


AST    (14-36)  U/L


 


Alkaline Phosphatase   264 H  ()  U/L


 


Total Protein   5.7 L  (6.3-8.2)  g/dL


 


Albumin   3.2 L  (3.5-5.0)  g/dL














Assessment and Plan


Assessment: 


Acute exacerbation of diastolic congestive heart failure, last echocardiogram 

demonstrated an ejection fraction of 55-60% with possible left ventricular 

outflow tract obstruction, moderate aortic stenosis, severe mitral regurg, and 

severe pulmonary hypertension with RVSP 69.2.


-Reduce Lasix to IV once daily


-Strict I's and O's, daily weights


-Cardiology recommendations appreciated


-Repeat echocardiogram


-Continue with Coreg


-Patient is not chronically an ACE inhibitor and this will not be initiated at 

this point in time until ejection fraction is known





Diabetes mellitus type 2 insulin requiring


-Well controlled


-Continue with Levemir and sliding scale insulin


-Plan to discharge home back on her home Levemir and lispro doses





Chronic kidney disease


-Appears at baseline creatinine runs 1.2-1.4


-Follow renal function closely with diuresis


-Avoid starting ACE inhibitor at this point in time





Persistentatrial fibrillation with history of sick sinus syndrome


-Continue with beta blocker, not chronically on anticoagulation


-We'll need to follow-up in office for pacemaker interrogation





Hypothyroidism


-Continue Synthroid





Hypertension, controlled


-Continue home medications





Chronic conditions: Coronary artery disease, sleep apnea, gout, glaucoma, anemia





DVT prophylaxis: Heparin


Discussed with: Patient, nursing


Anticipated discharge: In a.m.


Anticipated discharge place: Home


A total of 25 minutes was spent on the care of this complex patient more than 

50% of the time was spent in counseling and care coordination.

## 2019-08-18 NOTE — P.HPIM
History of Present Illness


H&P Date: 19





The patient is a 88-year-old female with a PMH of coronary artery disease status

post CABG, diastolic CHF, pacemaker secondary to sick sinus syndrome, aortic 

valve replacement, persistent atrial fibrillation not on anticoagulation, COPD, 

hypertension, and and hyperlipidemia, presented to the ED with complaints of 

shortness of breath.  The patient notes that for the past 2-3 days, she has been

feeling a lot more winded and her exercise tolerance has decreased 

significantly.  She notes compliance with her Lasix along with her fluid and 

salt restriction at home.  She otherwise denied chest pain, nausea, vomiting, 

diaphoresis, palpitations, or leg pain.  She also denied fever, chills, or 

cough.  She underwent an extensive evaluation the ED with chest x-ray showing 

small pleural effusions with no obvious heart failure.  Laboratory evaluation 

revealed a BNP of 5800, troponin of 0.033, alk phos 338, AST 47, ALT 24, total 

bili 1.9, magnesium 2.5, BUN 30, creatinine 1.24.  EKG reveals a paced rhythm.  

The patient was subsequently admitted to the medicine service for acute CHF 

exacerbation.





Review of Systems





Pertinent positives and negatives as discussed in HPI, a complete review of 

systems was performed and all other systems are negative.





Past Medical History


Past Medical History: Atrial Fibrillation, Coronary Artery Disease (CAD), 

Cancer, Heart Failure, Diabetes Mellitus, GI Bleed, Hypertension, Osteoarthritis

(OA), Pneumonia, Pulmonary Embolus (PE), Sleep Apnea/CPAP/BIPAP, Thyroid 

Disorder


Additional Past Medical History / Comment(s): gout, chronic pain in back hip and

leg, diverticulitis, LT EYE BLIND,GLAUCOMA, MAC DEGENERATION RT EYE, MURMUR, 

DIVERTICULAR DX.OVARIAN CYSTS,SKIN CA,ANEMIA, TRACHEBRONCHITIS WITH REACTIVE 

BRONCHOSPASM.used to use cpap machine .  Increased shortness of breath with 

activity and at rest. ZEESHAN CARPAL TUNNEL.


History of Any Multi-Drug Resistant Organisms: None Reported


Past Surgical History: Appendectomy, Bowel Resection, Breast Surgery, Cardiac 

Valve Replacement, Cholecystectomy, Coronary Bypass/CABG, Heart Catheterization,

Hysterectomy, Orthopedic Surgery, Pacemaker, Tonsillectomy


Additional Past Surgical History / Comment(s): pacemaker, AORTIC VALVE 

REPLACEMENT(TISSUE) AND 1 VESSEL BYPASS, CYST REMOVED FROM HAND/HEAD,BOWEL 

RESECTION D/T DIVERTICULITIS.LEONCIO FILTER, COLONOCOSPY/POLYPECTOMY/EGD, 

PAST LT EYE SX-DAMAGED THE OPTIC NERVE-BLIND LT EYE.lt knee arthrosopy, skin 

cancer removed from hand/head/back


Past Anesthesia/Blood Transfusion Reactions: No Reported Reaction


Type of Cardiac Device: Permanent Pacemaker


Device Placement Date:: 


Past Psychological History: Depression


Additional Psychological History / Comment(s): history of depression, denies now


Smoking Status: Former smoker


Past Alcohol Use History: None Reported


Past Drug Use History: None Reported





- Past Family History


  ** Brother(s)


History Unknown: Yes





  ** Father


Family Medical History: Cancer


Additional Family Medical History / Comment(s): ALCOHOLIC--  





  ** Mother


Family Medical History: Cancer, Coronary Artery Disease (CAD), Dementia, Smiley

betes Mellitus


Additional Family Medical History / Comment(s):  





Medications and Allergies


                                Home Medications











 Medication  Instructions  Recorded  Confirmed  Type


 


Carvedilol [Coreg] 3.125 mg PO BID 14 History


 


Allopurinol 100 mg PO BID 06/03/15 08/17/19 History


 


Levothyroxine Sodium [Synthroid] 75 mcg PO DAILY 18 History


 


Insulin Lispro [humaLOG Kwikpen] 5 unit SQ AC-TID PRN 18 History


 


Sodium Bicarbonate Tab 650 mg PO TID  tab 18 Rx


 


Ferrous Sulfate [Feosol] 325 mg PO DAILY 19 History


 


Furosemide [Lasix] 80 mg PO BID 19 History


 


Insulin Detemir (Levemir) [Levemir] 20 unit SQ AC-BRKFST 19 

History


 


Potassium Chloride ER [K-Dur 10] 10 meq PO BID 19 History


 


Spironolactone [Aldactone] 25 mg PO DAILY 19 History


 


Vit C/E/Zn/Coppr/Lutein/Zeaxan 1 cap PO BID 19 History





[Preservision Areds 2 Softgel]    








                                    Allergies











Allergy/AdvReac Type Severity Reaction Status Date / Time


 


aspirin Allergy  Unknown Verified 19 20:51


 


bee pollen [Bee Pollen] Allergy  Anaphylaxis Verified 19 20:51


 


celecoxib [From Celebrex] Allergy  Unknown Verified 19 20:51


 


blood thinners AdvReac Severe see comment Uncoded 19 20:51


 


URIC ACIDS AdvReac  GOUT Uncoded 19 20:51














Physical Exam


Vitals: 


                                   Vital Signs











  Temp Pulse Pulse Resp BP BP Pulse Ox


 


 19 20:34   70   17  118/75   97


 


 19 20:00  98.0 F   67  15   136/69  96


 


 19 19:21   74   19  120/82   97


 


 19 18:30  98.5 F  69   19  157/76   97


 


 19 16:22     23   


 


 19 16:15  99.4 F  86   22  137/91   92 L








                                Intake and Output











 19





 14:59 22:59 06:59


 


Other:   


 


  Voiding Method  Bedside Commode 


 


  Weight  81.647 kg 











General: non toxic, no distress, appears at stated age, normal weight


Derm: no unusual rashes/lesions no unusual ecchymoses, warm, dry


Head: atraumatic, normocephalic, symmetric


Eyes: EOMI, no lid lag, anicteric sclera, pupils equal round reactive to light


ENT: Nose and ears atraumatic, no thrush,  no pharyngeal erythema


Neck: No thyromegaly, no cervical lymphadenopathy, trachea midline, supple


Mouth: no lip lesion, mucus membranes moist


Cardiovascular: Irregularly irregular, no murmur, positive posterior tibial 

pulse bilateral, 1+ bilateral lower attributed edema, capillary refill less than

2 seconds


Lungs: Trace bilateral rails, no rhonchi, no accessory muscle use


Abdominal: soft,  nontender to palpation, no guarding, no appreciable 

organomegaly, normal bowel sounds


Ext: no gross muscle atrophy,  muscle strength 4 out of 5 in all 4 extremities 

grossly, no contractures, 


Neuro:  CN II-XI grossly intact, light touch intact all 4 extremities, finger to

nose within normal limits,


Psych: Alert, oriented, appropriate affect 





Results


CBC & Chem 7: 


                                 19 16:34





                                 19 16:34


Labs: 


                  Abnormal Lab Results - Last 24 Hours (Table)











  19 Range/Units





  16:34 16:34 22:28 


 


RDW  15.9 H    (11.5-15.5)  %


 


BUN   30 H   (7-17)  mg/dL


 


Creatinine   1.24 H   (0.52-1.04)  mg/dL


 


Glucose   121 H   (74-99)  mg/dL


 


POC Glucose (mg/dL)    143 H  (75-99)  mg/dL


 


Magnesium   2.5 H   (1.6-2.3)  mg/dL


 


Total Bilirubin   1.9 H   (0.2-1.3)  mg/dL


 


AST   47 H   (14-36)  U/L


 


Alkaline Phosphatase   338 H   ()  U/L














Thrombosis Risk Factor Assmnt





- Choose All That Apply


Any of the Below Risk Factors Present?: Yes


Each Factor Represents 1 point: Heart failure (<1month), Hx of IBD, Obesity (BMI

>25), Swollen legs (current)


Other Risk Factors: Yes


Each Risk Factor Represents 3 Points: Age 75 years or older, History of DVT/PE


Other congenital or acquired thrombophilia - If yes, enter type in comment: No


Thrombosis Risk Factor Assessment Total Risk Factor Score: 10


Thrombosis Risk Factor Assessment Level: High Risk





Assessment and Plan


Plan: 





Acute diastolic CHF exacerbation


-Lasix IVP every 12 hourly


-Fluid restriction


-Cardiac monitoring


-Cardiology consulted


-Daily weights


-I's and O's


-Supplemental oxygen





Troponin elevation


-Likely secondary to demand from acute CHF exacerbation


-Trend for now





Abnormal LFTs


-Monitor for now





Chronic conditions: Coronary artery disease, DM, aortic valve replacement, 

atrial ablation, COPD, hypertension, hyperlipidemia


-Continue with home meds





DVT prophylaxis


-Heparin





The patient is admitted with an anticipated less than 2 midnight stay for 

evaluation of acute diastolic CHF.


CODE STATUS: No code


Discussed with: Patient


Anticipated discharge date: 19


Anticipated discharge place: Home


A total of 35 minutes was spent on the care of this complex patient more than 

50% of the time was spent in counseling and care coordination.

## 2019-08-19 LAB
ANION GAP SERPL CALC-SCNC: 7 MMOL/L
BUN SERPL-SCNC: 36 MG/DL (ref 7–17)
CALCIUM SPEC-MCNC: 9.1 MG/DL (ref 8.4–10.2)
CHLORIDE SERPL-SCNC: 107 MMOL/L (ref 98–107)
CO2 SERPL-SCNC: 27 MMOL/L (ref 22–30)
GLUCOSE BLD-MCNC: 114 MG/DL (ref 75–99)
GLUCOSE BLD-MCNC: 153 MG/DL (ref 75–99)
GLUCOSE BLD-MCNC: 168 MG/DL (ref 75–99)
GLUCOSE BLD-MCNC: 202 MG/DL (ref 75–99)
GLUCOSE SERPL-MCNC: 166 MG/DL (ref 74–99)
MAGNESIUM SPEC-SCNC: 2.4 MG/DL (ref 1.6–2.3)
POTASSIUM SERPL-SCNC: 4.5 MMOL/L (ref 3.5–5.1)
SODIUM SERPL-SCNC: 141 MMOL/L (ref 137–145)

## 2019-08-19 RX ADMIN — Medication SCH MG: at 08:05

## 2019-08-19 RX ADMIN — POTASSIUM CHLORIDE SCH MEQ: 750 TABLET, EXTENDED RELEASE ORAL at 08:05

## 2019-08-19 RX ADMIN — INSULIN ASPART SCH UNIT: 100 INJECTION, SOLUTION INTRAVENOUS; SUBCUTANEOUS at 08:07

## 2019-08-19 RX ADMIN — CARVEDILOL SCH MG: 3.12 TABLET, FILM COATED ORAL at 17:33

## 2019-08-19 RX ADMIN — Medication SCH MG: at 22:49

## 2019-08-19 RX ADMIN — FUROSEMIDE SCH MG: 10 INJECTION, SOLUTION INTRAMUSCULAR; INTRAVENOUS at 08:06

## 2019-08-19 RX ADMIN — HEPARIN SODIUM SCH: 5000 INJECTION, SOLUTION INTRAVENOUS; SUBCUTANEOUS at 07:44

## 2019-08-19 RX ADMIN — Medication SCH MG: at 09:22

## 2019-08-19 RX ADMIN — POTASSIUM CHLORIDE SCH MEQ: 750 TABLET, EXTENDED RELEASE ORAL at 21:05

## 2019-08-19 RX ADMIN — INSULIN DETEMIR SCH UNIT: 100 INJECTION, SOLUTION SUBCUTANEOUS at 09:22

## 2019-08-19 RX ADMIN — ALLOPURINOL SCH MG: 100 TABLET ORAL at 08:06

## 2019-08-19 RX ADMIN — INSULIN ASPART SCH UNIT: 100 INJECTION, SOLUTION INTRAVENOUS; SUBCUTANEOUS at 12:17

## 2019-08-19 RX ADMIN — SPIRONOLACTONE SCH MG: 25 TABLET, FILM COATED ORAL at 08:06

## 2019-08-19 RX ADMIN — INSULIN ASPART SCH UNIT: 100 INJECTION, SOLUTION INTRAVENOUS; SUBCUTANEOUS at 17:33

## 2019-08-19 RX ADMIN — Medication SCH MG: at 16:19

## 2019-08-19 RX ADMIN — HEPARIN SODIUM SCH: 5000 INJECTION, SOLUTION INTRAVENOUS; SUBCUTANEOUS at 12:14

## 2019-08-19 RX ADMIN — LEVOTHYROXINE SODIUM SCH MCG: 75 TABLET ORAL at 05:33

## 2019-08-19 RX ADMIN — CARVEDILOL SCH MG: 3.12 TABLET, FILM COATED ORAL at 08:06

## 2019-08-19 RX ADMIN — ALLOPURINOL SCH MG: 100 TABLET ORAL at 21:05

## 2019-08-19 NOTE — ECHOF
Referral Reason:sob



MEASUREMENTS

--------

HEIGHT: 162.6 cm

WEIGHT: 82.6 kg

BP: 140/61

IVSd:   1.8 cm     (0.6 - 1.1)

LVIDd:   3.0 cm     (3.9 - 5.3)

LVPWd:   2.3 cm     (0.6 - 1.1)

IVSs:   2.4 cm

LVIDs:   2.3 cm

LVPWs:   2.4 cm

LAESV Index (A-L):   63.10 ml/m

Ao Diam:   2.5 cm     (2.0 - 3.7)

AV Cusp:   0.8 cm     (1.5 - 2.6)

LA Diam:   5.2 cm     (2.7 - 3.8)

AV maxP.13 mmHg

AV meanP.39 mmHg

RAP:   5.00 mmHg

RVSP:   67.19 mmHg







FINDINGS

--------

Atrial fibrillation.

This was a technically adequate study.

The left ventricular size is normal.   There is severe concentric left ventricular hypertrophy.   Ove
rall left ventricular systolic function is low-normal with, an EF between 50 - 55 %.   Septal wall mo
tion is delayed and consistent with prior cardiac surgery.

The right ventricle is normal in size.

Left atrium is severely dilated by volume.

The right atrial size is normal.   Electronic pacemaker lead seen in the right atrial cavity.

Interatrial and interventricular septum intact.

There is no evidence of aortic regurgitation.   There is moderate-to-severe aortic stenosis present. 
  Peak/mean gradient across the valve is 51.13mmHg / 28.39mmHg.

Severe mitral annular calcification present.   Severe mitral regurgitation is present.   Mild mitral 
stenosis , with a MVA of 3.2cm (by PHT)

Moderate to severe tricuspid regurgitation present.   There is severe pulmonary hypertension.   The r
ight ventricular systolic pressure, as measured by Doppler, is 67.19mmHg.

Trace/mild (physiologic)  pulmonic regurgitation.

The aortic root size is normal.

The inferior vena cava was not well visualized.

There is no pericardial effusion.



CONCLUSIONS

--------

1. Atrial fibrillation.

2. This was a technically adequate study.

3. The left ventricular size is normal.

4. There is severe concentric left ventricular hypertrophy.

5. Overall left ventricular systolic function is low-normal with, an EF between 50 - 55 %.

6. Septal wall motion is delayed and consistent with prior cardiac surgery.

7. The right ventricle is normal in size.

8. Left atrium is severely dilated by volume.

9. The right atrial size is normal.

10. Electronic pacemaker lead seen in the right atrial cavity.

11. Interatrial and interventricular septum intact.

12. There is no evidence of aortic regurgitation.

13. There is moderate-to-severe aortic stenosis present.

14. Peak/mean gradient across the valve is 51.13mmHg / 28.39mmHg.

15. Severe mitral annular calcification present.

16. Severe mitral regurgitation is present.

17. Mild mitral stenosis.

18. , with a MVA of 3.2cm (by PHT)

19. Moderate to severe tricuspid regurgitation present.

20. There is severe pulmonary hypertension.

21. The right ventricular systolic pressure, as measured by Doppler, is 67.19mmHg.

22. Trace/mild (physiologic)  pulmonic regurgitation.

23. The aortic root size is normal.

24. The inferior vena cava was not well visualized.

25. There is no pericardial effusion.





SONOGRAPHER: Susana Mcmahon RDCS

## 2019-08-19 NOTE — P.PN
Subjective


Progress Note Date: 08/19/19


Principal diagnosis: 





CHF exacerbation





Patient was seen and examined.  No acute events overnight.  Patient reports 

improvement in her breathing.  States that she is 60% back to normal.  She 

denies any chest pain or palpitations.  No nausea or vomiting.  No fever or 

chills.





Objective





- Vital Signs


Vital signs: 


                                   Vital Signs











Temp  98.3 F   08/19/19 11:09


 


Pulse  74   08/19/19 11:09


 


Resp  18   08/19/19 11:09


 


BP  166/64   08/19/19 11:09


 


Pulse Ox  95   08/19/19 11:09








                                 Intake & Output











 08/18/19 08/19/19 08/19/19





 18:59 06:59 18:59


 


Intake Total  150 591


 


Output Total 


 


Balance -602 -650 -609


 


Weight 82.3 kg 82.6 kg 


 


Intake:   


 


  Oral  150 591


 


Output:   


 


  Urine 


 


Other:   


 


  Voiding Method Toilet Toilet Toilet





 Bedside Commode  


 


  # Voids 1 0 


 


  # Bowel Movements 1  














- Exam





General: [non toxic], [no distress], [appears at stated age]


Derm: [warm], [dry]


Head: [atraumatic], [normocephalic], [symmetric]


Eyes: [EOMI], [no lid lag], [anicteric sclera]


Mouth: [no lip lesion], [mucus membranes moist]


Cardiovascular: [S1S2 reg], [no murmur], [positive DP pulse bilateral], 


Lungs: [Decreased breath sounds bilateral], [no rhonchi, no rales] , [no 

accessory muscle use]


Abdominal: [soft], [ nontender to palpation], [no guarding], [no appreciable 

organomegaly]


Ext: [no gross muscle atrophy], [lower extremity edema], [no contractures]


Neuro:  [no focal neuro deficits]


Psych: [Alert], [oriented], [appropriate affect] 





- Labs


CBC & Chem 7: 


                                 08/17/19 16:34





                                 08/19/19 05:28


Labs: 


                  Abnormal Lab Results - Last 24 Hours (Table)











  08/18/19 08/18/19 08/19/19 Range/Units





  17:05 20:22 05:28 


 


BUN    36 H  (7-17)  mg/dL


 


Creatinine    1.45 H  (0.52-1.04)  mg/dL


 


Glucose    166 H  (74-99)  mg/dL


 


POC Glucose (mg/dL)  137 H  163 H   (75-99)  mg/dL


 


Magnesium    2.4 H  (1.6-2.3)  mg/dL














  08/19/19 08/19/19 Range/Units





  06:36 11:35 


 


BUN    (7-17)  mg/dL


 


Creatinine    (0.52-1.04)  mg/dL


 


Glucose    (74-99)  mg/dL


 


POC Glucose (mg/dL)  168 H  153 H  (75-99)  mg/dL


 


Magnesium    (1.6-2.3)  mg/dL














Assessment and Plan


Assessment: 





Acute on chronic diastolic CHF exacerbation


Diabetes mellitus


Chronic kidney disease


Persistent atrial fibrillation with history of sick sinus syndrome


Hypothyroidism


Hypertension





Echocardiogram shows severe concentric LVH with EF 50-55%.  Plans: 1 more day of

Lasix 40 mg IV daily.  Continue beta blocker.  Continue spironolactone.  Strict 

intake and noted.  Daily weights.  Follow cardiology consultation.


Point-of-care glucose 153.  Plans: Insulin sliding scale.  Levemir 15 units, 

NovoLog 5 units 3 times a day with meals.  Regular Accu-Cheks.  Hypoglycemic 

precautions.


Creatinine 1.45.  Plans: Continue potassium chloride.  Continue sodium 

bicarbonate tablets.


Plans: Continue beta blocker.


Plans: Continue Synthroid.


/64.  Plans: Continue beta blocker.  Monitor vitals, adjust medications as

necessary.





Continue diuresis for 1 more day.  Cardiology on board.  Likely DC tomorrow.

## 2019-08-19 NOTE — P.PN
Subjective





This is a pleasant 88-year-old  female past medical history significant

for coronary artery disease status post bypass grafting, diastolic heart 

failure, sick sinus syndrome status post permanent pacemaker implantation, 

status post aortic valve replacement, persistent atrial fibrillation not on long

term anticoagulation secondary to GI bleeding, COPD, hypertension and 

dyslipidemia.  We are following her secondary to shortness of breath and acute 

exacerbation of chronic diastolic heart failure. She is currently maintained on 

IV lasix 40 mg daily along with aldactone 25 mg daily. She is seen and examined 

sitting up in bed in no acute distress. She states she feels mildly better but 

not back to baseline. She continues to feel short of breath at rest. Blood 

pressure 125/76 heart rate 75 afebrile and maintaining oxygen saturation on 

nasal cannula. Laboratory data reviewed, sodium 141, potassium 4.5, creatinine 

1.45, magnesium 2.4. She is maintaining a negative fluid balance. 





GENERAL: Well-appearing, well-nourished and in no acute distress.


NECK: Supple without JVD or thyromegaly.


LUNGS: Bibasila rales, no wheezes or rhonchi. Respiration equal and unlabored.  


HEART: Irregular rate and rhythm with systolic ejection murmur at the base, no 

rubs or gallops. S1 and S2 heard.


EXTREMITIES: Normal range of motion, no edema.  No clubbing or cyanosis. 

Peripheral pulses intact.





ASSESSMENT


Acute on chronic diastolic heart failure, BNP 5800 on admission


Coronary artery disease s/p bypass grafting


Persistent atrial fibrillation not on long term anticoagulation secondary to GI 

bleeding in the past per the patient. 


Sick sinus syndrome s/p permanent pacemaker implantation


COPD


Hypertension


Dyslipidemia


Acute kidney injury


Hypermagnesemia





PLAN


Echo has been ordered and will be reviewed. 


Continue IV diuresis for another 24 hours. 


Follow renal function and electrolytes in the morning. 


Further recommendations to follow.





Nurse Practitioner note has been reviewed, I agree with a documented findings 

and plan of care.  Patient was seen and examined.








Objective





- Vital Signs


Vital signs: 


                                   Vital Signs











Temp  97.5 F L  08/19/19 07:00


 


Pulse  75   08/19/19 07:00


 


Resp  18   08/19/19 07:00


 


BP  125/76   08/19/19 07:00


 


Pulse Ox  95   08/19/19 07:00








                                 Intake & Output











 08/18/19 08/19/19 08/19/19





 18:59 06:59 18:59


 


Intake Total  150 118


 


Output Total 602 800 800


 


Balance -603 -650 -682


 


Weight 82.3 kg 82.6 kg 


 


Intake:   


 


  Oral  150 118


 


Output:   


 


  Urine 602 800 800


 


Other:   


 


  Voiding Method Toilet Toilet Toilet





 Bedside Commode  


 


  # Voids 1 0 


 


  # Bowel Movements 1  














- Labs


CBC & Chem 7: 


                                 08/17/19 16:34





                                 08/19/19 05:28


Labs: 


                  Abnormal Lab Results - Last 24 Hours (Table)











  08/18/19 08/18/19 08/19/19 Range/Units





  17:05 20:22 05:28 


 


BUN    36 H  (7-17)  mg/dL


 


Creatinine    1.45 H  (0.52-1.04)  mg/dL


 


Glucose    166 H  (74-99)  mg/dL


 


POC Glucose (mg/dL)  137 H  163 H   (75-99)  mg/dL


 


Magnesium    2.4 H  (1.6-2.3)  mg/dL














  08/19/19 Range/Units





  06:36 


 


BUN   (7-17)  mg/dL


 


Creatinine   (0.52-1.04)  mg/dL


 


Glucose   (74-99)  mg/dL


 


POC Glucose (mg/dL)  168 H  (75-99)  mg/dL


 


Magnesium   (1.6-2.3)  mg/dL

## 2019-08-20 LAB
ANION GAP SERPL CALC-SCNC: 9 MMOL/L
BUN SERPL-SCNC: 38 MG/DL (ref 7–17)
CALCIUM SPEC-MCNC: 9.1 MG/DL (ref 8.4–10.2)
CHLORIDE SERPL-SCNC: 106 MMOL/L (ref 98–107)
CO2 SERPL-SCNC: 24 MMOL/L (ref 22–30)
GLUCOSE BLD-MCNC: 119 MG/DL (ref 75–99)
GLUCOSE BLD-MCNC: 134 MG/DL (ref 75–99)
GLUCOSE BLD-MCNC: 139 MG/DL (ref 75–99)
GLUCOSE BLD-MCNC: 193 MG/DL (ref 75–99)
GLUCOSE SERPL-MCNC: 135 MG/DL (ref 74–99)
MAGNESIUM SPEC-SCNC: 2.4 MG/DL (ref 1.6–2.3)
POTASSIUM SERPL-SCNC: 4.4 MMOL/L (ref 3.5–5.1)
SODIUM SERPL-SCNC: 139 MMOL/L (ref 137–145)

## 2019-08-20 RX ADMIN — INSULIN ASPART SCH: 100 INJECTION, SOLUTION INTRAVENOUS; SUBCUTANEOUS at 13:36

## 2019-08-20 RX ADMIN — INSULIN ASPART SCH UNIT: 100 INJECTION, SOLUTION INTRAVENOUS; SUBCUTANEOUS at 07:29

## 2019-08-20 RX ADMIN — CARVEDILOL SCH MG: 3.12 TABLET, FILM COATED ORAL at 17:28

## 2019-08-20 RX ADMIN — LEVOTHYROXINE SODIUM SCH MCG: 75 TABLET ORAL at 06:04

## 2019-08-20 RX ADMIN — HEPARIN SODIUM SCH: 5000 INJECTION, SOLUTION INTRAVENOUS; SUBCUTANEOUS at 21:09

## 2019-08-20 RX ADMIN — INSULIN ASPART SCH UNIT: 100 INJECTION, SOLUTION INTRAVENOUS; SUBCUTANEOUS at 13:35

## 2019-08-20 RX ADMIN — Medication SCH MG: at 07:31

## 2019-08-20 RX ADMIN — CARVEDILOL SCH MG: 3.12 TABLET, FILM COATED ORAL at 07:32

## 2019-08-20 RX ADMIN — Medication SCH MG: at 07:32

## 2019-08-20 RX ADMIN — HEPARIN SODIUM SCH: 5000 INJECTION, SOLUTION INTRAVENOUS; SUBCUTANEOUS at 07:21

## 2019-08-20 RX ADMIN — ALLOPURINOL SCH MG: 100 TABLET ORAL at 07:32

## 2019-08-20 RX ADMIN — FUROSEMIDE SCH MG: 10 INJECTION, SOLUTION INTRAMUSCULAR; INTRAVENOUS at 07:31

## 2019-08-20 RX ADMIN — HEPARIN SODIUM SCH UNIT: 5000 INJECTION, SOLUTION INTRAVENOUS; SUBCUTANEOUS at 15:31

## 2019-08-20 RX ADMIN — HEPARIN SODIUM SCH: 5000 INJECTION, SOLUTION INTRAVENOUS; SUBCUTANEOUS at 01:08

## 2019-08-20 RX ADMIN — Medication SCH MG: at 15:31

## 2019-08-20 RX ADMIN — INSULIN ASPART SCH UNIT: 100 INJECTION, SOLUTION INTRAVENOUS; SUBCUTANEOUS at 17:28

## 2019-08-20 RX ADMIN — INSULIN ASPART SCH: 100 INJECTION, SOLUTION INTRAVENOUS; SUBCUTANEOUS at 17:29

## 2019-08-20 RX ADMIN — ALLOPURINOL SCH MG: 100 TABLET ORAL at 21:07

## 2019-08-20 RX ADMIN — INSULIN DETEMIR SCH UNIT: 100 INJECTION, SOLUTION SUBCUTANEOUS at 07:29

## 2019-08-20 RX ADMIN — Medication SCH MG: at 21:07

## 2019-08-20 RX ADMIN — INSULIN ASPART SCH UNIT: 100 INJECTION, SOLUTION INTRAVENOUS; SUBCUTANEOUS at 07:30

## 2019-08-20 RX ADMIN — SPIRONOLACTONE SCH MG: 25 TABLET, FILM COATED ORAL at 07:31

## 2019-08-20 RX ADMIN — POTASSIUM CHLORIDE SCH MEQ: 750 TABLET, EXTENDED RELEASE ORAL at 21:07

## 2019-08-20 RX ADMIN — POTASSIUM CHLORIDE SCH MEQ: 750 TABLET, EXTENDED RELEASE ORAL at 07:31

## 2019-08-20 NOTE — P.DS
Providers


Date of admission: 


08/19/19 15:10





Expected date of discharge: 08/20/19


Attending physician: 


Jones Swan MD





Consults: 





                                        





08/17/19 18:23


Consult Physician Routine 


   Consulting Provider: Frank Joe


   Consult Reason/Comments: chf


   Do you want consulting provider notified?: Yes











Primary care physician: 


[facility]: 





88-year-old  female with past medical history of coronary artery 

disease status post CABG, diastolic congestive heart failure, pacemaker 

secondary to sick sinus syndrome, atrial fibrillation, and prior aortic valve 

replacement who presented to the emergency department with complaints of 

shortness of breath. 





On arrival to the ER she underwent an extensive evaluation.  Her initial vital 

signs were within normal limits.  Initial laboratory analysis demonstrated an 

elevated BNP at 5800 as well as an elevated creatinine at 1.246 consistent with 

her baseline.  Chest x-ray showed small pleural effusions.  Troponin was negativ

e.  There is concern for congestive heart failure and she was started on IV 

Lasix.  She was placed in observation for further monitoring.  





On the morning of 8/18 she had had significant improvement in her breathing 

which was not yet back to baseline.  Cardiology had been consulted.  The plan 

was to maintain IV Lasix and repeat echocardiogram in the morning to assess for 

structure and function.





Echocardiogram was done which showed severe concentric LVH with EF showing 50-

55%.





Patient showed considerable improvement throughout her hospitalization.





Patient was seen and examined. No acute events overnight. Patient reports slight

improvement in her breathing from yesterday. Patient states that she continues 

to feel short of breath with ambulation to the washroom. She denies any chest 

pain or palpitations. No nausea or vomiting. No fever or chills.





General: [non toxic], [no distress], [appears at stated age]


Derm: [warm], [dry]


Head: [atraumatic], [normocephalic], [symmetric]


Eyes: [EOMI], [no lid lag], [anicteric sclera]


Mouth: [no lip lesion], [mucus membranes moist]


Cardiovascular: [S1S2 reg], [no murmur], [positive DP pulse bilateral], 


Lungs: [Decreased breath sounds bilateral], [no rhonchi, no rales] , [no 

accessory muscle use]


Abdominal: [soft], [ nontender to palpation], [no guarding], [no appreciable 

organomegaly]


Ext: [no gross muscle atrophy], [lower extremity edema], [no contractures]


Neuro:  [no focal neuro deficits]


Psych: [Alert], [oriented], [appropriate affect] 





Acute on chronic diastolic CHF exacerbation


Diabetes mellitus


Chronic kidney disease


Persistent atrial fibrillation with history of sick sinus syndrome


Hypothyroidism


Hypertension





Echocardiogram shows severe concentric LVH with EF 50-55%.  Plans: Possible 

transition to PO Lasix from IV.  Continue beta blocker.  Continue 

spironolactone.  Strict intake and noted.  Daily weights.  Follow cardiology 

consultation. 


Point-of-care glucose 153.  Plans: Insulin sliding scale.  Levemir 15 units, 

NovoLog 5 units 3 times a day with meals.  Regular Accu-Cheks.  Hypoglycemic 

precautions.


Creatinine 1.17.  Plans: Continue potassium chloride.  Continue sodium 

bicarbonate tablets.


Plans: Continue beta blocker.


Plans: Continue Synthroid.


/61.  Plans: Continue beta blocker.  Monitor vitals, adjust medications as

necessary.





[DC planning pending cardiology recommendation. Follow 6 minute walk test. She 

will need adequate follow up with Cardiology and PCP post discharge.]


Pertinent Studies: 





Chest x-ray, echo


Patient Condition at Discharge: Stable





Plan - Discharge Summary


Discharge Rx Participant: No


New Discharge Prescriptions: 


Continue


   Carvedilol [Coreg] 3.125 mg PO BID


   Allopurinol 100 mg PO BID


   Levothyroxine Sodium [Synthroid] 75 mcg PO DAILY


   Insulin Lispro [humaLOG Kwikpen] 5 unit SQ AC-TID PRN


     PRN Reason: Blood Sugar - High


   Sodium Bicarbonate Tab 650 mg PO TID  tab


   Vit C/E/Zn/Coppr/Lutein/Zeaxan [Preservision Areds 2 Softgel] 1 cap PO BID


   Spironolactone [Aldactone] 25 mg PO DAILY


   Potassium Chloride ER [K-Dur 10] 10 meq PO BID


   Ferrous Sulfate [Iron (65 MG Elemental)] 325 mg PO DAILY


   Insulin Detemir (Levemir) [Levemir] 20 unit SQ AC-BRKFST


   Furosemide [Lasix] 80 mg PO BID


Discharge Medication List





Carvedilol [Coreg] 3.125 mg PO BID 06/21/14 [History]


Allopurinol 100 mg PO BID 06/03/15 [History]


Levothyroxine Sodium [Synthroid] 75 mcg PO DAILY 01/30/18 [History]


Insulin Lispro [humaLOG Kwikpen] 5 unit SQ AC-TID PRN 03/27/18 [History]


Sodium Bicarbonate Tab 650 mg PO TID  tab 03/29/18 [Rx]


Ferrous Sulfate [Iron (65 MG Elemental)] 325 mg PO DAILY 08/17/19 [History]


Furosemide [Lasix] 80 mg PO BID 08/17/19 [History]


Insulin Detemir (Levemir) [Levemir] 20 unit SQ AC-BRKFST 08/17/19 [History]


Potassium Chloride ER [K-Dur 10] 10 meq PO BID 08/17/19 [History]


Spironolactone [Aldactone] 25 mg PO DAILY 08/17/19 [History]


Vit C/E/Zn/Coppr/Lutein/Zeaxan [Preservision Areds 2 Softgel] 1 cap PO BID 

08/17/19 [History]








Follow up Appointment(s)/Referral(s): 


Frank Joe MD [STAFF PHYSICIAN] - 2 Weeks


Reji Ochoa MD [Primary Care Provider] - 1-2 days


Activity/Diet/Wound Care/Special Instructions: 


Diet: Water restricted Cardiac diet. Low salt diet.


Follow-up PCP within 1-2 days of discharge.


Follow up Cardiology within 1 week of discharge.


Please take all medication as advised.


Discharge Disposition: HOME SELF-CARE

## 2019-08-20 NOTE — P.PN
Subjective





This is a pleasant 88-year-old  female past medical history significant

for coronary artery disease status post bypass grafting, diastolic heart 

failure, sick sinus syndrome status post permanent pacemaker implantation, 

status post aortic valve replacement, persistent atrial fibrillation not on long

term anticoagulation secondary to GI bleeding, COPD, hypertension and 

dyslipidemia.  We are following her secondary to shortness of breath and acute 

exacerbation of chronic diastolic heart failure. She is currently maintained on 

IV lasix 40 mg daily along with aldactone 25 mg daily. She is seen and examined 

sitting up in bed in no acute distress. She states she feels mildly better but 

not back to baseline. She continues to feel short of breath at rest. Blood 

pressure 125/76 heart rate 75 afebrile and maintaining oxygen saturation on 

nasal cannula. Laboratory data reviewed, sodium 141, potassium 4.5, creatinine 

1.45, magnesium 2.4. She is maintaining a negative fluid balance. 





8/20/2019


Pt is seen and examined laying flat in bed. She states at rest she feels no 

shortness of breath, however with any mild exertion she feels short of breath. 

She denies chest pain, dizziness or palpitations. Echocardiogram reveals 

preserved LV systolic function with ejection fraction 50-55%, septal wall motion

the Lasix secondary to cardiac surgery, severe LVH, moderate to severe aortic 

stenosis with a mean gradient of 28 mmHg, severe mitral regurgitation, mild 

mitral stenosis with a mitral valve area 3.2 cm, severe pulmonary hypertension 

with an RVSP of 67 mmHg and severe tricuspid regurgitation.  Laboratory data 

reviewed, sodium 139, potassium 4.4, creatinine 1.17 and magnesium 2.4.  Blood 

pressure 108/61 heart rate 66 afebrile maintaining oxygen saturation on nasal 

cannula.  Maintaining a negative fluid balance.





GENERAL: Well-appearing, well-nourished and in no acute distress.


NECK: Supple without JVD or thyromegaly.


LUNGS: No rales, wheezes or rhonchi. Respiration equal and unlabored. 


HEART: Irregular rate and rhythm with systolic ejection murmur at the base, no 

rubs or gallops. S1 and S2 heard.


EXTREMITIES: Normal range of motion, no edema.  No clubbing or cyanosis. 

Peripheral pulses intact.





ASSESSMENT


Acute on chronic diastolic heart failure, BNP 5800 on admission


Pulmonary hypertension


Valvular heart disease, aortic stenosis, mitral regurgitations and tricuspid 

regurgitation


Coronary artery disease s/p bypass grafting


Persistent atrial fibrillation not on long term anticoagulation secondary to GI 

bleeding in the past per the patient. 


Sick sinus syndrome s/p permanent pacemaker implantation


COPD


Hypertension


Dyslipidemia


Acute kidney injury


Hypermagnesemia





PLAN


Recommend evaluation by physical therapy. She verbalizes concerns about 

completing her ADL's at home independently. 


Increase activity and ambulation. Check room air saturation after activity.





Nurse Practitioner note has been reviewed, I agree with a documented findings 

and plan of care.  Patient was seen and examined.








Objective





- Vital Signs


Vital signs: 


                                   Vital Signs











Temp  98.1 F   08/20/19 05:00


 


Pulse  66   08/20/19 05:00


 


Resp  18   08/20/19 05:00


 


BP  108/61   08/20/19 05:00


 


Pulse Ox  94 L  08/20/19 11:19








                                 Intake & Output











 08/19/19 08/20/19 08/20/19





 18:59 06:59 18:59


 


Intake Total 591 900 240


 


Output Total 1200 300 350


 


Balance -609 600 -110


 


Weight  82 kg 


 


Intake:   


 


  Oral 591 900 240


 


Output:   


 


  Urine 1200 300 350


 


Other:   


 


  Voiding Method Toilet Toilet 


 


  # Voids  1 














- Labs


CBC & Chem 7: 


                                 08/17/19 16:34





                                 08/20/19 06:48


Labs: 


                  Abnormal Lab Results - Last 24 Hours (Table)











  08/19/19 08/19/19 08/19/19 Range/Units





  11:35 17:20 19:48 


 


BUN     (7-17)  mg/dL


 


Creatinine     (0.52-1.04)  mg/dL


 


Glucose     (74-99)  mg/dL


 


POC Glucose (mg/dL)  153 H  114 H  202 H  (75-99)  mg/dL


 


Magnesium     (1.6-2.3)  mg/dL














  08/20/19 08/20/19 08/20/19 Range/Units





  06:48 07:06 11:04 


 


BUN  38 H    (7-17)  mg/dL


 


Creatinine  1.17 H    (0.52-1.04)  mg/dL


 


Glucose  135 H    (74-99)  mg/dL


 


POC Glucose (mg/dL)   134 H  193 H  (75-99)  mg/dL


 


Magnesium  2.4 H    (1.6-2.3)  mg/dL

## 2019-08-21 VITALS — RESPIRATION RATE: 17 BRPM

## 2019-08-21 VITALS — SYSTOLIC BLOOD PRESSURE: 129 MMHG | DIASTOLIC BLOOD PRESSURE: 70 MMHG | TEMPERATURE: 97.1 F | HEART RATE: 83 BPM

## 2019-08-21 LAB
ANION GAP SERPL CALC-SCNC: 8 MMOL/L
BUN SERPL-SCNC: 36 MG/DL (ref 7–17)
CALCIUM SPEC-MCNC: 9.7 MG/DL (ref 8.4–10.2)
CHLORIDE SERPL-SCNC: 105 MMOL/L (ref 98–107)
CO2 SERPL-SCNC: 28 MMOL/L (ref 22–30)
GLUCOSE BLD-MCNC: 132 MG/DL (ref 75–99)
GLUCOSE BLD-MCNC: 177 MG/DL (ref 75–99)
GLUCOSE SERPL-MCNC: 154 MG/DL (ref 74–99)
POTASSIUM SERPL-SCNC: 4.7 MMOL/L (ref 3.5–5.1)
SODIUM SERPL-SCNC: 141 MMOL/L (ref 137–145)

## 2019-08-21 RX ADMIN — HEPARIN SODIUM SCH: 5000 INJECTION, SOLUTION INTRAVENOUS; SUBCUTANEOUS at 16:12

## 2019-08-21 RX ADMIN — INSULIN ASPART SCH UNIT: 100 INJECTION, SOLUTION INTRAVENOUS; SUBCUTANEOUS at 07:52

## 2019-08-21 RX ADMIN — Medication SCH MG: at 07:50

## 2019-08-21 RX ADMIN — CARVEDILOL SCH MG: 3.12 TABLET, FILM COATED ORAL at 07:51

## 2019-08-21 RX ADMIN — Medication SCH MG: at 07:51

## 2019-08-21 RX ADMIN — LEVOTHYROXINE SODIUM SCH MCG: 75 TABLET ORAL at 06:16

## 2019-08-21 RX ADMIN — INSULIN DETEMIR SCH UNIT: 100 INJECTION, SOLUTION SUBCUTANEOUS at 07:51

## 2019-08-21 RX ADMIN — INSULIN ASPART SCH: 100 INJECTION, SOLUTION INTRAVENOUS; SUBCUTANEOUS at 07:52

## 2019-08-21 RX ADMIN — ALLOPURINOL SCH MG: 100 TABLET ORAL at 07:51

## 2019-08-21 RX ADMIN — HEPARIN SODIUM SCH: 5000 INJECTION, SOLUTION INTRAVENOUS; SUBCUTANEOUS at 07:52

## 2019-08-21 RX ADMIN — FUROSEMIDE SCH MG: 10 INJECTION, SOLUTION INTRAMUSCULAR; INTRAVENOUS at 07:51

## 2019-08-21 RX ADMIN — INSULIN ASPART SCH UNIT: 100 INJECTION, SOLUTION INTRAVENOUS; SUBCUTANEOUS at 12:55

## 2019-08-21 RX ADMIN — SPIRONOLACTONE SCH MG: 25 TABLET, FILM COATED ORAL at 07:51

## 2019-08-21 RX ADMIN — POTASSIUM CHLORIDE SCH MEQ: 750 TABLET, EXTENDED RELEASE ORAL at 07:51

## 2019-08-21 NOTE — P.PN
Subjective


Progress Note Date: 08/21/19


Principal diagnosis: 





CHF exacerbation





Patient was seen and examined. No acute events overnight. She reports 

improvement in her breathing since yesterday. States she gets thirsty, 

requesting more water. Has lost 1 kg in the past in the past 2 days.





Objective





- Vital Signs


Vital signs: 


                                   Vital Signs











Temp  97.9 F   08/21/19 05:00


 


Pulse  75   08/21/19 05:00


 


Resp  18   08/21/19 05:00


 


BP  133/71   08/21/19 05:00


 


Pulse Ox  93 L  08/21/19 05:00








                                 Intake & Output











 08/20/19 08/21/19 08/21/19





 18:59 06:59 18:59


 


Intake Total 860 240 


 


Output Total 350 1000 


 


Balance 510 -760 


 


Weight  81.5 kg 


 


Intake:   


 


  Oral 860 240 


 


Output:   


 


  Urine 350 1000 


 


Other:   


 


  Voiding Method  Toilet 


 


  # Voids 3 1 














- Exam





General: [non toxic], [no distress], [appears at stated age]


Derm: [warm], [dry]


Head: [atraumatic], [normocephalic], [symmetric]


Eyes: [EOMI], [no lid lag], [anicteric sclera]


Mouth: [no lip lesion], [mucus membranes moist]


Cardiovascular: [S1S2 reg], [no murmur], [positive DP pulse bilateral], 


Lungs: [Decreased breath sounds bilateral], [no rhonchi, no rales] , [no 

accessory muscle use]


Abdominal: [soft], [ nontender to palpation], [no guarding], [no appreciable 

organomegaly]


Ext: [no gross muscle atrophy], [lower extremity edema which has improved from 

admission], [no contractures]


Neuro:  [no focal neuro deficits]


Psych: [Alert], [oriented], [appropriate affect] 





- Labs


CBC & Chem 7: 


                                 08/17/19 16:34





                                 08/21/19 08:22


Labs: 


                  Abnormal Lab Results - Last 24 Hours (Table)











  08/20/19 08/20/19 08/20/19 Range/Units





  11:04 17:18 20:59 


 


POC Glucose (mg/dL)  193 H  139 H  119 H  (75-99)  mg/dL














  08/21/19 Range/Units





  07:04 


 


POC Glucose (mg/dL)  132 H  (75-99)  mg/dL














Assessment and Plan


Assessment: 





Acute on chronic diastolic CHF exacerbation


Diabetes mellitus


Chronic kidney disease


Persistent atrial fibrillation with history of sick sinus syndrome


Hypothyroidism


Hypertension





Echocardiogram shows severe concentric LVH with EF 50-55%.  Plans: Transition to

home dose of Lasix today.  Continue beta blocker.  Continue spironolactone.  

Strict intake and noted.  Daily weights.  Follow cardiology consultation. Passed

6 minute walk test, no need for home O2.


Point-of-care glucose 132.  Plans: Insulin sliding scale.  Levemir 15 units, 

NovoLog 5 units 3 times a day with meals.  Regular Accu-Cheks.  Hypoglycemic 

precautions.


Creatinine 1.17.  Plans: Continue potassium chloride.  Continue sodium 

bicarbonate tablets. Avoid nephrotoxins.


Plans: Continue beta blocker.


Plans: Continue Synthroid.


/71.  Plans: Continue beta blocker.  Monitor vitals, adjust medications as

necessary.





[She will need adequate follow up with Cardiology and PCP post discharge.]

## 2019-08-21 NOTE — P.PN
Subjective





This is a pleasant 88-year-old  female past medical history significant

for coronary artery disease status post bypass grafting, diastolic heart 

failure, sick sinus syndrome status post permanent pacemaker implantation, 

status post aortic valve replacement, persistent atrial fibrillation not on long

term anticoagulation secondary to GI bleeding, COPD, hypertension and 

dyslipidemia.  We are following her secondary to shortness of breath and acute 

exacerbation of chronic diastolic heart failure. She is currently maintained on 

IV lasix 40 mg daily along with aldactone 25 mg daily. She is seen and examined 

sitting up in bed in no acute distress. She states she feels mildly better but 

not back to baseline. She continues to feel short of breath at rest. Blood 

pressure 125/76 heart rate 75 afebrile and maintaining oxygen saturation on 

nasal cannula. Laboratory data reviewed, sodium 141, potassium 4.5, creatinine 

1.45, magnesium 2.4. She is maintaining a negative fluid balance. 





8/20/2019


Pt is seen and examined laying flat in bed. She states at rest she feels no 

shortness of breath, however with any mild exertion she feels short of breath. 

She denies chest pain, dizziness or palpitations. Echocardiogram reveals 

preserved LV systolic function with ejection fraction 50-55%, septal wall motion

the Lasix secondary to cardiac surgery, severe LVH, moderate to severe aortic 

stenosis with a mean gradient of 28 mmHg, severe mitral regurgitation, mild 

mitral stenosis with a mitral valve area 3.2 cm, severe pulmonary hypertension 

with an RVSP of 67 mmHg and severe tricuspid regurgitation.  Laboratory data 

reviewed, sodium 139, potassium 4.4, creatinine 1.17 and magnesium 2.4.  Blood 

pressure 108/61 heart rate 66 afebrile maintaining oxygen saturation on nasal 

cannula.  Maintaining a negative fluid balance.





8/21/2019 


Patient seen and examined sitting up eating breakfast. She states she is feeling

much better than when she came into the hospital with some ongoing shortness of 

breath first thing in the morning. She is requesting oxygen at home. She denies 

chest pain, dizziness or palpitations. She was up and ambulating on the unit. 

She does not meet requirements for home oxygen per  as she maintains

oxygen saturation with activity on room air. Blood pressure 133/71 heart rate 75

afebrile and maintaining oxygen saturations on room air. Laboratory data pending

for today. Currently maintained on lasix 40 mg IV daily. She did receive a dose 

already today. Lengthy discussion had regarding her medications and how she 

takes them at home. She states she is only taking lasix 80 mg once/day due to 

increased urination. She does not take the PM dose at all. She also discusses 

her diet over the last few weeks consisting of mostly fast food, hot dogs and 

hamburgers.





GENERAL: Well-appearing, well-nourished and in no acute distress.


NECK: Supple without JVD or thyromegaly.


LUNGS: No rales, wheezes or rhonchi. Respiration equal and unlabored. 


HEART: Irregular rate and rhythm with systolic ejection murmur at the base, no 

rubs or gallops. S1 and S2 heard.


EXTREMITIES: Normal range of motion, no edema.  No clubbing or cyanosis. 

Peripheral pulses intact.





ASSESSMENT


Acute on chronic diastolic heart failure, BNP 5800 on admission


Pulmonary hypertension


Valvular heart disease, aortic stenosis, mitral regurgitations and tricuspid 

regurgitation


Coronary artery disease s/p bypass grafting


Persistent atrial fibrillation not on long term anticoagulation secondary to GI 

bleeding in the past per the patient. 


Sick sinus syndrome s/p permanent pacemaker implantation


COPD


Hypertension


Dyslipidemia


Acute kidney injury


Hypermagnesemia





PLAN


Discussed with the patient medication compliance and the rationale for taking 

her diuretics. Low sodium diet recommended. Advised her to take her aldactone at

night and see if this helps her am shortness of breath. 


Stable for discharge from a cardiac perspective. 


Follow up in the office in 2 weeks.





Nurse Practitioner note has been reviewed, I agree with a documented findings 

and plan of care.  Patient was seen and examined.








Objective





- Vital Signs


Vital signs: 


                                   Vital Signs











Temp  97.9 F   08/21/19 05:00


 


Pulse  75   08/21/19 05:00


 


Resp  18   08/21/19 05:00


 


BP  133/71   08/21/19 05:00


 


Pulse Ox  93 L  08/21/19 05:00








                                 Intake & Output











 08/20/19 08/21/19 08/21/19





 18:59 06:59 18:59


 


Intake Total 860 240 


 


Output Total 350 1000 


 


Balance 510 -760 


 


Weight  81.5 kg 


 


Intake:   


 


  Oral 860 240 


 


Output:   


 


  Urine 350 1000 


 


Other:   


 


  Voiding Method  Toilet 


 


  # Voids 3 1 














- Labs


CBC & Chem 7: 


                                 08/17/19 16:34





                                 08/20/19 06:48


Labs: 


                  Abnormal Lab Results - Last 24 Hours (Table)











  08/20/19 08/20/19 08/20/19 Range/Units





  11:04 17:18 20:59 


 


POC Glucose (mg/dL)  193 H  139 H  119 H  (75-99)  mg/dL














  08/21/19 Range/Units





  07:04 


 


POC Glucose (mg/dL)  132 H  (75-99)  mg/dL

## 2019-11-26 ENCOUNTER — HOSPITAL ENCOUNTER (OUTPATIENT)
Dept: HOSPITAL 47 - RADCTMAIN | Age: 84
Discharge: HOME | End: 2019-11-26
Payer: MEDICARE

## 2019-11-26 DIAGNOSIS — K76.9: ICD-10-CM

## 2019-11-26 DIAGNOSIS — K63.89: Primary | ICD-10-CM

## 2019-11-26 DIAGNOSIS — R18.8: ICD-10-CM

## 2019-11-26 DIAGNOSIS — K66.8: ICD-10-CM

## 2019-11-26 DIAGNOSIS — K42.9: ICD-10-CM

## 2019-11-26 LAB — BUN SERPL-SCNC: 53 MG/DL (ref 7–17)

## 2019-11-26 PROCEDURE — 74150 CT ABDOMEN W/O CONTRAST: CPT

## 2019-11-26 PROCEDURE — 84520 ASSAY OF UREA NITROGEN: CPT

## 2019-11-26 PROCEDURE — 82565 ASSAY OF CREATININE: CPT

## 2019-11-26 PROCEDURE — 36415 COLL VENOUS BLD VENIPUNCTURE: CPT

## 2019-11-26 NOTE — CT
EXAMINATION TYPE: CT abdomen wo con

 

DATE OF EXAM: 11/26/2019

 

COMPARISON: None

 

HISTORY: 89-year-old female with Generalized pain. History states liver mass right lobe.

 

TECHNIQUE: Contiguous axial scanning of the abdomen without IV contrast. Coronal and sagittal reconst
ructions performed.

 

CT DLP: 423 mGycm

Automated exposure control for dose reduction was used.

 

 

FINDINGS: 

Median sternotomy wires. Pacemaker leads with right atrial and right ventricular leads. Dense mitral 
annular calcifications. Heart borderline enlarged without pericardial effusion. Retained epicardial p
acer leads.

 

Lung bases show some strandy atelectasis. No pleural effusion.

 

Low to intermediate density circumscribed area in the posterior mediastinum of the lower thorax measu
res 2.2 x 1.6 cm versus 2.2 x 1.2 cm on 6/3/2015. Overall stability suggests a benign etiology. Possi
ble lymphocele or duplication cyst.

 

There is trace perihepatic ascites.

 

Lack of IV contrast limits assessment of the solid abdominal viscera and vascular structures.

 

There is some focal hypodensity along the posterior superior left liver lobe measuring 2.0 cm, refer 
to axial image 17 and coronal image 42. This is best identified on high contrast liver windows due to
 the lack of contrast.

 

Adrenal glands, spleen with a couple hilar splenule is, and pancreas show no gross abnormality.

 

Moderate atherosclerotic calcifications abdominal aorta and iliac arteries. An IVC filter is present.
 No aortic aneurysm.

 

Moderate-sized fatty umbilical hernia measuring 4.9 cm wide.

 

Significant hypodensity measuring 1.5 cm from the posterior right kidney probable cyst. Smaller lesio
n from the anterior left kidney measuring 7 mm. Lack of IV contrast and small size makes these indete
rminate.

 

Additional perisplenic ascites. Mild ascites fluid within the patient's umbilical hernia.

 

Right lower quadrant soft tissue masses along the left lateral wall of the ascending colon measures 9
.3 x 6.4 x 6.2 cm. Adjacent soft tissue omental/peritoneal mass anteriorly measures 4.6 x 2.0 cm. A t
hird soft tissue deposit just adjacent measures 2.1 cm.

 

There may be an additional soft tissue deposits on the contralateral, left side, partially visualized
 measuring at least 3.3 cm.

 

Otherwise, no mesenteric or retroperitoneal lymphadenopathy in the upper to mid abdomen.

 

The pelvis is not imaged.

 

Bones: Hypertrophic facet arthropathy. Moderate degenerative disc disease. Grade 1 anterolisthesis at
 L4-L5.

 

 

 IMPRESSION: 

 

1. LARGE RIGHT LOWER QUADRANT MASS ALONG THE LEFT LATERAL WALL OF THE ASCENDING COLON MEASURES 9.3 X 
6.4 X 6.2 CM.  TWO ADJACENT PERITONEAL DEPOSITS MEASURE 4.6 AND 2.1 CM. NEOPLASM/METASTATIC DISEASE A
RE SUGGESTED. RECOMMEND TISSUE SAMPLING TO DETERMINE THE PRIMARY SITE.

2. VERY SUBTLE 2.0 CM LESION WITHIN THE POSTERIOR, CENTRAL ASPECT OF THE LEFT LIVER LOBE. THIS LESION
 IS INDETERMINATE AND METASTATIC DISEASE IS NOT EXCLUDED.

3. MILD PERISPLENIC AND PERIHEPATIC ASCITES. MODERATE SIZED FATTY UMBILICAL HERNIA MEASURING 4.9 CM W
ERMIAS ALSO CONTAINS MILD ASCITES FLUID.

4. DIFFICULT TO EXCLUDE AN ADDITIONAL 3.3 CM PARTIALLY VISUALIZED PERITONEAL DEPOSIT ON THE CONTRALAT
ERAL LEFT SIDE.

## 2019-12-02 ENCOUNTER — HOSPITAL ENCOUNTER (INPATIENT)
Dept: HOSPITAL 47 - EC | Age: 84
LOS: 14 days | Discharge: NURSING, REHAB OR LONG-TERM CARE | DRG: 329 | End: 2019-12-16
Attending: HOSPITALIST | Admitting: HOSPITALIST
Payer: MEDICARE

## 2019-12-02 VITALS — BODY MASS INDEX: 35.9 KG/M2

## 2019-12-02 DIAGNOSIS — Z80.0: ICD-10-CM

## 2019-12-02 DIAGNOSIS — Z86.59: ICD-10-CM

## 2019-12-02 DIAGNOSIS — D63.0: ICD-10-CM

## 2019-12-02 DIAGNOSIS — E11.22: ICD-10-CM

## 2019-12-02 DIAGNOSIS — M1A.9XX0: ICD-10-CM

## 2019-12-02 DIAGNOSIS — K46.0: ICD-10-CM

## 2019-12-02 DIAGNOSIS — N18.3: ICD-10-CM

## 2019-12-02 DIAGNOSIS — Z88.8: ICD-10-CM

## 2019-12-02 DIAGNOSIS — Z83.3: ICD-10-CM

## 2019-12-02 DIAGNOSIS — Z82.49: ICD-10-CM

## 2019-12-02 DIAGNOSIS — Z87.01: ICD-10-CM

## 2019-12-02 DIAGNOSIS — E66.9: ICD-10-CM

## 2019-12-02 DIAGNOSIS — Z66: ICD-10-CM

## 2019-12-02 DIAGNOSIS — C78.6: ICD-10-CM

## 2019-12-02 DIAGNOSIS — C77.2: ICD-10-CM

## 2019-12-02 DIAGNOSIS — D50.0: ICD-10-CM

## 2019-12-02 DIAGNOSIS — G47.33: ICD-10-CM

## 2019-12-02 DIAGNOSIS — Z99.89: ICD-10-CM

## 2019-12-02 DIAGNOSIS — I50.33: ICD-10-CM

## 2019-12-02 DIAGNOSIS — H54.62: ICD-10-CM

## 2019-12-02 DIAGNOSIS — E87.4: ICD-10-CM

## 2019-12-02 DIAGNOSIS — J98.11: ICD-10-CM

## 2019-12-02 DIAGNOSIS — Z90.49: ICD-10-CM

## 2019-12-02 DIAGNOSIS — I27.20: ICD-10-CM

## 2019-12-02 DIAGNOSIS — Z86.711: ICD-10-CM

## 2019-12-02 DIAGNOSIS — C18.4: ICD-10-CM

## 2019-12-02 DIAGNOSIS — E11.40: ICD-10-CM

## 2019-12-02 DIAGNOSIS — Z95.3: ICD-10-CM

## 2019-12-02 DIAGNOSIS — C18.2: Primary | ICD-10-CM

## 2019-12-02 DIAGNOSIS — D12.4: ICD-10-CM

## 2019-12-02 DIAGNOSIS — Z95.0: ICD-10-CM

## 2019-12-02 DIAGNOSIS — G89.29: ICD-10-CM

## 2019-12-02 DIAGNOSIS — Z79.890: ICD-10-CM

## 2019-12-02 DIAGNOSIS — I25.5: ICD-10-CM

## 2019-12-02 DIAGNOSIS — K21.9: ICD-10-CM

## 2019-12-02 DIAGNOSIS — K66.0: ICD-10-CM

## 2019-12-02 DIAGNOSIS — Z87.891: ICD-10-CM

## 2019-12-02 DIAGNOSIS — M19.91: ICD-10-CM

## 2019-12-02 DIAGNOSIS — Z95.1: ICD-10-CM

## 2019-12-02 DIAGNOSIS — Z90.710: ICD-10-CM

## 2019-12-02 DIAGNOSIS — I25.10: ICD-10-CM

## 2019-12-02 DIAGNOSIS — Z79.899: ICD-10-CM

## 2019-12-02 DIAGNOSIS — Z95.828: ICD-10-CM

## 2019-12-02 DIAGNOSIS — Z98.890: ICD-10-CM

## 2019-12-02 DIAGNOSIS — H40.9: ICD-10-CM

## 2019-12-02 DIAGNOSIS — K92.1: ICD-10-CM

## 2019-12-02 DIAGNOSIS — Z91.030: ICD-10-CM

## 2019-12-02 DIAGNOSIS — I48.91: ICD-10-CM

## 2019-12-02 DIAGNOSIS — R57.1: ICD-10-CM

## 2019-12-02 DIAGNOSIS — Z79.4: ICD-10-CM

## 2019-12-02 DIAGNOSIS — J96.01: ICD-10-CM

## 2019-12-02 DIAGNOSIS — Z86.718: ICD-10-CM

## 2019-12-02 DIAGNOSIS — Z88.6: ICD-10-CM

## 2019-12-02 DIAGNOSIS — Z71.3: ICD-10-CM

## 2019-12-02 DIAGNOSIS — Z81.8: ICD-10-CM

## 2019-12-02 DIAGNOSIS — D72.829: ICD-10-CM

## 2019-12-02 DIAGNOSIS — M54.9: ICD-10-CM

## 2019-12-02 DIAGNOSIS — D63.1: ICD-10-CM

## 2019-12-02 DIAGNOSIS — I48.92: ICD-10-CM

## 2019-12-02 DIAGNOSIS — H35.30: ICD-10-CM

## 2019-12-02 DIAGNOSIS — Q27.33: ICD-10-CM

## 2019-12-02 DIAGNOSIS — Z86.010: ICD-10-CM

## 2019-12-02 DIAGNOSIS — I08.3: ICD-10-CM

## 2019-12-02 DIAGNOSIS — Z85.828: ICD-10-CM

## 2019-12-02 DIAGNOSIS — Z81.1: ICD-10-CM

## 2019-12-02 DIAGNOSIS — E03.9: ICD-10-CM

## 2019-12-02 DIAGNOSIS — I13.0: ICD-10-CM

## 2019-12-02 DIAGNOSIS — E87.6: ICD-10-CM

## 2019-12-02 DIAGNOSIS — R18.8: ICD-10-CM

## 2019-12-02 DIAGNOSIS — M25.551: ICD-10-CM

## 2019-12-02 DIAGNOSIS — K43.2: ICD-10-CM

## 2019-12-02 DIAGNOSIS — E78.5: ICD-10-CM

## 2019-12-02 DIAGNOSIS — K64.8: ICD-10-CM

## 2019-12-02 DIAGNOSIS — K57.30: ICD-10-CM

## 2019-12-02 DIAGNOSIS — N17.0: ICD-10-CM

## 2019-12-02 LAB
ALBUMIN SERPL-MCNC: 3.6 G/DL (ref 3.5–5)
ALP SERPL-CCNC: 193 U/L (ref 38–126)
ALT SERPL-CCNC: 16 U/L (ref 9–52)
ANION GAP SERPL CALC-SCNC: 10 MMOL/L
APTT BLD: 23.7 SEC (ref 22–30)
AST SERPL-CCNC: 22 U/L (ref 14–36)
BASOPHILS # BLD AUTO: 0 K/UL (ref 0–0.2)
BASOPHILS NFR BLD AUTO: 0 %
BUN SERPL-SCNC: 73 MG/DL (ref 7–17)
CALCIUM SPEC-MCNC: 9.3 MG/DL (ref 8.4–10.2)
CHLORIDE SERPL-SCNC: 109 MMOL/L (ref 98–107)
CO2 SERPL-SCNC: 19 MMOL/L (ref 22–30)
EOSINOPHIL # BLD AUTO: 0.2 K/UL (ref 0–0.7)
EOSINOPHIL NFR BLD AUTO: 3 %
ERYTHROCYTE [DISTWIDTH] IN BLOOD BY AUTOMATED COUNT: 3.07 M/UL (ref 3.8–5.4)
ERYTHROCYTE [DISTWIDTH] IN BLOOD: 15.9 % (ref 11.5–15.5)
GLUCOSE BLD-MCNC: 132 MG/DL (ref 75–99)
GLUCOSE SERPL-MCNC: 214 MG/DL (ref 74–99)
HCT VFR BLD AUTO: 29.1 % (ref 34–46)
HGB BLD-MCNC: 9.2 GM/DL (ref 11.4–16)
INR PPP: 1 (ref ?–1.2)
LYMPHOCYTES # SPEC AUTO: 1.3 K/UL (ref 1–4.8)
LYMPHOCYTES NFR SPEC AUTO: 13 %
MCH RBC QN AUTO: 29.9 PG (ref 25–35)
MCHC RBC AUTO-ENTMCNC: 31.7 G/DL (ref 31–37)
MCV RBC AUTO: 94.6 FL (ref 80–100)
MONOCYTES # BLD AUTO: 0.5 K/UL (ref 0–1)
MONOCYTES NFR BLD AUTO: 5 %
NEUTROPHILS # BLD AUTO: 7.4 K/UL (ref 1.3–7.7)
NEUTROPHILS NFR BLD AUTO: 77 %
PLATELET # BLD AUTO: 240 K/UL (ref 150–450)
POTASSIUM SERPL-SCNC: 4.7 MMOL/L (ref 3.5–5.1)
PROT SERPL-MCNC: 6 G/DL (ref 6.3–8.2)
PT BLD: 10.4 SEC (ref 9–12)
SODIUM SERPL-SCNC: 138 MMOL/L (ref 137–145)
WBC # BLD AUTO: 9.6 K/UL (ref 3.8–10.6)

## 2019-12-02 PROCEDURE — 36415 COLL VENOUS BLD VENIPUNCTURE: CPT

## 2019-12-02 PROCEDURE — 80048 BASIC METABOLIC PNL TOTAL CA: CPT

## 2019-12-02 PROCEDURE — 96360 HYDRATION IV INFUSION INIT: CPT

## 2019-12-02 PROCEDURE — 88307 TISSUE EXAM BY PATHOLOGIST: CPT

## 2019-12-02 PROCEDURE — 81001 URINALYSIS AUTO W/SCOPE: CPT

## 2019-12-02 PROCEDURE — 82747 ASSAY OF FOLIC ACID RBC: CPT

## 2019-12-02 PROCEDURE — 85025 COMPLETE CBC W/AUTO DIFF WBC: CPT

## 2019-12-02 PROCEDURE — 88309 TISSUE EXAM BY PATHOLOGIST: CPT

## 2019-12-02 PROCEDURE — 87205 SMEAR GRAM STAIN: CPT

## 2019-12-02 PROCEDURE — 86850 RBC ANTIBODY SCREEN: CPT

## 2019-12-02 PROCEDURE — 82728 ASSAY OF FERRITIN: CPT

## 2019-12-02 PROCEDURE — 85730 THROMBOPLASTIN TIME PARTIAL: CPT

## 2019-12-02 PROCEDURE — 45381 COLONOSCOPY SUBMUCOUS NJX: CPT

## 2019-12-02 PROCEDURE — 84478 ASSAY OF TRIGLYCERIDES: CPT

## 2019-12-02 PROCEDURE — 94003 VENT MGMT INPAT SUBQ DAY: CPT

## 2019-12-02 PROCEDURE — 71045 X-RAY EXAM CHEST 1 VIEW: CPT

## 2019-12-02 PROCEDURE — 87070 CULTURE OTHR SPECIMN AEROBIC: CPT

## 2019-12-02 PROCEDURE — 88305 TISSUE EXAM BY PATHOLOGIST: CPT

## 2019-12-02 PROCEDURE — 86900 BLOOD TYPING SEROLOGIC ABO: CPT

## 2019-12-02 PROCEDURE — 45385 COLONOSCOPY W/LESION REMOVAL: CPT

## 2019-12-02 PROCEDURE — 82805 BLOOD GASES W/O2 SATURATION: CPT

## 2019-12-02 PROCEDURE — 94002 VENT MGMT INPAT INIT DAY: CPT

## 2019-12-02 PROCEDURE — 87086 URINE CULTURE/COLONY COUNT: CPT

## 2019-12-02 PROCEDURE — 94640 AIRWAY INHALATION TREATMENT: CPT

## 2019-12-02 PROCEDURE — 80053 COMPREHEN METABOLIC PANEL: CPT

## 2019-12-02 PROCEDURE — 83540 ASSAY OF IRON: CPT

## 2019-12-02 PROCEDURE — 96361 HYDRATE IV INFUSION ADD-ON: CPT

## 2019-12-02 PROCEDURE — 94760 N-INVAS EAR/PLS OXIMETRY 1: CPT

## 2019-12-02 PROCEDURE — 83735 ASSAY OF MAGNESIUM: CPT

## 2019-12-02 PROCEDURE — 83550 IRON BINDING TEST: CPT

## 2019-12-02 PROCEDURE — 85027 COMPLETE CBC AUTOMATED: CPT

## 2019-12-02 PROCEDURE — 85610 PROTHROMBIN TIME: CPT

## 2019-12-02 PROCEDURE — 82607 VITAMIN B-12: CPT

## 2019-12-02 PROCEDURE — 86901 BLOOD TYPING SEROLOGIC RH(D): CPT

## 2019-12-02 PROCEDURE — 86304 IMMUNOASSAY TUMOR CA 125: CPT

## 2019-12-02 PROCEDURE — 82105 ALPHA-FETOPROTEIN SERUM: CPT

## 2019-12-02 PROCEDURE — 74176 CT ABD & PELVIS W/O CONTRAST: CPT

## 2019-12-02 PROCEDURE — 82040 ASSAY OF SERUM ALBUMIN: CPT

## 2019-12-02 PROCEDURE — 84132 ASSAY OF SERUM POTASSIUM: CPT

## 2019-12-02 PROCEDURE — 82378 CARCINOEMBRYONIC ANTIGEN: CPT

## 2019-12-02 PROCEDURE — 82330 ASSAY OF CALCIUM: CPT

## 2019-12-02 PROCEDURE — 84100 ASSAY OF PHOSPHORUS: CPT

## 2019-12-02 PROCEDURE — 99285 EMERGENCY DEPT VISIT HI MDM: CPT

## 2019-12-02 RX ADMIN — SPIRONOLACTONE SCH MG: 25 TABLET, FILM COATED ORAL at 21:51

## 2019-12-02 RX ADMIN — POTASSIUM CHLORIDE SCH MEQ: 750 TABLET, EXTENDED RELEASE ORAL at 21:50

## 2019-12-02 RX ADMIN — INSULIN ASPART SCH: 100 INJECTION, SOLUTION INTRAVENOUS; SUBCUTANEOUS at 21:51

## 2019-12-02 RX ADMIN — Medication PRN MG: at 23:46

## 2019-12-02 RX ADMIN — POTASSIUM CHLORIDE SCH MLS/HR: 14.9 INJECTION, SOLUTION INTRAVENOUS at 23:13

## 2019-12-02 RX ADMIN — CARVEDILOL SCH MG: 3.12 TABLET, FILM COATED ORAL at 21:53

## 2019-12-02 RX ADMIN — Medication SCH MG: at 21:51

## 2019-12-02 RX ADMIN — ALLOPURINOL SCH MG: 100 TABLET ORAL at 21:50

## 2019-12-02 NOTE — P.HPIM
History of Present Illness


H&P Date: 19


Chief Complaint: Lower GI bleed





History of presenting complaint:


This is a very pleasant 89-year-old patient who follows with visiting physicians

Dr. Ochoa.  Chronic stable medical conditions include congestive heart failure 

with EF of 50%, aortic stenosis, mitral regurgitation, mitral stenosis, 

tricuspid regurgitation, secondary probably hypertension, diabetes, hypertens

ion, osteoarthritis, hypothyroid, blind left eye, coronary artery disease, 

[place] filter..  Patient does use a walker.  Patient did have a computed 

tomography scan of the abdomen on  and did show mass along the 

lateral wall of the ascending colon and also adjacent soft tissue omental mass 

and is also another additional mass present in intra-abdominally.  Suggestive of

metastatic disease.  Patient yesterday had some bleeding per the right rectum.  

And decided to come in.  Patient's been having lower abdominal discomfort.  She 

was admitted to the ER.  Patient normally does use a walker.  Lives alone.





Review of systems:


GEN.:  Tired


EYES: None


HEENT: None


NECK: None


RESPIRATORY: None


CARDIOVASCULAR: None


GASTROINTESTINAL: As above


GENITOURINARY: None


MUSCULOSKELETAL: Joint pains


LYMPHATICS: None


HEMATOLOGICAL: None  


PSYCHIATRY: None


NEUROLOGICAL: Uses a walker





Social history:


Patient is a .  Lives alone.  Smoked 1-2 packs a day for close to 22 years 

stopped in .  Doesn't drink alcohol.  Does use a walker





Physical examination:


VITAL SIGNS: 98.4, 85, 18, 133/100, 96% room air


GENERAL: BMI 30.2, laying in bed not in distress.


EYES: Pupils equal.  Conjunctiva normal.


HEENT: External appearance of nose and ears normal, oral cavity grossly normal.


NECK: JVD not raised; masses not palpable.


HEART: First and second heart sounds are normal;  no edema.  


LUNGS: Respiratory rate normal; clear to auscultation.  


ABDOMEN: Soft, mass palpable on the right side, with some tenderness,, liver 

spleen not palpable, no masses palpable.  


PSYCH: Alert and oriented x3;  mood  and affect normal.  


NEUROLOGICAL: Cranial nerves grossly intact; no facial asymmetry,   power and 

sensation grossly intact. 


LYMPHATICS: No lymph nodes palpable in the axilla and neck


MUSCULOSKELETAL: Evidence of OA especially in the hands and knees





INVESTIGATIONS, reviewed in the clinical context:


White count 9.6 hemoglobin 9.2 platelets 240 progression 4.7 BUN 73 creatinine 

1.79


Computed tomography scan of the abdomen-results noted as above





Assessment:


-Acute lower GI bleed most likely from malignancy.  Note that is fresh blood 

which could be a diverticular bleed.


-Intra-abdominal malignancy with metastatic appearance, does not have a 

diagnosis at present.


-Coronary artery disease with prior history of bypass


-Diabetes mellitus type 2


-Essential hypertension


-Primary osteoarthritis


-Chronic gout


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


-Macular degeneration of right eye


-Colonic diverticulosis obstructive sleep apnea did use CPAP machine in the past





Plan:


Patient has expressed the fact that she does not want any chemotherapy.  She may

consider radiation treatment if it is helpful.  I did speak to the medical 

oncology and the surgical period.  Depending on what the oncologist opines will 

take it from there.  Also it without a tissue diagnosis may be difficult to 

define what may be beneficial.  The home medications resumed.  Care was 

discussed with the patient.  Patient will be provided clear liquid diet.  

Prognosis guarded.








Past Medical History


Past Medical History: Atrial Fibrillation, Coronary Artery Disease (CAD), 

Cancer, Heart Failure, Diabetes Mellitus, GI Bleed, Hypertension, Osteoarthritis

(OA), Pneumonia, Pulmonary Embolus (PE), Sleep Apnea/CPAP/BIPAP, Thyroid 

Disorder


Additional Past Medical History / Comment(s): gout, chronic pain in back hip and

leg, diverticulitis, LT EYE BLIND,GLAUCOMA, MAC DEGENERATION RT EYE, MURMUR, 

DIVERTICULAR DX.OVARIAN CYSTS,SKIN CA,ANEMIA, TRACHEBRONCHITIS WITH REACTIVE 

BRONCHOSPASM.used to use cpap machine .  Increased shortness of breath with 

activity and at rest. ZEESHAN CARPAL TUNNEL. abd cncer


History of Any Multi-Drug Resistant Organisms: None Reported


Past Surgical History: Appendectomy, Bowel Resection, Breast Surgery, Cardiac 

Valve Replacement, Cholecystectomy, Coronary Bypass/CABG, Heart Catheterization,

Hysterectomy, Orthopedic Surgery, Pacemaker, Tonsillectomy


Additional Past Surgical History / Comment(s): pacemaker, AORTIC VALVE 

REPLACEMENT(TISSUE) AND 1 VESSEL BYPASS, CYST REMOVED FROM HAND/HEAD,BOWEL 

RESECTION D/T DIVERTICULITIS.LEONCIO FILTER, COLONOCOSPY/POLYPECTOMY/EGD, PAS

T LT EYE SX-DAMAGED THE OPTIC NERVE-BLIND LT EYE.lt knee arthrosopy, skin cancer

removed from hand/head/back


Past Anesthesia/Blood Transfusion Reactions: No Reported Reaction


Type of Cardiac Device: Permanent Pacemaker


Device Placement Date:: 


Past Psychological History: Depression


Additional Psychological History / Comment(s): history of depression, denies now


Smoking Status: Former smoker


Past Alcohol Use History: None Reported


Additional Past Alcohol Use History / Comment(s): STARTED SMOKING  1-2 ppd, 

quit , no alcohol now


Past Drug Use History: None Reported





- Past Family History


  ** Brother(s)


History Unknown: Yes





  ** Father


Family Medical History: Cancer


Additional Family Medical History / Comment(s): ALCOHOLIC--  





  ** Mother


Family Medical History: Cancer, Coronary Artery Disease (CAD), Dementia, Diabe

darcy Mellitus


Additional Family Medical History / Comment(s):  





Medications and Allergies


                                Home Medications











 Medication  Instructions  Recorded  Confirmed  Type


 


Carvedilol [Coreg] 3.125 mg PO BID 14 History


 


Allopurinol 100 mg PO BID 06/03/15 12/02/19 History


 


Levothyroxine Sodium [Synthroid] 75 mcg PO DAILY 18 History


 


Insulin Lispro [humaLOG Kwikpen] 5 unit SQ AC-BID 18 History


 


Furosemide [Lasix] 80 mg PO BID 19 History


 


Insulin Detemir (Levemir) [Levemir] 20 unit SQ AC-BRKFST 19 

History


 


Potassium Chloride ER [K-Dur 10] 10 meq PO BID 19 History


 


Spironolactone [Aldactone] 25 mg PO BID 19 History


 


Meclizine HCl 12.5 mg PO TID PRN 19 History


 


Naproxen Sodium [Aleve] 440 mg PO Q12HR PRN 19 History


 


Sodium Bicarbonate Tab 650 mg PO BID 19 History








                                    Allergies











Allergy/AdvReac Type Severity Reaction Status Date / Time


 


aspirin Allergy  Unknown Verified 19 16:01


 


bee pollen [Bee Pollen] Allergy  Anaphylaxis Verified 19 16:01


 


celecoxib [From Celebrex] Allergy  Unknown Verified 19 16:01


 


blood thinners AdvReac Severe see comment Uncoded 19 13:51


 


URIC ACIDS AdvReac  GOUT Uncoded 19 13:51














Physical Exam


Vitals: 


                                   Vital Signs











  Temp Pulse Pulse Resp BP BP Pulse Ox


 


 19 21:21  98.1 F   87  18   114/65  99


 


 19 17:51  97.6 F   81  17   120/68  100


 


 19 17:45  98.4 F  81   18  121/61   100


 


 19 16:40   81   18  121/61   100


 


 19 15:15   73   18  133/101   98


 


 19 13:43  98.4 F  85   18  133/100   96








                                Intake and Output











 19





 06:59 14:59 22:59


 


Other:   


 


  Weight  79.832 kg 79.832 kg














Results


CBC & Chem 7: 


                                 19 14:13





                                 19 14:13


Labs: 


                  Abnormal Lab Results - Last 24 Hours (Table)











  19 Range/Units





  14:13 14:13 20:38 


 


RBC  3.07 L    (3.80-5.40)  m/uL


 


Hgb  9.2 L    (11.4-16.0)  gm/dL


 


Hct  29.1 L    (34.0-46.0)  %


 


RDW  15.9 H    (11.5-15.5)  %


 


Chloride   109 H   ()  mmol/L


 


Carbon Dioxide   19 L   (22-30)  mmol/L


 


BUN   73 H   (7-17)  mg/dL


 


Creatinine   1.79 H   (0.52-1.04)  mg/dL


 


Glucose   214 H   (74-99)  mg/dL


 


POC Glucose (mg/dL)    132 H  (75-99)  mg/dL


 


Alkaline Phosphatase   193 H   ()  U/L


 


Total Protein   6.0 L   (6.3-8.2)  g/dL














Thrombosis Risk Factor Assmnt





- Choose All That Apply


Any of the Below Risk Factors Present?: Yes


Each Factor Represents 1 point: Heart failure (<1month), Obesity (BMI >25), 

Swollen legs (current), Varicose veins


Other Risk Factors: Yes


Each Risk Factor Represents 2 Points: Malignancy


Each Risk Factor Represents 3 Points: Age 75 years or older, History of DVT/PE


Thrombosis Risk Factor Assessment Total Risk Factor Score: 12


Thrombosis Risk Factor Assessment Level: High Risk

## 2019-12-02 NOTE — ED
General Adult HPI





- General


Chief complaint: GI Bleed


Stated complaint: weakness


Time Seen by Provider: 19 13:45


Source: patient, RN notes reviewed, old records reviewed


Mode of arrival: EMS


Limitations: physical limitation





- History of Present Illness


Initial comments: 





This is an 89-year-old female presents to the emergency department stating that 

about a week ago she was diagnosed with abdominal cancer some sort.  Patient 

states today she started having blood per rectum and she's been feeling weak.  

Patient denies being on any blood thinners.  Patient states her abdomen is 

benign hurt since July and it continues to hurt now and it is not much different

than it has been.  Patient denies any fever chills per patient denies any 

vomiting or diarrhea.  Patient denies any chest pain or palpitations.  Patient 

denies being lightheaded but does states she is generally weak.  Patient denies 

any headache patient denies any numbness or focal weakness





- Related Data


                                Home Medications











 Medication  Instructions  Recorded  Confirmed


 


Carvedilol [Coreg] 3.125 mg PO BID 14


 


Allopurinol 100 mg PO BID 06/03/15 08/17/19


 


Levothyroxine Sodium [Synthroid] 75 mcg PO DAILY 18


 


Insulin Lispro [humaLOG Kwikpen] 5 unit SQ AC-TID PRN 18


 


Ferrous Sulfate [Iron (65  mg PO DAILY 19





Elemental)]   


 


Furosemide [Lasix] 80 mg PO BID 19


 


Insulin Detemir (Levemir) [Levemir] 20 unit SQ AC-BRKFST 19


 


Potassium Chloride ER [K-Dur 10] 10 meq PO BID 19


 


Spironolactone [Aldactone] 25 mg PO DAILY 19


 


Vit C/E/Zn/Coppr/Lutein/Zeaxan 1 cap PO BID 19





[Preservision Areds 2 Softgel]   








                                  Previous Rx's











 Medication  Instructions  Recorded


 


Sodium Bicarbonate Tab 650 mg PO TID  tab 18











                                    Allergies











Allergy/AdvReac Type Severity Reaction Status Date / Time


 


aspirin Allergy  Unknown Verified 19 13:51


 


bee pollen [Bee Pollen] Allergy  Anaphylaxis Verified 19 13:51


 


celecoxib [From Celebrex] Allergy  Unknown Verified 19 13:51


 


blood thinners AdvReac Severe see comment Uncoded 19 13:51


 


URIC ACIDS AdvReac  GOUT Uncoded 19 13:51














Review of Systems


ROS Statement: 


Those systems with pertinent positive or pertinent negative responses have been 

documented in the HPI.





ROS Other: All systems not noted in ROS Statement are negative.





Past Medical History


Past Medical History: Atrial Fibrillation, Coronary Artery Disease (CAD), 

Cancer, Heart Failure, Diabetes Mellitus, GI Bleed, Hypertension, Osteoarthritis

(OA), Pneumonia, Pulmonary Embolus (PE), Sleep Apnea/CPAP/BIPAP, Thyroid Di

sorder


Additional Past Medical History / Comment(s): gout, chronic pain in back hip and

leg, diverticulitis, LT EYE BLIND,GLAUCOMA, MAC DEGENERATION RT EYE, MURMUR, 

DIVERTICULAR DX.OVARIAN CYSTS,SKIN CA,ANEMIA, TRACHEBRONCHITIS WITH REACTIVE 

BRONCHOSPASM.used to use cpap machine .  Increased shortness of breath with 

activity and at rest. ZEESHAN CARPAL TUNNEL. abd cncer


History of Any Multi-Drug Resistant Organisms: None Reported


Past Surgical History: Appendectomy, Bowel Resection, Breast Surgery, Cardiac 

Valve Replacement, Cholecystectomy, Coronary Bypass/CABG, Heart Catheterization,

Hysterectomy, Orthopedic Surgery, Pacemaker, Tonsillectomy


Additional Past Surgical History / Comment(s): pacemaker, AORTIC VALVE 

REPLACEMENT(TISSUE) AND 1 VESSEL BYPASS, CYST REMOVED FROM HAND/HEAD,BOWEL 

RESECTION D/T DIVERTICULITIS.LEONCIO FILTER, COLONOCOSPY/POLYPECTOMY/EGD, 

PAST LT EYE SX-DAMAGED THE OPTIC NERVE-BLIND LT EYE.lt knee arthrosopy, skin 

cancer removed from hand/head/back


Past Anesthesia/Blood Transfusion Reactions: No Reported Reaction


Type of Cardiac Device: Permanent Pacemaker


Device Placement Date:: 


Past Psychological History: Depression


Smoking Status: Former smoker


Past Alcohol Use History: None Reported


Past Drug Use History: None Reported





- Past Family History


  ** Brother(s)


History Unknown: Yes





  ** Father


Family Medical History: Cancer


Additional Family Medical History / Comment(s): ALCOHOLIC--  





  ** Mother


Family Medical History: Cancer, Coronary Artery Disease (CAD), Dementia, 

Diabetes Mellitus


Additional Family Medical History / Comment(s):  1985





General Exam





- General Exam Comments


Initial Comments: 





GENERAL:


Patient is well-developed and well-nourished.  Patient is nontoxic and well-

hydrated and is in mild distress.





ENT:


Neck is soft and supple.  No significant lymphadenopathy is noted.  Oropharynx 

is clear.  Moist mucous membranes.  Neck has full range of motion without 

eliciting any pain.  





EYES:


The sclera were anicteric and conjunctiva were pink and moist.  Extraocular 

movements were intact and pupils were equal round and reactive to light.  

Eyelids were unremarkable.





PULMONARY:


Unlabored respirations.  Good breath sounds bilaterally.  No audible rales 

rhonchi or wheezing was noted.





CARDIOVASCULAR:


There is a regular rate and rhythm without any murmurs gallops or rubs. 





ABDOMEN:


Soft and nontender with normal bowel sounds.  No palpable organomegaly was 

noted.  There is no palpable pulsatile mass.





SKIN:


Skin is clear with no lesions or rashes and otherwise unremarkable.





NEUROLOGIC:


Patient is alert and oriented x3.  Cranial nerves II through XII are grossly 

intact.  Motor and sensory are also intact.  Normal speech, volume and content. 

Symmetrical smile.  





MUSCULOSKELETAL:


Normal extremities with adequate strength and full range of motion. 





LYMPHATICS:


No significant lymphadenopathy is noted





PSYCHIATRIC:


Normal psychiatric evaluation.  


Limitations: physical limitation





Course


                                   Vital Signs











  19





  13:43 15:15


 


Temperature 98.4 F 


 


Pulse Rate 85 73


 


Respiratory 18 18





Rate  


 


Blood Pressure 133/100 133/101


 


O2 Sat by Pulse 96 98





Oximetry  














Medical Decision Making





- Medical Decision Making





I spoke with Dr. bui agreed to admit the patient admitted the patient wrote 

admitting orders.





- Lab Data


Result diagrams: 


                                 19 14:13





                                 19 14:13


                                   Lab Results











  19 Range/Units





  14:13 14:13 14:13 


 


WBC  9.6    (3.8-10.6)  k/uL


 


RBC  3.07 L    (3.80-5.40)  m/uL


 


Hgb  9.2 L    (11.4-16.0)  gm/dL


 


Hct  29.1 L    (34.0-46.0)  %


 


MCV  94.6    (80.0-100.0)  fL


 


MCH  29.9    (25.0-35.0)  pg


 


MCHC  31.7    (31.0-37.0)  g/dL


 


RDW  15.9 H    (11.5-15.5)  %


 


Plt Count  240    (150-450)  k/uL


 


Neutrophils %  77    %


 


Lymphocytes %  13    %


 


Monocytes %  5    %


 


Eosinophils %  3    %


 


Basophils %  0    %


 


Neutrophils #  7.4    (1.3-7.7)  k/uL


 


Lymphocytes #  1.3    (1.0-4.8)  k/uL


 


Monocytes #  0.5    (0-1.0)  k/uL


 


Eosinophils #  0.2    (0-0.7)  k/uL


 


Basophils #  0.0    (0-0.2)  k/uL


 


Hypochromasia  Slight    


 


PT    10.4  (9.0-12.0)  sec


 


INR    1.0  (<1.2)  


 


APTT    23.7  (22.0-30.0)  sec


 


Sodium   138   (137-145)  mmol/L


 


Potassium   4.7   (3.5-5.1)  mmol/L


 


Chloride   109 H   ()  mmol/L


 


Carbon Dioxide   19 L   (22-30)  mmol/L


 


Anion Gap   10   mmol/L


 


BUN   73 H   (7-17)  mg/dL


 


Creatinine   1.79 H   (0.52-1.04)  mg/dL


 


Est GFR (CKD-EPI)AfAm   29   (>60 ml/min/1.73 sqM)  


 


Est GFR (CKD-EPI)NonAf   25   (>60 ml/min/1.73 sqM)  


 


Glucose   214 H   (74-99)  mg/dL


 


Calcium   9.3   (8.4-10.2)  mg/dL


 


Total Bilirubin   1.3   (0.2-1.3)  mg/dL


 


AST   22   (14-36)  U/L


 


ALT   16   (9-52)  U/L


 


Alkaline Phosphatase   193 H   ()  U/L


 


Total Protein   6.0 L   (6.3-8.2)  g/dL


 


Albumin   3.6   (3.5-5.0)  g/dL














Disposition


Clinical Impression: 


 GI bleed, History of colon cancer





Disposition: ADMITTED AS IP TO THIS hospitals


Referrals: 


Reji Ochoa MD [Primary Care Provider] - 1-2 days


Time of Disposition: 15:50

## 2019-12-03 LAB
BASOPHILS # BLD AUTO: 0 K/UL (ref 0–0.2)
BASOPHILS NFR BLD AUTO: 0 %
EOSINOPHIL # BLD AUTO: 0.3 K/UL (ref 0–0.7)
EOSINOPHIL NFR BLD AUTO: 4 %
ERYTHROCYTE [DISTWIDTH] IN BLOOD BY AUTOMATED COUNT: 2.65 M/UL (ref 3.8–5.4)
ERYTHROCYTE [DISTWIDTH] IN BLOOD: 15.6 % (ref 11.5–15.5)
FERRITIN SERPL-MCNC: 38.9 NG/ML (ref 10–291)
GLUCOSE BLD-MCNC: 104 MG/DL (ref 75–99)
GLUCOSE BLD-MCNC: 134 MG/DL (ref 75–99)
GLUCOSE BLD-MCNC: 156 MG/DL (ref 75–99)
GLUCOSE BLD-MCNC: 276 MG/DL (ref 75–99)
GLUCOSE BLD-MCNC: 303 MG/DL (ref 75–99)
GLUCOSE BLD-MCNC: 93 MG/DL (ref 75–99)
HCT VFR BLD AUTO: 25 % (ref 34–46)
HGB BLD-MCNC: 8.1 GM/DL (ref 11.4–16)
IRON SERPL-MCNC: 17 UG/DL (ref 50–170)
LYMPHOCYTES # SPEC AUTO: 1.2 K/UL (ref 1–4.8)
LYMPHOCYTES NFR SPEC AUTO: 14 %
MCH RBC QN AUTO: 30.7 PG (ref 25–35)
MCHC RBC AUTO-ENTMCNC: 32.5 G/DL (ref 31–37)
MCV RBC AUTO: 94.3 FL (ref 80–100)
MONOCYTES # BLD AUTO: 0.5 K/UL (ref 0–1)
MONOCYTES NFR BLD AUTO: 6 %
NEUTROPHILS # BLD AUTO: 6.2 K/UL (ref 1.3–7.7)
NEUTROPHILS NFR BLD AUTO: 74 %
PLATELET # BLD AUTO: 214 K/UL (ref 150–450)
TIBC SERPL-MCNC: 336 UG/DL (ref 228–460)
WBC # BLD AUTO: 8.4 K/UL (ref 3.8–10.6)

## 2019-12-03 RX ADMIN — INSULIN ASPART SCH UNIT: 100 INJECTION, SOLUTION INTRAVENOUS; SUBCUTANEOUS at 14:42

## 2019-12-03 RX ADMIN — INSULIN ASPART SCH: 100 INJECTION, SOLUTION INTRAVENOUS; SUBCUTANEOUS at 21:39

## 2019-12-03 RX ADMIN — CARVEDILOL SCH MG: 3.12 TABLET, FILM COATED ORAL at 09:36

## 2019-12-03 RX ADMIN — SPIRONOLACTONE SCH: 25 TABLET, FILM COATED ORAL at 21:41

## 2019-12-03 RX ADMIN — ALLOPURINOL SCH MG: 100 TABLET ORAL at 21:39

## 2019-12-03 RX ADMIN — LEVOTHYROXINE SODIUM SCH MCG: 75 TABLET ORAL at 05:25

## 2019-12-03 RX ADMIN — Medication SCH MG: at 21:39

## 2019-12-03 RX ADMIN — SODIUM FERRIC GLUCONATE COMPLEX SCH MLS/HR: 12.5 INJECTION INTRAVENOUS at 15:29

## 2019-12-03 RX ADMIN — POTASSIUM CHLORIDE SCH MEQ: 750 TABLET, EXTENDED RELEASE ORAL at 21:39

## 2019-12-03 RX ADMIN — INSULIN ASPART SCH: 100 INJECTION, SOLUTION INTRAVENOUS; SUBCUTANEOUS at 09:36

## 2019-12-03 RX ADMIN — INSULIN DETEMIR SCH UNIT: 100 INJECTION, SOLUTION SUBCUTANEOUS at 09:35

## 2019-12-03 RX ADMIN — POTASSIUM CHLORIDE SCH MEQ: 750 TABLET, EXTENDED RELEASE ORAL at 09:35

## 2019-12-03 RX ADMIN — CARVEDILOL SCH MG: 3.12 TABLET, FILM COATED ORAL at 17:19

## 2019-12-03 RX ADMIN — POTASSIUM CHLORIDE SCH MLS/HR: 14.9 INJECTION, SOLUTION INTRAVENOUS at 23:58

## 2019-12-03 RX ADMIN — INSULIN ASPART SCH UNIT: 100 INJECTION, SOLUTION INTRAVENOUS; SUBCUTANEOUS at 09:38

## 2019-12-03 RX ADMIN — INSULIN ASPART SCH: 100 INJECTION, SOLUTION INTRAVENOUS; SUBCUTANEOUS at 15:28

## 2019-12-03 RX ADMIN — INSULIN ASPART SCH: 100 INJECTION, SOLUTION INTRAVENOUS; SUBCUTANEOUS at 17:23

## 2019-12-03 RX ADMIN — SPIRONOLACTONE SCH MG: 25 TABLET, FILM COATED ORAL at 09:36

## 2019-12-03 RX ADMIN — ALLOPURINOL SCH MG: 100 TABLET ORAL at 09:36

## 2019-12-03 RX ADMIN — FUROSEMIDE SCH MG: 80 TABLET ORAL at 09:36

## 2019-12-03 RX ADMIN — ACETAMINOPHEN PRN MG: 325 TABLET, FILM COATED ORAL at 18:36

## 2019-12-03 RX ADMIN — Medication SCH MG: at 09:36

## 2019-12-03 RX ADMIN — FUROSEMIDE SCH MG: 80 TABLET ORAL at 17:19

## 2019-12-03 RX ADMIN — ACETAMINOPHEN PRN MG: 325 TABLET, FILM COATED ORAL at 06:26

## 2019-12-03 RX ADMIN — Medication PRN MG: at 23:56

## 2019-12-03 NOTE — P.CONS
History of Present Illness





- Reason for Consult


Consult date: 19


abd mass


Requesting physician: Jean Claude Arzate





- Chief Complaint


Stool suggestive of GI bleed





- History of Present Illness





Mrs. Ortiz is a very pleasant 89-year-old  female patient who states

that she had blood in her stool which is what prompted her to come to the 

hospital.  She had imaging on admission showing a large right lower quadrant 

mass, 2 adjacent peritoneal deposits, 2 cm lesion in the left lobe of the liver,

mild ascites, another questionable peritoneal deposit, labs showed anemia with a

hemoglobin of 8.1,  BUN 73, creatinine 1.79.  


Patient states intentional 60 pound weight loss over the past 2-3 months by only

eating 2 meals a day, she noticed increased fatigue, poor energy levels, she 

fell up the stairs on gi, denied any syncope, states she has felt like

this before when she was anemic.  She has taken iron supplements in the past 

but, this was discontinued.  History of colon polyps, she's had 18 inches of her

colon removed in 2 separate surgeries, had multiple colonoscopies, believes the 

last one was in [place] about 5 years ago.   Patient denies nausea or vomiting, 

sweats, abdominal distention or bloating, she has noticed abdominal cramping, 

across lower part of the abdomen when having bowel movements, this resolves 

after the bowel movement, she denies any changes in the caliber of stool.





Review of Systems





14 point review of systems is negative except as stated in HPI





Past Medical History


Past Medical History: Atrial Fibrillation, Coronary Artery Disease (CAD), 

Cancer, Heart Failure, Diabetes Mellitus, GI Bleed, Hypertension, Osteoarthritis

(OA), Pneumonia, Pulmonary Embolus (PE), Sleep Apnea/CPAP/BIPAP, Thyroid 

Disorder


Additional Past Medical History / Comment(s): gout, chronic pain in back hip and

leg, diverticulitis, LT EYE BLIND,GLAUCOMA, MAC DEGENERATION RT EYE, MURMUR, 

DIVERTICULAR DX.OVARIAN CYSTS,SKIN CA,ANEMIA, TRACHEBRONCHITIS WITH REACTIVE 

BRONCHOSPASM.used to use cpap machine .  Increased shortness of breath with 

activity and at rest. ZEESHAN CARPAL TUNNEL. abd cncer


History of Any Multi-Drug Resistant Organisms: None Reported


Past Surgical History: Appendectomy, Bowel Resection, Breast Surgery, Cardiac 

Valve Replacement, Cholecystectomy, Coronary Bypass/CABG, Heart Catheterization,

Hysterectomy, Orthopedic Surgery, Pacemaker, Tonsillectomy


Additional Past Surgical History / Comment(s): pacemaker, AORTIC VALVE REPLAC

EMENT(TISSUE) AND 1 VESSEL BYPASS, CYST REMOVED FROM HAND/HEAD,BOWEL RESECTION 

D/T DIVERTICULITIS.LEONCIO FILTER, COLONOCOSPY/POLYPECTOMY/EGD, PAST LT EYE 

SX-DAMAGED THE OPTIC NERVE-BLIND LT EYE.lt knee arthrosopy, skin cancer removed 

from hand/head/back


Past Anesthesia/Blood Transfusion Reactions: No Reported Reaction


Type of Cardiac Device: Permanent Pacemaker


Device Placement Date:: 


Past Psychological History: Depression


Additional Psychological History / Comment(s): history of depression, denies now


Smoking Status: Former smoker


Past Alcohol Use History: None Reported


Additional Past Alcohol Use History / Comment(s): STARTED SMOKING  1-2 ppd, 

quit , no alcohol now


Past Drug Use History: None Reported





- Past Family History


  ** Brother(s)


History Unknown: Yes





  ** Father


Family Medical History: Cancer


Additional Family Medical History / Comment(s): ALCOHOLIC--  





  ** Mother


Family Medical History: Cancer, Coronary Artery Disease (CAD), Dementia, 

Diabetes Mellitus


Additional Family Medical History / Comment(s):  





Medications and Allergies


                                Home Medications











 Medication  Instructions  Recorded  Confirmed  Type


 


Carvedilol [Coreg] 3.125 mg PO BID 14 History


 


Allopurinol 100 mg PO BID 06/03/15 12/02/19 History


 


Levothyroxine Sodium [Synthroid] 75 mcg PO DAILY 18 History


 


Insulin Lispro [humaLOG Kwikpen] 5 unit SQ AC-BID 18 History


 


Furosemide [Lasix] 80 mg PO BID 19 History


 


Insulin Detemir (Levemir) [Levemir] 20 unit SQ AC-BRKFST 19 Hi

story


 


Potassium Chloride ER [K-Dur 10] 10 meq PO BID 19 History


 


Spironolactone [Aldactone] 25 mg PO BID 19 History


 


Meclizine HCl 12.5 mg PO TID PRN 19 History


 


Naproxen Sodium [Aleve] 440 mg PO Q12HR PRN 19 History


 


Sodium Bicarbonate Tab 650 mg PO BID 19 History








                                    Allergies











Allergy/AdvReac Type Severity Reaction Status Date / Time


 


aspirin Allergy  Unknown Verified 19 16:01


 


bee pollen [Bee Pollen] Allergy  Anaphylaxis Verified 19 16:01


 


celecoxib [From Celebrex] Allergy  Unknown Verified 19 16:01


 


blood thinners AdvReac Severe see comment Uncoded 19 13:51


 


URIC ACIDS AdvReac  GOUT Uncoded 19 13:51














Physical Exam


Vitals: 


                                   Vital Signs











  Temp Pulse Pulse Resp BP BP Pulse Ox


 


 19 05:00  98.1 F   77  18   116/53  96


 


 19 00:00    87  18   


 


 19 21:21  98.1 F   87  18   114/65  99


 


 19 17:51  97.6 F   81  17   120/68  100


 


 19 17:45  98.4 F  81   18  121/61   100


 


 19 16:40   81   18  121/61   100


 


 19 15:15   73   18  133/101   98


 


 19 13:43  98.4 F  85   18  133/100   96








                                Intake and Output











 19





 22:59 06:59 14:59


 


Intake Total 680 710 


 


Output Total 450 400 


 


Balance 230 310 


 


Intake:   


 


  Intake, IV Titration 530 120 





  Amount   


 


    Lactated Ringers 1,000 ml 80 120 





    @ 20 mls/hr IV .Q24H [facility]   





    Rx#:[id number]   


 


    Sodium Chloride 0.9% 1, 450  





    000 ml @ 75 mls/hr IV .   





    [id number] ONE Rx#:[id number]   


 


  Oral 150 590 


 


Output:   


 


  Urine 450 400 


 


Other:   


 


  # Voids 2 2 


 


  Weight 79.832 kg  














- Constitutional


General appearance: cooperative, no acute distress, obese





- EENT


Eyes: anicteric sclerae


ENT: hearing grossly normal, normal oropharynx





- Neck


Neck: no lymphadenopathy





- Respiratory


Respiratory: bilateral: CTA





- Cardiovascular


Rhythm: regular


Heart sounds: normal: S1, S2


  ** leg


Peripheral Edema: bilateral: Trace (Left lower extremity bruising from recent 

fall)





- Gastrointestinal


General gastrointestinal: no absent bowel sounds, no decreased bowel sounds, no 

distended, no hepatomegaly, no hyperactive bowel sounds, normal bowel sounds, no

organomegaly, no rigid, no scaphoid, soft, no splenomegaly, no tenderness, no 

umbilical hernia, no ventral hernia





- Integumentary


Integumentary: normal





- Neurologic





Patient is legally blind


Neurologic: CNII-XII intact





- Musculoskeletal


Musculoskeletal: strength equal bilaterally





- Psychiatric


Psychiatric: A&O x's 3, appropriate affect, intact judgment & insight





Results


CBC & Chem 7: 


                                 19 07:44





                                 19 14:13


Labs: 


                  Abnormal Lab Results - Last 24 Hours (Table)











  19 Range/Units





  14:13 14:13 20:38 


 


RBC  3.07 L    (3.80-5.40)  m/uL


 


Hgb  9.2 L    (11.4-16.0)  gm/dL


 


Hct  29.1 L    (34.0-46.0)  %


 


RDW  15.9 H    (11.5-15.5)  %


 


Chloride   109 H   ()  mmol/L


 


Carbon Dioxide   19 L   (22-30)  mmol/L


 


BUN   73 H   (7-17)  mg/dL


 


Creatinine   1.79 H   (0.52-1.04)  mg/dL


 


Glucose   214 H   (74-99)  mg/dL


 


POC Glucose (mg/dL)    132 H  (75-99)  mg/dL


 


Alkaline Phosphatase   193 H   ()  U/L


 


Total Protein   6.0 L   (6.3-8.2)  g/dL














  19 Range/Units





  06:59 07:44 


 


RBC   2.65 L  (3.80-5.40)  m/uL


 


Hgb   8.1 L  (11.4-16.0)  gm/dL


 


Hct   25.0 L  (34.0-46.0)  %


 


RDW   15.6 H  (11.5-15.5)  %


 


Chloride    ()  mmol/L


 


Carbon Dioxide    (22-30)  mmol/L


 


BUN    (7-17)  mg/dL


 


Creatinine    (0.52-1.04)  mg/dL


 


Glucose    (74-99)  mg/dL


 


POC Glucose (mg/dL)  156 H   (75-99)  mg/dL


 


Alkaline Phosphatase    ()  U/L


 


Total Protein    (6.3-8.2)  g/dL











CT scan - abdomen: report reviewed





Assessment and Plan


(1) GI bleed


Narrative/Plan: 


Patient has had in the past.  Last colonoscopy 5 years ago.  Patient has history

of polyps.  Surgery consult and, anticipate colonoscopy.





Hemoglobin not requiring transfusion at this time.  Transfuse to keep hemoglobin

7 or higher.  Patient also has a history of chronic renal insufficiency from 

chart review, which could be contributing to her anemia as well.  Established 

care with a Nephrologist may be reasonable


Current Visit: Yes   Status: Acute   Priority: High   Code(s): K92.2 - 

GASTROINTESTINAL HEMORRHAGE, UNSPECIFIED   SNOMED Code(s): 61629251


   





(2) Anemia


Narrative/Plan: 


Anemia workup ordered


Current Visit: Yes   Status: Chronic   Priority: Medium   Code(s): D64.9 - 

ANEMIA, UNSPECIFIED   SNOMED Code(s): 064335858


   





(3) Abdominal mass


Narrative/Plan: 


Case discussed with Attending.  Surgery is consulted.  Recommendation/plan is 

for colonoscopy-to rule out any potential impending obstruction.  Hopefully able

to obtain a biopsy through the same procedure.





Patient wanted to know her options.  At this time the only thing we are able to 

say is that she has a mass, we need to find out what this is so that we can 

provide her with treatment options.  She verbalized understanding.  Information 

was communicated to nursing soda can be shared with family to keep everybody on 

the same page.


Current Visit: Yes   Status: Acute   Priority: High   Code(s): R19.00 - INTRA-

ABD AND PELVIC SWELLING, MASS AND LUMP, UNSP SITE   SNOMED Code(s): 484140460


   


Plan: 





Doctor attests:  I performed a history and physical examination of this patient,

developed impression and plan of care.  Discussed with dictator.  I agree with 

dictators note, documented as a scribe.

## 2019-12-03 NOTE — PN
PROGRESS NOTE



DATE OF DICTATION:

December 3, 2019.



REASON FOR CONSULTATION:

Rectal bleeding.



HISTORY OF PRESENT ILLNESS:

The patient is an 89-year-old pleasant white female who was admitted to hospital

because of rectal bleeding for the last 4 days duration.  She states that on Friday,

she started having bright red blood per rectum.  She had about 3 episodes.  On

Saturday, she had another couple of episodes and hence came to the emergency room and

subsequently admitted to the hospital for further evaluation.  The patient had imaging

studies on an outpatient basis for right lower quadrant abdominal pain and was noted to

have right lower quadrant mass in addition to small peritoneal deposits and a 2 cm

lesion in the left lobe of the liver.  Dr. Gutierrez was also consulted in regards to

this issue.



She states that she had lost about 60 pounds in the last 4 months duration, but she

attributes all of this to voluntary weight loss.  She has been having intermittent

constipation.  She denies any nausea, vomiting.  No abdominal pain other than right

lower quadrant discomfort.  No fever, chills, night sweats.



PAST MEDICAL HISTORY:

Significant for atrial fibrillation, coronary artery disease, congestive heart failure,

diabetes mellitus, hypertension, hyperlipidemia, history of pulmonary embolism in the

past, sleep apnea, and hypothyroidism.



PAST SURGICAL HISTORY:

Breast surgery, valve replacement, cholecystectomy, CABG, appendectomy, colon surgery

for questionable colon polyps, Carley filter placement.  Last colonoscopy 5 years

ago in [place].



MEDICATIONS:

At home include:  Coreg, allopurinol, Synthroid, Lasix, Levemir, K-Dur, Aldactone,

meclizine and Aleve, sodium bicarbonate.



ALLERGIES:

ALLERGIES TO ASPIRIN, CELEBREX AND POLLEN.



SOCIAL HISTORY:

No smoking.  No alcohol use.



FAMILY HISTORY:

Father had some kind of cancer.  Mother had dementia, diabetes mellitus.



REVIEW OF SYSTEMS:

CARDIOPULMONARY: She denies any chest pain, shortness of breath.  GENITOURINARY: No

dysuria or hematuria.

MUSCULOSKELETAL unremarkable.

SKIN unremarkable.  Endocrine unremarkable.  Psychiatric unremarkable.  Neurology

unremarkable.

ENT/vision unremarkable.

CONSTITUTIONAL: Weight loss of 60 pounds.  No fever, chills, night sweats.



PHYSICAL EXAMINATION:

She appears comfortable.  No apparent distress.

VITAL SIGNS:  Stable.  Blood pressure is 117/65, pulse rate 75, temperature 98.1

degrees.

HEENT examination unremarkable. Conjunctivae pink.  Sclerae anicteric.  Oral cavity no

lesions.

NECK: No JVD or lymph node enlargement.

CHEST: Clear to auscultation.

HEART:  Regular rate and rhythm.

ABDOMEN:  Soft.  There was fullness in the right lower quadrant area. Rest of the

abdomen was benign.  Bowel sounds are positive.

EXTREMITIES:  No pedal edema.

SKIN:  No rashes.

NEUROLOGIC:  Alert and oriented x3.  No focal deficits.



LABS:

WBC 8.4, hemoglobin 8.1, platelets 214.  Basic metabolic panel showed a BUN of 73,

creatinine 1.79.  CA-125 was 256.



IMPRESSION:

1. Rectal bleeding for the last 3 days duration.  Hemoglobin 8.1 g/dL.  Last

    colonoscopy 5 years ago in [place] and according to the patient was normal.  Prior

    to that, she had multiple colonoscopies and was diagnosed with colon polyps in the

    past and at one point had partial colon resection.

2. Right lower quadrant mass noted on recent CT scan of the abdomen and pelvis for

    which Dr. Gutierrez has been consulted and Oncology following the patient also.

3. Anemia related to lower gastrointestinal bleed.



RECOMMENDATIONS:

1. Monitor CBC closely.

2. Patient already scheduled for colonoscopy by Dr. Gutierrez tomorrow and if it is

    negative, she plans on proceeding with biopsy of the right lower quadrant mass.

3. I agree with the above plan.  We will follow with you closely.

Thank you for this consultation.





MMODL / IJN: [id number] / Job#: [id number]

## 2019-12-04 LAB
ALBUMIN SERPL-MCNC: 3.2 G/DL (ref 3.5–5)
ALP SERPL-CCNC: 159 U/L (ref 38–126)
ALT SERPL-CCNC: 21 U/L (ref 9–52)
ANION GAP SERPL CALC-SCNC: 9 MMOL/L
AST SERPL-CCNC: 24 U/L (ref 14–36)
BASOPHILS # BLD AUTO: 0 K/UL (ref 0–0.2)
BASOPHILS NFR BLD AUTO: 1 %
BUN SERPL-SCNC: 59 MG/DL (ref 7–17)
CALCIUM SPEC-MCNC: 9.2 MG/DL (ref 8.4–10.2)
CHLORIDE SERPL-SCNC: 109 MMOL/L (ref 98–107)
CO2 SERPL-SCNC: 24 MMOL/L (ref 22–30)
EOSINOPHIL # BLD AUTO: 0.4 K/UL (ref 0–0.7)
EOSINOPHIL NFR BLD AUTO: 5 %
ERYTHROCYTE [DISTWIDTH] IN BLOOD BY AUTOMATED COUNT: 2.75 M/UL (ref 3.8–5.4)
ERYTHROCYTE [DISTWIDTH] IN BLOOD: 15.7 % (ref 11.5–15.5)
GLUCOSE BLD-MCNC: 128 MG/DL (ref 75–99)
GLUCOSE BLD-MCNC: 148 MG/DL (ref 75–99)
GLUCOSE BLD-MCNC: 151 MG/DL (ref 75–99)
GLUCOSE BLD-MCNC: 272 MG/DL (ref 75–99)
GLUCOSE SERPL-MCNC: 109 MG/DL (ref 74–99)
HCT VFR BLD AUTO: 25.6 % (ref 34–46)
HGB BLD-MCNC: 8.2 GM/DL (ref 11.4–16)
LYMPHOCYTES # SPEC AUTO: 1.4 K/UL (ref 1–4.8)
LYMPHOCYTES NFR SPEC AUTO: 17 %
MCH RBC QN AUTO: 29.9 PG (ref 25–35)
MCHC RBC AUTO-ENTMCNC: 32 G/DL (ref 31–37)
MCV RBC AUTO: 93.4 FL (ref 80–100)
MONOCYTES # BLD AUTO: 0.6 K/UL (ref 0–1)
MONOCYTES NFR BLD AUTO: 8 %
NEUTROPHILS # BLD AUTO: 5.7 K/UL (ref 1.3–7.7)
NEUTROPHILS NFR BLD AUTO: 68 %
PLATELET # BLD AUTO: 232 K/UL (ref 150–450)
POTASSIUM SERPL-SCNC: 5 MMOL/L (ref 3.5–5.1)
PROT SERPL-MCNC: 5.7 G/DL (ref 6.3–8.2)
SODIUM SERPL-SCNC: 142 MMOL/L (ref 137–145)
WBC # BLD AUTO: 8.4 K/UL (ref 3.8–10.6)

## 2019-12-04 PROCEDURE — 0DBK8ZX EXCISION OF ASCENDING COLON, VIA NATURAL OR ARTIFICIAL OPENING ENDOSCOPIC, DIAGNOSTIC: ICD-10-PCS

## 2019-12-04 RX ADMIN — ACETAMINOPHEN PRN MG: 325 TABLET, FILM COATED ORAL at 06:19

## 2019-12-04 RX ADMIN — NEOMYCIN SULFATE SCH MG: 500 TABLET ORAL at 19:39

## 2019-12-04 RX ADMIN — POTASSIUM CHLORIDE SCH: 14.9 INJECTION, SOLUTION INTRAVENOUS at 21:04

## 2019-12-04 RX ADMIN — ALLOPURINOL SCH MG: 100 TABLET ORAL at 21:11

## 2019-12-04 RX ADMIN — Medication SCH MG: at 21:11

## 2019-12-04 RX ADMIN — ALLOPURINOL SCH MG: 100 TABLET ORAL at 14:00

## 2019-12-04 RX ADMIN — ACETAMINOPHEN PRN MG: 325 TABLET, FILM COATED ORAL at 15:59

## 2019-12-04 RX ADMIN — CARVEDILOL SCH MG: 3.12 TABLET, FILM COATED ORAL at 08:30

## 2019-12-04 RX ADMIN — POTASSIUM CHLORIDE SCH MEQ: 750 TABLET, EXTENDED RELEASE ORAL at 14:00

## 2019-12-04 RX ADMIN — NEOMYCIN SULFATE SCH MG: 500 TABLET ORAL at 21:12

## 2019-12-04 RX ADMIN — INSULIN ASPART SCH: 100 INJECTION, SOLUTION INTRAVENOUS; SUBCUTANEOUS at 08:05

## 2019-12-04 RX ADMIN — SPIRONOLACTONE SCH MG: 25 TABLET, FILM COATED ORAL at 21:11

## 2019-12-04 RX ADMIN — INSULIN DETEMIR SCH UNIT: 100 INJECTION, SOLUTION SUBCUTANEOUS at 14:00

## 2019-12-04 RX ADMIN — INSULIN ASPART SCH: 100 INJECTION, SOLUTION INTRAVENOUS; SUBCUTANEOUS at 19:40

## 2019-12-04 RX ADMIN — LEVOTHYROXINE SODIUM SCH MCG: 75 TABLET ORAL at 06:19

## 2019-12-04 RX ADMIN — Medication PRN MG: at 22:22

## 2019-12-04 RX ADMIN — FUROSEMIDE SCH: 80 TABLET ORAL at 15:28

## 2019-12-04 RX ADMIN — INSULIN ASPART SCH: 100 INJECTION, SOLUTION INTRAVENOUS; SUBCUTANEOUS at 16:05

## 2019-12-04 RX ADMIN — FUROSEMIDE SCH: 80 TABLET ORAL at 17:40

## 2019-12-04 RX ADMIN — IOPAMIDOL PRN ML: 612 INJECTION, SOLUTION INTRAVENOUS at 09:29

## 2019-12-04 RX ADMIN — CEFAZOLIN SCH MLS/HR: 330 INJECTION, POWDER, FOR SOLUTION INTRAMUSCULAR; INTRAVENOUS at 16:04

## 2019-12-04 RX ADMIN — INSULIN ASPART SCH UNIT: 100 INJECTION, SOLUTION INTRAVENOUS; SUBCUTANEOUS at 21:12

## 2019-12-04 RX ADMIN — SPIRONOLACTONE SCH MG: 25 TABLET, FILM COATED ORAL at 14:00

## 2019-12-04 RX ADMIN — SODIUM FERRIC GLUCONATE COMPLEX SCH MLS/HR: 12.5 INJECTION INTRAVENOUS at 09:31

## 2019-12-04 RX ADMIN — POTASSIUM CHLORIDE SCH: 750 TABLET, EXTENDED RELEASE ORAL at 21:05

## 2019-12-04 RX ADMIN — IOPAMIDOL PRN ML: 612 INJECTION, SOLUTION INTRAVENOUS at 08:30

## 2019-12-04 RX ADMIN — ACETAMINOPHEN PRN MG: 325 TABLET, FILM COATED ORAL at 23:09

## 2019-12-04 RX ADMIN — CARVEDILOL SCH MG: 3.12 TABLET, FILM COATED ORAL at 19:40

## 2019-12-04 RX ADMIN — Medication SCH MG: at 14:00

## 2019-12-04 NOTE — CT
EXAMINATION TYPE: CT abdomen pelvis wo con

 

DATE OF EXAM: 12/4/2019

 

COMPARISON: 11/26/2019

 

HISTORY: Follow up per patient.

 

CT DLP: 606.1 mGycm

 

Examination of the solid and hollow viscera is limited given the lack of contrast.

 

FINDINGS: 

 

LUNG BASES: No evidence for nodule. No evidence for infiltrate. Small pleural effusions are now noted
 bilaterally. The heart is enlarged.

 

LIVER/GB: Mild nodularity suspected of the peritoneal surface of the liver. Suspect small peritoneal 
deposits. Small amount of ascites noted about the liver edge. The gallbladder is unremarkable. No spa
ce-occupying hepatic lesion.

 

PANCREAS: No pancreatic mass identified. No inflammatory process seen.

 

SPLEEN: No evidence for splenomegaly. No intrasplenic lesions seen.

 

ADRENALS: No adrenal nodules identified. No evidence for thickening.

 

KIDNEYS: No evidence for renal mass. No nephrolithiasis. No hydronephrosis.

 

BOWEL: Large exophytic mass of the medial wall ascending colon measures approximately 8.5 x 6.9 x 6.7
 cm versus 8.1 x 6.4 x 6.1 cm. Wall thickening of the adjacent colon. Peritoneal nodularity persists 
compatible with peritoneal deposits measuring 4.7 x 2.4 cm along the right lateral undersurface of th
e abdominal wall which has increased in size relative to the prior study. Prior measurement of 4.6 x 
2.0 cm. Additional stable nodule posterior to the left lateral anterior abdominal wall.

 

Lymph nodes: No evidence for adenopathy greater than 1 cm.

 

Abdominal aorta: Atheromatous changes seen. No evidence for aneurysm.

 

Genital organs: Soft tissue mass within the pelvis to the right of midline in the region of the right
 adnexa measures 4.1 x 4.8 cm. This could reflect ovarian neoplasm versus a drop met. There are joya
es of prior hysterectomy.

 

Other: No significant abnormality.

 

IMPRESSION: 

1. Right adnexal soft tissue mass as discussed above which may reflect ovarian neoplasm with peritone
al metastatic disease. Additional consideration is that of an exophytic colonic mass with peritoneal 
metastases as well as drop metastases in the pelvis. Correlate clinically.

## 2019-12-04 NOTE — CDI
Documentation Clarification Form



Date: 12/4/2019 1:58:19 PM

From: Becky Panchal RN, CCDS

Phone: [phone]

MRN: [id number]

Admit Date: 12/2/2019 3:52:00 PM

Patient Name: Christina Ortiz

Visit Number: [id number]



ATTENTION: The Clinical Documentation Specialists (CDI) and [facility] Coding Staff 
appreciate your assistance in clarifying documentation. Please respond to the 
clarification below the line at the bottom and electronically sign. The CDI & 
[facility] Coding staff will review the response and follow-up if needed. Please note: 
Queries are made part of the Legal Health Record. If you have any questions, 
please contact the author of this message via ITS.



Dr. Jean Claude Arzate



A diagnosis of anemia lacks specificity to accurately reflect your patients 
severity of condition and clarification is needed.  



History/Risk Factors:

CHF, aortic stenosis, htn, Carley filter



Clinical indicators: 

12/2 H&P: "Acute lower GI bleed most likely from malignancy."

12/3 GI Progress note: "Anemia related to lower gastrointestinal bleed."

Hemoglobin: 9.2/8.1/8.2

Hematocrit: 29.1/25/25.6



Treatment:        

Monitoring labs

Iv Iron



In order to capture the severity of condition, please clarify the type of anemia
and etiology if known:



Acute blood loss anemia

Acute on chronic blood loss anemia

Chronic blood loss anemia

Iron deficiency anemia

Anemia due to malignancy

Nutritional anemia

Anemia of chronic kidney disease

Unable to determine

Other, please specify ___________



(Last Revision: October 2017)





Possible anemia off chronic disease secondary to suspected malignancy

___________________________________________________________________________

MTDD

## 2019-12-04 NOTE — P.PCN
Date of Procedure: 12/04/19


Description of Procedure: 











PREOPERATIVE DIAGNOSIS:


Hematochezia


Colon mass per computed tomography scan


Prior history of colon resection





POSTOPERATIVE DIAGNOSIS:


Hematochezia


Colon mass per computed tomography scan, over 9 cm ascending colon


Prior history of colon resection


Arteriovenous malformation, hepatic flexure





OPERATION:


Colonoscopy to the ileocolic anastomosis


Colonoscopy with snare polypectomy ascending colon mass


Colonoscopy with injection of Na ink 3 mL ascending colon mass





SURGEON: Minda Gutierrez MD.





ANESTHESIA: MAC.





INDICATIONS:


The patient is a 89-year-old female who presents with rectal bleeding.  She had 

a computed tomography scan demonstrate a large colon mass.  Colonoscopy was 

referred for surgical planning including tissue biopsy.  Benefits and risks were

described and informed consent was obtained.





DESCRIPTION OF PROCEDURE:


The patient had undergone a GoLYTELY prep. She had been brought into the 

operating room and laid in the left lateral decubitus position. After adequate 

intravenous sedation, the rectum was examined with 2% lidocaine jelly. External 

hemorrhoids were encountered. The rectal tone was within normal limits. No 

lesions were palpated in the rectal vault. An Olympus colonoscope was advanced 

to the ileocolic anastomosis.  The prep was excellent.  Residual blood was found

within the rectum however distal to the mass, arteriovenous malformation with 

minimal bleed was identified.  Snare polypectomy was obtained of the ascending 

colon of a circumferential flat polypoid tumor.  No active bleeding was found of

the tumor itself.  Ejection in the Inc. was performed at the site of bleeding 

just distal to the AV malformation. No moderate scattered diverticulosis was 

encountered.   No evidence of focal colitis was found. Retroflexion of the scope

demonstrated grade 1 internal hemorrhoids without active bleeding or 

inflammation. The colon was desufflated. The patient had tolerated the procedure

well. 





Withdrawal time was over 6 minutes.





FINDINGS:


Aronchick preparation quality scale 2 (1-5)


Internal hemorrhoids, grade 1


External prolapsed hemorrhoids, grade 1


Arteriovenous malformation just distal to ascending colon mass with small blood 

active bleeding


Large over 9 cm flat polypoid mass effect in the ascending colon 3 cm distal to 

the left colic anastomosis


No focal colitis.





RECOMMENDATIONS:


1.  Recommend surgical resection

## 2019-12-04 NOTE — P.GSCN
History of Present Illness


Consult date: 19


History of present illness: 





CHIEF COMPLAINT: Hematochezia





HISTORY OF PRESENT ILLNESS: The patient is an 89-year-old female who presents to

the hospital with rectal bleeding last 3+ days.  She reports having almost 5-6 

month history of persistent right lower quadrant including right hip pain which 

had been dismissed up until a month to 2 months ago.  Separately, she reports 

falling onto her right side last 2-3 days coinciding with start of her rectal 

bleeding.  She had outpatient studies including CT of the abdomen and pelvis 1 

week ago that demonstrated a colonic mass of the ascending colon.  Separately 

she reports a history of colon polyps including 6 years ago she had a 

colonoscopy where partial polypectomy was performed with the intent of follow up

in 6 months.  She had moved from [place] to [place] and had been lost to 

follow-up in that same timeframe for over 6 years.  She has a family history of 

colon cancer in her father.  She reports multiple bloody bowel movements today. 

She reports persistent anemia including need for iron.  As a result of rectal 

bleeding and hemoglobin on presentation of 9.2 including CT findings of 

abdominal mass, general surgery is consulted





PAST MEDICAL HISTORY: 


See list.





PAST SURGICAL HISTORY: 


See list.





MEDICATIONS: 


See list.





ALLERGIES: 


See list.





SOCIAL HISTORY: See list.





FAMILY HISTORY:  See list.





REVIEW OF ORGAN SYSTEMS:


CONSTITUTIONAL:  No fevers or chills.  Has recent weight loss.


EYES: Denies any trouble with vision. No glasses.


HEENT:  No difficulties with hearing. No nosebleeds.  No difficulty swallowing. 


RESPIRATORY: Past pneumonia. Denies any troubles with breathing or dyspnea on 

exertion.  Has sleep apnea


CARDIOVASCULAR: Past chest pain, palpitations, or recent heart attacks.  Has isc

hemic cardiomyopathy.  Has atrial fibrillation and history of congestive heart 

failure.


GASTROINTESTINAL: Denies fatty food intolerance.  Has change in bowel habits and

gas bloat.  


GENITOURINARY:  Denies any blood in urine or increased urinary frequency.  


NEUROLOGICAL:  Denies any numbness or tingling along the distal extremities. No 

seizure disorders or headaches.


MUSCULOSKELETAL: Has back pain, stiffness or joint arthritis.  Has gout.


SKIN: No current skin cancer. No rash.


PSYCHIATRIC:  Denies current depression or suicidal thoughts.


ENDOCRINE: Has thyroid disorders.  Has blood sugar glucose intolerance.


HEME/LYMPHATIC: Denies any lumps and bumps around the neck.  Past deep venous 

thrombosis.  Has persistent anemia.


ALLERGY/IMMUNOLOGY:  No immunoglobulin therapy. No immune deficiencies.


BREAST: Denies current breast lumps, pain or nipple discharge.





PHYSICAL EXAM:


VITALS: Reviewed


CONSTITUTIONAL:  Well developed and in no acute distress. 


EYES:  Conjuctivae without sclera icterus. Pupils are equally round and reactive

to light. Extraocular movements grossly intact. 


HEAD, EARS, NOSE, THROAT: Moist buccal mucosa. Head is atraumatic, 

normocephalic. Hears conversational speech. No nasal drainage. 


NECK:  Supple. No JV distention. No thyroidomegaly.


RESPIRATORY:  Non-labored respirations and equal bilateral excursions. No gross 

wheezes. 


CARDIOVASCULAR:  Irregular rate and irregular rhythm.  Extremities without 

moderate edema. Palpable 2+ radial pulses.


ABDOMEN:  Soft.  Non-tender. Nondistended. 


LYMPH: No neck lymphadenopathy. 


MUSCULOSKELETAL:  Nail and fingers with good capillary refill.


SKIN:  Warm and well perfused with good skin turgor.


NEUROLOGIC: Cranial nerves I through XII grossly intact. Sensation upper and 

extremities intact. No focal or lateralizing signs. 


PSYCH:  Appropriate affect.  Alert and oriented to person, place and time. 

Displays appropriate insight.





CLINCAL LABS: Reviewed.  Initial hemoglobin 9.2 





RADIOLOGY: Report reviewed with abdominal ascites identified including 

lymphadenopathy





IMAGING: Independently reviewed demonstrating large over 5 cm mass ascending 

colon with questionable erosion along to the abdominal wall right lower abdomen








ASSESSMENT: 


1.  Abnormal computed tomography scan with ascending colon mass


2.  Rectal bleeding





PLAN: 


1.  Presentation surgical intervention required for rectal bleeding and invading

mass.


2.  For surgical preparation, colonoscopy to exclude satellite lesions and to 

determine partial colectomy versus total abdominal colectomy


3.  Patient reports she still undecided regarding chemotherapy and port 

placement.


4.  Overall, patient high risk with pre-existing anemia, congestive heart 

failure, previous history of DVTs











Thank you for this kind consultation.





Past Medical History


Past Medical History: Atrial Fibrillation, Coronary Artery Disease (CAD), 

Cancer, Heart Failure, Diabetes Mellitus, GI Bleed, Hypertension, Osteoarthritis

(OA), Pneumonia, Pulmonary Embolus (PE), Sleep Apnea/CPAP/BIPAP, Thyroid 

Disorder


Additional Past Medical History / Comment(s): gout, chronic pain in back hip and

leg, diverticulitis, LT EYE BLIND,GLAUCOMA, MAC DEGENERATION RT EYE, MURMUR, 

DIVERTICULAR DX.OVARIAN CYSTS,SKIN CA,ANEMIA, TRACHEBRONCHITIS WITH REACTIVE 

BRONCHOSPASM.used to use cpap machine .  Increased shortness of breath with 

activity and at rest. ZEESHAN CARPAL TUNNEL. abd cncer


History of Any Multi-Drug Resistant Organisms: None Reported


Past Surgical History: Appendectomy, Bowel Resection, Breast Surgery, Cardiac 

Valve Replacement, Cholecystectomy, Coronary Bypass/CABG, Heart Catheterization,

Hysterectomy, Orthopedic Surgery, Pacemaker, Tonsillectomy


Additional Past Surgical History / Comment(s): pacemaker, AORTIC VALVE 

REPLACEMENT(TISSUE) AND 1 VESSEL BYPASS, CYST REMOVED FROM HAND/HEAD,BOWEL 

RESECTION D/T DIVERTICULITIS.LEONCIO FILTER, COLONOCOSPY/POLYPECTOMY/EGD, 

PAST LT EYE SX-DAMAGED THE OPTIC NERVE-BLIND LT EYE.lt knee arthrosopy, skin 

cancer removed from hand/head/back


Past Anesthesia/Blood Transfusion Reactions: No Reported Reaction


Type of Cardiac Device: Permanent Pacemaker


Device Placement Date:: 


Past Psychological History: Depression


Additional Psychological History / Comment(s): history of depression, denies now


Smoking Status: Former smoker


Past Alcohol Use History: None Reported


Additional Past Alcohol Use History / Comment(s): STARTED SMOKING  1-2 ppd, 

quit , no alcohol now


Past Drug Use History: None Reported





- Past Family History


  ** Brother(s)


History Unknown: Yes





  ** Father


Family Medical History: Cancer


Additional Family Medical History / Comment(s): ALCOHOLIC--  





  ** Mother


Family Medical History: Cancer, Coronary Artery Disease (CAD), Dementia, 

Diabetes Mellitus


Additional Family Medical History / Comment(s):  





Medications and Allergies


                                Home Medications











 Medication  Instructions  Recorded  Confirmed  Type


 


Carvedilol [Coreg] 3.125 mg PO BID 14 History


 


Allopurinol 100 mg PO BID 06/03/15 12/02/19 History


 


Levothyroxine Sodium [Synthroid] 75 mcg PO DAILY 18 History


 


Insulin Lispro [humaLOG Kwikpen] 5 unit SQ AC-BID 18 History


 


Furosemide [Lasix] 80 mg PO BID 19 History


 


Insulin Detemir (Levemir) [Levemir] 20 unit SQ AC-BRKFST 19 

History


 


Potassium Chloride ER [K-Dur 10] 10 meq PO BID 19 History


 


Spironolactone [Aldactone] 25 mg PO BID 19 History


 


Meclizine HCl 12.5 mg PO TID PRN 19 History


 


Naproxen Sodium [Aleve] 440 mg PO Q12HR PRN 19 History


 


Sodium Bicarbonate Tab 650 mg PO BID 19 History








                                    Allergies











Allergy/AdvReac Type Severity Reaction Status Date / Time


 


aspirin Allergy  Unknown Verified 19 16:01


 


bee pollen [Bee Pollen] Allergy  Anaphylaxis Verified 19 16:01


 


celecoxib [From Celebrex] Allergy  Unknown Verified 19 16:01


 


blood thinners AdvReac Severe see comment Uncoded 19 13:51


 


URIC ACIDS AdvReac  GOUT Uncoded 19 13:51














Surgical - Exam


                                   Vital Signs











Temp Pulse Resp BP Pulse Ox


 


 98.4 F   85   18   133/100   96 


 


 19 13:43  19 13:43  19 13:43  19 13:43  19 13:43














Results





- Labs





                                 19 07:44





                                 19 14:13


                  Abnormal Lab Results - Last 24 Hours (Table)











  19 Range/Units





  07:44 07:44 07:44 


 


RBC   2.65 L   (3.80-5.40)  m/uL


 


Hgb   8.1 L   (11.4-16.0)  gm/dL


 


Hct   25.0 L   (34.0-46.0)  %


 


RDW   15.6 H   (11.5-15.5)  %


 


POC Glucose (mg/dL)     (75-99)  mg/dL


 


Iron    17 L  ()  ug/dL


 


% Saturation    5.06 L  (12.00-45.00)   


 


Carcinoembryonic Ag  299.2 H    (0.0-4.9)  ng/mL


 


 Antigen    256.0 H  (0.0-30.1)  U/mL


 


Vitamin B12    3270.0 H  (200.0-944.0)  pg/mL














  19 Range/Units





  11:25 14:20 17:13 


 


RBC     (3.80-5.40)  m/uL


 


Hgb     (11.4-16.0)  gm/dL


 


Hct     (34.0-46.0)  %


 


RDW     (11.5-15.5)  %


 


POC Glucose (mg/dL)  276 H  303 H  134 H  (75-99)  mg/dL


 


Iron     ()  ug/dL


 


% Saturation     (12.00-45.00)   


 


Carcinoembryonic Ag     (0.0-4.9)  ng/mL


 


 Antigen     (0.0-30.1)  U/mL


 


Vitamin B12     (200.0-944.0)  pg/mL














  19 Range/Units





  20:20 07:05 


 


RBC    (3.80-5.40)  m/uL


 


Hgb    (11.4-16.0)  gm/dL


 


Hct    (34.0-46.0)  %


 


RDW    (11.5-15.5)  %


 


POC Glucose (mg/dL)  104 H  128 H  (75-99)  mg/dL


 


Iron    ()  ug/dL


 


% Saturation    (12.00-45.00)   


 


Carcinoembryonic Ag    (0.0-4.9)  ng/mL


 


 Antigen    (0.0-30.1)  U/mL


 


Vitamin B12    (200.0-944.0)  pg/mL

## 2019-12-04 NOTE — P.PN
Progress Note - Text


Progress Note Date: 12/04/19





All questions and answered described. Blood work of CEA, CA-125 and rbc Folate 

reviewed all are elevated. Colon resection described. CODE status reviewed for 

which she still wants FULL code. I spoke to the patient's daughter and son 

regarding surgical options where resection for palliation described.

## 2019-12-04 NOTE — P.PN
Progress Note - Text


Progress Note Date: 12/04/19





Chief Complaint: Lower GI bleed





History of presenting complaint:


This is a very pleasant 89-year-old patient who follows with visiting physicians

Dr. Ochoa.  Chronic stable medical conditions include congestive heart failure 

with EF of 50%, aortic stenosis, mitral regurgitation, mitral stenosis, 

tricuspid regurgitation, secondary probably hypertension, diabetes, 

hypertension, osteoarthritis, hypothyroid, blind left eye, coronary artery 

disease, Carley filter..  Patient does use a walker.  Patient did have a 

computed tomography scan of the abdomen on November 26 and did show mass along 

the lateral wall of the ascending colon and also adjacent soft tissue omental 

mass and is also another additional mass present in intra-abdominally.  

Suggestive of metastatic disease.  Patient yesterday had some bleeding per the 

right rectum.  And decided to come in.  Patient's been having lower abdominal 

discomfort.  She was admitted to the ER.  Patient normally does use a walker.  

Lives alone.


Today-so the patient before the procedure today.  Laying in bed.  Slight a

bdominal discomfort.  Just feels a bit tired.  Some of the bedside.  Did go down

for colonoscopy later today.





Review of systems: Was done for constitutional, cardiovascular, GI, pulmonary. 

relevant finding as above





Active Medications





Acetaminophen (Tylenol Tab)  650 mg PO Q6HR PRN


   PRN Reason: Fever and/ or Pain


   Last Admin: 12/04/19 15:59 Dose:  650 mg


   Documented by: 


Allopurinol (Zyloprim)  100 mg PO BID [facility]


   Last Admin: 12/04/19 14:00 Dose:  100 mg


   Documented by: 


Alvimopan (Entereg)  12 mg PO ONCE ONE


   Stop: 12/05/19 09:01


Carvedilol (Coreg)  3.125 mg PO BID-W/MEALS [facility]


   Last Admin: 12/04/19 19:40 Dose:  3.125 mg


   Documented by: 


Furosemide (Lasix)  80 mg PO BID@0900,1700 [facility]


   Last Admin: 12/04/19 17:40 Dose:  Not Given


   Documented by: 


Heparin Sodium (Porcine) (Heparin)  5,000 unit SQ ONCE ONE


   Stop: 12/05/19 12:01


Lactated Ringer's (Lactated Ringers)  1,000 mls @ 20 mls/hr IV .Q24H [facility]


   Last Admin: 12/03/19 23:58 Dose:  20 mls/hr


   Documented by: 


Ferric Sodium Gluconate 125 mg (/ Sodium Chloride)  110 mls @ 100 mls/hr IVPB 

DAILY [facility]


   Stop: 12/05/19 10:05


   Last Admin: 12/04/19 09:31 Dose:  100 mls/hr


   Documented by: 


Sodium Chloride (Saline 0.9%)  1,000 mls @ 100 mls/hr IV .Q10H [facility]


   Last Admin: 12/04/19 16:04 Dose:  100 mls/hr


   Documented by: 


Cefazolin Sodium 2 gm/ Sodium (Chloride)  50 mls @ 100 mls/hr IVPB ONCE ONE


   Stop: 12/05/19 08:29


Metronidazole 500 mg/ IV (Solution)  100 mls @ 100 mls/hr IVPB ONCE ONE


   Stop: 12/05/19 08:59


Insulin Aspart (Novolog)  0 unit SQ ACHS [facility]; Protocol


   Last Admin: 12/04/19 19:40 Dose:  Not Given


   Documented by: 


Insulin Detemir (Levemir)  10 unit SQ DAILY@0700 [facility]


   Last Admin: 12/04/19 14:00 Dose:  10 unit


   Documented by: 


Levothyroxine Sodium (Synthroid)  75 mcg PO DAILY@0630 [facility]


   Last Admin: 12/04/19 06:19 Dose:  75 mcg


   Documented by: 


Meclizine HCl (Antivert)  12.5 mg PO TID PRN


   PRN Reason: NAUSEA AND DIZZINESS


Melatonin (Melatonin)  2 mg PO HS PRN


   PRN Reason: Insomnia


   Last Admin: 12/03/19 23:56 Dose:  2 mg


   Documented by: 


Metronidazole (Flagyl)  1,000 mg PO TID@0600,1800,1900 [facility]


   Stop: 12/05/19 06:01


   Last Admin: 12/04/19 19:39 Dose:  1,000 mg


   Documented by: 


Neomycin Sulfate (Neomycin)  1,000 mg PO TID@1800,1900,0600 [facility]


   Stop: 12/05/19 06:01


   Last Admin: 12/04/19 19:39 Dose:  1,000 mg


   Documented by: 


Potassium Chloride (K-Dur 10)  10 meq PO BID [facility]


   Last Admin: 12/04/19 14:00 Dose:  10 meq


   Documented by: 


Sodium Bicarbonate (Sodium Bicarbonate Tab)  650 mg PO BID [facility]


   Last Admin: 12/04/19 14:00 Dose:  650 mg


   Documented by: 


Spironolactone (Aldactone)  25 mg PO BID [facility]


   Last Admin: 12/04/19 14:00 Dose:  25 mg


   Documented by: 











Physical examination:


VITAL SIGNS: 97.7, 73, 17, 105/68, 96% on room air


GENERAL: Laying in bed, awake


EYES: Pupils equal.  Conjunctiva normal.


HEENT: External appearance of nose and ears normal, oral cavity grossly normal.


NECK: JVD not raised; masses not palpable.


HEART: First and second heart sounds are normal;  no edema.  


LUNGS: Respiratory rate normal; clear to auscultation.  


ABDOMEN: Soft, mass palpable on the right side, with some tenderness,, liver 

spleen not palpable, no masses palpable.  


PSYCH: Alert and oriented x3;  mood  and affect normal.  


MUSCULOSKELETAL: Evidence of OA especially in the hands and knees





INVESTIGATIONS, reviewed in the clinical context:


White count 8.4 hemoglobin 8.2 platelets 232 progression bun 59 creatinine 1.7





Previous testing


White count 9.6 hemoglobin 9.2 platelets 240 progression 4.7 BUN 73 creatinine 

1.79


Computed tomography scan of the abdomen-results noted as above





Assessment:


-Ascending colon mass, per coloscopy


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


-Coronary artery disease with prior history of bypass


-Diabetes mellitus type 2


-Essential hypertension


-Primary osteoarthritis


-Chronic gout


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


-Macular degeneration of right eye


-Colonic diverticulosis 


-obstructive sleep apnea did use CPAP machine in the past





Plan:


Care was discussed thoroughly today with the patient and the son.  Late in the 

day patient did go down for colonoscopy.  Results as above..

## 2019-12-05 LAB
ALBUMIN SERPL-MCNC: 3.1 G/DL (ref 3.5–5)
ALP SERPL-CCNC: 149 U/L (ref 38–126)
ALT SERPL-CCNC: 21 U/L (ref 9–52)
ANION GAP SERPL CALC-SCNC: 9 MMOL/L
AST SERPL-CCNC: 23 U/L (ref 14–36)
BASOPHILS # BLD AUTO: 0 K/UL (ref 0–0.2)
BASOPHILS NFR BLD AUTO: 1 %
BUN SERPL-SCNC: 45 MG/DL (ref 7–17)
CALCIUM SPEC-MCNC: 9 MG/DL (ref 8.4–10.2)
CHLORIDE SERPL-SCNC: 108 MMOL/L (ref 98–107)
CO2 SERPL-SCNC: 21 MMOL/L (ref 22–30)
EOSINOPHIL # BLD AUTO: 0.4 K/UL (ref 0–0.7)
EOSINOPHIL NFR BLD AUTO: 4 %
ERYTHROCYTE [DISTWIDTH] IN BLOOD BY AUTOMATED COUNT: 2.67 M/UL (ref 3.8–5.4)
ERYTHROCYTE [DISTWIDTH] IN BLOOD: 16.1 % (ref 11.5–15.5)
GLUCOSE BLD-MCNC: 137 MG/DL (ref 75–99)
GLUCOSE BLD-MCNC: 147 MG/DL (ref 75–99)
GLUCOSE BLD-MCNC: 176 MG/DL (ref 75–99)
GLUCOSE BLD-MCNC: 96 MG/DL (ref 75–99)
GLUCOSE SERPL-MCNC: 145 MG/DL (ref 74–99)
HCT VFR BLD AUTO: 25.1 % (ref 34–46)
HGB BLD-MCNC: 8.2 GM/DL (ref 11.4–16)
LYMPHOCYTES # SPEC AUTO: 1.1 K/UL (ref 1–4.8)
LYMPHOCYTES NFR SPEC AUTO: 13 %
MCH RBC QN AUTO: 30.6 PG (ref 25–35)
MCHC RBC AUTO-ENTMCNC: 32.5 G/DL (ref 31–37)
MCV RBC AUTO: 94.2 FL (ref 80–100)
MONOCYTES # BLD AUTO: 0.7 K/UL (ref 0–1)
MONOCYTES NFR BLD AUTO: 8 %
NEUTROPHILS # BLD AUTO: 6.2 K/UL (ref 1.3–7.7)
NEUTROPHILS NFR BLD AUTO: 73 %
PLATELET # BLD AUTO: 242 K/UL (ref 150–450)
POTASSIUM SERPL-SCNC: 4.2 MMOL/L (ref 3.5–5.1)
PROT SERPL-MCNC: 5.5 G/DL (ref 6.3–8.2)
SODIUM SERPL-SCNC: 138 MMOL/L (ref 137–145)
WBC # BLD AUTO: 8.6 K/UL (ref 3.8–10.6)

## 2019-12-05 RX ADMIN — INSULIN ASPART SCH: 100 INJECTION, SOLUTION INTRAVENOUS; SUBCUTANEOUS at 21:54

## 2019-12-05 RX ADMIN — CARVEDILOL SCH MG: 3.12 TABLET, FILM COATED ORAL at 17:39

## 2019-12-05 RX ADMIN — NEOMYCIN SULFATE SCH MG: 500 TABLET ORAL at 08:07

## 2019-12-05 RX ADMIN — INSULIN ASPART SCH: 100 INJECTION, SOLUTION INTRAVENOUS; SUBCUTANEOUS at 07:50

## 2019-12-05 RX ADMIN — Medication SCH MG: at 08:06

## 2019-12-05 RX ADMIN — FUROSEMIDE SCH MG: 80 TABLET ORAL at 08:06

## 2019-12-05 RX ADMIN — ACETAMINOPHEN PRN MG: 325 TABLET, FILM COATED ORAL at 21:59

## 2019-12-05 RX ADMIN — Medication SCH MG: at 21:56

## 2019-12-05 RX ADMIN — ALLOPURINOL SCH MG: 100 TABLET ORAL at 21:56

## 2019-12-05 RX ADMIN — CEFAZOLIN SCH: 330 INJECTION, POWDER, FOR SOLUTION INTRAMUSCULAR; INTRAVENOUS at 21:54

## 2019-12-05 RX ADMIN — POTASSIUM CHLORIDE SCH: 14.9 INJECTION, SOLUTION INTRAVENOUS at 21:55

## 2019-12-05 RX ADMIN — CARVEDILOL SCH MG: 3.12 TABLET, FILM COATED ORAL at 08:06

## 2019-12-05 RX ADMIN — CEFAZOLIN SCH MLS/HR: 330 INJECTION, POWDER, FOR SOLUTION INTRAMUSCULAR; INTRAVENOUS at 08:17

## 2019-12-05 RX ADMIN — Medication PRN MG: at 22:23

## 2019-12-05 RX ADMIN — LEVOTHYROXINE SODIUM SCH MCG: 75 TABLET ORAL at 08:06

## 2019-12-05 RX ADMIN — INSULIN DETEMIR SCH: 100 INJECTION, SOLUTION SUBCUTANEOUS at 07:50

## 2019-12-05 RX ADMIN — POTASSIUM CHLORIDE SCH MEQ: 750 TABLET, EXTENDED RELEASE ORAL at 21:56

## 2019-12-05 RX ADMIN — POTASSIUM CHLORIDE SCH MEQ: 750 TABLET, EXTENDED RELEASE ORAL at 08:06

## 2019-12-05 RX ADMIN — SPIRONOLACTONE SCH MG: 25 TABLET, FILM COATED ORAL at 21:56

## 2019-12-05 RX ADMIN — SPIRONOLACTONE SCH MG: 25 TABLET, FILM COATED ORAL at 08:06

## 2019-12-05 RX ADMIN — ACETAMINOPHEN PRN MG: 325 TABLET, FILM COATED ORAL at 11:06

## 2019-12-05 RX ADMIN — INSULIN ASPART SCH UNIT: 100 INJECTION, SOLUTION INTRAVENOUS; SUBCUTANEOUS at 17:42

## 2019-12-05 RX ADMIN — INSULIN ASPART SCH: 100 INJECTION, SOLUTION INTRAVENOUS; SUBCUTANEOUS at 14:10

## 2019-12-05 RX ADMIN — FUROSEMIDE SCH MG: 80 TABLET ORAL at 17:38

## 2019-12-05 RX ADMIN — CEFAZOLIN SCH: 330 INJECTION, POWDER, FOR SOLUTION INTRAMUSCULAR; INTRAVENOUS at 05:50

## 2019-12-05 RX ADMIN — SODIUM FERRIC GLUCONATE COMPLEX SCH MLS/HR: 12.5 INJECTION INTRAVENOUS at 09:56

## 2019-12-05 RX ADMIN — ALLOPURINOL SCH MG: 100 TABLET ORAL at 08:06

## 2019-12-05 RX ADMIN — POTASSIUM CHLORIDE SCH: 14.9 INJECTION, SOLUTION INTRAVENOUS at 05:50

## 2019-12-05 NOTE — P.HPADDEND
H&P Addendum


H&P Addendum Date: 12/05/19





Patient seen and evaluated.  I was personally contacted by the oncologist Dr Nelson

regarding her care.  Patient has declined any chemotherapy.  She is aware that 

surgery is only palliative for bleeding and obstruction.  Patient wished to 

proceed with surgery.

## 2019-12-05 NOTE — CDI
Documentation Clarification Form



Date: 12/5/2019 10:49:00 AM

From: Becky Panchal RN, CCDS

Phone: [phone]

MRN: [id number]

Admit Date: 12/2/2019 3:52:00 PM

Patient Name: Christina Ortiz

Visit Number: [id number]



ATTENTION: The Clinical Documentation Specialists (CDI) and [facility] Coding Staff 
appreciate your assistance in clarifying documentation. Please respond to the 
clarification below the line at the bottom and electronically sign. The CDI & 
[facility] Coding staff will review the response and follow-up if needed. Please note: 
Queries are made part of the Legal Health Record. If you have any questions, 
please contact the author of this message via ITS.

Dr. Jean Claude Arzate



History/Risk Factors:

CHF, Aortic stenosis, Pulmonary HTN, HTN, DM, CAD

8/21/19 Patients baseline BUN/CR/GFR: 36/1.29/37



Clinical Indicators:

12/3 Oncology Progress Note: "Patient also has a history of chronic renal 
insufficiency from chart review, which could be contributing to her anemia as 
well."

Current BUN: 73/59/45

Cr: 1.79/1.7/1.57

GFR: 25/26/29



Treatment:   

IVF: ivf bolus   

Other

Several doses of IV Lasix, currently on Lasix 80 mg PO BID

Aldactone 25 mg PO QD



In order to capture the severity of condition, please clarify if the condition 
signifies:



 Acute renal failure, Please specify etiology (if known): 

          Cortical Necrosis

      Medullary Necrosis

       Tubular Necrosis

Acute kidney injury 

Acute on chronic renal failure

   CKD Stage 1 GFR >90

   CKD Stage 2 GFR 60-89

   CKD Stage 3 GFR 30-59

   CKD Stage 4 GFR 15-29

   CKD Stage 5 GFR <15

Chronic renal failure/Chronic Kidney disease (CKD) please stage (if known):

   CKD Stage 1 GFR >90

   CKD Stage 2 GFR 60-89

   CKD Stage 3 GFR 30-59

   CKD Stage 4 GFR 15-29

   CKD Stage 5 GFR <15

ESRD

Other, please specify _________

Unable to determine



(Last Revision: April 2018)











Probable chronic kidney disease stage III from diabetic nephropathy and 
hypertensive nephrosclerosis

___________________________________________________________________________

MTDD

## 2019-12-05 NOTE — P.PN
Progress Note - Text


Progress Note Date: 12/05/19





Due to multiple surgical emergencies today, surgical case is deferred. In the 

interim, recommend increase protein stores and albumin with high protein diet as

patient has been NPO and liquid diet over 72 hrs. All questions were reviewed 

with the patient and her family who were agreeable with the care plan.

## 2019-12-05 NOTE — P.PN
Subjective


Progress Note Date: 12/05/19


Principal diagnosis: 





abdominal mass





In follow-up today patient denies any progressive symptoms or new complaints.





Objective





- Vital Signs


Vital signs: 


                                   Vital Signs











Temp  97.9 F   12/05/19 11:43


 


Pulse  78   12/05/19 11:43


 


Resp  17   12/05/19 11:43


 


BP  110/55   12/05/19 11:43


 


Pulse Ox  96   12/05/19 11:43








                                 Intake & Output











 12/04/19 12/05/19 12/05/19





 18:59 06:59 18:59


 


Intake Total 150  900


 


Balance 150  900


 


Intake:   


 


    


 


  Intake, IV Titration   900





  Amount   


 


    Sodium Chloride 0.9% 1,   800





    000 ml @ 100 mls/hr IV .   





    Q10H DAVID Rx#:[id number]   


 


    Sodium Ferric Gluconat-   100





    Sucrose 125 mg In Sodium   





    Chloride 0.9% 100 ml @   





    100 mls/hr IVPB DAILY DAVID   





    Rx#:[id number]   


 


  Oral   0


 


Other:   


 


  Voiding Method Bedside Commode Bedside Commode 


 


  # Voids 3 2 6


 


  # Bowel Movements  1 














- Constitutional


General appearance: Present: cooperative, no acute distress, obese





- EENT


Eyes: Present: anicteric sclerae


ENT: Present: hearing grossly normal





- Respiratory


Details: 





respirations even and unlabored





- Cardiovascular


Details: 





skin warm and dry





- Gastrointestinal


General gastrointestinal: Present: soft





- Musculoskeletal


Musculoskeletal: Present: strength equal bilaterally





- Psychiatric


Psychiatric: Present: A&O x's 3, appropriate affect, intact judgment & insight





- Labs


CBC & Chem 7: 


                                 12/05/19 09:13





                                 12/05/19 09:13


Labs: 


                  Abnormal Lab Results - Last 24 Hours (Table)











  12/04/19 12/05/19 12/05/19 Range/Units





  20:19 09:13 09:13 


 


RBC   2.67 L   (3.80-5.40)  m/uL


 


Hgb   8.2 L   (11.4-16.0)  gm/dL


 


Hct   25.1 L   (34.0-46.0)  %


 


RDW   16.1 H   (11.5-15.5)  %


 


Chloride    108 H  ()  mmol/L


 


Carbon Dioxide    21 L  (22-30)  mmol/L


 


BUN    45 H  (7-17)  mg/dL


 


Creatinine    1.57 H  (0.52-1.04)  mg/dL


 


Glucose    145 H  (74-99)  mg/dL


 


POC Glucose (mg/dL)  272 H    (75-99)  mg/dL


 


Total Bilirubin    1.4 H  (0.2-1.3)  mg/dL


 


Alkaline Phosphatase    149 H  ()  U/L


 


Total Protein    5.5 L  (6.3-8.2)  g/dL


 


Albumin    3.1 L  (3.5-5.0)  g/dL














  12/05/19 12/05/19 Range/Units





  11:34 16:52 


 


RBC    (3.80-5.40)  m/uL


 


Hgb    (11.4-16.0)  gm/dL


 


Hct    (34.0-46.0)  %


 


RDW    (11.5-15.5)  %


 


Chloride    ()  mmol/L


 


Carbon Dioxide    (22-30)  mmol/L


 


BUN    (7-17)  mg/dL


 


Creatinine    (0.52-1.04)  mg/dL


 


Glucose    (74-99)  mg/dL


 


POC Glucose (mg/dL)  147 H  137 H  (75-99)  mg/dL


 


Total Bilirubin    (0.2-1.3)  mg/dL


 


Alkaline Phosphatase    ()  U/L


 


Total Protein    (6.3-8.2)  g/dL


 


Albumin    (3.5-5.0)  g/dL














Assessment and Plan


(1) GI bleed


Narrative/Plan: 


Patient has had in the past.  Last colonoscopy 5 years ago.  Patient has history

of polyps.  Surgery consult


Hemoglobin not requiring transfusion at this time.  Transfuse to keep hemoglobin

7 or higher.  Patient also has a history of chronic renal insufficiency from 

chart review, which could be contributing to her anemia as well. 


Current Visit: Yes   Status: Acute   Priority: High   Code(s): K92.2 - 

GASTROINTESTINAL HEMORRHAGE, UNSPECIFIED   SNOMED Code(s): 69299619


   





(2) Anemia


Narrative/Plan: 


Anemia workup reveled iron deficiency.  IV iron was given


Current Visit: Yes   Status: Chronic   Priority: Medium   Code(s): D64.9 - 

ANEMIA, UNSPECIFIED   SNOMED Code(s): 885074086


   





(3) Abdominal mass


Narrative/Plan: 


Dr. Nelson discussed the case with the surgical team.  He also reviewed with the 

patient the situation.  Patient does have evidence of metastatic disease.  

Patient aware that surgery will not cure her disease.  She is not sure if she is

interested in pursuing any chemotherapy.  Patient is going to proceed with surg

lauren.  She can follow up with oncology about 4-6 weeks postoperatively to discuss

any other questions, concerns or relevant therapies.


Current Visit: Yes   Status: Acute   Priority: High   Code(s): R19.00 - INTRA-

ABD AND PELVIC SWELLING, MASS AND LUMP, UNSP SITE   SNOMED Code(s): 318393195


   


Plan: 





Doctor attests:  I performed a history and physical examination of this patient,

developed impression and plan of care.  Discussed with dictator.  I agree with 

dictators note, documented as a scribe.

## 2019-12-05 NOTE — P.PN
Progress Note - Text


Progress Note Date: 12/05/19





Chief Complaint: Lower GI bleed





History of presenting complaint:


This is a very pleasant 89-year-old patient who follows with visiting physicians

Dr. Ochoa.  Chronic stable medical conditions include congestive heart failure 

with EF of 50%, aortic stenosis, mitral regurgitation, mitral stenosis, 

tricuspid regurgitation, secondary probably hypertension, diabetes, 

hypertension, osteoarthritis, hypothyroid, blind left eye, coronary artery 

disease, Carley filter..  Patient does use a walker.  Patient did have a 

computed tomography scan of the abdomen on November 26 and did show mass along 

the lateral wall of the ascending colon and also adjacent soft tissue omental 

mass and is also another additional mass present in intra-abdominally.  

Suggestive of metastatic disease.  Patient yesterday had some bleeding per the 

right rectum.  And decided to come in.  Patient's been having lower abdominal 

discomfort.  She was admitted to the ER.  Patient normally does use a walker.  

Lives alone.  Colonoscopy done on December 4 shows ascending colon mass and AV 

malformation was some slight bleeding.


Today-.  The patient does morning.  Laying in bed.  Tired.  Pending surgery 

later today.  No new issues.





Review of systems: Was done for constitutional, cardiovascular, GI, pulmonary. 

relevant finding as above





Active Medications





Acetaminophen (Tylenol Tab)  650 mg PO Q6HR PRN


   PRN Reason: Fever and/ or Pain


   Last Admin: 12/05/19 11:06 Dose:  650 mg


   Documented by: 


Allopurinol (Zyloprim)  100 mg PO BID [facility]


   Last Admin: 12/05/19 08:06 Dose:  100 mg


   Documented by: 


Carvedilol (Coreg)  3.125 mg PO BID-W/MEALS [facility]


   Last Admin: 12/05/19 17:39 Dose:  3.125 mg


   Documented by: 


Furosemide (Lasix)  80 mg PO BID@0900,1700 [facility]


   Last Admin: 12/05/19 17:38 Dose:  80 mg


   Documented by: 


Lactated Ringer's (Lactated Ringers)  1,000 mls @ 20 mls/hr IV .Q24H [facility]


   Last Admin: 12/05/19 05:50 Dose:  Not Given


   Documented by: 


Sodium Chloride (Saline 0.9%)  1,000 mls @ 100 mls/hr IV .Q10H [facility]


   Last Admin: 12/05/19 08:17 Dose:  100 mls/hr


   Documented by: 


Lactated Ringer's (Lactated Ringers)  1,000 mls @ 20 mls/hr IV .Q24H [facility]


   Last Admin: 12/04/19 21:04 Dose:  Not Given


   Documented by: 


Insulin Aspart (Novolog)  0 unit SQ ACHS [facility]; Protocol


   Last Admin: 12/05/19 17:42 Dose:  1 unit


   Documented by: 


Insulin Detemir (Levemir)  10 unit SQ DAILY@0700 [facility]


   Last Admin: 12/05/19 07:50 Dose:  Not Given


   Documented by: 


Levothyroxine Sodium (Synthroid)  75 mcg PO DAILY@0630 [facility]


   Last Admin: 12/05/19 08:06 Dose:  75 mcg


   Documented by: 


Meclizine HCl (Antivert)  12.5 mg PO TID PRN


   PRN Reason: NAUSEA AND DIZZINESS


Melatonin (Melatonin)  2 mg PO HS PRN


   PRN Reason: Insomnia


   Last Admin: 12/04/19 22:22 Dose:  2 mg


   Documented by: 


Potassium Chloride (K-Dur 10)  10 meq PO BID [facility]


   Last Admin: 12/05/19 08:06 Dose:  10 meq


   Documented by: 


Sodium Bicarbonate (Sodium Bicarbonate Tab)  650 mg PO BID [facility]


   Last Admin: 12/05/19 08:06 Dose:  650 mg


   Documented by: 


Spironolactone (Aldactone)  25 mg PO BID [facility]


   Last Admin: 12/05/19 08:06 Dose:  25 mg


   Documented by: 











Physical examination:


VITAL SIGNS: 97.9, 78, 70, 110/55, 96% room air


GENERAL: Laying in bed, awake


EYES: Pupils equal.  Conjunctiva normal.


HEENT: External appearance of nose and ears normal, oral cavity grossly normal.


NECK: JVD not raised; masses not palpable.


HEART: First and second heart sounds are normal;  no edema.  


LUNGS: Respiratory rate normal; clear to auscultation.  


ABDOMEN: Soft, mass palpable on the right side, with some tenderness,, liver 

spleen not palpable, no masses palpable.  


PSYCH: Alert and oriented x3;  mood  and affect normal.  


MUSCULOSKELETAL: Evidence of OA especially in the hands and knees





INVESTIGATIONS, reviewed in the clinical context:


White count 8.4 hemoglobin 8.2 platelets 232 progression bun 59 creatinine 1.7





Previous testing


White count 8.6 hemoglobin 8.2 platelets 242 progression 4.2 bun 45 creatinine 

1.57





Previous testing


White count 9.6 hemoglobin 9.2 platelets 240 progression 4.7 BUN 73 creatinine 

1.79


Computed tomography scan of the abdomen-results noted as above





Assessment:


-Ascending colon mass, per coloscopy, pending surgery


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


-Coronary artery disease with prior history of bypass


-Probable chronic kidney disease from diabetic nephropathy and hypertensive 

nephrosclerosis


-Normocytic anemia suspected from chronic disease of malignancy


-Diabetes mellitus type 2


-Essential hypertension


-Primary osteoarthritis


-Chronic gout


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


-Macular degeneration of right eye


-Colonic diverticulosis 


-obstructive sleep apnea did use CPAP machine in the past





Plan


Talk to the patient does morning.  Awaiting to go down for surgery this 

afternoon.  Other medications to continue.

## 2019-12-06 LAB
GLUCOSE BLD-MCNC: 152 MG/DL (ref 75–99)
GLUCOSE BLD-MCNC: 161 MG/DL (ref 75–99)
GLUCOSE BLD-MCNC: 189 MG/DL (ref 75–99)
GLUCOSE BLD-MCNC: 216 MG/DL (ref 75–99)

## 2019-12-06 RX ADMIN — ALLOPURINOL SCH MG: 100 TABLET ORAL at 21:10

## 2019-12-06 RX ADMIN — POTASSIUM CHLORIDE SCH: 14.9 INJECTION, SOLUTION INTRAVENOUS at 04:31

## 2019-12-06 RX ADMIN — CEFAZOLIN SCH: 330 INJECTION, POWDER, FOR SOLUTION INTRAMUSCULAR; INTRAVENOUS at 06:25

## 2019-12-06 RX ADMIN — INSULIN ASPART SCH: 100 INJECTION, SOLUTION INTRAVENOUS; SUBCUTANEOUS at 18:04

## 2019-12-06 RX ADMIN — ACETAMINOPHEN PRN MG: 325 TABLET, FILM COATED ORAL at 21:10

## 2019-12-06 RX ADMIN — CEFAZOLIN SCH MLS/HR: 330 INJECTION, POWDER, FOR SOLUTION INTRAMUSCULAR; INTRAVENOUS at 07:41

## 2019-12-06 RX ADMIN — INSULIN ASPART SCH UNIT: 100 INJECTION, SOLUTION INTRAVENOUS; SUBCUTANEOUS at 11:27

## 2019-12-06 RX ADMIN — POTASSIUM CHLORIDE SCH MEQ: 750 TABLET, EXTENDED RELEASE ORAL at 21:10

## 2019-12-06 RX ADMIN — CARVEDILOL SCH MG: 3.12 TABLET, FILM COATED ORAL at 16:38

## 2019-12-06 RX ADMIN — CARVEDILOL SCH MG: 3.12 TABLET, FILM COATED ORAL at 07:33

## 2019-12-06 RX ADMIN — Medication SCH MG: at 21:10

## 2019-12-06 RX ADMIN — INSULIN ASPART SCH UNIT: 100 INJECTION, SOLUTION INTRAVENOUS; SUBCUTANEOUS at 07:34

## 2019-12-06 RX ADMIN — SPIRONOLACTONE SCH MG: 25 TABLET, FILM COATED ORAL at 21:10

## 2019-12-06 RX ADMIN — POTASSIUM CHLORIDE SCH MEQ: 750 TABLET, EXTENDED RELEASE ORAL at 07:33

## 2019-12-06 RX ADMIN — FUROSEMIDE SCH MG: 80 TABLET ORAL at 16:38

## 2019-12-06 RX ADMIN — INSULIN DETEMIR SCH UNIT: 100 INJECTION, SOLUTION SUBCUTANEOUS at 07:33

## 2019-12-06 RX ADMIN — LEVOTHYROXINE SODIUM SCH MCG: 75 TABLET ORAL at 06:24

## 2019-12-06 RX ADMIN — INSULIN ASPART SCH UNIT: 100 INJECTION, SOLUTION INTRAVENOUS; SUBCUTANEOUS at 21:10

## 2019-12-06 RX ADMIN — ALLOPURINOL SCH MG: 100 TABLET ORAL at 07:33

## 2019-12-06 RX ADMIN — POTASSIUM CHLORIDE SCH: 14.9 INJECTION, SOLUTION INTRAVENOUS at 22:27

## 2019-12-06 RX ADMIN — ACETAMINOPHEN PRN MG: 325 TABLET, FILM COATED ORAL at 10:24

## 2019-12-06 RX ADMIN — Medication SCH MG: at 07:32

## 2019-12-06 RX ADMIN — SPIRONOLACTONE SCH MG: 25 TABLET, FILM COATED ORAL at 07:33

## 2019-12-06 RX ADMIN — FUROSEMIDE SCH MG: 80 TABLET ORAL at 07:34

## 2019-12-06 RX ADMIN — CEFAZOLIN SCH MLS/HR: 330 INJECTION, POWDER, FOR SOLUTION INTRAMUSCULAR; INTRAVENOUS at 21:30

## 2019-12-06 NOTE — P.PN
Progress Note - Text


Progress Note Date: 12/03/19





Chief Complaint: Lower GI bleed





History of presenting complaint:


This is a very pleasant 89-year-old patient who follows with visiting physicians

Dr. Ochoa.  Chronic stable medical conditions include congestive heart failure 

with EF of 50%, aortic stenosis, mitral regurgitation, mitral stenosis, 

tricuspid regurgitation, secondary probably hypertension, diabetes, 

hypertension, osteoarthritis, hypothyroid, blind left eye, coronary artery 

disease, Carley filter..  Patient does use a walker.  Patient did have a 

computed tomography scan of the abdomen on November 26 and did show mass along 

the lateral wall of the ascending colon and also adjacent soft tissue omental 

mass and is also another additional mass present in intra-abdominally.  

Suggestive of metastatic disease.  Patient yesterday had some bleeding per the 

right rectum.  And decided to come in.  Patient's been having lower abdominal 

discomfort.  She was admitted to the ER.  Patient normally does use a walker.  

Lives alone.


Today-laying in bed.  Tired.  No new issues.





Review of systems: Was done for constitutional, cardiovascular, GI, pulmonary. 

relevant finding as above





Current medications reviewed in today's electronic records








Physical examination:


VITAL SIGNS: 98.1, 71, 17, 105/68, 95% room air


GENERAL: Laying in bed, awake slightly anxious.


EYES: Pupils equal.  Conjunctiva normal.


HEENT: External appearance of nose and ears normal, oral cavity grossly normal.


NECK: JVD not raised; masses not palpable.


HEART: First and second heart sounds are normal;  no edema.  


LUNGS: Respiratory rate normal; clear to auscultation.  


ABDOMEN: Soft, mass palpable on the right side, with some tenderness,, liver 

spleen not palpable, no masses palpable.  


PSYCH: Alert and oriented x3;  mood  and affect normal.  


MUSCULOSKELETAL: Evidence of OA especially in the hands and knees





INVESTIGATIONS, reviewed in the clinical context:


White count 8.4 hemoglobin 8.1 platelets 214





Previous testing


White count 9.6 hemoglobin 9.2 platelets 240 progression 4.7 BUN 73 creatinine 

1.79


Computed tomography scan of the abdomen-results noted as above


In 17 ferritin 38 CTA elevated at 299 CA-125 antigen elevated at 256 vitamin B12

3270





Assessment:


-Acute lower GI bleed most likely from malignancy.  Note that is fresh blood 

which could be a diverticular bleed.


-Intra-abdominal malignancy with metastatic appearance, does not have a 

diagnosis at present.


-Coronary artery disease with prior history of bypass


-Diabetes mellitus type 2


-Essential hypertension


-Primary osteoarthritis


-Chronic gout


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


-Macular degeneration of right eye


-Colonic diverticulosis obstructive sleep apnea did use CPAP machine in the past





Plan:


Awaiting further evaluation by oncology/Gen. surgery pending colonoscopy for 

tissue diagnosis.  Other medications to continue.  Care was discussed with the 

patient.

## 2019-12-06 NOTE — P.PN
<Unique Krause A - Last Filed: 12/06/19 13:07>





Subjective


Progress Note Date: 12/06/19





CHIEF COMPLAINT: Abdominal pain





HISTORY OF PRESENT ILLNESS: Patient seen and examined at bedside.  Patient 

reports pain in the middle of her abdomen and also the right upper quadrant 

after eating yesterday.  She denies nausea or vomiting.  Tolerating regular 

diet.  Vital signs stable.





PHYSICAL EXAM: 


VITAL SIGNS: Currently stable.


GENERAL: Well-developed in no acute distress. 


HEENT:  No sclera icterus. Extraocular movements grossly intact.  Moist buccal 

mucosa. 


Head is atraumatic, normocephalic. Hears conversational speech. No nasal 

drainage.


NECK:  Supple without lymphadenopathy.


CHEST:  Non-labored respirations and equal bilateral excursions. 


CARDIOVASCULAR:  Regular rate with regular rhythm.  Palpable 2+ radial pulses.


ABDOMEN:  Soft.  Nondistended. Nontender.


MUSCULOSKELETAL:  No clubbing, cyanosis or edema.


NEUROLOGIC:  No focal or lateralizing signs.  Cranial nerves II through XII 

grossly intact.


PSYCH:  Appropriate affect.  Alert and oriented to person, place and time.


SKIN: Well perfused.  Good skin turgor. 





ASSESSMENT: 


1.  Abnormal computed tomography scan with ascending colon mass


2.  Rectal bleeding





PLAN: 


Continue diet as tolerated today


Patient scheduled for exploratory laparotomy with colectomy tomorrow with Dr. Gutierrez





Nurse practitioner note has been reviewed by physician. Signing provider agrees 

with the documented findings, assessment, and plan of care. 








Objective





- Vital Signs


Vital signs: 


                                   Vital Signs











Temp  97.4 F L  12/06/19 05:44


 


Pulse  68   12/06/19 08:00


 


Resp  16   12/06/19 08:00


 


BP  97/64   12/06/19 05:44


 


Pulse Ox  98   12/06/19 05:44








                                 Intake & Output











 12/05/19 12/06/19 12/06/19





 18:59 06:59 18:59


 


Intake Total 1380 290 


 


Output Total 400  400


 


Balance 980 290 -400


 


Weight   79.9 kg


 


Intake:   


 


  Intake, IV Titration 900  





  Amount   


 


    Sodium Chloride 0.9% 1, 800  





    000 ml @ 100 mls/hr IV .   





    Q10H [facility]ID Rx#:[id number]   


 


    Sodium Ferric Gluconat- 100  





    Sucrose 125 mg In Sodium   





    Chloride 0.9% 100 ml @   





    100 mls/hr IVPB DAILY DAVID   





    Rx#:[id number]   


 


  Oral 480 290 


 


Output:   


 


  Urine 400  400


 


Other:   


 


  Voiding Method Bedside Commode Bedside Commode Bedside Commode


 


  # Voids 6 1 1














- Labs


CBC & Chem 7: 


                                 12/05/19 09:13





                                 12/05/19 09:13


Labs: 


                  Abnormal Lab Results - Last 24 Hours (Table)











  12/05/19 12/05/19 12/06/19 Range/Units





  16:52 21:32 06:58 


 


POC Glucose (mg/dL)  137 H  176 H  152 H  (75-99)  mg/dL














  12/06/19 Range/Units





  11:12 


 


POC Glucose (mg/dL)  189 H  (75-99)  mg/dL














<Minda Gutierrez N - Last Filed: 12/07/19 12:11>





Subjective


Tolerated diet.  We'll proceed with surgical options.








Objective





- Vital Signs


Vital signs: 


                                   Vital Signs











Temp  97.7 F   12/07/19 10:05


 


Pulse  82   12/07/19 10:05


 


Resp  18   12/07/19 10:05


 


BP  110/62   12/07/19 10:05


 


Pulse Ox  94 L  12/07/19 04:38








                                 Intake & Output











 12/06/19 12/07/19 12/07/19





 18:59 06:59 18:59


 


Intake Total 160 900 


 


Output Total 800  


 


Balance -640 900 


 


Weight 79.9 kg 81.7 kg 81.7 kg


 


Intake:   


 


  Intake, IV Titration 160 900 





  Amount   


 


    Lactated Ringers 1,000 ml 160  





    @ 20 mls/hr IV .Q24H DAVID   





    Rx#:[id number]   


 


    Sodium Chloride 0.9% 1,  900 





    000 ml @ 100 mls/hr IV .   





    Q10H DAVID Rx#:[id number]   


 


Output:   


 


  Urine 800  


 


Other:   


 


  Voiding Method Bedside Commode Bedside Commode 


 


  # Voids 1 1 


 


  # Bowel Movements  1 














- Labs


CBC & Chem 7: 


                                 12/05/19 09:13





                                 12/05/19 09:13


Labs: 


                  Abnormal Lab Results - Last 24 Hours (Table)











  12/06/19 12/06/19 12/07/19 Range/Units





  16:53 20:02 07:18 


 


POC Glucose (mg/dL)  161 H  216 H  144 H  (75-99)  mg/dL














  12/07/19 Range/Units





  11:36 


 


POC Glucose (mg/dL)  152 H  (75-99)  mg/dL

## 2019-12-07 LAB
ALBUMIN SERPL-MCNC: 2.4 G/DL (ref 3.5–5)
ALP SERPL-CCNC: 133 U/L (ref 38–126)
ALT SERPL-CCNC: 20 U/L (ref 9–52)
ANION GAP SERPL CALC-SCNC: 5 MMOL/L
AST SERPL-CCNC: 21 U/L (ref 14–36)
BASOPHILS # BLD AUTO: 0 K/UL (ref 0–0.2)
BASOPHILS NFR BLD AUTO: 0 %
BUN SERPL-SCNC: 35 MG/DL (ref 7–17)
CALCIUM SPEC-MCNC: 8.5 MG/DL (ref 8.4–10.2)
CHLORIDE SERPL-SCNC: 115 MMOL/L (ref 98–107)
CO2 BLDA-SCNC: 19 MMOL/L (ref 19–24)
CO2 BLDA-SCNC: 19 MMOL/L (ref 19–24)
CO2 SERPL-SCNC: 19 MMOL/L (ref 22–30)
EOSINOPHIL # BLD AUTO: 0 K/UL (ref 0–0.7)
EOSINOPHIL NFR BLD AUTO: 0 %
ERYTHROCYTE [DISTWIDTH] IN BLOOD BY AUTOMATED COUNT: 3.05 M/UL (ref 3.8–5.4)
ERYTHROCYTE [DISTWIDTH] IN BLOOD: 18.3 % (ref 11.5–15.5)
GLUCOSE BLD-MCNC: 124 MG/DL (ref 75–99)
GLUCOSE BLD-MCNC: 130 MG/DL (ref 75–99)
GLUCOSE BLD-MCNC: 144 MG/DL (ref 75–99)
GLUCOSE BLD-MCNC: 152 MG/DL (ref 75–99)
GLUCOSE SERPL-MCNC: 144 MG/DL (ref 74–99)
HCO3 BLDA-SCNC: 18 MMOL/L (ref 21–25)
HCO3 BLDA-SCNC: 18 MMOL/L (ref 21–25)
HCT VFR BLD AUTO: 29.6 % (ref 34–46)
HGB BLD-MCNC: 9.1 GM/DL (ref 11.4–16)
HYALINE CASTS UR QL AUTO: 6 /LPF (ref 0–2)
LYMPHOCYTES # SPEC AUTO: 1.1 K/UL (ref 1–4.8)
LYMPHOCYTES NFR SPEC AUTO: 6 %
MAGNESIUM SPEC-SCNC: 2 MG/DL (ref 1.6–2.3)
MCH RBC QN AUTO: 29.9 PG (ref 25–35)
MCHC RBC AUTO-ENTMCNC: 30.8 G/DL (ref 31–37)
MCV RBC AUTO: 97.1 FL (ref 80–100)
MONOCYTES # BLD AUTO: 0.8 K/UL (ref 0–1)
MONOCYTES NFR BLD AUTO: 5 %
NEUTROPHILS # BLD AUTO: 14.9 K/UL (ref 1.3–7.7)
NEUTROPHILS NFR BLD AUTO: 88 %
PCO2 BLDA: 36 MMHG (ref 35–45)
PCO2 BLDA: 36 MMHG (ref 35–45)
PH BLDA: 7.31 [PH] (ref 7.35–7.45)
PH BLDA: 7.31 [PH] (ref 7.35–7.45)
PH UR: 5.5 [PH] (ref 5–8)
PLATELET # BLD AUTO: 282 K/UL (ref 150–450)
PO2 BLDA: 151 MMHG (ref 83–108)
PO2 BLDA: 324 MMHG (ref 83–108)
POTASSIUM SERPL-SCNC: 4.7 MMOL/L (ref 3.5–5.1)
PROT SERPL-MCNC: 4.4 G/DL (ref 6.3–8.2)
PROT UR QL: (no result)
RBC UR QL: >182 /HPF (ref 0–5)
SODIUM SERPL-SCNC: 139 MMOL/L (ref 137–145)
SP GR UR: 1.02 (ref 1–1.03)
SQUAMOUS UR QL AUTO: <1 /HPF (ref 0–4)
UROBILINOGEN UR QL STRIP: <2 MG/DL (ref ?–2)
WBC # BLD AUTO: 16.9 K/UL (ref 3.8–10.6)
WBC # UR AUTO: 164 /HPF (ref 0–5)

## 2019-12-07 PROCEDURE — 0DNE0ZZ RELEASE LARGE INTESTINE, OPEN APPROACH: ICD-10-PCS

## 2019-12-07 PROCEDURE — 0DQV0ZZ REPAIR MESENTERY, OPEN APPROACH: ICD-10-PCS

## 2019-12-07 PROCEDURE — 0DTF0ZZ RESECTION OF RIGHT LARGE INTESTINE, OPEN APPROACH: ICD-10-PCS

## 2019-12-07 PROCEDURE — 0WQF0ZZ REPAIR ABDOMINAL WALL, OPEN APPROACH: ICD-10-PCS

## 2019-12-07 PROCEDURE — 0DBA0ZX EXCISION OF JEJUNUM, OPEN APPROACH, DIAGNOSTIC: ICD-10-PCS

## 2019-12-07 PROCEDURE — 03HY32Z INSERTION OF MONITORING DEVICE INTO UPPER ARTERY, PERCUTANEOUS APPROACH: ICD-10-PCS

## 2019-12-07 PROCEDURE — 4A133J1 MONITORING OF ARTERIAL PULSE, PERIPHERAL, PERCUTANEOUS APPROACH: ICD-10-PCS

## 2019-12-07 PROCEDURE — 0DN80ZZ RELEASE SMALL INTESTINE, OPEN APPROACH: ICD-10-PCS

## 2019-12-07 PROCEDURE — 0D9670Z DRAINAGE OF STOMACH WITH DRAINAGE DEVICE, VIA NATURAL OR ARTIFICIAL OPENING: ICD-10-PCS

## 2019-12-07 PROCEDURE — 4A133B1 MONITORING OF ARTERIAL PRESSURE, PERIPHERAL, PERCUTANEOUS APPROACH: ICD-10-PCS

## 2019-12-07 PROCEDURE — 0DBB0ZX EXCISION OF ILEUM, OPEN APPROACH, DIAGNOSTIC: ICD-10-PCS

## 2019-12-07 PROCEDURE — 0DBL0ZX EXCISION OF TRANSVERSE COLON, OPEN APPROACH, DIAGNOSTIC: ICD-10-PCS

## 2019-12-07 RX ADMIN — INSULIN ASPART SCH: 100 INJECTION, SOLUTION INTRAVENOUS; SUBCUTANEOUS at 12:33

## 2019-12-07 RX ADMIN — CARVEDILOL SCH MG: 3.12 TABLET, FILM COATED ORAL at 07:48

## 2019-12-07 RX ADMIN — SPIRONOLACTONE SCH: 25 TABLET, FILM COATED ORAL at 07:53

## 2019-12-07 RX ADMIN — FUROSEMIDE SCH: 80 TABLET ORAL at 07:53

## 2019-12-07 RX ADMIN — INSULIN ASPART SCH: 100 INJECTION, SOLUTION INTRAVENOUS; SUBCUTANEOUS at 17:31

## 2019-12-07 RX ADMIN — PROPOFOL SCH MLS/HR: 10 INJECTION, EMULSION INTRAVENOUS at 19:00

## 2019-12-07 RX ADMIN — CEFAZOLIN SCH: 330 INJECTION, POWDER, FOR SOLUTION INTRAMUSCULAR; INTRAVENOUS at 22:21

## 2019-12-07 RX ADMIN — POTASSIUM CHLORIDE SCH: 750 TABLET, EXTENDED RELEASE ORAL at 23:13

## 2019-12-07 RX ADMIN — ALLOPURINOL SCH: 100 TABLET ORAL at 23:13

## 2019-12-07 RX ADMIN — POTASSIUM CHLORIDE SCH: 750 TABLET, EXTENDED RELEASE ORAL at 07:54

## 2019-12-07 RX ADMIN — POTASSIUM CHLORIDE SCH MEQ: 750 TABLET, EXTENDED RELEASE ORAL at 22:29

## 2019-12-07 RX ADMIN — SPIRONOLACTONE SCH: 25 TABLET, FILM COATED ORAL at 23:13

## 2019-12-07 RX ADMIN — SPIRONOLACTONE SCH MG: 25 TABLET, FILM COATED ORAL at 23:00

## 2019-12-07 RX ADMIN — ALLOPURINOL SCH: 100 TABLET ORAL at 07:52

## 2019-12-07 RX ADMIN — Medication SCH: at 23:13

## 2019-12-07 RX ADMIN — INSULIN ASPART SCH: 100 INJECTION, SOLUTION INTRAVENOUS; SUBCUTANEOUS at 22:20

## 2019-12-07 RX ADMIN — INSULIN ASPART SCH: 100 INJECTION, SOLUTION INTRAVENOUS; SUBCUTANEOUS at 07:52

## 2019-12-07 RX ADMIN — POTASSIUM CHLORIDE SCH: 14.9 INJECTION, SOLUTION INTRAVENOUS at 00:53

## 2019-12-07 RX ADMIN — CHLORHEXIDINE GLUCONATE SCH ML: 1.2 RINSE ORAL at 22:24

## 2019-12-07 RX ADMIN — CEFAZOLIN SCH MLS/HR: 330 INJECTION, POWDER, FOR SOLUTION INTRAMUSCULAR; INTRAVENOUS at 07:49

## 2019-12-07 RX ADMIN — NOREPINEPHRINE BITARTRATE SCH MLS/HR: 1 INJECTION, SOLUTION, CONCENTRATE INTRAVENOUS at 20:00

## 2019-12-07 RX ADMIN — LEVOTHYROXINE SODIUM SCH: 75 TABLET ORAL at 07:27

## 2019-12-07 RX ADMIN — Medication SCH: at 07:54

## 2019-12-07 RX ADMIN — CARVEDILOL SCH: 3.12 TABLET, FILM COATED ORAL at 18:16

## 2019-12-07 RX ADMIN — INSULIN DETEMIR SCH: 100 INJECTION, SOLUTION SUBCUTANEOUS at 07:52

## 2019-12-07 RX ADMIN — Medication SCH MG: at 22:29

## 2019-12-07 RX ADMIN — POTASSIUM CHLORIDE SCH: 14.9 INJECTION, SOLUTION INTRAVENOUS at 20:24

## 2019-12-07 RX ADMIN — FUROSEMIDE SCH: 80 TABLET ORAL at 18:15

## 2019-12-07 RX ADMIN — ALLOPURINOL SCH MG: 100 TABLET ORAL at 22:29

## 2019-12-07 NOTE — P.PN
Subjective


Progress Note Date: 12/07/19




















CHIEF COMPLAINT: Large colon mass with bleeding





HISTORY OF PRESENT ILLNESS: The patient is a 89-year-old female who presented 

with acute onset right lower quadrant abdominal pain including rectal bleeding 

moderate.  Diagnostic studies are consistent with large colonic mass with 

multiple intra-abdominal deposits.  Patient reports she does not want 

chemotherapy.  Family is at bedside.  She's been able to tolerate diet.  She 

still reports right upper right lower quadrant abdominal pain.





ROS: No reports of nausea and vomiting.  No fevers or chills.  No new chest 

pain.  No productive sputum





PHYSICAL EXAM: 


VITAL SIGNS: Reviewed


CONSTITUTIONAL:  Well developed and in no acute distress. 


EYES:  Conjuctivae without sclera icterus. Extraocular movements grossly intact.




HEAD, EARS, NOSE, THROAT: Moist buccal mucosa. Head is atraumatic, 

normocephalic. Hears conversational speech. No nasal drainage.


RESPIRATORY:  Non-labored respirations and equal bilateral excursions.


CARDIOVASCULAR:  Palpable 2+ radial pulses.  Regular rate.  Regular rhythm.


ABDOMEN: Soft, no peritonitis.  Tender right lower quadrant.


MUSCULOSKELETAL:  No gross deformity of the lower extremities noted.  No 

clubbing.  No cyanosis.  2+ bilateral lower extremity edema


SKIN: Good skin turgor. Well perfused. 


NEUROLOGIC: Cranial nerves I through XII grossly intact.  No focal or 

lateralizing signs. 


PSYCH:  Appropriate affect.  Alert and oriented to person, place and time. 





CLINICAL LABS: White blood cell count 





PATHOLOGY: Consistent with tubulovillous adenoma sample of large colon mass





ASSESSMENT: 


1.  Colon cancer with large colon mass





PLAN: 


1.  She has multiple family members including her children at bedside for 

support.


2.  Palliative colectomy described secondary to ongoing pain and anemia with 

bleeding per patient's wishes


3.  I personally spoke to anesthesia provider where  patient may have a few ice 

chips prior to surgery.





Objective





- Vital Signs


Vital signs: 


                                   Vital Signs











Temp  97.7 F   12/07/19 10:05


 


Pulse  82   12/07/19 10:05


 


Resp  18   12/07/19 10:05


 


BP  110/62   12/07/19 10:05


 


Pulse Ox  94 L  12/07/19 04:38








                                 Intake & Output











 12/06/19 12/07/19 12/07/19





 18:59 06:59 18:59


 


Intake Total 160 900 


 


Output Total 800  


 


Balance -640 900 


 


Weight 79.9 kg 81.7 kg 81.7 kg


 


Intake:   


 


  Intake, IV Titration 160 900 





  Amount   


 


    Lactated Ringers 1,000 ml 160  





    @ 20 mls/hr IV .Q24H [facility]   





    Rx#:[id number]   


 


    Sodium Chloride 0.9% 1,  900 





    000 ml @ 100 mls/hr IV .   





    Q10H DAVID Rx#:[id number]   


 


Output:   


 


  Urine 800  


 


Other:   


 


  Voiding Method Bedside Commode Bedside Commode 


 


  # Voids 1 1 


 


  # Bowel Movements  1 














- Labs


CBC & Chem 7: 


                                 12/05/19 09:13





                                 12/05/19 09:13


Labs: 


                  Abnormal Lab Results - Last 24 Hours (Table)











  12/06/19 12/06/19 12/07/19 Range/Units





  16:53 20:02 07:18 


 


POC Glucose (mg/dL)  161 H  216 H  144 H  (75-99)  mg/dL














  12/07/19 Range/Units





  11:36 


 


POC Glucose (mg/dL)  152 H  (75-99)  mg/dL














Assessment and Plan


(1) Abdominal mass


Current Visit: Yes   Status: Acute   Priority: High   Code(s): R19.00 - INTRA-

ABD AND PELVIC SWELLING, MASS AND LUMP, UNSP SITE   SNOMED Code(s): 267245038


   





(2) GI bleed


Current Visit: Yes   Status: Acute   Priority: High   Code(s): K92.2 - 

GASTROINTESTINAL HEMORRHAGE, UNSPECIFIED   SNOMED Code(s): 22414639


   





(3) History of colon cancer


Current Visit: Yes   Status: Acute   Code(s): Z85.038 - PERSONAL HISTORY OF 

MALIGNANT NEOPLASM OF LARGE INTESTINE   SNOMED Code(s): 842855205


   





(4) Anemia


Current Visit: Yes   Status: Chronic   Priority: Medium   Code(s): D64.9 - 

ANEMIA, UNSPECIFIED   SNOMED Code(s): 558192744

## 2019-12-07 NOTE — XR
EXAMINATION TYPE: XR chest 1V portable

 

DATE OF EXAM: 12/7/2019

 

COMPARISON: Chest x-ray 8/17/2019

 

HISTORY: Tube placement

 

TECHNIQUE: Single frontal view of the chest is obtained.

 

FINDINGS:  

Endotracheal tube terminates 1 cm from the sarai, retraction by approximately 2 cm is recommended. E
nteric feeding tube with the side port terminating in the distal esophagus, advancement by at least 1
0 cm is recommended.

 

2-lead cardiac pacemaker with left chest wall power pack is stable in position.

 

Cardiac silhouette is not enlarged. Mild pulmonary vascular congestion. Coarse calcifications project
ing over the heart are likely valvular in etiology. Cardiac valve also noted. Atherosclerotic calcifi
cations of the aorta. Streak-like by basilar opacities are favored to represent atelectasis. No pneum
othorax.

 

IMPRESSION:  

1. Low position of endotracheal tube, retraction by approximately 2 cm is recommended.

2. Premature termination of enteric feeding tube, advancement by approximately 10 cm is recommended.

3. Streak-like by basilar opacities are favored to represent atelectasis, attention on follow-up imag
ing.

4. Mild pulmonary vascular congestion.

## 2019-12-07 NOTE — P.HPADDEND
H&P Addendum


H&P Addendum Date: 12/07/19





We'll proceed with exploratory laparotomy colectomy, possible ostomy.  Benefits 

risks proceeded described to patient and family who agreed to proceed.

## 2019-12-07 NOTE — P.ANPRN
Procedure Note - Anesthesia





- Invasive Line


  ** Right Arterial Line


Time Out Performed: Yes


Date of Procedure: 12/07/19


Time of Procedure: 13:18


Location of Patient: PreOp


Preparation: Sterile Prep, Sterile Dressing


Arterial Line Location: Radial


Ultrasound Used: No


Narrative: 


arterial line placement per sterile protocol utilized.

## 2019-12-07 NOTE — P.OP
Date of Procedure: 12/07/19


Description of Procedure: 











PREOPERATIVE DIAGNOSES:


1.  Ascending colon mass with rectal bleeding


2.  Right lower quadrant abdominal pain


3.  Chronic iron deficiency anemia


4.  Congestive heart failure, diastolic, moderate


5.  History of colon resection


6.  Ischemic cardiomyopathy


7.  History of atrial fibrillation


8.  Past history of pulmonary embolism with [place] filter placement


9.  Obstructive sleep apnea


10.  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


11.  Hypertensive heart disease


12.  History of CABG


13.  History of aortic valvular replacement 


14.  Depressive disorder


15.  Gout


16.  Permanent pacemaker


17.  Hypothyroidism





POSTOP DIAGNOSES:


1.  Ascending colon mass with rectal bleeding


2.  Right lower quadrant abdominal pain


3.  Chronic iron deficiency anemia


4.  Congestive heart failure, diastolic, moderate


5.  History of colon resection


6.  Ischemic cardiomyopathy


7.  History of atrial fibrillation


8.  Past history of pulmonary embolism with [place] filter placement


9.  Obstructive sleep apnea


10.  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


11.  Hypertensive heart disease


12.  History of CABG


13.  History of aortic valvular replacement 


14.  Depressive disorder


15.  Gout


16.  Permanent pacemaker


17.  Hypothyroidism


18.  Small bowel obstruction due to tumor and pelvis and internal hernia


19.  Incisional hernia epigastrium, 5 cm


20.  Colon cancer recurrence of abdominal wall, right lower quadrant


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





PROCEDURES PERFORMED:


1.  Exploratory laparotomy with lysis of adhesions over 2-1/2 to 3 hours


2.  Extensive right hemicolectomy


3.  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


4.  Takedown of internal hernia pelvis


5.  Small bowel resection distal jejunum to ileum, over 2 feet


6.  Repair of incisional hernia, mid abdomen, 5-cm


7.  Placement of round #19 drain left lower quadrant


8.  Placement of incisional wound VAC system PREVENA, 20 cm 





SURGEON:


Dr. Minda Gutierrez.





ANESTHESIA:


General with epidural.





ESTIMATED BLOOD LOSS:


300 mL.





SPECIMENS:


1.  Distal jejunum to ileum small bowel resection


2.  Transverse mesocolon mass 4 cm


3.  Extended right colectomy


4.  Anastomosis





COMPLICATIONS:


None.





CONDITION:


Guarded.





INDICATIONS: The patient is a 89-year-old female who reports over 2-3 months 

history of right lower quadrant abdominal pain.  As of recent, she had imaging 

studies demonstrating ascending colon mass.  She also presented with rectal 

bleeding and anemia.  Because of findings of likely metastatic disease of 

unclear etiology, patient was given options including nonsurgical, chemotherapy.

 She declined chemotherapy. She opted for surgical intervention for palliation 

for symptomatic right lower quadrant pain and rectal bleeding.  Benefits, risks 

of procedure including bleeding, colostomy, infection, bowel resection were 

described in detail.  Informed consent was obtained.





DESCRIPTION:


The patient was brought to the operating room.  An epidural was placed per 

Anesthesia.





After general induction, a Louise catheter was placed.  The abdomen had been 

prepped and


draped in standard sterile fashion including placement of [place] draping.  Prior 

to


incision, a time-out protocol was confirmed with surgical team regarding the 

patient's name and


procedures being performed.  





Initial attention was brought to this previous cicatrix of the mid to lower 

abdomen where a 5-cm epigastric midline incisional hernia was found.  A


#10 blade was used to enter the previous incision and deepened into the 

subcutaneous tissue.  From and along the length of the incision,  severe 

adhesions were found involving the small bowel and colon.  Carefully the abdomen

was entered using a combination of blunt dissection as well as Metzenbaum 

scissors and a #10 blade.  Extensive lysis of adhesions occurred well over 2 

hours upon entry into the abdomen to avoid any enterotomies or colotomies. 





Immediately, a 4 to 5 cm transverse mesocolon mass was found adjacent to the 

mid-transverse colon. The mesocolon mass was resected with Enseal. Tattoo along 

the hepatic flexure was found and prepared for resection. Five (5) cm proximal 

to the tatoo, the mesentery window was created. The colon was prepared for 

resection using Covidien 60-mm purple load. The right colon mass over 10-cm was 

palpated and mobilized using Vessel sealer.  Seperately, ingrowth of tumor was 

found recurrent along a prior ostomy closure site along the abdominal wall of 

the right lower quadrant. The mass was resected with cautery. A strawberry like 

lesion along the serosa of the ascending colon, firm, calcified and easily 

friable was removed in total to the prior ileocolic anastomosis. The anastomosis

was resected using 60-mm Covidien purple loads. The tumor invaded to the 

adjacent structures including right pelvis and abdominal wall and also resected.







A point of resection was along the mid to distal jejunum.  A 3-0 silk suture was

placed along the


antimesenteric border.  Next the bowel was resected using a 60 mm Covidien 

purple staple load.  





On further inspection, an active internal hernia of the distal ileum with dense 

complex interwoven adhesions were found and ischemic with enteric spillage. 

Additionally, in the pelvis above the bladder, a tumor mass was found tethering 

the distal jejunum and ileum in a spiraling fashion. The small bowel was 

dissected along its mesentery


using Vessel sealer.  Next enterotomies were made along the antimesenteric 

border and a stapler 60 mm purple load was fired to create neolumen.  The 

enterotomy was closed using similar stapler loads.  





The abdomen was copiously irrigated with over 1-L normal saline. 





Hemostasis had been checked.  Given the moderate amount of irrigation, a round 

19 drain was exited via the left lower quadrant with suction within the deep


pelvis.





The abdomen had been closed using double-stranded 0-PDS.  Skin staples was 

placed. A PREVENA 20-cm surgical dressing was placed lengthwise. Please note, a 

2-0 nylon drain stitch was placed.  The tubing was cut to size, and a LYNDSEY bulb 

was placed.  Optifoam dressing was also placed over the LYNDSEY site.





Towards the end of the procedure, she went into atrial fibrillation and 

hypotensive. Secondary to her severe heart disease, she was cardioverted into 

rhythm per the anesthesia provider and was taken directly into the intensive 

care unit. 





Intraabdominal findings and photos were reviewed at length with the patient's 

family.

## 2019-12-08 LAB
ANION GAP SERPL CALC-SCNC: 8 MMOL/L
BUN SERPL-SCNC: 34 MG/DL (ref 7–17)
CALCIUM SPEC-MCNC: 8.6 MG/DL (ref 8.4–10.2)
CELLS COUNTED: 100
CHLORIDE SERPL-SCNC: 116 MMOL/L (ref 98–107)
CO2 BLDA-SCNC: 18 MMOL/L (ref 19–24)
CO2 SERPL-SCNC: 16 MMOL/L (ref 22–30)
ERYTHROCYTE [DISTWIDTH] IN BLOOD BY AUTOMATED COUNT: 3.25 M/UL (ref 3.8–5.4)
ERYTHROCYTE [DISTWIDTH] IN BLOOD: 18.4 % (ref 11.5–15.5)
GLUCOSE BLD-MCNC: 139 MG/DL (ref 75–99)
GLUCOSE BLD-MCNC: 187 MG/DL (ref 75–99)
GLUCOSE BLD-MCNC: 81 MG/DL (ref 75–99)
GLUCOSE SERPL-MCNC: 140 MG/DL (ref 74–99)
HCO3 BLDA-SCNC: 17 MMOL/L (ref 21–25)
HCT VFR BLD AUTO: 31.7 % (ref 34–46)
HGB BLD-MCNC: 9.6 GM/DL (ref 11.4–16)
LYMPHOCYTES # BLD MANUAL: 1.81 K/UL (ref 1–4.8)
MAGNESIUM SPEC-SCNC: 1.9 MG/DL (ref 1.6–2.3)
MCH RBC QN AUTO: 29.6 PG (ref 25–35)
MCHC RBC AUTO-ENTMCNC: 30.2 G/DL (ref 31–37)
MCV RBC AUTO: 97.8 FL (ref 80–100)
MONOCYTES # BLD MANUAL: 1.03 K/UL (ref 0–1)
NEUTROPHILS NFR BLD MANUAL: 89 %
NEUTS SEG # BLD MANUAL: 22.96 K/UL (ref 1.3–7.7)
PCO2 BLDA: 36 MMHG (ref 35–45)
PH BLDA: 7.29 [PH] (ref 7.35–7.45)
PLATELET # BLD AUTO: 305 K/UL (ref 150–450)
PO2 BLDA: 87 MMHG (ref 83–108)
POTASSIUM SERPL-SCNC: 4.8 MMOL/L (ref 3.5–5.1)
SODIUM SERPL-SCNC: 140 MMOL/L (ref 137–145)
WBC # BLD AUTO: 25.8 K/UL (ref 3.8–10.6)

## 2019-12-08 PROCEDURE — 3E0336Z INTRODUCTION OF NUTRITIONAL SUBSTANCE INTO PERIPHERAL VEIN, PERCUTANEOUS APPROACH: ICD-10-PCS

## 2019-12-08 PROCEDURE — 02HV33Z INSERTION OF INFUSION DEVICE INTO SUPERIOR VENA CAVA, PERCUTANEOUS APPROACH: ICD-10-PCS

## 2019-12-08 RX ADMIN — CHLORHEXIDINE GLUCONATE SCH ML: 1.2 RINSE ORAL at 08:29

## 2019-12-08 RX ADMIN — POTASSIUM CHLORIDE SCH MLS/HR: 14.9 INJECTION, SOLUTION INTRAVENOUS at 19:15

## 2019-12-08 RX ADMIN — ALLOPURINOL SCH: 100 TABLET ORAL at 08:25

## 2019-12-08 RX ADMIN — METRONIDAZOLE SCH MLS/HR: 500 INJECTION, SOLUTION INTRAVENOUS at 18:31

## 2019-12-08 RX ADMIN — MAGNESIUM SULFATE IN DEXTROSE SCH MLS/HR: 10 INJECTION, SOLUTION INTRAVENOUS at 08:29

## 2019-12-08 RX ADMIN — METRONIDAZOLE SCH MLS/HR: 500 INJECTION, SOLUTION INTRAVENOUS at 00:50

## 2019-12-08 RX ADMIN — PANTOPRAZOLE SODIUM SCH MG: 40 INJECTION, POWDER, FOR SOLUTION INTRAVENOUS at 08:29

## 2019-12-08 RX ADMIN — Medication SCH: at 08:26

## 2019-12-08 RX ADMIN — PIPERACILLIN AND TAZOBACTAM SCH MLS/HR: 3; .375 INJECTION, POWDER, FOR SOLUTION INTRAVENOUS at 18:31

## 2019-12-08 RX ADMIN — Medication SCH MG: at 20:50

## 2019-12-08 RX ADMIN — FUROSEMIDE SCH MLS/HR: 10 INJECTION, SOLUTION INTRAMUSCULAR; INTRAVENOUS at 19:13

## 2019-12-08 RX ADMIN — INSULIN ASPART SCH: 100 INJECTION, SOLUTION INTRAVENOUS; SUBCUTANEOUS at 14:12

## 2019-12-08 RX ADMIN — LEVOTHYROXINE SODIUM SCH: 75 TABLET ORAL at 06:28

## 2019-12-08 RX ADMIN — PROPOFOL SCH MLS/HR: 10 INJECTION, EMULSION INTRAVENOUS at 12:00

## 2019-12-08 RX ADMIN — POTASSIUM CHLORIDE SCH MLS/HR: 14.9 INJECTION, SOLUTION INTRAVENOUS at 10:57

## 2019-12-08 RX ADMIN — PROPOFOL SCH MLS/HR: 10 INJECTION, EMULSION INTRAVENOUS at 22:50

## 2019-12-08 RX ADMIN — INSULIN ASPART SCH: 100 INJECTION, SOLUTION INTRAVENOUS; SUBCUTANEOUS at 06:26

## 2019-12-08 RX ADMIN — NOREPINEPHRINE BITARTRATE SCH MLS/HR: 1 INJECTION, SOLUTION, CONCENTRATE INTRAVENOUS at 19:59

## 2019-12-08 RX ADMIN — NOREPINEPHRINE BITARTRATE SCH MLS/HR: 1 INJECTION, SOLUTION, CONCENTRATE INTRAVENOUS at 00:52

## 2019-12-08 RX ADMIN — POTASSIUM CHLORIDE SCH: 750 TABLET, EXTENDED RELEASE ORAL at 08:26

## 2019-12-08 RX ADMIN — ALLOPURINOL SCH MG: 100 TABLET ORAL at 20:50

## 2019-12-08 RX ADMIN — FUROSEMIDE SCH MLS/HR: 10 INJECTION, SOLUTION INTRAMUSCULAR; INTRAVENOUS at 10:51

## 2019-12-08 RX ADMIN — SPIRONOLACTONE SCH: 25 TABLET, FILM COATED ORAL at 08:26

## 2019-12-08 RX ADMIN — INSULIN DETEMIR SCH: 100 INJECTION, SOLUTION SUBCUTANEOUS at 07:29

## 2019-12-08 RX ADMIN — CARVEDILOL SCH: 3.12 TABLET, FILM COATED ORAL at 06:28

## 2019-12-08 RX ADMIN — METRONIDAZOLE SCH MLS/HR: 500 INJECTION, SOLUTION INTRAVENOUS at 08:29

## 2019-12-08 RX ADMIN — CHLORHEXIDINE GLUCONATE SCH ML: 1.2 RINSE ORAL at 20:50

## 2019-12-08 RX ADMIN — MAGNESIUM SULFATE IN DEXTROSE SCH MLS/HR: 10 INJECTION, SOLUTION INTRAVENOUS at 06:33

## 2019-12-08 RX ADMIN — INSULIN ASPART SCH UNIT: 100 INJECTION, SOLUTION INTRAVENOUS; SUBCUTANEOUS at 19:14

## 2019-12-08 RX ADMIN — FUROSEMIDE SCH: 80 TABLET ORAL at 08:26

## 2019-12-08 NOTE — P.PN
Subjective


Progress Note Date: 12/06/19


Principal diagnosis: 





Ascending colon mass





History of presenting complaint:


This is a very pleasant 89-year-old patient who follows with visiting physicians

Dr. Ochoa.  Chronic stable medical conditions include congestive heart failure 

with EF of 50%, aortic stenosis, mitral regurgitation, mitral stenosis, 

tricuspid regurgitation, secondary probably hypertension, diabetes, hyp

ertension, osteoarthritis, hypothyroid, blind left eye, coronary artery disease,

[place] filter..  Patient does use a walker.  Patient did have a computed 

tomography scan of the abdomen on November 26 and did show mass along the 

lateral wall of the ascending colon and also adjacent soft tissue omental mass 

and is also another additional mass present in intra-abdominally.  Suggestive of

metastatic disease.  Patient yesterday had some bleeding per the right rectum.  

And decided to come in.  Patient's been having lower abdominal discomfort.  She 

was admitted to the ER.  Patient normally does use a walker.  Lives alone.  

Colonoscopy done on December 4 shows ascending colon mass and AV malformation 

was some slight bleeding.





Today-.  The patient does morning.  Laying in bed.  Tired.  Pending surgery 

tomorrow.  No new issues.





Review of systems: Was done for constitutional, cardiovascular, GI, pulmonary. 

relevant finding as above








Objective





- Vital Signs


Vital signs: 


                                   Vital Signs











Temp  97.8 F   12/06/19 13:49


 


Pulse  88   12/06/19 13:49


 


Resp  18   12/06/19 13:49


 


BP  119/59   12/06/19 13:49


 


Pulse Ox  97   12/06/19 13:49








                                 Intake & Output











 12/05/19 12/06/19 12/06/19





 18:59 06:59 18:59


 


Intake Total 1380 290 160


 


Output Total 400  400


 


Balance 980 290 -240


 


Weight   79.9 kg


 


Intake:   


 


  Intake, IV Titration 900  160





  Amount   


 


    Lactated Ringers 1,000 ml   160





    @ 20 mls/hr IV .Q24H DAVID   





    Rx#:[id number]   


 


    Sodium Chloride 0.9% 1, 800  





    000 ml @ 100 mls/hr IV .   





    Q10H DAVID Rx#:[id number]   


 


    Sodium Ferric Gluconat- 100  





    Sucrose 125 mg In Sodium   





    Chloride 0.9% 100 ml @   





    100 mls/hr IVPB DAILY DAVID   





    Rx#:[id number]   


 


  Oral 480 290 


 


Output:   


 


  Urine 400  400


 


Other:   


 


  Voiding Method Bedside Commode Bedside Commode Bedside Commode


 


  # Voids 6 1 1














- Exam





GENERAL: Laying in bed, awake


EYES: Pupils equal.  Conjunctiva normal.


HEENT: External appearance of nose and ears normal, oral cavity grossly normal.


NECK: JVD not raised; masses not palpable.


HEART: First and second heart sounds are normal;  no edema.  


LUNGS: Respiratory rate normal; clear to auscultation.  


ABDOMEN: Soft, mass palpable on the right side, with some tenderness,, liver 

spleen not palpable, no masses palpable.  


PSYCH: Alert and oriented x3;  mood  and affect normal.  


MUSCULOSKELETAL: Evidence of OA especially in the hands and knees





INVESTIGATIONS, reviewed in the clinical context:


White count 8.4 hemoglobin 8.2 platelets 232 progression bun 59 creatinine 1.7





Previous testing


White count 8.6 hemoglobin 8.2 platelets 242 progression 4.2 bun 45 creatinine 

1.57





Previous testing


White count 9.6 hemoglobin 9.2 platelets 240 progression 4.7 BUN 73 creatinine 

1.79


Computed tomography scan of the abdomen-results noted as above





- Labs


CBC & Chem 7: 


                                 12/07/19 20:05





                                 12/07/19 20:05


Labs: 


                  Abnormal Lab Results - Last 24 Hours (Table)











  12/05/19 12/05/19 12/06/19 Range/Units





  16:52 21:32 06:58 


 


POC Glucose (mg/dL)  137 H  176 H  152 H  (75-99)  mg/dL














  12/06/19 Range/Units





  11:12 


 


POC Glucose (mg/dL)  189 H  (75-99)  mg/dL














Assessment and Plan


Assessment: 





-Ascending colon mass, per coloscopy, pending surgery


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


-Coronary artery disease with prior history of bypass


-Probable chronic kidney disease from diabetic nephropathy and hypertensive 

nephrosclerosis


-Normocytic anemia suspected from chronic disease of malignancy


-Diabetes mellitus type 2


-Essential hypertension


-Primary osteoarthritis


-Chronic gout


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


-Macular degeneration of right eye


-Colonic diverticulosis 


-obstructive sleep apnea did use CPAP machine in the past





Plan


Patient will be continued on current medications and follow up renal function.  

Continue the pain management.  Gen. surgery is planning for OR tomorrow..





Time with Patient: Greater than 30

## 2019-12-08 NOTE — XR
EXAMINATION TYPE: XR chest 1V portable

 

DATE OF EXAM: 12/8/2019

 

HISTORY: rt ij central line placement.

 

REFERENCE: Previous study dated 12/8/2019.

 

FINDINGS: The patient is ET tube and NG tube remain in place, unchanged in appearance. A right intern
al jugular catheter has been placed. Its tip is in the superior vena cava.

 

There has been a midline sternotomy. There is a bipolar pacemaker place on the left.

 

The heart is mildly enlarged. There is bibasilar airspace disease and small, bilateral effusions. No 
pneumothorax is identified.

 

IMPRESSION: 

 

SATISFACTORY CENTRAL LINE PLACEMENT.

## 2019-12-08 NOTE — XR
EXAMINATION TYPE: XR chest 1V portable

 

DATE OF EXAM: 12/8/2019

 

HISTORY: Tube placement.

 

REFERENCE: Previous study dated 12/7/2019.

 

FINDINGS: There has been a midline sternotomy. There is a bipolar pacemaker place on the left.

 

The patient is ET tube and NG tube remain in place, unchanged in appearance.

 

There is bibasilar airspace disease. This has increased. There are small, bilateral effusions. These 
are also increased. Heart is mildly enlarged.

 

IMPRESSION: 

1. WORSENING BIBASILAR AIRSPACE DISEASE.

2. SMALL, BILATERAL EFFUSIONS.

3. MILD CARDIOMEGALY.

## 2019-12-08 NOTE — CONS
CONSULTATION



HISTORY OF PRESENT ILLNESS:

Mrs. Ortiz is an 89-year-old female who is seen for atrial fibrillation.  This

patient is currently intubated.  The history obtained from the nurse as well as the

patient's chart.  This patient has a known history of a moderate to severe aortic

stenosis, mitral regurgitation, mild mitral stenosis, tricuspid regurgitation and

pulmonary hypertension.  The patient has a history of diabetes as well as hypertension.

Patient came with abdominal pain and patient was found to have a mass along the lateral

wall of the ascending colon and soft tissue omental mass.  Patient underwent surgery

yesterday, exploratory  laparotomy and hemicolectomy and resection of the transverse

mesocolon tumor, small-bowel resection and distal jejunum to the ileum and repair of

the incisional hernia.  The patient, during the surgery, had atrial fibrillation with a

rapid ventricular response and patient was cardioverted.



Subsequently, patient has remained in the normal sinus rhythm.  The patient's is

hemodynamically unstable.



Currently patient is on Levophed.  There is no more reoccurrence of atrial

fibrillation.



PHYSICAL EXAMINATION:

At present reveals an 89-year-old female who is intubated.  Blood pressure is 93/58

mmHg.  Respiratory rate is 20.

HEENT examination is negative.  Neck is supple.  There is no increase in jugular venous

pressure.

HEART:  First and second heart sounds are normal.  There is a grade 3/6 ejection

systolic murmur noted.  LUNGS reveal bilateral scattered wheezes.

ABDOMEN:  Soft.

EXTREMITIES:  Peripheral pulses are not felt.



There is no EKG available in the chart.  The patient's echocardiogram in August showed

moderate to severe aortic stenosis and severe mitral regurgitation.  Chest x-ray

suggestive of bibasilar infiltrates[place] [address].  The patient's urine output

is low. Creatinine is 1.73.



FINAL IMPRESSION:

This patient is status post surgery for carcinoma of the ascending colon.  At present,

patient appears to be possibly in septic shock.  The patient had episodes of atrial

fibrillation with a rapid ventricular response in the OR when he was converted to the

normal sinus rhythm.  At present, patient is being maintained in the normal sinus

rhythm.  If the patient has a recurrent episodes of atrial fibrillation, we will start

the patient on IV amiodarone but at present patient's prognosis remains extremely

guarded in view of the patient's underlying moderate to severe aortic stenosis.



Thank you for this consultation.





MMODL / IJN: [id number] / Job#: [id number]

## 2019-12-08 NOTE — P.PN
Subjective


Progress Note Date: 12/08/19




















CHIEF COMPLAINT: Large colon mass with bleeding





HISTORY OF PRESENT ILLNESS: The patient is a 89-year-old female who presented 

with acute onset right lower quadrant abdominal pain including rectal bleeding 

moderate.  She underwent exploratory laparotomy with resection of right colon 

for over 10 cm lesion with invasion into abdominal wall.  Additionally findings 

of small bowel obstruction with internal hernia was also addressed with small 

bowel resection.  She has poor cardiac history including severe aortic stenosis 

and went into atrial fibrillation.  As a result, she is in the critical care un

it.  She is currently on pressors.  She awakens to voice.  Her family is at 

bedside.  She received fluid boluses overnight for hypotension.  She is on a 

ventilator.





ROS: No reports of nausea and vomiting.  No fevers or chills.  No productive 

sputum





PHYSICAL EXAM: 


VITAL SIGNS: Reviewed


CONSTITUTIONAL:  Well developed and in no acute distress. 


EYES:  Conjuctivae without sclera icterus. Extraocular movements grossly intact.




HEAD, EARS, NOSE, THROAT: Moist buccal mucosa. Head is atraumatic, nor

mocephalic. Hears conversational speech. No nasal drainage.


RESPIRATORY:  Mechanical ventilation.


CARDIOVASCULAR:  Palpable 2+ radial pulses.  


ABDOMEN: Soft, no peritonitis.  Midline dressing intact with PREVENA.  LYNDSEY 

serosanguineous


MUSCULOSKELETAL:  No gross deformity of the lower extremities noted.  No 

clubbing.  No cyanosis.  2+ bilateral lower extremity edema


SKIN: Good skin turgor. Well perfused. 


NEUROLOGIC: Cranial nerves I through XII grossly intact.  No focal or 

lateralizing signs. 





CLINICAL LABS: White blood cell count elevated over 16,000 stress-induced 

responded





PATHOLOGY: Consistent with tubulovillous adenoma sample of large colon mass





ASSESSMENT: 


1.  Colon cancer with large colon mass


2.  Aortic stenosis with unstable atrial fibrillation


3.  Abdominal metastases with small bowel obstruction and right colon mass and 

hemorrhaging





PLAN: 


1.  Overall condition is guarded with her multiple medical comorbidities prior 

to surgery.


2.  At this time, continue with current ICU care.


3.  Agreeable with TPN





Objective





- Vital Signs


Vital signs: 


                                   Vital Signs











Temp  99.3 F   12/08/19 08:00


 


Pulse  73   12/08/19 08:00


 


Resp  21   12/08/19 08:00


 


BP  118/41   12/08/19 07:00


 


Pulse Ox  96   12/08/19 08:00








                                 Intake & Output











 12/07/19 12/08/19 12/08/19





 18:59 06:59 18:59


 


Intake Total 3350 1485.727 40


 


Output Total 600 610 35


 


Balance 2750 875.727 5


 


Weight 81.7 kg 85.8 kg 


 


Intake:   


 


  IV 2750 1220 40


 


    Bolus Normal Saline  1000 


 


    LR @20ml/hr  220 40


 


  Intake, IV Titration 600 265.727 





  Amount   


 


    Norepinephrine 8 mg In  158.537 





    Sodium Chloride 0.9% 250   





    ml @ 0.05 MCG/KG/MIN 7.   





    904 mls/hr IV .Q24H DAVID   





    Rx#:[id number]   


 


    Propofol 1,000 mg In  7.19 





    Empty Bag 1 bag @ Titrate   





    IV .Q0M DAVID Rx#:   





    [id number]   


 


    Sodium Chloride 0.9% 1, 600  





    000 ml @ 100 mls/hr IV .   





    Q10H DAVID Rx#:[id number]   


 


    metroNIDAZOLE-NS   100 





    mg In Saline 1 100ml.bag   





    @ 100 mls/hr IVPB Q8HR   





    DAVID Rx#:[id number]   


 


Output:   


 


  Drainage  350 


 


    Left Lower Abdomen  350 


 


  Urine 300 260 35


 


  Estimated Blood Loss 300  


 


Other:   


 


  Voiding Method  Indwelling Catheter Indwelling Catheter








                       ABP, PAP, CO, CI - Last Documented











Arterial Blood Pressure        93/58

















- Labs


CBC & Chem 7: 


                                 12/08/19 04:10





                                 12/08/19 04:10


Labs: 


                  Abnormal Lab Results - Last 24 Hours (Table)











  12/07/19 12/07/19 12/07/19 Range/Units





  18:47 19:18 20:05 


 


WBC    16.9 H  (3.8-10.6)  k/uL


 


RBC    3.05 L  (3.80-5.40)  m/uL


 


Hgb    9.1 L  (11.4-16.0)  gm/dL


 


Hct    29.6 L  (34.0-46.0)  %


 


MCHC    30.8 L  (31.0-37.0)  g/dL


 


RDW    18.3 H  (11.5-15.5)  %


 


Neutrophils #    14.9 H  (1.3-7.7)  k/uL


 


Neutrophils # (Manual)     (1.3-7.7)  k/uL


 


Monocytes # (Manual)     (0-1.0)  k/uL


 


ABG pH   7.31 L   (7.35-7.45)  


 


ABG pO2   324 H   ()  mmHg


 


ABG HCO3   18 L   (21-25)  mmol/L


 


ABG Total CO2     (19-24)  mmol/L


 


ABG O2 Saturation   100.0 H   (94-97)  %


 


Chloride     ()  mmol/L


 


Carbon Dioxide     (22-30)  mmol/L


 


BUN     (7-17)  mg/dL


 


Creatinine     (0.52-1.04)  mg/dL


 


Glucose     (74-99)  mg/dL


 


POC Glucose (mg/dL)  130 H    (75-99)  mg/dL


 


Phosphorus     (2.5-4.5)  mg/dL


 


Alkaline Phosphatase     ()  U/L


 


Total Protein     (6.3-8.2)  g/dL


 


Albumin     (3.5-5.0)  g/dL


 


Urine Appearance     (Clear)  


 


Urine Protein     (Negative)  


 


Urine Blood     (Negative)  


 


Ur Leukocyte Esterase     (Negative)  


 


Urine RBC     (0-5)  /hpf


 


Urine WBC     (0-5)  /hpf


 


Urine WBC Clumps     (None)  /hpf


 


Urine Bacteria     (None)  /hpf


 


Hyaline Casts     (0-2)  /lpf


 


Urine Mucus     (None)  /hpf














  12/07/19 12/07/19 12/07/19 Range/Units





  20:05 20:15 20:42 


 


WBC     (3.8-10.6)  k/uL


 


RBC     (3.80-5.40)  m/uL


 


Hgb     (11.4-16.0)  gm/dL


 


Hct     (34.0-46.0)  %


 


MCHC     (31.0-37.0)  g/dL


 


RDW     (11.5-15.5)  %


 


Neutrophils #     (1.3-7.7)  k/uL


 


Neutrophils # (Manual)     (1.3-7.7)  k/uL


 


Monocytes # (Manual)     (0-1.0)  k/uL


 


ABG pH    7.31 L  (7.35-7.45)  


 


ABG pO2    151 H  ()  mmHg


 


ABG HCO3    18 L  (21-25)  mmol/L


 


ABG Total CO2     (19-24)  mmol/L


 


ABG O2 Saturation    99.8 H  (94-97)  %


 


Chloride  115 H    ()  mmol/L


 


Carbon Dioxide  19 L    (22-30)  mmol/L


 


BUN  35 H    (7-17)  mg/dL


 


Creatinine  1.57 H    (0.52-1.04)  mg/dL


 


Glucose  144 H    (74-99)  mg/dL


 


POC Glucose (mg/dL)     (75-99)  mg/dL


 


Phosphorus  4.9 H    (2.5-4.5)  mg/dL


 


Alkaline Phosphatase  133 H    ()  U/L


 


Total Protein  4.4 L    (6.3-8.2)  g/dL


 


Albumin  2.4 L    (3.5-5.0)  g/dL


 


Urine Appearance   Cloudy H   (Clear)  


 


Urine Protein   1+ H   (Negative)  


 


Urine Blood   Moderate H   (Negative)  


 


Ur Leukocyte Esterase   Large H   (Negative)  


 


Urine RBC   >182 H   (0-5)  /hpf


 


Urine WBC   164 H   (0-5)  /hpf


 


Urine WBC Clumps   Occasional H   (None)  /hpf


 


Urine Bacteria   Rare H   (None)  /hpf


 


Hyaline Casts   6 H   (0-2)  /lpf


 


Urine Mucus   Rare H   (None)  /hpf














  12/07/19 12/08/19 12/08/19 Range/Units





  22:19 04:10 04:10 


 


WBC   25.8 H   (3.8-10.6)  k/uL


 


RBC   3.25 L   (3.80-5.40)  m/uL


 


Hgb   9.6 L   (11.4-16.0)  gm/dL


 


Hct   31.7 L   (34.0-46.0)  %


 


MCHC   30.2 L   (31.0-37.0)  g/dL


 


RDW   18.4 H   (11.5-15.5)  %


 


Neutrophils #     (1.3-7.7)  k/uL


 


Neutrophils # (Manual)   22.96 H   (1.3-7.7)  k/uL


 


Monocytes # (Manual)   1.03 H   (0-1.0)  k/uL


 


ABG pH     (7.35-7.45)  


 


ABG pO2     ()  mmHg


 


ABG HCO3     (21-25)  mmol/L


 


ABG Total CO2     (19-24)  mmol/L


 


ABG O2 Saturation     (94-97)  %


 


Chloride    116 H  ()  mmol/L


 


Carbon Dioxide    16 L  (22-30)  mmol/L


 


BUN    34 H  (7-17)  mg/dL


 


Creatinine    1.73 H  (0.52-1.04)  mg/dL


 


Glucose    140 H  (74-99)  mg/dL


 


POC Glucose (mg/dL)  124 H    (75-99)  mg/dL


 


Phosphorus    5.6 H  (2.5-4.5)  mg/dL


 


Alkaline Phosphatase     ()  U/L


 


Total Protein     (6.3-8.2)  g/dL


 


Albumin     (3.5-5.0)  g/dL


 


Urine Appearance     (Clear)  


 


Urine Protein     (Negative)  


 


Urine Blood     (Negative)  


 


Ur Leukocyte Esterase     (Negative)  


 


Urine RBC     (0-5)  /hpf


 


Urine WBC     (0-5)  /hpf


 


Urine WBC Clumps     (None)  /hpf


 


Urine Bacteria     (None)  /hpf


 


Hyaline Casts     (0-2)  /lpf


 


Urine Mucus     (None)  /hpf














  12/08/19 12/08/19 Range/Units





  07:56 11:35 


 


WBC    (3.8-10.6)  k/uL


 


RBC    (3.80-5.40)  m/uL


 


Hgb    (11.4-16.0)  gm/dL


 


Hct    (34.0-46.0)  %


 


MCHC    (31.0-37.0)  g/dL


 


RDW    (11.5-15.5)  %


 


Neutrophils #    (1.3-7.7)  k/uL


 


Neutrophils # (Manual)    (1.3-7.7)  k/uL


 


Monocytes # (Manual)    (0-1.0)  k/uL


 


ABG pH  7.29 L   (7.35-7.45)  


 


ABG pO2    ()  mmHg


 


ABG HCO3  17 L   (21-25)  mmol/L


 


ABG Total CO2  18 L   (19-24)  mmol/L


 


ABG O2 Saturation    (94-97)  %


 


Chloride    ()  mmol/L


 


Carbon Dioxide    (22-30)  mmol/L


 


BUN    (7-17)  mg/dL


 


Creatinine    (0.52-1.04)  mg/dL


 


Glucose    (74-99)  mg/dL


 


POC Glucose (mg/dL)   139 H  (75-99)  mg/dL


 


Phosphorus    (2.5-4.5)  mg/dL


 


Alkaline Phosphatase    ()  U/L


 


Total Protein    (6.3-8.2)  g/dL


 


Albumin    (3.5-5.0)  g/dL


 


Urine Appearance    (Clear)  


 


Urine Protein    (Negative)  


 


Urine Blood    (Negative)  


 


Ur Leukocyte Esterase    (Negative)  


 


Urine RBC    (0-5)  /hpf


 


Urine WBC    (0-5)  /hpf


 


Urine WBC Clumps    (None)  /hpf


 


Urine Bacteria    (None)  /hpf


 


Hyaline Casts    (0-2)  /lpf


 


Urine Mucus    (None)  /hpf








                      Microbiology - Last 24 Hours (Table)











 12/08/19 00:37 Sputum Culture - Preliminary





 Sputum 


 


 12/07/19 20:15 Urine Culture - Preliminary





 Urine,Voided 














Assessment and Plan


(1) Abdominal mass


Current Visit: Yes   Status: Acute   Priority: High   Code(s): R19.00 - INTRA-

ABD AND PELVIC SWELLING, MASS AND LUMP, UNSP SITE   SNOMED Code(s): 387761204


   





(2) GI bleed


Current Visit: Yes   Status: Acute   Priority: High   Code(s): K92.2 - 

GASTROINTESTINAL HEMORRHAGE, UNSPECIFIED   SNOMED Code(s): 38010669


   





(3) History of colon cancer


Current Visit: Yes   Status: Acute   Code(s): Z85.038 - PERSONAL HISTORY OF 

MALIGNANT NEOPLASM OF LARGE INTESTINE   SNOMED Code(s): 268057923


   





(4) Anemia


Current Visit: Yes   Status: Chronic   Priority: Medium   Code(s): D64.9 - 

ANEMIA, UNSPECIFIED   SNOMED Code(s): 163112276


   





(5) Aortic stenosis


Current Visit: Yes   Status: Acute   Code(s): I35.0 - NONRHEUMATIC AORTIC 

(VALVE) STENOSIS   SNOMED Code(s): 23061275


   





(6) Atrial fibrillation


Current Visit: No   Status: Acute   Code(s): I48.91 - UNSPECIFIED ATRIAL 

FIBRILLATION   SNOMED Code(s): 72085357


   





(7) Atrial flutter


Current Visit: No   Status: Acute   Code(s): I48.92 - UNSPECIFIED ATRIAL FLUTTER

  SNOMED Code(s): 4908769


   





(8) Diastolic CHF, acute on chronic


Current Visit: No   Status: Acute   Code(s): I50.33 - ACUTE ON CHRONIC DIASTOLIC

(CONGESTIVE) HEART FAILURE   SNOMED Code(s): 381169906


   





(9) Systolic congestive heart failure


Current Visit: No   Status: Acute   Code(s): I50.20 - UNSPECIFIED SYSTOLIC 

(CONGESTIVE) HEART FAILURE   SNOMED Code(s): 02172269

## 2019-12-08 NOTE — P.PN
Subjective


Progress Note Date: 12/07/19


Principal diagnosis: 





Ascending colon mass status post colectomy





History of presenting complaint:


This is a very pleasant 89-year-old patient who follows with visiting physicians

Dr. Ochoa.  Chronic stable medical conditions include congestive heart failure 

with EF of 50%, aortic stenosis, mitral regurgitation, mitral stenosis, 

tricuspid regurgitation, secondary probably hypertension, diabetes, 

hypertension, osteoarthritis, hypothyroid, blind left eye, coronary artery 

disease, Carley filter..  Patient does use a walker.  Patient did have a 

computed tomography scan of the abdomen on November 26 and did show mass along 

the lateral wall of the ascending colon and also adjacent soft tissue omental 

mass and is also another additional mass present in intra-abdominally.  

Suggestive of metastatic disease.  Patient yesterday had some bleeding per the 

right rectum.  And decided to come in.  Patient's been having lower abdominal 

discomfort.  She was admitted to the ER.  Patient normally does use a walker.  

Lives alone.  Colonoscopy done on December 4 shows ascending colon mass and AV 

malformation was some slight bleeding.





12/06/2019-.  The patient does morning.  Laying in bed.  Tired.  Pending surgery

tomorrow.  No new issues.





12/07/2019


Patient is currently mechanically ventilated.  Status post 3 today.PROCEDURES 

PERFORMED:


1.  Exploratory laparotomy with lysis of adhesions over 2-1/2 to 3 hours


2.  Extensive right hemicolectomy


3.  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


4.  Takedown of internal hernia pelvis


5.  Small bowel resection distal jejunum to ileum, over 2 feet


6.  Repair of incisional hernia, mid abdomen, 5-cm


7.  Placement of round #19 drain left lower quadrant


8.  Placement of incisional wound VAC system PREVENA, 20 cm 





Complete review of systems could not be obtained from the patient.











Objective





- Vital Signs


Vital signs: 


                                   Vital Signs











Temp  98.6 F   12/07/19 20:00


 


Pulse  72   12/07/19 22:30


 


Resp  18   12/07/19 22:30


 


BP  125/49   12/07/19 22:15


 


Pulse Ox  97   12/07/19 22:30








                                 Intake & Output











 12/07/19 12/07/19 12/08/19





 06:59 18:59 06:59


 


Intake Total 900 3350 85.555


 


Output Total  600 240


 


Balance 900 2750 -154.445


 


Weight 81.7 kg 81.7 kg 


 


Intake:   


 


  IV  2750 60


 


    LR @20ml/hr   60


 


  Intake, IV Titration 900 600 25.555





  Amount   


 


    Norepinephrine 8 mg In   18.365





    Sodium Chloride 0.9% 250   





    ml @ 0.05 MCG/KG/MIN 7.   





    904 mls/hr IV .Q24H DAVID   





    Rx#:[id number]   


 


    Propofol 1,000 mg In   7.19





    Empty Bag 1 bag @ Titrate   





    IV .Q0M DAVID Rx#:   





    [id number]   


 


    Sodium Chloride 0.9% 1, 900 600 





    000 ml @ 100 mls/hr IV .   





    Q10H DAVID Rx#:[id number]   


 


Output:   


 


  Drainage   170


 


    Left Lower Abdomen   170


 


  Urine  300 70


 


  Estimated Blood Loss  300 


 


Other:   


 


  Voiding Method Bedside Commode  Indwelling Catheter


 


  # Voids 1  


 


  # Bowel Movements 1  








                       ABP, PAP, CO, CI - Last Documented











Arterial Blood Pressure        103/41

















- Exam





PHYSICAL EXAMINATION: 


Patient is currently on mechanical ventilator.. 


HEENT: Normocephalic. Neck is supple. Pupils reactive. Nostrils clear. Oral 

cavity is moist. Ears reveal no drainage. 


Neck reveals no JVD, carotid bruits, or thyromegaly. 


CHEST EXAMINATION: Trachea is central. Symmetrical expansion.  Bibasilar 

diminished air entry.. 


CARDIAC: Normal S1, S2 with no gallops. No murmurs 


ABDOMEN: Soft. Bowel sounds diminished.  Surgical site is bandaged.  Wound VAC 

in place.. 


Extremities: reveal no edema.  No clubbing or cyanosis


Neurologically sedated and on mechanical ventilator.


Skin: No rash or skin lesions. 


Psychiatric: Could not be assessed


Musculoskeletal: No joint swelling or deformity.  





INVESTIGATIONS, reviewed in the clinical context:


White count 8.4 hemoglobin 8.2 platelets 232 progression bun 59 creatinine 1.7





Previous testing


White count 8.6 hemoglobin 8.2 platelets 242 progression 4.2 bun 45 creatinine 

1.57





Previous testing


White count 9.6 hemoglobin 9.2 platelets 240 progression 4.7 BUN 73 creatinine 

1.79


Computed tomography scan of the abdomen-results noted as above





- Labs


CBC & Chem 7: 


                                 12/07/19 20:05





                                 12/07/19 20:05


Labs: 


                  Abnormal Lab Results - Last 24 Hours (Table)











  12/07/19 12/07/19 12/07/19 Range/Units





  07:18 11:36 18:47 


 


WBC     (3.8-10.6)  k/uL


 


RBC     (3.80-5.40)  m/uL


 


Hgb     (11.4-16.0)  gm/dL


 


Hct     (34.0-46.0)  %


 


MCHC     (31.0-37.0)  g/dL


 


RDW     (11.5-15.5)  %


 


Neutrophils #     (1.3-7.7)  k/uL


 


ABG pH     (7.35-7.45)  


 


ABG pO2     ()  mmHg


 


ABG HCO3     (21-25)  mmol/L


 


ABG O2 Saturation     (94-97)  %


 


Chloride     ()  mmol/L


 


Carbon Dioxide     (22-30)  mmol/L


 


BUN     (7-17)  mg/dL


 


Creatinine     (0.52-1.04)  mg/dL


 


Glucose     (74-99)  mg/dL


 


POC Glucose (mg/dL)  144 H  152 H  130 H  (75-99)  mg/dL


 


Phosphorus     (2.5-4.5)  mg/dL


 


Alkaline Phosphatase     ()  U/L


 


Total Protein     (6.3-8.2)  g/dL


 


Albumin     (3.5-5.0)  g/dL


 


Urine Appearance     (Clear)  


 


Urine Protein     (Negative)  


 


Urine Blood     (Negative)  


 


Ur Leukocyte Esterase     (Negative)  


 


Urine RBC     (0-5)  /hpf


 


Urine WBC     (0-5)  /hpf


 


Urine WBC Clumps     (None)  /hpf


 


Urine Bacteria     (None)  /hpf


 


Hyaline Casts     (0-2)  /lpf


 


Urine Mucus     (None)  /hpf














  12/07/19 12/07/19 12/07/19 Range/Units





  19:18 20:05 20:05 


 


WBC   16.9 H   (3.8-10.6)  k/uL


 


RBC   3.05 L   (3.80-5.40)  m/uL


 


Hgb   9.1 L   (11.4-16.0)  gm/dL


 


Hct   29.6 L   (34.0-46.0)  %


 


MCHC   30.8 L   (31.0-37.0)  g/dL


 


RDW   18.3 H   (11.5-15.5)  %


 


Neutrophils #   14.9 H   (1.3-7.7)  k/uL


 


ABG pH  7.31 L    (7.35-7.45)  


 


ABG pO2  324 H    ()  mmHg


 


ABG HCO3  18 L    (21-25)  mmol/L


 


ABG O2 Saturation  100.0 H    (94-97)  %


 


Chloride    115 H  ()  mmol/L


 


Carbon Dioxide    19 L  (22-30)  mmol/L


 


BUN    35 H  (7-17)  mg/dL


 


Creatinine    1.57 H  (0.52-1.04)  mg/dL


 


Glucose    144 H  (74-99)  mg/dL


 


POC Glucose (mg/dL)     (75-99)  mg/dL


 


Phosphorus    4.9 H  (2.5-4.5)  mg/dL


 


Alkaline Phosphatase    133 H  ()  U/L


 


Total Protein    4.4 L  (6.3-8.2)  g/dL


 


Albumin    2.4 L  (3.5-5.0)  g/dL


 


Urine Appearance     (Clear)  


 


Urine Protein     (Negative)  


 


Urine Blood     (Negative)  


 


Ur Leukocyte Esterase     (Negative)  


 


Urine RBC     (0-5)  /hpf


 


Urine WBC     (0-5)  /hpf


 


Urine WBC Clumps     (None)  /hpf


 


Urine Bacteria     (None)  /hpf


 


Hyaline Casts     (0-2)  /lpf


 


Urine Mucus     (None)  /hpf














  12/07/19 12/07/19 12/07/19 Range/Units





  20:15 20:42 22:19 


 


WBC     (3.8-10.6)  k/uL


 


RBC     (3.80-5.40)  m/uL


 


Hgb     (11.4-16.0)  gm/dL


 


Hct     (34.0-46.0)  %


 


MCHC     (31.0-37.0)  g/dL


 


RDW     (11.5-15.5)  %


 


Neutrophils #     (1.3-7.7)  k/uL


 


ABG pH   7.31 L   (7.35-7.45)  


 


ABG pO2   151 H   ()  mmHg


 


ABG HCO3   18 L   (21-25)  mmol/L


 


ABG O2 Saturation   99.8 H   (94-97)  %


 


Chloride     ()  mmol/L


 


Carbon Dioxide     (22-30)  mmol/L


 


BUN     (7-17)  mg/dL


 


Creatinine     (0.52-1.04)  mg/dL


 


Glucose     (74-99)  mg/dL


 


POC Glucose (mg/dL)    124 H  (75-99)  mg/dL


 


Phosphorus     (2.5-4.5)  mg/dL


 


Alkaline Phosphatase     ()  U/L


 


Total Protein     (6.3-8.2)  g/dL


 


Albumin     (3.5-5.0)  g/dL


 


Urine Appearance  Cloudy H    (Clear)  


 


Urine Protein  1+ H    (Negative)  


 


Urine Blood  Moderate H    (Negative)  


 


Ur Leukocyte Esterase  Large H    (Negative)  


 


Urine RBC  >182 H    (0-5)  /hpf


 


Urine WBC  164 H    (0-5)  /hpf


 


Urine WBC Clumps  Occasional H    (None)  /hpf


 


Urine Bacteria  Rare H    (None)  /hpf


 


Hyaline Casts  6 H    (0-2)  /lpf


 


Urine Mucus  Rare H    (None)  /hpf














Assessment and Plan


Assessment: 





-Ascending colon mass, per coloscopy, is post resection.  Postoperative day 0.  

Follow-up biopsy report.


-Postoperative ventilator-dependent respiratory failure expected.


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


-Coronary artery disease with prior history of bypass


-Probable chronic kidney disease from diabetic nephropathy and hypertensive 

nephrosclerosis


-Normocytic anemia suspected from chronic disease of malignancy


-Diabetes mellitus type 2


-Essential hypertension


-Primary osteoarthritis


-Chronic gout


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


-Macular degeneration of right eye


-Colonic diverticulosis 


-obstructive sleep apnea did use CPAP machine in the past





Plan


Patient will be continued on current medications and follow up renal function.  

Continue the pain management.  Gen. surgery is following.  Pulmonary is on 

board.  Further recommendations based on the clinical course...





Time with Patient: Greater than 30

## 2019-12-08 NOTE — P.CNPUL
History of Present Illness


Consult date: 19


Requesting physician: Jean Claude Arzate


Reason for consult: other (Operative respiratory failure and hypotension.)


Chief complaint: Stools suggestive of GI bleeding.


History of present illness: 





This is an 89-year-old female who was recently evaluated for GI bleeding, and 

abdominal imaging showed a large right lower quadrant mass, 2 adjacent 

peritoneal deposits.  2 cm lesion in the left lobe of the liver, mild ascites, 

and another questionable peritoneal deposit.  She had anemia with hemoglobin of 

8.1, she also had chronic renal failure with a creatinine of 1.79, stage III.  

Patient has also been complaining of weight loss over the last 2-3 months 

roughly about 60 pounds.  Patient is also known to have history of colonic 

polyps, and she had previous bowel resection/to previous surgeries.  Had 

multiple colonoscopies.  Last colonoscopy was in [place] about 5 years ago.  

Seen by surgery on consultation, and it was felt that the patient required 

surgical intervention for rectal bleeding secondary to invading mass.  She is 

known to have history of diastolic congestive heart failure and known history of

deep vein thrombosis.  And she also had pre-existing anemia.  2 days after 

admission, patient underwent colonoscopy, and snare polypectomy/ascending colon 

mass, and she had injection of dye marker/chelsea ink./ascending colon mass.  

Descending colon biopsies showed tubulovillous adenoma.  On 2019, patient 

underwent exploratory laparotomy with lysis of adhesions extensive right he

micolectomy resection of transverse colon tumor/4 cm taken down of internal 

hernia pelvis, small bowel resection distal jejunum to ileum, over 2 feet repair

of incisional hernia and placement of incisional wound VAC system.  

Postoperatively, patient was sent to the intensive care unit on mechanical 

ventilation.  Last night, the patient had episodes of hypotension requiring 

fluid boluses and placement of norepinephrine.  She is presently on 0.2 mcg/kg/m

of norepinephrine.  She is on mechanical ventilation with tidal volume of 450 

FiO2 of 45% PEEP of 5 assist control rate of 18.  Right IJ central line was 

placed because of the patient is requiring pressors for hypotension.  She is 

noted to be acidotic with a bicarb of 18 and pH of 7.29, hence I initiated 

sodium bicarb drip on this patient.  Renal functioning is poor, creatinine today

is 1.73, admission creatinine was 1.79.  Patient is known to have history of 

diastolic congestive heart failure, and aortic stenosis.  Patient is on 15 mcg/

kg/m the propofol, calm and sedated.  Her urine output is extremely poor, hence 

I recommended Lasix drip on this patient.  And based on the CVP findings after 

line placement, we'll decide on fluid boluses if necessary.





Review of Systems


ROS unobtainable: due to endotracheal tube





Past Medical History


Past Medical History: Atrial Fibrillation, Coronary Artery Disease (CAD), 

Cancer, Heart Failure, Diabetes Mellitus, GI Bleed, Hypertension, Osteoarthritis

(OA), Pneumonia, Pulmonary Embolus (PE), Sleep Apnea/CPAP/BIPAP, Thyroid 

Disorder


Additional Past Medical History / Comment(s): gout, chronic pain in back hip and

leg, diverticulitis, LT EYE BLIND,GLAUCOMA, MAC DEGENERATION RT EYE, MURMUR, 

DIVERTICULAR DX.OVARIAN CYSTS,SKIN CA,ANEMIA, TRACHEBRONCHITIS WITH REACTIVE 

BRONCHOSPASM.used to use cpap machine .  Increased shortness of breath with 

activity and at rest. ZEESHAN CARPAL TUNNEL. abd cncer


History of Any Multi-Drug Resistant Organisms: None Reported


Past Surgical History: Appendectomy, Bowel Resection, Breast Surgery, Cardiac 

Valve Replacement, Cholecystectomy, Coronary Bypass/CABG, Heart Catheterization,

Hysterectomy, Orthopedic Surgery, Pacemaker, Tonsillectomy


Additional Past Surgical History / Comment(s): pacemaker, AORTIC VALVE 

REPLACEMENT(TISSUE) AND 1 VESSEL BYPASS, CYST REMOVED FROM HAND/HEAD,BOWEL 

RESECTION D/T DIVERTICULITIS.LEONCIO FILTER, COLONOCOSPY/POLYPECTOMY/EGD, 

PAST LT EYE SX-DAMAGED THE OPTIC NERVE-BLIND LT EYE.lt knee arthrosopy, skin 

cancer removed from hand/head/back


Past Anesthesia/Blood Transfusion Reactions: No Reported Reaction


Type of Cardiac Device: Permanent Pacemaker


Device Placement Date:: 


Past Psychological History: Depression


Additional Psychological History / Comment(s): history of depression, denies now


Smoking Status: Former smoker


Past Alcohol Use History: None Reported


Additional Past Alcohol Use History / Comment(s): STARTED SMOKING  1-2 ppd, 

quit , no alcohol now


Past Drug Use History: None Reported





- Past Family History


  ** Brother(s)


History Unknown: Yes





  ** Father


Family Medical History: Cancer


Additional Family Medical History / Comment(s): ALCOHOLIC--  





  ** Mother


Family Medical History: Cancer, Coronary Artery Disease (CAD), Dementia, 

Diabetes Mellitus


Additional Family Medical History / Comment(s):  





Medications and Allergies


                                Home Medications











 Medication  Instructions  Recorded  Confirmed  Type


 


Carvedilol [Coreg] 3.125 mg PO BID 14 History


 


Allopurinol 100 mg PO BID 06/03/15 12/02/19 History


 


Levothyroxine Sodium [Synthroid] 75 mcg PO DAILY 18 History


 


Insulin Lispro [humaLOG Kwikpen] 5 unit SQ AC-BID 18 History


 


Furosemide [Lasix] 80 mg PO BID 19 History


 


Insulin Detemir (Levemir) [Levemir] 20 unit SQ AC-BRKFST 19 

History


 


Potassium Chloride ER [K-Dur 10] 10 meq PO BID 19 History


 


Spironolactone [Aldactone] 25 mg PO BID 19 History


 


Meclizine HCl 12.5 mg PO TID PRN 19 History


 


Naproxen Sodium [Aleve] 440 mg PO Q12HR PRN 19 History


 


Sodium Bicarbonate Tab 650 mg PO BID 19 History








                                    Allergies











Allergy/AdvReac Type Severity Reaction Status Date / Time


 


aspirin Allergy  Unknown Verified 19 16:01


 


bee pollen [Bee Pollen] Allergy  Anaphylaxis Verified 19 16:01


 


celecoxib [From Celebrex] Allergy  Unknown Verified 19 16:01


 


blood thinners AdvReac Severe see comment Uncoded 19 13:51


 


URIC ACIDS AdvReac  GOUT Uncoded 19 13:51














Physical Exam


Vitals: 


                                   Vital Signs











  Temp Pulse Pulse Pulse Resp BP BP


 


 19 08:00  99.3 F  73    21  


 


 19 07:45   73    18  


 


 19 07:30   70    21  


 


 19 07:15   74    14  


 


 19 07:00   72    18  118/41 


 


 19 06:45   73    18  


 


 19 06:30   72    19  


 


 19 06:15   68    69 H  


 


 19 06:00   71    21  119/43 


 


 19 05:45   70     


 


 19 05:30   70     


 


 19 05:15   74     119/43 


 


 19 05:00   70    18  


 


 19 04:45   75     


 


 19 04:30   72    18  


 


 19 04:15   75    17  


 


 19 04:00  98.6 F  75  82  79  18  129/49 


 


 19 03:45   75     


 


 19 03:30   71     


 


 19 03:15   71     


 


 19 03:00   74     143/60 


 


 19 02:45   75     


 


 19 02:30   75     


 


 19 02:15   72     


 


 19 02:00   70    18  120/44 


 


 19 01:45   87     


 


 19 01:30   69     


 


 19 01:15   72     120/44 


 


 19 01:00   72    18  


 


 19 00:45   74     


 


 19 00:30   75     


 


 19 00:15   73     121/46 


 


 19 00:00  98.7 F  73  82  79  18  122/50 


 


 19 23:45   74    18  


 


 19 23:30   71     


 


 19 23:15   79     


 


 19 23:00   73     


 


 19 22:45   73     111/47 


 


 19 22:30   72    18  


 


 19 22:15   75    18  125/49 


 


 19 22:00   74    18  


 


 19 21:45   73     122/60 


 


 19 21:30   69     73/36 


 


 19 21:15   68     91/45 


 


 19 21:00   64    18  95/44 


 


 19 20:45   64     101/47 


 


 19 20:30   65     


 


 19 20:15   64    18  100/47 


 


 19 20:00  98.6 F  75  82  79  18  96/50 


 


 19 19:45   66    18  


 


 19 19:30   65    18  


 


 19 19:15   65    18  


 


 19 19:00   65     


 


 19 13:12  97.2 F L    79  17   136/79














  Pulse Ox


 


 19 08:00  96


 


 19 07:45  96


 


 19 07:30  96


 


 19 07:15  96


 


 19 07:00 


 


 19 06:45  96


 


 19 06:30  96


 


 19 06:15  96


 


 19 06:00  96


 


 19 05:45  97


 


 19 05:30  96


 


 19 05:15  96


 


 19 05:00  97


 


 19 04:45  98


 


 19 04:30  96


 


 19 04:15  95


 


 19 04:00  97


 


 19 03:45  96


 


 19 03:30  97


 


 19 03:15  97


 


 19 03:00 


 


 19 02:45  97


 


 19 02:30  97


 


 19 02:15  96


 


 19 02:00  97


 


 19 01:45 


 


 19 01:30  95


 


 19 01:15  95


 


 19 01:00 


 


 19 00:45  95


 


 19 00:30  95


 


 19 00:15  96


 


 19 00:00  95


 


 19 23:45  95


 


 19 23:30  94 L


 


 19 23:15  96


 


 19 23:00  97


 


 19 22:45  97


 


 19 22:30  97


 


 19 22:15  98


 


 19 22:00  97


 


 19 21:45  97


 


 19 21:30  97


 


 19 21:15  97


 


 19 21:00  99


 


 19 20:45  100


 


 19 20:30  100


 


 19 20:15  99


 


 19 20:00  99


 


 19 19:45  95


 


 19 19:30  91 L


 


 19 19:15 


 


 19 19:00  88 L


 


 19 13:12  99








                                Intake and Output











 19





 22:59 06:59 14:59


 


Intake Total 7061.477 8015.172 40


 


Output Total 840 370 35


 


Balance 046.124 4146.172 5


 


Intake:   


 


  IV 1060 1160 40


 


    Bolus Normal Saline  1000 


 


    LR @20ml/hr 60 160 40


 


  Intake, IV Titration 25.555 240.172 





  Amount   


 


    Norepinephrine 8 mg In 18.365 140.172 





    Sodium Chloride 0.9% 250   





    ml @ 0.05 MCG/KG/MIN 7.   





    904 mls/hr IV .Q24H DAVID   





    Rx#:[id number]   


 


    Propofol 1,000 mg In 7.19  





    Empty Bag 1 bag @ Titrate   





    IV .Q0M DAVID Rx#:   





    [id number]   


 


    metroNIDAZOLE-NS   100 





    mg In Saline 1 100ml.bag   





    @ 100 mls/hr IVPB Q8HR   





    DAVID Rx#:[id number]   


 


Output:   


 


  Drainage 170 180 


 


    Left Lower Abdomen 170 180 


 


  Urine 370 190 35


 


  Estimated Blood Loss 300  


 


Other:   


 


  Voiding Method Indwelling Catheter Indwelling Catheter Indwelling Catheter


 


  Weight  85.8 kg 








                         ABP, PAP, CO, CI - Last 8 Hours











Arterial Blood Pressure        93/58


 


Arterial Blood Pressure        94/37


 


Arterial Blood Pressure        94/36


 


Arterial Blood Pressure        95/38


 


Arterial Blood Pressure        104/42


 


Arterial Blood Pressure        106/41


 


Arterial Blood Pressure        94/38


 


Arterial Blood Pressure        92/36


 


Arterial Blood Pressure        104/46


 


Arterial Blood Pressure        103/39


 


Arterial Blood Pressure        105/39


 


Arterial Blood Pressure        108/45


 


Arterial Blood Pressure        107/41


 


Arterial Blood Pressure        105/42


 


Arterial Blood Pressure        100/40


 


Arterial Blood Pressure        108/41


 


Arterial Blood Pressure        113/41


 


Arterial Blood Pressure        144/51


 


Arterial Blood Pressure        101/38


 


Arterial Blood Pressure        108/40


 


Arterial Blood Pressure        132/46

















Physical Exam: Revealed a 89-year-old female in no distress.  On mechanical 

ventilation, intubated, sedated, on propofol, and on norepinephrine.


Head: Atraumatic, normocephalic.  Endotracheal tube and nasogastric tube are 

intact.


HEENT:[Neck is supple.] [No neck masses.] [No thyromegaly.] [No JVD.]  PERRLA, 

EOMI, no icterus.


Chest: [Symmetrical chest expansion, minimal crackles at the bases, no rhonchi 

no wheezes.


Cardiac Exam: [Normal S1 and S2, no S3 gallop, 2/6 systolic murmur over the 

aortic area.


Abdomen: [Postsurgical, wound VAC is noted.  Soft, nontender,  no megaly, no 

rebound, no guarding, negative bowel sounds.  Drains noted.


Extremities: [No clubbing, no edema, no cyanosis.]


Neurological Exam: Cannot be assessed, patient is sedated, on propofol.


Psychiatric: Cannot be assessed.


Skin: No rashes.


Lymphatics: No lymphadenopathy.





Results





- Laboratory Findings


CBC and BMP: 


                                 19 04:10





                                 19 04:10


ABG











ABG pH  7.29  (7.35-7.45)  L  19  07:56    


 


ABG pCO2  36 mmHg (35-45)   19  07:56    


 


ABG pO2  87 mmHg ()   19  07:56    


 


ABG O2 Saturation  96.6 % (94-97)   19  07:56    





PT/INR, D-dimer











PT  10.4 sec (9.0-12.0)   19  14:13    


 


INR  1.0  (<1.2)   19  14:13    








Abnormal lab findings: 


                                  Abnormal Labs











  19





  14:13 14:13 20:38


 


WBC   


 


RBC  3.07 L  


 


Hgb  9.2 L  


 


Hct  29.1 L  


 


MCHC   


 


RDW  15.9 H  


 


Neutrophils #   


 


Neutrophils # (Manual)   


 


Monocytes # (Manual)   


 


ABG pH   


 


ABG pO2   


 


ABG HCO3   


 


ABG Total CO2   


 


ABG O2 Saturation   


 


Chloride   109 H 


 


Carbon Dioxide   19 L 


 


BUN   73 H 


 


Creatinine   1.79 H 


 


Glucose   214 H 


 


POC Glucose (mg/dL)    132 H


 


Phosphorus   


 


Iron   


 


% Saturation   


 


Total Bilirubin   


 


Alkaline Phosphatase   193 H 


 


Total Protein   6.0 L 


 


Albumin   


 


Carcinoembryonic Ag   


 


 Antigen   


 


Vitamin B12   


 


RBC Folate   


 


Urine Appearance   


 


Urine Protein   


 


Urine Blood   


 


Ur Leukocyte Esterase   


 


Urine RBC   


 


Urine WBC   


 


Urine WBC Clumps   


 


Urine Bacteria   


 


Hyaline Casts   


 


Urine Mucus   














  19





  06:59 07:44 07:44


 


WBC   


 


RBC    2.65 L


 


Hgb    8.1 L


 


Hct    25.0 L


 


MCHC   


 


RDW    15.6 H


 


Neutrophils #   


 


Neutrophils # (Manual)   


 


Monocytes # (Manual)   


 


ABG pH   


 


ABG pO2   


 


ABG HCO3   


 


ABG Total CO2   


 


ABG O2 Saturation   


 


Chloride   


 


Carbon Dioxide   


 


BUN   


 


Creatinine   


 


Glucose   


 


POC Glucose (mg/dL)  156 H  


 


Phosphorus   


 


Iron   


 


% Saturation   


 


Total Bilirubin   


 


Alkaline Phosphatase   


 


Total Protein   


 


Albumin   


 


Carcinoembryonic Ag   299.2 H 


 


 Antigen   


 


Vitamin B12   


 


RBC Folate   


 


Urine Appearance   


 


Urine Protein   


 


Urine Blood   


 


Ur Leukocyte Esterase   


 


Urine RBC   


 


Urine WBC   


 


Urine WBC Clumps   


 


Urine Bacteria   


 


Hyaline Casts   


 


Urine Mucus   














  19





  07:44 07:44 11:25


 


WBC   


 


RBC   


 


Hgb   


 


Hct   


 


MCHC   


 


RDW   


 


Neutrophils #   


 


Neutrophils # (Manual)   


 


Monocytes # (Manual)   


 


ABG pH   


 


ABG pO2   


 


ABG HCO3   


 


ABG Total CO2   


 


ABG O2 Saturation   


 


Chloride   


 


Carbon Dioxide   


 


BUN   


 


Creatinine   


 


Glucose   


 


POC Glucose (mg/dL)    276 H


 


Phosphorus   


 


Iron  17 L  


 


% Saturation  5.06 L  


 


Total Bilirubin   


 


Alkaline Phosphatase   


 


Total Protein   


 


Albumin   


 


Carcinoembryonic Ag   


 


 Antigen  256.0 H  


 


Vitamin B12  3270.0 H  


 


RBC Folate   1,365 H 


 


Urine Appearance   


 


Urine Protein   


 


Urine Blood   


 


Ur Leukocyte Esterase   


 


Urine RBC   


 


Urine WBC   


 


Urine WBC Clumps   


 


Urine Bacteria   


 


Hyaline Casts   


 


Urine Mucus   














  19





  14:20 17:13 20:20


 


WBC   


 


RBC   


 


Hgb   


 


Hct   


 


MCHC   


 


RDW   


 


Neutrophils #   


 


Neutrophils # (Manual)   


 


Monocytes # (Manual)   


 


ABG pH   


 


ABG pO2   


 


ABG HCO3   


 


ABG Total CO2   


 


ABG O2 Saturation   


 


Chloride   


 


Carbon Dioxide   


 


BUN   


 


Creatinine   


 


Glucose   


 


POC Glucose (mg/dL)  303 H  134 H  104 H


 


Phosphorus   


 


Iron   


 


% Saturation   


 


Total Bilirubin   


 


Alkaline Phosphatase   


 


Total Protein   


 


Albumin   


 


Carcinoembryonic Ag   


 


 Antigen   


 


Vitamin B12   


 


RBC Folate   


 


Urine Appearance   


 


Urine Protein   


 


Urine Blood   


 


Ur Leukocyte Esterase   


 


Urine RBC   


 


Urine WBC   


 


Urine WBC Clumps   


 


Urine Bacteria   


 


Hyaline Casts   


 


Urine Mucus   














  19





  07:05 08:40 08:40


 


WBC   


 


RBC   2.75 L 


 


Hgb   8.2 L 


 


Hct   25.6 L 


 


MCHC   


 


RDW   15.7 H 


 


Neutrophils #   


 


Neutrophils # (Manual)   


 


Monocytes # (Manual)   


 


ABG pH   


 


ABG pO2   


 


ABG HCO3   


 


ABG Total CO2   


 


ABG O2 Saturation   


 


Chloride    109 H


 


Carbon Dioxide   


 


BUN    59 H


 


Creatinine    1.70 H


 


Glucose    109 H


 


POC Glucose (mg/dL)  128 H  


 


Phosphorus   


 


Iron   


 


% Saturation   


 


Total Bilirubin    1.4 H


 


Alkaline Phosphatase    159 H


 


Total Protein    5.7 L


 


Albumin    3.2 L


 


Carcinoembryonic Ag   


 


 Antigen   


 


Vitamin B12   


 


RBC Folate   


 


Urine Appearance   


 


Urine Protein   


 


Urine Blood   


 


Ur Leukocyte Esterase   


 


Urine RBC   


 


Urine WBC   


 


Urine WBC Clumps   


 


Urine Bacteria   


 


Hyaline Casts   


 


Urine Mucus   














  19





  11:08 17:08 20:19


 


WBC   


 


RBC   


 


Hgb   


 


Hct   


 


MCHC   


 


RDW   


 


Neutrophils #   


 


Neutrophils # (Manual)   


 


Monocytes # (Manual)   


 


ABG pH   


 


ABG pO2   


 


ABG HCO3   


 


ABG Total CO2   


 


ABG O2 Saturation   


 


Chloride   


 


Carbon Dioxide   


 


BUN   


 


Creatinine   


 


Glucose   


 


POC Glucose (mg/dL)  148 H  151 H  272 H


 


Phosphorus   


 


Iron   


 


% Saturation   


 


Total Bilirubin   


 


Alkaline Phosphatase   


 


Total Protein   


 


Albumin   


 


Carcinoembryonic Ag   


 


 Antigen   


 


Vitamin B12   


 


RBC Folate   


 


Urine Appearance   


 


Urine Protein   


 


Urine Blood   


 


Ur Leukocyte Esterase   


 


Urine RBC   


 


Urine WBC   


 


Urine WBC Clumps   


 


Urine Bacteria   


 


Hyaline Casts   


 


Urine Mucus   














  19





  09:13 09:13 11:34


 


WBC   


 


RBC  2.67 L  


 


Hgb  8.2 L  


 


Hct  25.1 L  


 


MCHC   


 


RDW  16.1 H  


 


Neutrophils #   


 


Neutrophils # (Manual)   


 


Monocytes # (Manual)   


 


ABG pH   


 


ABG pO2   


 


ABG HCO3   


 


ABG Total CO2   


 


ABG O2 Saturation   


 


Chloride   108 H 


 


Carbon Dioxide   21 L 


 


BUN   45 H 


 


Creatinine   1.57 H 


 


Glucose   145 H 


 


POC Glucose (mg/dL)    147 H


 


Phosphorus   


 


Iron   


 


% Saturation   


 


Total Bilirubin   1.4 H 


 


Alkaline Phosphatase   149 H 


 


Total Protein   5.5 L 


 


Albumin   3.1 L 


 


Carcinoembryonic Ag   


 


 Antigen   


 


Vitamin B12   


 


RBC Folate   


 


Urine Appearance   


 


Urine Protein   


 


Urine Blood   


 


Ur Leukocyte Esterase   


 


Urine RBC   


 


Urine WBC   


 


Urine WBC Clumps   


 


Urine Bacteria   


 


Hyaline Casts   


 


Urine Mucus   














  19





  16:52 21:32 06:58


 


WBC   


 


RBC   


 


Hgb   


 


Hct   


 


MCHC   


 


RDW   


 


Neutrophils #   


 


Neutrophils # (Manual)   


 


Monocytes # (Manual)   


 


ABG pH   


 


ABG pO2   


 


ABG HCO3   


 


ABG Total CO2   


 


ABG O2 Saturation   


 


Chloride   


 


Carbon Dioxide   


 


BUN   


 


Creatinine   


 


Glucose   


 


POC Glucose (mg/dL)  137 H  176 H  152 H


 


Phosphorus   


 


Iron   


 


% Saturation   


 


Total Bilirubin   


 


Alkaline Phosphatase   


 


Total Protein   


 


Albumin   


 


Carcinoembryonic Ag   


 


 Antigen   


 


Vitamin B12   


 


RBC Folate   


 


Urine Appearance   


 


Urine Protein   


 


Urine Blood   


 


Ur Leukocyte Esterase   


 


Urine RBC   


 


Urine WBC   


 


Urine WBC Clumps   


 


Urine Bacteria   


 


Hyaline Casts   


 


Urine Mucus   














  19





  11:12 16:53 20:02


 


WBC   


 


RBC   


 


Hgb   


 


Hct   


 


MCHC   


 


RDW   


 


Neutrophils #   


 


Neutrophils # (Manual)   


 


Monocytes # (Manual)   


 


ABG pH   


 


ABG pO2   


 


ABG HCO3   


 


ABG Total CO2   


 


ABG O2 Saturation   


 


Chloride   


 


Carbon Dioxide   


 


BUN   


 


Creatinine   


 


Glucose   


 


POC Glucose (mg/dL)  189 H  161 H  216 H


 


Phosphorus   


 


Iron   


 


% Saturation   


 


Total Bilirubin   


 


Alkaline Phosphatase   


 


Total Protein   


 


Albumin   


 


Carcinoembryonic Ag   


 


 Antigen   


 


Vitamin B12   


 


RBC Folate   


 


Urine Appearance   


 


Urine Protein   


 


Urine Blood   


 


Ur Leukocyte Esterase   


 


Urine RBC   


 


Urine WBC   


 


Urine WBC Clumps   


 


Urine Bacteria   


 


Hyaline Casts   


 


Urine Mucus   














  19





  07:18 11:36 18:47


 


WBC   


 


RBC   


 


Hgb   


 


Hct   


 


MCHC   


 


RDW   


 


Neutrophils #   


 


Neutrophils # (Manual)   


 


Monocytes # (Manual)   


 


ABG pH   


 


ABG pO2   


 


ABG HCO3   


 


ABG Total CO2   


 


ABG O2 Saturation   


 


Chloride   


 


Carbon Dioxide   


 


BUN   


 


Creatinine   


 


Glucose   


 


POC Glucose (mg/dL)  144 H  152 H  130 H


 


Phosphorus   


 


Iron   


 


% Saturation   


 


Total Bilirubin   


 


Alkaline Phosphatase   


 


Total Protein   


 


Albumin   


 


Carcinoembryonic Ag   


 


 Antigen   


 


Vitamin B12   


 


RBC Folate   


 


Urine Appearance   


 


Urine Protein   


 


Urine Blood   


 


Ur Leukocyte Esterase   


 


Urine RBC   


 


Urine WBC   


 


Urine WBC Clumps   


 


Urine Bacteria   


 


Hyaline Casts   


 


Urine Mucus   














  19





  19:18 20:05 20:05


 


WBC   16.9 H 


 


RBC   3.05 L 


 


Hgb   9.1 L 


 


Hct   29.6 L 


 


MCHC   30.8 L 


 


RDW   18.3 H 


 


Neutrophils #   14.9 H 


 


Neutrophils # (Manual)   


 


Monocytes # (Manual)   


 


ABG pH  7.31 L  


 


ABG pO2  324 H  


 


ABG HCO3  18 L  


 


ABG Total CO2   


 


ABG O2 Saturation  100.0 H  


 


Chloride    115 H


 


Carbon Dioxide    19 L


 


BUN    35 H


 


Creatinine    1.57 H


 


Glucose    144 H


 


POC Glucose (mg/dL)   


 


Phosphorus    4.9 H


 


Iron   


 


% Saturation   


 


Total Bilirubin   


 


Alkaline Phosphatase    133 H


 


Total Protein    4.4 L


 


Albumin    2.4 L


 


Carcinoembryonic Ag   


 


 Antigen   


 


Vitamin B12   


 


RBC Folate   


 


Urine Appearance   


 


Urine Protein   


 


Urine Blood   


 


Ur Leukocyte Esterase   


 


Urine RBC   


 


Urine WBC   


 


Urine WBC Clumps   


 


Urine Bacteria   


 


Hyaline Casts   


 


Urine Mucus   














  19





  20:15 20:42 22:19


 


WBC   


 


RBC   


 


Hgb   


 


Hct   


 


MCHC   


 


RDW   


 


Neutrophils #   


 


Neutrophils # (Manual)   


 


Monocytes # (Manual)   


 


ABG pH   7.31 L 


 


ABG pO2   151 H 


 


ABG HCO3   18 L 


 


ABG Total CO2   


 


ABG O2 Saturation   99.8 H 


 


Chloride   


 


Carbon Dioxide   


 


BUN   


 


Creatinine   


 


Glucose   


 


POC Glucose (mg/dL)    124 H


 


Phosphorus   


 


Iron   


 


% Saturation   


 


Total Bilirubin   


 


Alkaline Phosphatase   


 


Total Protein   


 


Albumin   


 


Carcinoembryonic Ag   


 


 Antigen   


 


Vitamin B12   


 


RBC Folate   


 


Urine Appearance  Cloudy H  


 


Urine Protein  1+ H  


 


Urine Blood  Moderate H  


 


Ur Leukocyte Esterase  Large H  


 


Urine RBC  >182 H  


 


Urine WBC  164 H  


 


Urine WBC Clumps  Occasional H  


 


Urine Bacteria  Rare H  


 


Hyaline Casts  6 H  


 


Urine Mucus  Rare H  














  12/19





  04:10 04:10 07:56


 


WBC  25.8 H  


 


RBC  3.25 L  


 


Hgb  9.6 L  


 


Hct  31.7 L  


 


MCHC  30.2 L  


 


RDW  18.4 H  


 


Neutrophils #   


 


Neutrophils # (Manual)  22.96 H  


 


Monocytes # (Manual)  1.03 H  


 


ABG pH    7.29 L


 


ABG pO2   


 


ABG HCO3    17 L


 


ABG Total CO2    18 L


 


ABG O2 Saturation   


 


Chloride   116 H 


 


Carbon Dioxide   16 L 


 


BUN   34 H 


 


Creatinine   1.73 H 


 


Glucose   140 H 


 


POC Glucose (mg/dL)   


 


Phosphorus   5.6 H 


 


Iron   


 


% Saturation   


 


Total Bilirubin   


 


Alkaline Phosphatase   


 


Total Protein   


 


Albumin   


 


Carcinoembryonic Ag   


 


 Antigen   


 


Vitamin B12   


 


RBC Folate   


 


Urine Appearance   


 


Urine Protein   


 


Urine Blood   


 


Ur Leukocyte Esterase   


 


Urine RBC   


 


Urine WBC   


 


Urine WBC Clumps   


 


Urine Bacteria   


 


Hyaline Casts   


 


Urine Mucus   














- Diagnostic Findings


Chest x-ray: image reviewed (Chest x-ray this morning showed cardiomegaly, 

bibasilar airspace disease, possible interstitial edema small bilateral 

effusions,)





Assessment and Plan


Assessment: 





Impression:





1 status post  Exploratory laparotomy with lysis of adhesions over 2-1/2 to 3 

hours, postoperative day #1.  Patient has postoperative hypoxic respiratory 

failure, expected considering her multiple medical problems and her significant 

cardiac history.  I believe there is a component of diastolic congestive heart 

failure and possibly some component of heart failure secondary to aortic valve 

disease.  Patient is known to have history of severe aortic stenosis.


2.  Extensive right hemicolectomy


3.  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


4.  Takedown of internal hernia pelvis


5.  Small bowel resection distal jejunum to ileum, over 2 feet


6.  Repair of incisional hernia, mid abdomen, 5-cm


7.  Placement of round #19 drain left lower quadrant


8.  Placement of incisional wound VAC system PREVENA, 20 cm 


9 Ascending colon mass with rectal bleeding


10 Chronic iron deficiency anemia


11 Congestive heart failure, diastolic, moderate


12.History of colon resection


13 history of Ischemic cardiomyopathy


14. History of atrial fibrillation


15.Past history of pulmonary embolism with Leoncio filter placement


16 Obstructive sleep apnea


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


18 Hypertensive heart disease


19 History of CABG


20 History of aortic valvular replacement 


21.Depressive disorder


22.Gout


23 Permanent pacemaker


24 Hypothyroidism


25 Small bowel obstruction due to tumor and pelvis and internal hernia


26 Incisional hernia epigastrium, 5 cm


27 Colon cancer recurrence of abdominal wall, right lower quadrant


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





Recommendation:


Continue ventilatory support


Ventilator settings were adjusted accordingly.


Continue antibiotics.


Continue GI and DVT prophylaxis.


Continue close monitoring of sugars and insulin as per protocol.  May even 

consider insulin drip.


Continue sodium bicarb drip for now.


Close assessment of fluid status, and monitor CVP.


Consider TPN in the next 24 hours.


Daily assessment of chest x-ray and labs including electrolytes, CBC, and renal 

profile.


At present, the patient is not ready for any form of weaning, her overall 

pulmonary status and cardiac status is marginal at best.  We'll continue to 

follow with the different consultants on this case.  Overall prognosis is 

definitely guarded.








Time with Patient: Greater than 30

## 2019-12-08 NOTE — PCN
PROCEDURE NOTE



PROCEDURE PERFORMED:

Placement of right internal jugular triple-lumen catheter.



PREOPERATIVE DIAGNOSIS:

Postoperative hypotension requiring pressors.



POSTOPERATIVE DIAGNOSIS:

Postoperative hypotension requiring pressors.



ANESTHESIA USED:

2 mL of 1% lidocaine.



PROCEDURE:

The patient was placed in a Trendelenburg position, the area of the cervical region was

prepared in a sterile fashion and drapes were applied.  Using the posterior approach,

the right internal jugular vein was cannulated easily and a guidewire was placed.  The

area around the guidewire was dilated using a dilator.  Then a triple-lumen catheter

was inserted over the guidewire, and the guidewire was removed.  Good blood flow was

noted in the three different ports of the triple-lumen catheter.  The line was secured

using 3.0 silk sutures.  Chest x-ray showed no evidence of any immediate complications.





MMODL / IJN: [id number] / Job#: [id number]

## 2019-12-08 NOTE — P.PN
Progress Note - Text


Progress Note Date: 12/08/19


Patient intubated and sedated.  Hypotensive based on arterial line, but better 

pressures with BP cuff.  Epidural @ 6 ml/hr





Epidural site clean and dry





A/P POD#1 s/p ex lap with R hemicolectomy


- decreased epidural to 4 ml/hr


- may turn off is hypotension continues and no plan to wean today

## 2019-12-09 LAB
ALBUMIN SERPL-MCNC: 2.1 G/DL (ref 3.5–5)
ANION GAP SERPL CALC-SCNC: 5 MMOL/L
BASOPHILS # BLD AUTO: 0 K/UL (ref 0–0.2)
BASOPHILS NFR BLD AUTO: 0 %
BUN SERPL-SCNC: 42 MG/DL (ref 7–17)
CALCIUM SPEC-MCNC: 8.1 MG/DL (ref 8.4–10.2)
CHLORIDE SERPL-SCNC: 114 MMOL/L (ref 98–107)
CO2 BLDA-SCNC: 20 MMOL/L (ref 19–24)
CO2 SERPL-SCNC: 19 MMOL/L (ref 22–30)
EOSINOPHIL # BLD AUTO: 0.2 K/UL (ref 0–0.7)
EOSINOPHIL NFR BLD AUTO: 1 %
ERYTHROCYTE [DISTWIDTH] IN BLOOD BY AUTOMATED COUNT: 2.85 M/UL (ref 3.8–5.4)
ERYTHROCYTE [DISTWIDTH] IN BLOOD: 19.2 % (ref 11.5–15.5)
GLUCOSE BLD-MCNC: 156 MG/DL (ref 75–99)
GLUCOSE BLD-MCNC: 166 MG/DL (ref 75–99)
GLUCOSE BLD-MCNC: 177 MG/DL (ref 75–99)
GLUCOSE BLD-MCNC: 185 MG/DL (ref 75–99)
GLUCOSE BLD-MCNC: 186 MG/DL (ref 75–99)
GLUCOSE BLD-MCNC: 201 MG/DL (ref 75–99)
GLUCOSE BLD-MCNC: 211 MG/DL (ref 75–99)
GLUCOSE BLD-MCNC: 213 MG/DL (ref 75–99)
GLUCOSE BLD-MCNC: 214 MG/DL (ref 75–99)
GLUCOSE BLD-MCNC: 215 MG/DL (ref 75–99)
GLUCOSE BLD-MCNC: 231 MG/DL (ref 75–99)
GLUCOSE SERPL-MCNC: 198 MG/DL (ref 74–99)
HCO3 BLDA-SCNC: 19 MMOL/L (ref 21–25)
HCT VFR BLD AUTO: 27.6 % (ref 34–46)
HGB BLD-MCNC: 8.4 GM/DL (ref 11.4–16)
LYMPHOCYTES # SPEC AUTO: 1.8 K/UL (ref 1–4.8)
LYMPHOCYTES NFR SPEC AUTO: 9 %
MAGNESIUM SPEC-SCNC: 2.4 MG/DL (ref 1.6–2.3)
MCH RBC QN AUTO: 29.5 PG (ref 25–35)
MCHC RBC AUTO-ENTMCNC: 30.5 G/DL (ref 31–37)
MCV RBC AUTO: 96.7 FL (ref 80–100)
MONOCYTES # BLD AUTO: 1.5 K/UL (ref 0–1)
MONOCYTES NFR BLD AUTO: 8 %
NEUTROPHILS # BLD AUTO: 14.7 K/UL (ref 1.3–7.7)
NEUTROPHILS NFR BLD AUTO: 78 %
PCO2 BLDA: 33 MMHG (ref 35–45)
PH BLDA: 7.38 [PH] (ref 7.35–7.45)
PLATELET # BLD AUTO: 222 K/UL (ref 150–450)
PO2 BLDA: 142 MMHG (ref 83–108)
POTASSIUM SERPL-SCNC: 4.2 MMOL/L (ref 3.5–5.1)
SODIUM SERPL-SCNC: 138 MMOL/L (ref 137–145)
WBC # BLD AUTO: 18.8 K/UL (ref 3.8–10.6)

## 2019-12-09 RX ADMIN — CHLORHEXIDINE GLUCONATE SCH ML: 1.2 RINSE ORAL at 08:14

## 2019-12-09 RX ADMIN — HEPARIN SODIUM SCH UNIT: 5000 INJECTION, SOLUTION INTRAVENOUS; SUBCUTANEOUS at 08:14

## 2019-12-09 RX ADMIN — FUROSEMIDE SCH MG: 10 INJECTION, SOLUTION INTRAMUSCULAR; INTRAVENOUS at 20:20

## 2019-12-09 RX ADMIN — ALLOPURINOL SCH: 100 TABLET ORAL at 08:00

## 2019-12-09 RX ADMIN — HEPARIN SODIUM SCH UNIT: 5000 INJECTION, SOLUTION INTRAVENOUS; SUBCUTANEOUS at 20:20

## 2019-12-09 RX ADMIN — IPRATROPIUM BROMIDE AND ALBUTEROL SULFATE SCH ML: .5; 3 SOLUTION RESPIRATORY (INHALATION) at 17:09

## 2019-12-09 RX ADMIN — METRONIDAZOLE SCH MLS/HR: 500 INJECTION, SOLUTION INTRAVENOUS at 16:17

## 2019-12-09 RX ADMIN — NOREPINEPHRINE BITARTRATE SCH MLS/HR: 1 INJECTION, SOLUTION, CONCENTRATE INTRAVENOUS at 10:12

## 2019-12-09 RX ADMIN — METRONIDAZOLE SCH MLS/HR: 500 INJECTION, SOLUTION INTRAVENOUS at 23:25

## 2019-12-09 RX ADMIN — INSULIN HUMAN SCH MLS/HR: 100 INJECTION, SOLUTION PARENTERAL at 17:08

## 2019-12-09 RX ADMIN — Medication SCH MG: at 20:20

## 2019-12-09 RX ADMIN — NOREPINEPHRINE BITARTRATE SCH MLS/HR: 1 INJECTION, SOLUTION, CONCENTRATE INTRAVENOUS at 00:17

## 2019-12-09 RX ADMIN — POTASSIUM CHLORIDE SCH MLS/HR: 14.9 INJECTION, SOLUTION INTRAVENOUS at 08:15

## 2019-12-09 RX ADMIN — INSULIN DETEMIR SCH UNIT: 100 INJECTION, SOLUTION SUBCUTANEOUS at 05:58

## 2019-12-09 RX ADMIN — IPRATROPIUM BROMIDE AND ALBUTEROL SULFATE SCH ML: .5; 3 SOLUTION RESPIRATORY (INHALATION) at 20:07

## 2019-12-09 RX ADMIN — INSULIN ASPART SCH UNIT: 100 INJECTION, SOLUTION INTRAVENOUS; SUBCUTANEOUS at 00:01

## 2019-12-09 RX ADMIN — INSULIN ASPART SCH UNIT: 100 INJECTION, SOLUTION INTRAVENOUS; SUBCUTANEOUS at 05:58

## 2019-12-09 RX ADMIN — LEVOTHYROXINE SODIUM SCH: 75 TABLET ORAL at 05:36

## 2019-12-09 RX ADMIN — PIPERACILLIN AND TAZOBACTAM SCH MLS/HR: 3; .375 INJECTION, POWDER, FOR SOLUTION INTRAVENOUS at 05:35

## 2019-12-09 RX ADMIN — CHLORHEXIDINE GLUCONATE SCH ML: 1.2 RINSE ORAL at 20:20

## 2019-12-09 RX ADMIN — PROPOFOL SCH MLS/HR: 10 INJECTION, EMULSION INTRAVENOUS at 06:48

## 2019-12-09 RX ADMIN — Medication SCH: at 08:00

## 2019-12-09 RX ADMIN — METRONIDAZOLE SCH MLS/HR: 500 INJECTION, SOLUTION INTRAVENOUS at 00:02

## 2019-12-09 RX ADMIN — PANTOPRAZOLE SODIUM SCH MG: 40 INJECTION, POWDER, FOR SOLUTION INTRAVENOUS at 08:14

## 2019-12-09 RX ADMIN — IPRATROPIUM BROMIDE AND ALBUTEROL SULFATE SCH ML: .5; 3 SOLUTION RESPIRATORY (INHALATION) at 12:48

## 2019-12-09 RX ADMIN — SODIUM PHOSPHATE, MONOBASIC, MONOHYDRATE SCH MLS/HR: 276; 142 INJECTION, SOLUTION INTRAVENOUS at 15:51

## 2019-12-09 RX ADMIN — INSULIN ASPART SCH UNIT: 100 INJECTION, SOLUTION INTRAVENOUS; SUBCUTANEOUS at 11:54

## 2019-12-09 RX ADMIN — METRONIDAZOLE SCH MLS/HR: 500 INJECTION, SOLUTION INTRAVENOUS at 08:14

## 2019-12-09 RX ADMIN — PIPERACILLIN AND TAZOBACTAM SCH MLS/HR: 3; .375 INJECTION, POWDER, FOR SOLUTION INTRAVENOUS at 18:02

## 2019-12-09 RX ADMIN — FUROSEMIDE SCH MLS/HR: 10 INJECTION, SOLUTION INTRAMUSCULAR; INTRAVENOUS at 04:48

## 2019-12-09 RX ADMIN — POTASSIUM CHLORIDE SCH MLS/HR: 14.9 INJECTION, SOLUTION INTRAVENOUS at 10:11

## 2019-12-09 NOTE — CDI
Documentation Clarification Form



Date: 12/9/2019 8:57:08 AM

From: Becky Panchal RN, CCDS

Phone: [phone]

MRN: [id number]

Admit Date: 12/2/2019 3:52:00 PM

Patient Name: Christina Ortiz

Visit Number: [id number]



ATTENTION: The Clinical Documentation Specialists (CDI) and [facility] Coding Staff 
appreciate your assistance in clarifying documentation. Please respond to the 
clarification below the line at the bottom and electronically sign. The CDI & 
[facility] Coding staff will review the response and follow-up if needed. Please note: 
Queries are made part of the Legal Health Record. If you have any questions, 
please contact the author of this message via ITS.



Dr. Carlota Rubio



History/Risk Factors: 

Atrial Fib, CAD, GI, HTN, Pneumonia



Clinical Indicators:

12/8 Cardiology Consult: "Mrs. Ortiz is an 89-year-old female who is seen for
atrial fibrillation.

The patient, during the surgery, had atrial fibrillation with a rapid 
ventricular response and patient was cardioverted.

There is no more reoccurrence of atrial fibrillation."

EKG/telemetry: NSR at current time



Treatment:  

Consults: Cardiology

Coreg 3.125 PO BID with meals

Patient is currently on Levophed drip titrate for B/P

2.5 L IVF Bolus



In your professional opinion, can you please clarify the type of Atrial 
Fibrillation, if known?  



Chronic/Permanent

Paroxysmal

Persistent

Other, please specify ________

Unable to determine



(Last Revision: April 2018)

___________________________________________________________________________



MTDD

## 2019-12-09 NOTE — P.CONS
History of Present Illness





- Reason for Consult


Consult date: 19


Antibiotic managment


Requesting physician: Minda Gutierrez





- Chief Complaint


bleeding per rectum x 1 day





- History of Present Illness


Patient is 89-year  female presenting to the ER at [facility]

on 2019 for evaluation of bleeding per rectum and feeling very weak 

apparently a week prior to that the patient was diagnosed with a abdominal tumor

subsequently patient has been evaluated by surgical team and the patient was 

taken to the OR on 2019 patient is a status post exploratory laparotomy 

with lysis of addition right hemicolectomy resection of transverse mesocolon 

small bowel resection and repair of incisional area with subsequent placement of

 incisional wound VAC system this patient has been afebrile except she did have 

a low-grade fever 100.3 on 19  but no fever has been recorded since then 

patient did have a normal white count initially  subsequently has been running a

white count has been elevated to 16.1 and was up to 25.8 yesterday patient has 

been started on Zosyn I was asked to see the patient regarding further m

anagement of her antibiotic patient has a time of elevation this afternoon has 

been extubated and has been breathing comfortably patient has been complaining 

of some dryness in the nose but denies having any chest pain or shortness of 

breath occasional cough currently denies any abdominal pain no nausea no 

vomiting or any diarrhea.








Review of Systems


Positive point has been mentioned in HPI rest of the systems are negative











Past Medical History


Past Medical History: Atrial Fibrillation, Coronary Artery Disease (CAD), 

Cancer, Heart Failure, Diabetes Mellitus, GI Bleed, Hypertension, Osteoarthritis

(OA), Pneumonia, Pulmonary Embolus (PE), Sleep Apnea/CPAP/BIPAP, Thyroid 

Disorder


Additional Past Medical History / Comment(s): gout, chronic pain in back hip and

leg, diverticulitis, LT EYE BLIND,GLAUCOMA, MAC DEGENERATION RT EYE, MURMUR, 

DIVERTICULAR DX.OVARIAN CYSTS,SKIN CA,ANEMIA, TRACHEBRONCHITIS WITH REACTIVE 

BRONCHOSPASM.used to use cpap machine .  Increased shortness of breath with 

activity and at rest. ZEESHAN CARPAL TUNNEL. abd cncer


History of Any Multi-Drug Resistant Organisms: None Reported


Past Surgical History: Appendectomy, Bowel Resection, Breast Surgery, Cardiac 

Valve Replacement, Cholecystectomy, Coronary Bypass/CABG, Heart Catheterization,

Hysterectomy, Orthopedic Surgery, Pacemaker, Tonsillectomy


Additional Past Surgical History / Comment(s): pacemaker, AORTIC VALVE 

REPLACEMENT(TISSUE) AND 1 VESSEL BYPASS, CYST REMOVED FROM HAND/HEAD,BOWEL 

RESECTION D/T DIVERTICULITIS.LEONCIO FILTER, COLONOCOSPY/POLYPECTOMY/EGD, 

PAST LT EYE SX-DAMAGED THE OPTIC NERVE-BLIND LT EYE.lt knee arthrosopy, skin 

cancer removed from hand/head/back


Past Anesthesia/Blood Transfusion Reactions: No Reported Reaction


Type of Cardiac Device: Permanent Pacemaker


Device Placement Date:: 


Past Psychological History: Depression


Additional Psychological History / Comment(s): history of depression, denies now


Smoking Status: Former smoker


Past Alcohol Use History: None Reported


Additional Past Alcohol Use History / Comment(s): STARTED SMOKING  1-2 ppd, 

quit , no alcohol now


Past Drug Use History: None Reported





- Past Family History


  ** Brother(s)


History Unknown: Yes





  ** Father


Family Medical History: Cancer


Additional Family Medical History / Comment(s): ALCOHOLIC--  





  ** Mother


Family Medical History: Cancer, Coronary Artery Disease (CAD), Dementia, 

Diabetes Mellitus


Additional Family Medical History / Comment(s):  





Medications and Allergies


                                Home Medications











 Medication  Instructions  Recorded  Confirmed  Type


 


Carvedilol [Coreg] 3.125 mg PO BID 14 History


 


Allopurinol 100 mg PO BID 06/03/15 12/02/19 History


 


Levothyroxine Sodium [Synthroid] 75 mcg PO DAILY 18 History


 


Insulin Lispro [humaLOG Kwikpen] 5 unit SQ AC-BID 18 History


 


Furosemide [Lasix] 80 mg PO BID 19 History


 


Insulin Detemir (Levemir) [Levemir] 20 unit SQ AC-BRKFST 19 

History


 


Potassium Chloride ER [K-Dur 10] 10 meq PO BID 19 History


 


Spironolactone [Aldactone] 25 mg PO BID 19 History


 


Meclizine HCl 12.5 mg PO TID PRN 19 History


 


Naproxen Sodium [Aleve] 440 mg PO Q12HR PRN 19 History


 


Sodium Bicarbonate Tab 650 mg PO BID 19 History








                                    Allergies











Allergy/AdvReac Type Severity Reaction Status Date / Time


 


aspirin Allergy  Unknown Verified 19 16:01


 


bee pollen [Bee Pollen] Allergy  Anaphylaxis Verified 19 16:01


 


celecoxib [From Celebrex] Allergy  Unknown Verified 19 16:01


 


blood thinners AdvReac Severe see comment Uncoded 19 13:51


 


URIC ACIDS AdvReac  GOUT Uncoded 19 13:51














Physical Exam


Vitals: 


                                   Vital Signs











  Temp Pulse Resp BP Pulse Ox


 


 19 12:00  98.8 F  72  12  117/47  98


 


 19 11:45   79  10 L   99


 


 19 11:30   71  16   99


 


 19 11:15   72  12   98


 


 19 11:00   70  15  98/40  98


 


 19 10:45   71  16   98


 


 19 10:30   67  15   98


 


 19 10:15   68  22   99


 


 19 10:00   67  22   99


 


 19 09:45   66  20   99


 


 19 09:30   66  20   99


 


 19 09:15   67  20   99


 


 19 09:00   66  22  116/49  98


 


 19 08:45   68  23   98


 


 19 08:30   67  21   99


 


 19 08:15   65  36 H   98


 


 19 08:00  98.2 F  60  18  118/50  98


 


 19 07:45   62  18   98


 


 19 07:30   73  18   98


 


 19 07:15   78  18   98


 


 19 07:00   80  18  113/58  98


 


 19 06:45   79  18   98


 


 19 06:30   78  18   98


 


 19 06:15   63  18   98


 


 19 06:00   70  18  113/47  98


 


 19 05:45   69  18   98


 


 19 05:30   70  18   98


 


 19 05:15   70  18   98


 


 19 05:00   70  18   98


 


 19 04:45   69  18   98


 


 19 04:30    18   96


 


 19 04:15   72  18   98


 


 19 04:00  100.3 F H  71  18  115/48  99


 


 19 03:45   75  18   99


 


 19 03:30   71  18   99


 


 19 03:15   69  18   99


 


 19 03:00   69  19  116/51  99


 


 19 02:45   71  18   99


 


 19 02:30   70  18   99


 


 19 02:15   69  20   99


 


 19 02:00   79  18  127/53  97


 


 19 01:45   80  24   98


 


 19 01:30   71  18   99


 


 19 01:15   68  21   98


 


 19 01:00   71  10 L  130/53 


 


 19 00:45   71  18   98


 


 19 00:30   71  18   98


 


 19 00:15   80  19   98


 


 19 00:00  100.3 F H  73  18  135/55  97


 


 19 23:45   73    99


 


 19 23:30   72  18  135/55  98


 


 19 23:15   72  18   99


 


 19 23:00   74  18  123/52  98


 


 19 22:45   79  18   98


 


 19 22:30   84  18   98


 


 19 22:15   84  18   98


 


 19 22:00   75  18  121/54  98


 


 19 21:45   76  18   98


 


 19 21:30   78  18   98


 


 19 21:15   75   121/54  98


 


 19 21:00   75   122/49  98


 


 19 20:45   81  18   98


 


 19 20:30   90  18   98


 


 19 20:15   97  18  122/49  97


 


 19 20:00  98.8 F  90  18  119/49  98


 


 19 19:45   90  18   98


 


 19 19:30   92  18   98


 


 19 19:15   93  19   98


 


 19 19:00   96  18  127/54  98


 


 19 18:45   97  20   98


 


 19 18:30   97  20   98


 


 19 18:15   101 H  20   97


 


 19 18:00   93  21   98


 


 19 17:45   96  20   98


 


 19 17:30   96  20   98


 


 19 17:15   97  22   97


 


 19 17:00   96  24  126/54  98


 


 19 16:45   100  24   97


 


 19 16:30   100  20   97


 


 19 16:15   100  20   97


 


 19 16:00  98.9 F  101 H  20  113/49  97


 


 19 15:45   101 H  22   97


 


 19 15:30   98  24   97


 


 19 15:15   96  18   97


 


 19 15:00   101 H  20  118/50  97


 


 19 14:45   100  25 H   97


 


 19 14:30   102 H  18   96


 


 19 14:15   102 H  18   97


 


 19 14:00   104 H  18  119/49  97


 


 19 13:45   98  18  119/49  96


 


 19 13:30   103 H  18  119/49  97


 


 19 13:15   102 H  18  119/49  97


 


 19 13:00   103 H  20  108/44  96


 


 19 12:45   97  18   97


 


 19 12:30   93  10 L   96


 


 19 12:15   95  18   97








                                Intake and Output











 19





 22:59 06:59 14:59


 


Intake Total 4421.813 8470.948 905.427


 


Output Total 568 750 435


 


Balance 478.142 596.948 470.427


 


Intake:   


 


   972 756


 


    Dextrose 5% in Water 1, 400 400 300





    000 ml @ 50 mls/hr IV .   





    Q23H DAVID with Sodium   





    Bicarb (1 Meq/ml) 150 ml   





    Rx#:[id number]   


 


    LR @20ml/hr 160 160 120


 


    Mvi, Adult No.4 with Vit 150 400 300





    K 10 ml Trace (Conc-1Ml/   





    Dose) 1 ml Parenteral   





    Electrolytes 20 ml In   





    Amino Acid 4.25%-D10w 1,   





    000 ml @ 50 mls/hr IV .   





    X16O12T DAVID Rx#:[id number]   


 


    pressure bag  12 36


 


  Intake, IV Titration 336.142 374.948 149.427





  Amount   


 


    Furosemide 100 mg In 83.667 95.833 





    Sodium Chloride 0.9% 90   





    ml @ 10 MG/HR 10 mls/hr   





    IV .Q10H DAVID Rx#:   





    [id number]   


 


    Norepinephrine 8 mg In 152.475 201.010 113.300





    Sodium Chloride 0.9% 250   





    ml @ 0.05 MCG/KG/MIN 7.   





    904 mls/hr IV .Q24H DAVID   





    Rx#:[id number]   


 


    Propofol 1,000 mg In 100 78.105 36.127





    Empty Bag 1 bag @ Titrate   





    IV .Q0M DAVID Rx#:   





    [id number]   


 


Output:   


 


  Drainage 110 280 


 


    Left Lower Abdomen 110 280 


 


  Urine 458 470 435


 


Other:   


 


  Voiding Method Indwelling Catheter Indwelling Catheter Indwelling Catheter


 


  Weight 85.8 kg 85.9 kg 85.9 kg








                         ABP, PAP, CO, CI - Last 8 Hours











Arterial Blood Pressure        122/41


 


Arterial Blood Pressure        124/42


 


Arterial Blood Pressure        117/38


 


Arterial Blood Pressure        124/41


 


Arterial Blood Pressure        115/38


 


Arterial Blood Pressure        128/42


 


Arterial Blood Pressure        139/46


 


Arterial Blood Pressure        123/39


 


Arterial Blood Pressure        87/31


 


Arterial Blood Pressure        119/39


 


Arterial Blood Pressure        122/40


 


Arterial Blood Pressure        117/38


 


Arterial Blood Pressure        117/39


 


Arterial Blood Pressure        112/37


 


Arterial Blood Pressure        102/38


 


Arterial Blood Pressure        125/44


 


Arterial Blood Pressure        119/40


 


Arterial Blood Pressure        116/40


 


Arterial Blood Pressure        118/42


 


Arterial Blood Pressure        119/44


 


Arterial Blood Pressure        122/45


 


Arterial Blood Pressure        115/42


 


Arterial Blood Pressure        111/42


 


Arterial Blood Pressure        122/42


 


Arterial Blood Pressure        123/47


 


Arterial Blood Pressure        115/44


 


Arterial Blood Pressure        128/47


 


Arterial Blood Pressure        125/47


 


Arterial Blood Pressure        120/46


 


Arterial Blood Pressure        124/47


 


Arterial Blood Pressure        113/48


 


Arterial Blood Pressure        125/48














GENERAL DESCRIPTION: Elderly female lying in bed, no distress. No tachypnea or 

accessory muscle of respiration use.


HEENT: Shows Pallor , no scleral icterus. Oral mucous membrane is dry.


NECK: Trachea central, no thyromegaly.


LUNGS: Unlabored breathing.  Decreased breath sound at bases. No wheeze or 

crackle.


HEART: S1, S2, regular rate and rhythm.


ABDOMEN: Soft, abdominal incision is covered with the wound VAC ,no tenderness ,

guarding or rigidity


EXTREMITIES: No edema of feet.


SKIN: No rash, no masses palpable.


NEUROLOGICAL: The patient is awake, alert, oriented x2, mood and affect normal.











Results


CBC & Chem 7: 


                                 19 04:35





                                 19 04:35


Labs: 


                  Abnormal Lab Results - Last 24 Hours (Table)











  19 Range/Units





  19:12 23:58 04:05 


 


WBC     (3.8-10.6)  k/uL


 


RBC     (3.80-5.40)  m/uL


 


Hgb     (11.4-16.0)  gm/dL


 


Hct     (34.0-46.0)  %


 


MCHC     (31.0-37.0)  g/dL


 


RDW     (11.5-15.5)  %


 


Neutrophils #     (1.3-7.7)  k/uL


 


Monocytes #     (0-1.0)  k/uL


 


ABG pCO2    33 L  (35-45)  mmHg


 


ABG pO2    142 H  ()  mmHg


 


ABG HCO3    19 L  (21-25)  mmol/L


 


ABG O2 Saturation    99.9 H  (94-97)  %


 


Chloride     ()  mmol/L


 


Carbon Dioxide     (22-30)  mmol/L


 


BUN     (7-17)  mg/dL


 


Creatinine     (0.52-1.04)  mg/dL


 


Glucose     (74-99)  mg/dL


 


POC Glucose (mg/dL)  187 H  215 H   (75-99)  mg/dL


 


Calcium     (8.4-10.2)  mg/dL


 


Magnesium     (1.6-2.3)  mg/dL


 


Albumin     (3.5-5.0)  g/dL














  19 Range/Units





  04:35 04:35 05:53 


 


WBC  18.8 H    (3.8-10.6)  k/uL


 


RBC  2.85 L    (3.80-5.40)  m/uL


 


Hgb  8.4 L    (11.4-16.0)  gm/dL


 


Hct  27.6 L    (34.0-46.0)  %


 


MCHC  30.5 L    (31.0-37.0)  g/dL


 


RDW  19.2 H    (11.5-15.5)  %


 


Neutrophils #  14.7 H    (1.3-7.7)  k/uL


 


Monocytes #  1.5 H    (0-1.0)  k/uL


 


ABG pCO2     (35-45)  mmHg


 


ABG pO2     ()  mmHg


 


ABG HCO3     (21-25)  mmol/L


 


ABG O2 Saturation     (94-97)  %


 


Chloride   114 H   ()  mmol/L


 


Carbon Dioxide   19 L   (22-30)  mmol/L


 


BUN   42 H   (7-17)  mg/dL


 


Creatinine   2.08 H   (0.52-1.04)  mg/dL


 


Glucose   198 H   (74-99)  mg/dL


 


POC Glucose (mg/dL)    231 H  (75-99)  mg/dL


 


Calcium   8.1 L   (8.4-10.2)  mg/dL


 


Magnesium   2.4 H   (1.6-2.3)  mg/dL


 


Albumin   2.1 L   (3.5-5.0)  g/dL














  19 Range/Units





  11:50 


 


WBC   (3.8-10.6)  k/uL


 


RBC   (3.80-5.40)  m/uL


 


Hgb   (11.4-16.0)  gm/dL


 


Hct   (34.0-46.0)  %


 


MCHC   (31.0-37.0)  g/dL


 


RDW   (11.5-15.5)  %


 


Neutrophils #   (1.3-7.7)  k/uL


 


Monocytes #   (0-1.0)  k/uL


 


ABG pCO2   (35-45)  mmHg


 


ABG pO2   ()  mmHg


 


ABG HCO3   (21-25)  mmol/L


 


ABG O2 Saturation   (94-97)  %


 


Chloride   ()  mmol/L


 


Carbon Dioxide   (22-30)  mmol/L


 


BUN   (7-17)  mg/dL


 


Creatinine   (0.52-1.04)  mg/dL


 


Glucose   (74-99)  mg/dL


 


POC Glucose (mg/dL)  214 H  (75-99)  mg/dL


 


Calcium   (8.4-10.2)  mg/dL


 


Magnesium   (1.6-2.3)  mg/dL


 


Albumin   (3.5-5.0)  g/dL








                      Microbiology - Last 24 Hours (Table)











 19 17:05 Gram Stain - Preliminary





 Peritoneal Fluid Body Fluid Culture - Preliminary


 


 19 00:37 Gram Stain - Preliminary





 Sputum Sputum Culture - Preliminary


 


 19 20:15 Urine Culture - Preliminary





 Urine,Voided 














Assessment and Plan


Assessment: 


1-patient with leukocytosis which is more likely reactive in this patient who is

status post extensive laparotomy resection of large and small bowel for right-

sided colon tumor as there was no mention of any perforation in the operative 

report and patient currently has not been running any high-grade fever and no 

other clinical focus of infection








(1) Leukocytosis


Current Visit: Yes   Status: Acute   Code(s): D72.829 - ELEVATED WHITE BLOOD 

CELL COUNT, UNSPECIFIED   SNOMED Code(s): 238623948


   


Plan: 


1-Zosyn 3.375 g every 8 hour to continue


2-gentle IV fluid


We will follow on clinical condition and cultures to further adjust medication 

if needed


Thank you for this consultation we will follow the patient along with you





Time with Patient: Greater than 30

## 2019-12-09 NOTE — PN
PROGRESS NOTE



PULMONARY/CRITICAL CARE PROGRESS NOTE:



DATE OF SERVICE:

December 9, 2019



CRITICAL CARE TIME: 36 minutes.



This is an 89-year-old female admitted to the hospital on December 2.  She came in with

a diagnosis of GI bleeding, colon cancer.  She apparently was intubated on the 7th when

she went to the operating room.  She had an exploratory laparotomy, lysis of adhesions,

right hemicolectomy, transverse colon resection, hernia repair, and small-bowel

resection.  She did not require a colostomy.  Anyway, the patient has been in the ICU

on the ventilator since the 7th when she came back from the operating room.  She is a

NO CODE.  Currently, she is on the volume assist-control mode rate of 18, tidal volume

450, FiO2 of 45%, PEEP of 5.  Blood gases showed a pO2 of 142, a pCO2 of 33 and a pH

7.38.  These blood gases consistent with hyperoxia and mixed acid-base disturbance

including a mild respiratory alkalosis and metabolic acidosis.  Her rate will be

increased from 18 to 22 and her tidal volume will be dropped from 450 to 350.  Her FiO2

was already dropped from 45% to 35% based on these blood gases.  The patient is on D5W

3 amps of bicarb at 50 mL an hour, Lasix drip a 10 mg an hour, TPN at 50 mL an hour,

propofol at 20 mcg/kg per minute and norepinephrine at 10 mcg/minute as well lactated

Ringer's at 20 mL an hour, and epidural catheter.



The surgery by the way was done by Dr. Gutierrez.



I did ask the nurses to stop the propofol and do a daily interruption of sedation with

possible evaluation for spontaneous breathing trial.  Given her significant surgery and

her age, I am not sure she is ready, but we should evaluate her on a daily basis.



PHYSICAL EXAMINATION:

VITAL SIGNS: Current vital signs are reviewed. Her temperature is 98.2, heart rate 67,

respiratory rate 22, blood pressure 119/39, saturations are 99%.  Central venous

pressure is between 6 to 8 cm of water.

GENERAL: Appears in no acute distress.  Currently sedated.

HEENT: Examination is grossly unremarkable.  There is an orally placed endotracheal

tube.  NG tube is also noted.

NECK:  Supple.  Full range of motion.  No adenopathy.  Neck veins are flat.

CARDIOVASCULAR: Examination reveals regular rhythm and rate.  S1, S2 normal.  No S3,

S4, or murmur.  Heart sounds are distant.

LUNGS:  Reveal coarse rhonchi bilaterally.  No wheezes.  Breath sounds equal.

ABDOMEN:  Soft.  Midline incision is noted.  No bowel sounds are noted.  Wound VAC is

noted.

EXTREMITIES: Are intact.  No cyanosis, clubbing, or significant edema.

SKIN: Without rash.

NEUROLOGIC: Examination could not be adequately assessed given her current level of

sedation.



LABORATORY DATA:

Laboratory data is reviewed.  White count 18.8, hemoglobin 8.4, hematocrit 27.6,

platelet count 222,000.  Sodium 138, potassium 4.2, chloride 114, CO2 is 19. Anion gap

is 5. BUN and creatinine were 42 and 2.08.  Albumin 2.1.



Microbiologic studies including peritoneal fluid analysis, sputum and urine, all

negative.



Chest x-ray shows some trace pleural effusion, some bibasilar atelectasis or

infiltrates.



MEDICATIONS:

Medications are reviewed.



ASSESSMENT:

1. Postoperative day #2, status post exploratory laparotomy with lysis of adhesions,

    right hemicolectomy, transverse colon resection, hernia repair, small bowel

    resection, lysis of adhesions, and placement of a Prevena wound VAC.

2. Postoperative respiratory failure with routine postoperative ventilator management.

3. Colon cancer.

4. Gastrointestinal bleed.

5. Chronic iron deficiency anemia.

6. History of colon resection.

7. Ischemic cardiomyopathy.

8. Diastolic heart failure.

9. History of atrial fibrillation.

10.Previous history of pulmonary embolism with Freep[place] filter placement.

11.Sleep apnea syndrome.

12.Type 2 diabetes mellitus with diabetic neuropathy.

13.History of hypertensive heart disease.

14.History of bypass grafting.

15.History of aortic valve replacement.

16.Depression.

17.Gout.

18.Status post permanent pacemaker.

19.Hypothyroidism.

20.Colon cancer, transverse colon.



PLAN:

The patient's propofol will be held.  We will do a daily interruption of sedation and

evaluate the patient for spontaneous breathing trial.  The patient's assist-control

rate will be increased from 18 to 22 and tidal volume will be dropped from 450 to 350.

The FiO2 was dropped from 45% to 35% based on the blood gases.  Blood gases show

evidence of hyperoxia with a mixed acid-base disturbance including a respiratory

alkalosis and metabolic acidosis.  The patient remains on a number of different IV

fluids including D5W with 3 amps of bicarb at 50 mL an hour, Lasix drip a 10 mg an

hour, TPN at 50 mL an hour, propofol at 20 mcg/kg/minute, Levophed at 10 mcg/minute.

She is also on lactated Ringer's at 20 mL an hour.  She has an epidural in place.

Overall prognosis remains very guarded.  Given her age and multiple medical problems as

listed above, her prognosis is very guarded to say the least.  Additional

recommendations and suggestions are forthcoming.  We will continue to follow.

Medications are reviewed.



CRITICAL CARE TIME: 36 minutes.





KASSIE / LINDAN: [id number] / Job#: [id number]

## 2019-12-09 NOTE — CONS
CONSULTATION



REASON FOR CONSULT:

Renal failure.



HISTORY OF PRESENT ILLNESS:

Patient is an 89-year-old female who was initially admitted to the hospital for a GI

bleed.  Colonoscopy showed evidence of a colonic mass for which surgery was consulted

and patient is status post explorative laparotomy with lysis of adhesions with

extensive right hemicolectomy, resection of colonic tumor, small-bowel resection and

repair of incisional hernia on 12/7/2019.  The patient is currently on the vent.  She

does have a history of CHF, however, EF of about 55% in August of this year.  Serum

creatinine was about 1.79 on initial admission on 12/2/2019.  It did go down to about

1.5 and now it is up to 2.0.  Review of previous labs shows a creatinine as low as 1.29

on 08/21/2019.  Currently patient is maintained on Lasix drip at 10 mg an hour.  Urine

output is well maintained at about 50-60 mL/hour as well as sometimes 100 mL an hour.

Patient is currently on the vent. FiO2 is at 35%.  There are plans for possible

extubation.



PAST MEDICAL HISTORY:

Significant for atrial fibrillation, coronary artery disease, diastolic heart failure,

history of PE, CHF, hypertension, osteoarthritis, obstructive sleep apnea.  Chronic

back pain, skin cancer, ovarian cyst, macular degeneration, glaucoma, diverticulitis,

carpal tunnel syndrome.



PAST SURGICAL HISTORY:

Appendectomy, bowel resection previously, breast surgery, cardiac valve replacement

which was aortic valve, its a tissue valve with coronary artery bypass surgery,

cholecystectomy, hysterectomy, pacemaker placement, tonsillectomy, previous

colonoscopy, removal of skin cancer.



SOCIAL HISTORY:

Positive for patient being a former smoker.  No history of drug abuse or alcohol abuse.



MEDICATIONS:

Medications prior to admission included insulin, Synthroid, Coreg, allopurinol, Lasix,

potassium, Aldactone, Aleve, meclizine, sodium bicarb.



ALLERGIES:

INCLUDE ASPIRIN, BEE POLLEN, CELEBREX,  URIC ACID, REDUCERS, BLOOD THINNERS, DETAILS

NOT AVAILABLE.



REVIEW OF SYSTEMS:

As per HPI.  Other systems negative.



EXAMINATION:

Patient is currently comfortable.  She is awake.  She remains on the vent.  Blood

pressure this morning was 122/41, heart rate of 71 per minute.  Patient is afebrile.

Examination of the heart S1, S2.  Examination of the lungs, bilateral breath sounds are

heard.

ABDOMEN:  Soft, nontender.  Wound VAC is present.  Examination of lower extremities

shows no significant edema.  CNS exam cannot be performed in detail.



LABS SHOW:

Hemoglobin 8.4, sodium 138, potassium 4.2, chloride 114.  CO2 is 19, BUN 42, creatinine

2.08, albumin 2.1.  UA shows 1+ protein, moderate blood, RBCs more than 182. WBCs 164.



ASSESSMENT:

1. Acute kidney injury, acute tubular necrosis, ischemic acute tubular necrosis,

    currently nonoliguric.  I will discontinue the Lasix drip.  We can maintain the

    patient on IV push Lasix.  She does not appear to be in significant volume

    overload.  We will repeat labs in a.m. and repeat a chest x-ray in a.m. as well.

2. Status post resection of colonic mass with small-bowel resection as well, with

    explorative laparotomy and lysis of adhesions on 12/7/2019.

3. Hypoxic respiratory failure currently on the vent with plans for possible

    extubation today.

4. Hypertension.

5. Coronary artery disease.

6. Status post aortic valve replacement with tissue valve.

7. Metabolic acidosis, currently maintained on bicarb IV.

8. Anemia with no active bleeding noted.



PLAN:

Discontinue Lasix drip.  Maintain Lasix 40 mg q.12 hours.  Repeat labs in a.m.  Repeat

chest x-ray in a.m. Patient's iron saturation was only 5% on 12/3/2019.  We will add IV

iron if she has not received any IV iron recently.  Continue with the sodium bicarb

drip for now.  Avoid any nephrotoxic agents.



Thank you for this consultation.  We will continue to follow the patient with you

during her hospitalization.





MMODL / IJN: [id number] / Job#: [id number]

## 2019-12-09 NOTE — P.PN
Subjective


Progress Note Date: 12/09/19




















CHIEF COMPLAINT: Large colon mass with bleeding





HISTORY OF PRESENT ILLNESS: The patient is a 89-year-old female who presented 

with acute onset right lower quadrant abdominal pain including rectal bleeding 

moderate.  She underwent exploratory laparotomy with resection of right colon 

for over 10 cm lesion with invasion into abdominal wall including small bowel 

resection for internal small bowel obstruction.





Overnight, she is doing better.  Her Levophed requirement decreased from 20 mics

at 10 mics.  She awakens to voice.  Per discussion with nursing, patient was 

switched from a FULL CODE to a DO NOT RESUSCITATE.





ROS: No reports of nausea and vomiting.  No fevers or chills.  No productive spu

suresh





PHYSICAL EXAM: 


VITAL SIGNS: Reviewed


CONSTITUTIONAL:  Well developed and in no acute distress. 


EYES:  Conjuctivae without sclera icterus. Extraocular movements grossly intact.




HEAD, EARS, NOSE, THROAT: Moist buccal mucosa. Head is atraumatic, 

normocephalic. Hears conversational speech. No nasal drainage.


RESPIRATORY:  Mechanical ventilation.


CARDIOVASCULAR:  Palpable 2+ radial pulses.  


ABDOMEN: Soft, no peritonitis.  Midline dressing intact with PREVENA.  LYNDSEY 

serosanguineous


MUSCULOSKELETAL:  No gross deformity of the lower extremities noted.  No 

clubbing.  No cyanosis.  2+ bilateral lower extremity edema


SKIN: Good skin turgor. Well perfused. 


NEUROLOGIC: Cranial nerves I through XII grossly intact.  No focal or 

lateralizing signs. 





CLINICAL LABS: White blood cell count elevated from 25,000 down to 18,000





STUDIES: Chest x-ray without pneumonias.  Small bilateral pleural effusions





ASSESSMENT: 


1.  Colon cancer with large colon mass


2.  Aortic stenosis with unstable atrial fibrillation


3.  Abdominal metastases with small bowel obstruction and right colon mass and 

hemorrhaging





PLAN: 


1.  Clinically, she stable.


2.  Infectious disease consultation for adjustment of antibiotics


3.  Send LYNDSEY fluid for peritoneal cultures


4.  Wean off ventilator when feasible





Objective





- Vital Signs


Vital signs: 


                                   Vital Signs











Temp  98.8 F   12/09/19 12:00


 


Pulse  72   12/09/19 12:00


 


Resp  12   12/09/19 12:00


 


BP  117/47   12/09/19 12:00


 


Pulse Ox  98   12/09/19 12:00








                                 Intake & Output











 12/08/19 12/09/19 12/09/19





 18:59 06:59 18:59


 


Intake Total 9755.354 1188.090 905.427


 


Output Total 468 1065 435


 


Balance 3362.223 9010.090 470.427


 


Weight 85.8 kg 85.9 kg 85.9 kg


 


Intake:   


 


  IV 1690 1402 756


 


    Bolus Normal Saline 1000  


 


    Dextrose 5% in Water 1, 450 600 300





    000 ml @ 50 mls/hr IV .   





    Q23H DAVID with Sodium   





    Bicarb (1 Meq/ml) 150 ml   





    Rx#:[id number]   


 


    LR @20ml/hr 240 240 120


 


    Mvi, Adult No.4 with Vit  550 300





    K 10 ml Trace (Conc-1Ml/   





    Dose) 1 ml Parenteral   





    Electrolytes 20 ml In   





    Amino Acid 4.25%-D10w 1,   





    000 ml @ 50 mls/hr IV .   





    [id number] DAVID Rx#:[id number]   


 


    pressure bag  12 36


 


  Intake, IV Titration 246.684 711.090 149.427





  Amount   


 


    Furosemide 100 mg In  179.500 





    Sodium Chloride 0.9% 90   





    ml @ 10 MG/HR 10 mls/hr   





    IV .Q10H DAVID Rx#:   





    [id number]   


 


    Norepinephrine 8 mg In 153.874 353.485 113.300





    Sodium Chloride 0.9% 250   





    ml @ 0.05 MCG/KG/MIN 7.   





    904 mls/hr IV .Q24H DAVID   





    Rx#:[id number]   


 


    Propofol 1,000 mg In 92.81 178.105 36.127





    Empty Bag 1 bag @ Titrate   





    IV .Q0M DAVID Rx#:   





    [id number]   


 


Output:   


 


  Drainage 80 310 


 


    Left Lower Abdomen 80 310 


 


  Urine 388 755 435


 


Other:   


 


  Voiding Method Indwelling Catheter Indwelling Catheter Indwelling Catheter








                       ABP, PAP, CO, CI - Last Documented











Arterial Blood Pressure        122/41

















- Labs


CBC & Chem 7: 


                                 12/09/19 04:35





                                 12/09/19 04:35


Labs: 


                  Abnormal Lab Results - Last 24 Hours (Table)











  12/08/19 12/08/19 12/09/19 Range/Units





  19:12 23:58 04:05 


 


WBC     (3.8-10.6)  k/uL


 


RBC     (3.80-5.40)  m/uL


 


Hgb     (11.4-16.0)  gm/dL


 


Hct     (34.0-46.0)  %


 


MCHC     (31.0-37.0)  g/dL


 


RDW     (11.5-15.5)  %


 


Neutrophils #     (1.3-7.7)  k/uL


 


Monocytes #     (0-1.0)  k/uL


 


ABG pCO2    33 L  (35-45)  mmHg


 


ABG pO2    142 H  ()  mmHg


 


ABG HCO3    19 L  (21-25)  mmol/L


 


ABG O2 Saturation    99.9 H  (94-97)  %


 


Chloride     ()  mmol/L


 


Carbon Dioxide     (22-30)  mmol/L


 


BUN     (7-17)  mg/dL


 


Creatinine     (0.52-1.04)  mg/dL


 


Glucose     (74-99)  mg/dL


 


POC Glucose (mg/dL)  187 H  215 H   (75-99)  mg/dL


 


Calcium     (8.4-10.2)  mg/dL


 


Magnesium     (1.6-2.3)  mg/dL


 


Albumin     (3.5-5.0)  g/dL














  12/09/19 12/09/19 12/09/19 Range/Units





  04:35 04:35 05:53 


 


WBC  18.8 H    (3.8-10.6)  k/uL


 


RBC  2.85 L    (3.80-5.40)  m/uL


 


Hgb  8.4 L    (11.4-16.0)  gm/dL


 


Hct  27.6 L    (34.0-46.0)  %


 


MCHC  30.5 L    (31.0-37.0)  g/dL


 


RDW  19.2 H    (11.5-15.5)  %


 


Neutrophils #  14.7 H    (1.3-7.7)  k/uL


 


Monocytes #  1.5 H    (0-1.0)  k/uL


 


ABG pCO2     (35-45)  mmHg


 


ABG pO2     ()  mmHg


 


ABG HCO3     (21-25)  mmol/L


 


ABG O2 Saturation     (94-97)  %


 


Chloride   114 H   ()  mmol/L


 


Carbon Dioxide   19 L   (22-30)  mmol/L


 


BUN   42 H   (7-17)  mg/dL


 


Creatinine   2.08 H   (0.52-1.04)  mg/dL


 


Glucose   198 H   (74-99)  mg/dL


 


POC Glucose (mg/dL)    231 H  (75-99)  mg/dL


 


Calcium   8.1 L   (8.4-10.2)  mg/dL


 


Magnesium   2.4 H   (1.6-2.3)  mg/dL


 


Albumin   2.1 L   (3.5-5.0)  g/dL














  12/09/19 Range/Units





  11:50 


 


WBC   (3.8-10.6)  k/uL


 


RBC   (3.80-5.40)  m/uL


 


Hgb   (11.4-16.0)  gm/dL


 


Hct   (34.0-46.0)  %


 


MCHC   (31.0-37.0)  g/dL


 


RDW   (11.5-15.5)  %


 


Neutrophils #   (1.3-7.7)  k/uL


 


Monocytes #   (0-1.0)  k/uL


 


ABG pCO2   (35-45)  mmHg


 


ABG pO2   ()  mmHg


 


ABG HCO3   (21-25)  mmol/L


 


ABG O2 Saturation   (94-97)  %


 


Chloride   ()  mmol/L


 


Carbon Dioxide   (22-30)  mmol/L


 


BUN   (7-17)  mg/dL


 


Creatinine   (0.52-1.04)  mg/dL


 


Glucose   (74-99)  mg/dL


 


POC Glucose (mg/dL)  214 H  (75-99)  mg/dL


 


Calcium   (8.4-10.2)  mg/dL


 


Magnesium   (1.6-2.3)  mg/dL


 


Albumin   (3.5-5.0)  g/dL








                      Microbiology - Last 24 Hours (Table)











 12/08/19 17:05 Gram Stain - Preliminary





 Peritoneal Fluid Body Fluid Culture - Preliminary


 


 12/08/19 00:37 Gram Stain - Preliminary





 Sputum Sputum Culture - Preliminary


 


 12/07/19 20:15 Urine Culture - Preliminary





 Urine,Voided 














Assessment and Plan


(1) Abdominal mass


Current Visit: Yes   Status: Acute   Priority: High   Code(s): R19.00 - INTRA-

ABD AND PELVIC SWELLING, MASS AND LUMP, UNSP SITE   SNOMED Code(s): 334501668


   





(2) GI bleed


Current Visit: Yes   Status: Acute   Priority: High   Code(s): K92.2 - 

GASTROINTESTINAL HEMORRHAGE, UNSPECIFIED   SNOMED Code(s): 01985129


   





(3) History of colon cancer


Current Visit: Yes   Status: Acute   Code(s): Z85.038 - PERSONAL HISTORY OF 

MALIGNANT NEOPLASM OF LARGE INTESTINE   SNOMED Code(s): 078399300


   





(4) Anemia


Current Visit: Yes   Status: Chronic   Priority: Medium   Code(s): D64.9 - 

ANEMIA, UNSPECIFIED   SNOMED Code(s): 788727775


   





(5) Aortic stenosis


Current Visit: Yes   Status: Acute   Code(s): I35.0 - NONRHEUMATIC AORTIC 

(VALVE) STENOSIS   SNOMED Code(s): 81380887


   





(6) Atrial fibrillation


Current Visit: No   Status: Acute   Code(s): I48.91 - UNSPECIFIED ATRIAL 

FIBRILLATION   SNOMED Code(s): 79536227


   





(7) Atrial flutter


Current Visit: No   Status: Acute   Code(s): I48.92 - UNSPECIFIED ATRIAL FLUTTER

  SNOMED Code(s): 7176764


   





(8) Diastolic CHF, acute on chronic


Current Visit: No   Status: Acute   Code(s): I50.33 - ACUTE ON CHRONIC DIASTOLIC

(CONGESTIVE) HEART FAILURE   SNOMED Code(s): 152986571


   





(9) Systolic congestive heart failure


Current Visit: No   Status: Acute   Code(s): I50.20 - UNSPECIFIED SYSTOLIC 

(CONGESTIVE) HEART FAILURE   SNOMED Code(s): 26258061

## 2019-12-09 NOTE — P.PN
Progress Note - Text


Progress Note Date: 12/09/19





Patient seen and reevaluated this evening.  She has been extubated.  She is 

started on clears.  Intraoperative events and findings were described.  Patient 

overall happy with level of care.

## 2019-12-09 NOTE — P.PN
Progress Note - Text





Anesthesia.  Patient is status post exploratory laparotomy and bowel resection. 

She remains sedated intubated and with her epidural now running at 2 mL per 

hour.  The epidural was at 6 mL per hour but was dialed back because of issues 

with hypotension.  Sedation is to be weaned today and we will have a better idea

of effectiveness of her epidural once she wakes up.  Site is clean and dry and 

free of any evidence of infection.

## 2019-12-09 NOTE — XR
EXAMINATION TYPE: XR chest 1V portable

 

DATE OF EXAM: 12/9/2019

 

COMPARISON: 12/8/2019

 

HISTORY: Ventilatory dependent respiratory failure.

 

TECHNIQUE: Single frontal view of the chest is obtained.

 

FINDINGS:  Enteric, endotracheal, and right internal jugular central venous catheter lines and tubes 
are stable. However the enteric tube is cephalad in position and could be advanced 5 cm for optimal p
lacement as is fenestrated portion is above the gastroesophageal junction. There are similar appearin
g bibasilar opacities and trace pleural effusions with underlying emphysema. Cardiac and

 

Sternotomy wires are seen with multilead left-sided cardiac device. 

 

IMPRESSION:  

1. Similar-appearing bibasilar opacities and trace pleural effusions.

2. Cephalad placement of the enteric tube, which could be advanced 5 cm for optimal placement.

## 2019-12-09 NOTE — P.PN
Subjective


Progress Note Date: 12/08/19


Principal diagnosis: 





Ascending colon mass status post colectomy





History of presenting complaint:


This is a very pleasant 89-year-old patient who follows with visiting physicians

Dr. Ochoa.  Chronic stable medical conditions include congestive heart failure 

with EF of 50%, aortic stenosis, mitral regurgitation, mitral stenosis, 

tricuspid regurgitation, secondary probably hypertension, diabetes, 

hypertension, osteoarthritis, hypothyroid, blind left eye, coronary artery 

disease, Carley filter..  Patient does use a walker.  Patient did have a 

computed tomography scan of the abdomen on November 26 and did show mass along 

the lateral wall of the ascending colon and also adjacent soft tissue omental 

mass and is also another additional mass present in intra-abdominally.  

Suggestive of metastatic disease.  Patient yesterday had some bleeding per the 

right rectum.  And decided to come in.  Patient's been having lower abdominal 

discomfort.  She was admitted to the ER.  Patient normally does use a walker.  

Lives alone.  Colonoscopy done on December 4 shows ascending colon mass and AV 

malformation was some slight bleeding.





12/06/2019-.  The patient does morning.  Laying in bed.  Tired.  Pending surgery

tomorrow.  No new issues.





12/07/2019


Patient is currently mechanically ventilated.  Status post 3 today.PROCEDURES 

PERFORMED:


1.  Exploratory laparotomy with lysis of adhesions over 2-1/2 to 3 hours


2.  Extensive right hemicolectomy


3.  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


4.  Takedown of internal hernia pelvis


5.  Small bowel resection distal jejunum to ileum, over 2 feet


6.  Repair of incisional hernia, mid abdomen, 5-cm


7.  Placement of round #19 drain left lower quadrant


8.  Placement of incisional wound VAC system PREVENA, 20 cm 





12/08/2019


Patient is currently on mechanical ventilator.  Status post surgery as above.


Patient went into atrial fibrillation.  Rate is controlled.  Currently converted

to normal sinus rhythm.  Cardiology is following.  Also hypotensive and is on 

process support.  Patient was given fluid boluses..


Renal function worsened with creatinine 1.73.  Patient is also having metabolic 

acidosis.  Started on bicarb drip.


Has been afebrile.  No nausea vomiting.


Chest x-ray showed worsening bibasilar airspace disease.





Complete review of systems could not be obtained from the patient.








Objective





- Vital Signs


Vital signs: 


                                   Vital Signs











Temp  98.8 F   12/08/19 20:00


 


Pulse  75   12/08/19 21:15


 


Resp  18   12/08/19 20:45


 


BP  121/54   12/08/19 21:15


 


Pulse Ox  98   12/08/19 21:15








                                 Intake & Output











 12/08/19 12/08/19 12/09/19





 06:59 18:59 06:59


 


Intake Total 8872.910 4437.684 473.684


 


Output Total 610 468 240


 


Balance 213.757 5100.684 233.684


 


Weight 85.8 kg 85.8 kg 


 


Intake:   


 


  IV 1220 1690 310


 


    Bolus Normal Saline 1000 1000 


 


    Dextrose 5% in Water 1,  450 150





    000 ml @ 50 mls/hr IV .   





    Q23H DAVID with Sodium   





    Bicarb (1 Meq/ml) 150 ml   





    Rx#:[id number]   


 


    LR @20ml/hr 220 240 60


 


    Mvi, Adult No.4 with Vit   100





    K 10 ml Trace (Conc-1Ml/   





    Dose) 1 ml Parenteral   





    Electrolytes 20 ml In   





    Amino Acid 4.25%-D10w 1,   





    000 ml @ 50 mls/hr IV .   





    [id number] [facility] Rx#:[id number]   


 


  Intake, IV Titration 265.727 246.684 163.684





  Amount   


 


    Furosemide 100 mg In   83.667





    Sodium Chloride 0.9% 90   





    ml @ 10 MG/HR 10 mls/hr   





    IV .Q10H DAVID Rx#:   





    [id number]   


 


    Norepinephrine 8 mg In 158.537 153.874 80.017





    Sodium Chloride 0.9% 250   





    ml @ 0.05 MCG/KG/MIN 7.   





    904 mls/hr IV .Q24H DAVID   





    Rx#:[id number]   


 


    Propofol 1,000 mg In 7.19 92.81 





    Empty Bag 1 bag @ Titrate   





    IV .Q0M DAVID Rx#:   





    [id number]   


 


    metroNIDAZOLE-NS  100  





    mg In Saline 1 100ml.bag   





    @ 100 mls/hr IVPB Q8HR   





    DAVID Rx#:[id number]   


 


Output:   


 


  Drainage 350 80 30


 


    Left Lower Abdomen 350 80 30


 


  Urine 260 388 210


 


Other:   


 


  Voiding Method Indwelling Catheter Indwelling Catheter 








                       ABP, PAP, CO, CI - Last Documented











Arterial Blood Pressure        117/47

















- Exam





PHYSICAL EXAMINATION: 


Patient is currently on mechanical ventilator.. 


HEENT: Normocephalic. Neck is supple. Pupils reactive. Nostrils clear. Oral 

cavity is moist. Ears reveal no drainage. 


Neck reveals no JVD, carotid bruits, or thyromegaly. 


CHEST EXAMINATION: Trachea is central. Symmetrical expansion.  Bibasilar 

diminished air entry.. 


CARDIAC: Normal S1, S2 with no gallops. No murmurs 


ABDOMEN: Soft. Bowel sounds diminished.  Surgical site is bandaged.  Wound VAC 

in place.. 


Extremities: reveal no edema.  No clubbing or cyanosis


Neurologically sedated and on mechanical ventilator.


Skin: No rash or skin lesions. 


Psychiatric: Could not be assessed


Musculoskeletal: No joint swelling or deformity.  





INVESTIGATIONS, reviewed in the clinical context:


White count 8.4 hemoglobin 8.2 platelets 232 progression bun 59 creatinine 1.7





Previous testing


White count 8.6 hemoglobin 8.2 platelets 242 progression 4.2 bun 45 creatinine 

1.57





Previous testing


White count 9.6 hemoglobin 9.2 platelets 240 progression 4.7 BUN 73 creatinine 

1.79


Computed tomography scan of the abdomen-results noted as above





- Labs


CBC & Chem 7: 


                                 12/08/19 04:10





                                 12/08/19 04:10


Labs: 


                  Abnormal Lab Results - Last 24 Hours (Table)











  12/07/19 12/08/19 12/08/19 Range/Units





  22:19 04:10 04:10 


 


WBC   25.8 H   (3.8-10.6)  k/uL


 


RBC   3.25 L   (3.80-5.40)  m/uL


 


Hgb   9.6 L   (11.4-16.0)  gm/dL


 


Hct   31.7 L   (34.0-46.0)  %


 


MCHC   30.2 L   (31.0-37.0)  g/dL


 


RDW   18.4 H   (11.5-15.5)  %


 


Neutrophils # (Manual)   22.96 H   (1.3-7.7)  k/uL


 


Monocytes # (Manual)   1.03 H   (0-1.0)  k/uL


 


ABG pH     (7.35-7.45)  


 


ABG HCO3     (21-25)  mmol/L


 


ABG Total CO2     (19-24)  mmol/L


 


Chloride    116 H  ()  mmol/L


 


Carbon Dioxide    16 L  (22-30)  mmol/L


 


BUN    34 H  (7-17)  mg/dL


 


Creatinine    1.73 H  (0.52-1.04)  mg/dL


 


Glucose    140 H  (74-99)  mg/dL


 


POC Glucose (mg/dL)  124 H    (75-99)  mg/dL


 


Phosphorus    5.6 H  (2.5-4.5)  mg/dL














  12/08/19 12/08/19 12/08/19 Range/Units





  07:56 11:35 19:12 


 


WBC     (3.8-10.6)  k/uL


 


RBC     (3.80-5.40)  m/uL


 


Hgb     (11.4-16.0)  gm/dL


 


Hct     (34.0-46.0)  %


 


MCHC     (31.0-37.0)  g/dL


 


RDW     (11.5-15.5)  %


 


Neutrophils # (Manual)     (1.3-7.7)  k/uL


 


Monocytes # (Manual)     (0-1.0)  k/uL


 


ABG pH  7.29 L    (7.35-7.45)  


 


ABG HCO3  17 L    (21-25)  mmol/L


 


ABG Total CO2  18 L    (19-24)  mmol/L


 


Chloride     ()  mmol/L


 


Carbon Dioxide     (22-30)  mmol/L


 


BUN     (7-17)  mg/dL


 


Creatinine     (0.52-1.04)  mg/dL


 


Glucose     (74-99)  mg/dL


 


POC Glucose (mg/dL)   139 H  187 H  (75-99)  mg/dL


 


Phosphorus     (2.5-4.5)  mg/dL








                      Microbiology - Last 24 Hours (Table)











 12/08/19 00:37 Sputum Culture - Preliminary





 Sputum 


 


 12/07/19 20:15 Urine Culture - Preliminary





 Urine,Voided 














Assessment and Plan


Assessment: 





-Ascending colon mass, per coloscopy, is post resection.  Abdominal metastasis. 

Postoperative day 1.  Follow-up biopsy report.


-Postoperative ventilator-dependent respiratory failure expected.


-New onset Atrial fibrillation with controlled rate.  Converted to sinus rhythm.


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


-Coronary artery disease with prior history of bypass


-Probable chronic kidney disease from diabetic nephropathy and hypertensive 

nephrosclerosis


-Normocytic anemia suspected from chronic disease of malignancy


-Diabetes mellitus type 2


-Essential hypertension


-Primary osteoarthritis


-Chronic gout


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


-Macular degeneration of right eye


-Colonic diverticulosis 


-obstructive sleep apnea did use CPAP machine in the past





Plan


Patient will be continued on current medications and follow up renal function.  

Continue the pain management.  Continue with IV antibiotics.  Gen. surgery 

cardiology and  Pulmonary is on board.  Further recommendations based on the 

clinical course...





Time with Patient: Greater than 30

## 2019-12-09 NOTE — PN
PROGRESS NOTE



This patient was seen for atrial fibrillation.  Patient is status post surgery for

colectomy and lysis of the adhesion. Patient had an episode of atrial fibrillation

during surgery.  Patient remains in normal sinus rhythm.  No dysrhythmias are noted.

Patient currently is still on 10 mcg of Levophed.



Blood pressure is now 120/70 mmHg.

First and second heart sounds are heard.  There is a grade 3/6 ejection systolic murmur

noted.

Lungs are fairly clear to auscultation and percussion.



Patient's creatinine is 2.08.



ASSESSMENT AND PLAN:

Patient remains stable cardiac-wise without any episodes of recurrent atrial

fibrillation.  We will now see the patient p.r.n. If the patient has recurrent episodes

of atrial fibrillation, we will start the patient on amiodarone.





MMODL / IJN: [id number] / Job#: [id number]

## 2019-12-09 NOTE — P.PN
Subjective





89-year-old patient who follows with visiting physicians Dr. Ochoa.  Chronic 

stable medical conditions include congestive heart failure with EF of 50%, 

aortic stenosis, mitral regurgitation, mitral stenosis, tricuspid regurgitation,

secondary probably hypertension, diabetes, hypertension, osteoarthritis, 

hypothyroid, blind left eye, coronary artery disease, Carley filter..  

Patient does use a walker.  Patient did have a computed tomography scan of the 

abdomen on November 26 and did show mass along the lateral wall of the ascending

colon and also adjacent soft tissue omental mass and is also another additional 

mass present in intra-abdominally.  Suggestive of metastatic disease.  Patient 

yesterday had some bleeding per the right rectum.  And decided to come in.  

Patient's been having lower abdominal discomfort.  She was admitted to the ER.  

Patient normally does use a walker.  Lives alone.  Colonoscopy done on December 4 shows ascending colon mass and AV malformation was some slight bleeding.





12/06/2019-.  The patient does morning.  Laying in bed.  Tired.  Pending surgery

tomorrow.  No new issues.





12/07/2019


Patient is currently mechanically ventilated.  Status post 3 today.PROCEDURES 

PERFORMED:


1.  Exploratory laparotomy with lysis of adhesions over 2-1/2 to 3 hours


2.  Extensive right hemicolectomy


3.  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


4.  Takedown of internal hernia pelvis


5.  Small bowel resection distal jejunum to ileum, over 2 feet


6.  Repair of incisional hernia, mid abdomen, 5-cm


7.  Placement of round #19 drain left lower quadrant


8.  Placement of incisional wound VAC system PREVENA, 20 cm 





12/08/2019


Patient is currently on mechanical ventilator.  Status post surgery as above.


Patient went into atrial fibrillation.  Rate is controlled.  Currently converted

to normal sinus rhythm.  Cardiology is following.  Also hypotensive and is on 

process support.  Patient was given fluid boluses..


Renal function worsened with creatinine 1.73.  Patient is also having metabolic 

acidosis.  Started on bicarb drip.


Has been afebrile.  No nausea vomiting.





12/09/2019


Patient is extubated today, does have an epidural sluggish bowel sounds.  Has a 

drain in the left lower abdomen.  Patient is able to answer questions 

appropriately but appears to be bit delirious from a opiate analgesia and 

propofol while she was intubated.  Did not pass gas did not move her bowels yet.





Constitutional: Denied any fatigue denied any fever.


Cardio vascular: denied any chest pain, palpitations


Gastrointestinal denied any nausea vomiting


Pulmonary: Denied any shortness of breath cough


Neurologic denied any new focal deficits





All inpatient medications were reviewed and appropriate changes in these 

medications as dictated in the interval history and assessment and plan.








Objective





- Vital Signs


Vital signs: 


                                   Vital Signs











Temp  98.8 F   12/09/19 12:00


 


Pulse  80   12/09/19 14:00


 


Resp  17   12/09/19 14:00


 


BP  126/55   12/09/19 14:00


 


Pulse Ox  96   12/09/19 14:00








                                 Intake & Output











 12/08/19 12/09/19 12/09/19





 18:59 06:59 18:59


 


Intake Total 3427.277 8186.090 1182.194


 


Output Total 468 1065 565


 


Balance 4251.633 7046.090 617.194


 


Weight 85.8 kg 85.9 kg 85.9 kg


 


Intake:   


 


  IV 1690 1402 1008


 


    Bolus Normal Saline 1000  


 


    Dextrose 5% in Water 1, 450 600 400





    000 ml @ 50 mls/hr IV .   





    Q23H DAVID with Sodium   





    Bicarb (1 Meq/ml) 150 ml   





    Rx#:[id number]   


 


    LR @20ml/hr 240 240 160


 


    Mvi, Adult No.4 with Vit  550 400





    K 10 ml Trace (Conc-1Ml/   





    Dose) 1 ml Parenteral   





    Electrolytes 20 ml In   





    Amino Acid 4.25%-D10w 1,   





    000 ml @ 50 mls/hr IV .   





    [id number] DAVID Rx#:[id number]   


 


    pressure bag  12 48


 


  Intake, IV Titration 246.684 711.090 174.194





  Amount   


 


    Furosemide 100 mg In  179.500 





    Sodium Chloride 0.9% 90   





    ml @ 10 MG/HR 10 mls/hr   





    IV .Q10H DAVID Rx#:   





    [id number]   


 


    Norepinephrine 8 mg In 153.874 353.485 138.067





    Sodium Chloride 0.9% 250   





    ml @ 0.05 MCG/KG/MIN 7.   





    904 mls/hr IV .Q24H DAVID   





    Rx#:[id number]   


 


    Propofol 1,000 mg In 92.81 178.105 36.127





    Empty Bag 1 bag @ Titrate   





    IV .Q0M DAVID Rx#:   





    [id number]   


 


Output:   


 


  Drainage 80 310 


 


    Left Lower Abdomen 80 310 


 


  Urine 388 755 565


 


Other:   


 


  Voiding Method Indwelling Catheter Indwelling Catheter Indwelling Catheter








                       ABP, PAP, CO, CI - Last Documented











Arterial Blood Pressure        129/41

















- Exam








PHYSICAL EXAMINATION: 





GENERAL: The patient is alert and oriented x2-3, be delirious as mentioned 

abovenot in any acute distress. Well developed, well nourished. 


HEENT: Pupils are round and equally reacting to light. EOMI. No scleral icterus.

No conjunctival pallor. Normocephalic, atraumatic. No pharyngeal erythema. No 

thyromegaly. 


CARDIOVASCULAR: S1 and S2 present. No murmurs, rubs, or gallops. 


PULMONARY: Chest is clear to auscultation, no wheezing or crackles. 


ABDOMEN: Soft, nontender, nondistended, sluggish bowel sounds. No palpable 

organomegaly. her children the left lower quadrant


MUSCULOSKELETAL: No joint swelling or deformity.


EXTREMITIES: No cyanosis, clubbing, or pedal edema. 


NEUROLOGICAL: Gross neurological examination did not reveal any focal deficits. 


SKIN: No rashes. 

















- Labs


CBC & Chem 7: 


                                 12/09/19 04:35





                                 12/09/19 04:35


Labs: 


                  Abnormal Lab Results - Last 24 Hours (Table)











  12/08/19 12/08/19 12/09/19 Range/Units





  19:12 23:58 04:05 


 


WBC     (3.8-10.6)  k/uL


 


RBC     (3.80-5.40)  m/uL


 


Hgb     (11.4-16.0)  gm/dL


 


Hct     (34.0-46.0)  %


 


MCHC     (31.0-37.0)  g/dL


 


RDW     (11.5-15.5)  %


 


Neutrophils #     (1.3-7.7)  k/uL


 


Monocytes #     (0-1.0)  k/uL


 


ABG pCO2    33 L  (35-45)  mmHg


 


ABG pO2    142 H  ()  mmHg


 


ABG HCO3    19 L  (21-25)  mmol/L


 


ABG O2 Saturation    99.9 H  (94-97)  %


 


Chloride     ()  mmol/L


 


Carbon Dioxide     (22-30)  mmol/L


 


BUN     (7-17)  mg/dL


 


Creatinine     (0.52-1.04)  mg/dL


 


Glucose     (74-99)  mg/dL


 


POC Glucose (mg/dL)  187 H  215 H   (75-99)  mg/dL


 


Calcium     (8.4-10.2)  mg/dL


 


Magnesium     (1.6-2.3)  mg/dL


 


Albumin     (3.5-5.0)  g/dL














  12/09/19 12/09/19 12/09/19 Range/Units





  04:35 04:35 05:53 


 


WBC  18.8 H    (3.8-10.6)  k/uL


 


RBC  2.85 L    (3.80-5.40)  m/uL


 


Hgb  8.4 L    (11.4-16.0)  gm/dL


 


Hct  27.6 L    (34.0-46.0)  %


 


MCHC  30.5 L    (31.0-37.0)  g/dL


 


RDW  19.2 H    (11.5-15.5)  %


 


Neutrophils #  14.7 H    (1.3-7.7)  k/uL


 


Monocytes #  1.5 H    (0-1.0)  k/uL


 


ABG pCO2     (35-45)  mmHg


 


ABG pO2     ()  mmHg


 


ABG HCO3     (21-25)  mmol/L


 


ABG O2 Saturation     (94-97)  %


 


Chloride   114 H   ()  mmol/L


 


Carbon Dioxide   19 L   (22-30)  mmol/L


 


BUN   42 H   (7-17)  mg/dL


 


Creatinine   2.08 H   (0.52-1.04)  mg/dL


 


Glucose   198 H   (74-99)  mg/dL


 


POC Glucose (mg/dL)    231 H  (75-99)  mg/dL


 


Calcium   8.1 L   (8.4-10.2)  mg/dL


 


Magnesium   2.4 H   (1.6-2.3)  mg/dL


 


Albumin   2.1 L   (3.5-5.0)  g/dL














  12/09/19 Range/Units





  11:50 


 


WBC   (3.8-10.6)  k/uL


 


RBC   (3.80-5.40)  m/uL


 


Hgb   (11.4-16.0)  gm/dL


 


Hct   (34.0-46.0)  %


 


MCHC   (31.0-37.0)  g/dL


 


RDW   (11.5-15.5)  %


 


Neutrophils #   (1.3-7.7)  k/uL


 


Monocytes #   (0-1.0)  k/uL


 


ABG pCO2   (35-45)  mmHg


 


ABG pO2   ()  mmHg


 


ABG HCO3   (21-25)  mmol/L


 


ABG O2 Saturation   (94-97)  %


 


Chloride   ()  mmol/L


 


Carbon Dioxide   (22-30)  mmol/L


 


BUN   (7-17)  mg/dL


 


Creatinine   (0.52-1.04)  mg/dL


 


Glucose   (74-99)  mg/dL


 


POC Glucose (mg/dL)  214 H  (75-99)  mg/dL


 


Calcium   (8.4-10.2)  mg/dL


 


Magnesium   (1.6-2.3)  mg/dL


 


Albumin   (3.5-5.0)  g/dL








                      Microbiology - Last 24 Hours (Table)











 12/07/19 20:15 Urine Culture - Final





 Urine,Voided 


 


 12/08/19 17:05 Gram Stain - Preliminary





 Peritoneal Fluid Body Fluid Culture - Preliminary


 


 12/08/19 00:37 Gram Stain - Preliminary





 Sputum Sputum Culture - Preliminary














Assessment and Plan


Plan: 





-Ascending colon mass, per coloscopy, is post resection.  Abdominal metastasis. 

Postoperative day 2.  Follow-up biopsy report.biopsy from colonoscopy showed 

tubular villous adenoma


-Postoperative ventilator-dependent respiratory failure expected.presently 

extubated.current to monitor in ICU for 1 more night


-New onset Atrial fibrillation with controlled rate.  Converted to sinus 

rhythm.patient is presently not on any anti-coagulation


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


-Coronary artery disease with prior history of bypass


-Probable chronic kidney disease from diabetic nephropathy and hypertensive 

nephrosclerosis


-acute renal failure: Possibility of intravascular depletionand prerenal azot

emia secondary to thatand patient is presently on bicarbonate drip


-Normocytic anemia suspected from chronic disease of malignancy


-Diabetes mellitus type 2


-Essential hypertension


-Primary osteoarthritis


-Chronic gout


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


-Macular degeneration of right eye


-Colonic diverticulosis 


-obstructive sleep apnea

## 2019-12-10 LAB
ANION GAP SERPL CALC-SCNC: 5 MMOL/L
BUN SERPL-SCNC: 43 MG/DL (ref 7–17)
CALCIUM SPEC-MCNC: 8 MG/DL (ref 8.4–10.2)
CHLORIDE SERPL-SCNC: 110 MMOL/L (ref 98–107)
CO2 SERPL-SCNC: 24 MMOL/L (ref 22–30)
ERYTHROCYTE [DISTWIDTH] IN BLOOD BY AUTOMATED COUNT: 2.42 M/UL (ref 3.8–5.4)
ERYTHROCYTE [DISTWIDTH] IN BLOOD: 18.4 % (ref 11.5–15.5)
GLUCOSE BLD-MCNC: 156 MG/DL (ref 75–99)
GLUCOSE BLD-MCNC: 156 MG/DL (ref 75–99)
GLUCOSE BLD-MCNC: 174 MG/DL (ref 75–99)
GLUCOSE BLD-MCNC: 174 MG/DL (ref 75–99)
GLUCOSE BLD-MCNC: 175 MG/DL (ref 75–99)
GLUCOSE BLD-MCNC: 176 MG/DL (ref 75–99)
GLUCOSE BLD-MCNC: 178 MG/DL (ref 75–99)
GLUCOSE BLD-MCNC: 181 MG/DL (ref 75–99)
GLUCOSE BLD-MCNC: 199 MG/DL (ref 75–99)
GLUCOSE BLD-MCNC: 203 MG/DL (ref 75–99)
GLUCOSE BLD-MCNC: 208 MG/DL (ref 75–99)
GLUCOSE SERPL-MCNC: 164 MG/DL (ref 74–99)
HCT VFR BLD AUTO: 23.4 % (ref 34–46)
HGB BLD-MCNC: 7.3 GM/DL (ref 11.4–16)
MAGNESIUM SPEC-SCNC: 2.2 MG/DL (ref 1.6–2.3)
MCH RBC QN AUTO: 29.9 PG (ref 25–35)
MCHC RBC AUTO-ENTMCNC: 31 G/DL (ref 31–37)
MCV RBC AUTO: 96.6 FL (ref 80–100)
PLATELET # BLD AUTO: 149 K/UL (ref 150–450)
POTASSIUM SERPL-SCNC: 3.3 MMOL/L (ref 3.5–5.1)
SODIUM SERPL-SCNC: 139 MMOL/L (ref 137–145)
WBC # BLD AUTO: 11.5 K/UL (ref 3.8–10.6)

## 2019-12-10 RX ADMIN — SODIUM PHOSPHATE, MONOBASIC, MONOHYDRATE SCH: 276; 142 INJECTION, SOLUTION INTRAVENOUS at 20:17

## 2019-12-10 RX ADMIN — POTASSIUM CHLORIDE SCH MLS/HR: 7.46 INJECTION, SOLUTION INTRAVENOUS at 16:17

## 2019-12-10 RX ADMIN — IPRATROPIUM BROMIDE AND ALBUTEROL SULFATE SCH: .5; 3 SOLUTION RESPIRATORY (INHALATION) at 03:30

## 2019-12-10 RX ADMIN — INSULIN HUMAN SCH MLS/HR: 100 INJECTION, SOLUTION PARENTERAL at 12:21

## 2019-12-10 RX ADMIN — IPRATROPIUM BROMIDE AND ALBUTEROL SULFATE SCH ML: .5; 3 SOLUTION RESPIRATORY (INHALATION) at 12:04

## 2019-12-10 RX ADMIN — NOREPINEPHRINE BITARTRATE SCH MLS/HR: 1 INJECTION, SOLUTION, CONCENTRATE INTRAVENOUS at 06:15

## 2019-12-10 RX ADMIN — HYDROCODONE BITARTRATE AND ACETAMINOPHEN PRN EACH: 5; 325 TABLET ORAL at 22:13

## 2019-12-10 RX ADMIN — METRONIDAZOLE SCH MLS/HR: 500 INJECTION, SOLUTION INTRAVENOUS at 16:17

## 2019-12-10 RX ADMIN — CHLORHEXIDINE GLUCONATE SCH: 1.2 RINSE ORAL at 08:10

## 2019-12-10 RX ADMIN — POTASSIUM CHLORIDE SCH MEQ: 20 TABLET, EXTENDED RELEASE ORAL at 08:11

## 2019-12-10 RX ADMIN — HEPARIN SODIUM SCH UNIT: 5000 INJECTION, SOLUTION INTRAVENOUS; SUBCUTANEOUS at 08:11

## 2019-12-10 RX ADMIN — IPRATROPIUM BROMIDE AND ALBUTEROL SULFATE SCH ML: .5; 3 SOLUTION RESPIRATORY (INHALATION) at 00:01

## 2019-12-10 RX ADMIN — IPRATROPIUM BROMIDE AND ALBUTEROL SULFATE SCH ML: .5; 3 SOLUTION RESPIRATORY (INHALATION) at 15:12

## 2019-12-10 RX ADMIN — Medication SCH: at 08:27

## 2019-12-10 RX ADMIN — PIPERACILLIN AND TAZOBACTAM SCH MLS/HR: 3; .375 INJECTION, POWDER, FOR SOLUTION INTRAVENOUS at 05:10

## 2019-12-10 RX ADMIN — IPRATROPIUM BROMIDE AND ALBUTEROL SULFATE SCH ML: .5; 3 SOLUTION RESPIRATORY (INHALATION) at 09:29

## 2019-12-10 RX ADMIN — METRONIDAZOLE SCH MLS/HR: 500 INJECTION, SOLUTION INTRAVENOUS at 08:12

## 2019-12-10 RX ADMIN — POTASSIUM CHLORIDE SCH MEQ: 20 TABLET, EXTENDED RELEASE ORAL at 06:12

## 2019-12-10 RX ADMIN — SODIUM PHOSPHATE, MONOBASIC, MONOHYDRATE SCH MLS/HR: 276; 142 INJECTION, SOLUTION INTRAVENOUS at 05:09

## 2019-12-10 RX ADMIN — PANTOPRAZOLE SODIUM SCH MG: 40 INJECTION, POWDER, FOR SOLUTION INTRAVENOUS at 08:11

## 2019-12-10 RX ADMIN — IPRATROPIUM BROMIDE AND ALBUTEROL SULFATE SCH: .5; 3 SOLUTION RESPIRATORY (INHALATION) at 19:18

## 2019-12-10 RX ADMIN — FUROSEMIDE SCH MG: 10 INJECTION, SOLUTION INTRAMUSCULAR; INTRAVENOUS at 20:16

## 2019-12-10 RX ADMIN — LEVOTHYROXINE SODIUM ANHYDROUS SCH MCG: 100 INJECTION, POWDER, LYOPHILIZED, FOR SOLUTION INTRAVENOUS at 08:12

## 2019-12-10 RX ADMIN — POTASSIUM CHLORIDE SCH MLS/HR: 7.46 INJECTION, SOLUTION INTRAVENOUS at 17:39

## 2019-12-10 RX ADMIN — PIPERACILLIN AND TAZOBACTAM SCH MLS/HR: 3; .375 INJECTION, POWDER, FOR SOLUTION INTRAVENOUS at 17:03

## 2019-12-10 RX ADMIN — LEUCINE, PHENYLALANINE, LYSINE, METHIONINE, ISOLEUCINE, VALINE, HISTIDINE, THREONINE, TRYPTOPHAN, ALANINE, GLYCINE, ARGININE, PROLINE, SERINE, TYROSINE, SODIUM ACETATE, DIBASIC POTASSIUM PHOSPHATE, MAGNESIUM CHLORIDE, SODIUM CHLORIDE, CALCIUM CHLORIDE, DEXTROSE SCH MLS/HR
311; 238; 247; 170; 255; 247; 204; 179; 77; 880; 438; 489; 289; 213; 17; 297; 261; 51; 77; 33; 10 INJECTION INTRAVENOUS at 20:17

## 2019-12-10 RX ADMIN — FUROSEMIDE SCH MG: 10 INJECTION, SOLUTION INTRAMUSCULAR; INTRAVENOUS at 08:11

## 2019-12-10 RX ADMIN — HEPARIN SODIUM SCH UNIT: 5000 INJECTION, SOLUTION INTRAVENOUS; SUBCUTANEOUS at 20:16

## 2019-12-10 RX ADMIN — CHLORHEXIDINE GLUCONATE SCH: 1.2 RINSE ORAL at 20:03

## 2019-12-10 NOTE — P.PN
Subjective


Progress Note Date: 12/10/19




















CHIEF COMPLAINT: Large colon mass with bleeding





HISTORY OF PRESENT ILLNESS: The patient is a 89-year-old female who underwent 

exploratory laparotomy with resection of right colon for over 10 cm lesion with 

invasion into abdominal wall including small bowel resection for small bowel 

obstruction.  She has been extubated since yesterday.  She is tolerating liquid 

diet.  No reports of flatus.  She is generally weak at this time.  Pain is well-

controlled.  Last night she had fevers low-grade now resolved.





ROS: No reports of nausea and vomiting.  No flatus or bowel movement





PHYSICAL EXAM: 


VITAL SIGNS: Reviewed


CONSTITUTIONAL:  Well developed and in no acute distress. 


EYES:  Conjuctivae without sclera icterus. Extraocular movements grossly intact.




HEAD, EARS, NOSE, THROAT: Moist buccal mucosa. Head is atraumatic, 

normocephalic. Hears conversational speech. No nasal drainage.


RESPIRATORY: Nonlabored respirations


CARDIOVASCULAR:  Palpable 2+ radial pulses.  


ABDOMEN: Soft, no peritonitis.  Midline dressing intact with PREVENA.  LYNDSEY 

serosanguineous


MUSCULOSKELETAL:  No gross deformity of the lower extremities noted.  No 

clubbing.  No cyanosis.  2+ bilateral lower extremity edema


SKIN: Good skin turgor. Well perfused. 


NEUROLOGIC: Cranial nerves I through XII grossly intact.  No focal or 

lateralizing signs. 





CLINICAL LABS: White blood cell count elevated from 18,000 to 11,000





STUDIES: Chest x-ray confirms volume overload





PATHOLOGY: Confirms poorly differentiated adenocarcinoma with signet ring with 

positive lymph node metastases.





ASSESSMENT: 


1.  Ascending colon cancer, recurrent metastatic


2.  Severe aortic stenosis





PLAN: 


1.  May advance diet to full liquid.


2.  Will likely need extensive rehabilitation.


3.  May transfer to the general medical floor from the ICU when clinically 

stable





Objective





- Vital Signs


Vital signs: 


                                   Vital Signs











Temp  98.4 F   12/10/19 12:00


 


Pulse  91   12/10/19 18:00


 


Resp  17   12/10/19 18:00


 


BP  112/46   12/10/19 15:30


 


Pulse Ox  94 L  12/10/19 18:00








                                 Intake & Output











 12/09/19 12/10/19 12/10/19





 18:59 06:59 18:59


 


Intake Total 8163.401 4585.024 2006.389


 


Output Total 965 1535 1185


 


Balance 821.120 336.024 821.389


 


Weight 85.9 kg 90.8 kg 


 


Intake:   


 


  IV 1542 1692 1192


 


    Dextrose 5% in Water 1, 600 600 100





    000 ml @ 50 mls/hr IV .   





    Q23H [facility] with Sodium   





    Bicarb (1 Meq/ml) 150 ml   





    Rx#:[id number]   


 


    Mvi, Adult No.4 with Vit 630 650 





    K 10 ml Trace (Conc-1Ml/   





    Dose) 1 ml Parenteral   





    Electrolytes 20 ml In   





    Amino Acid 4.25%-D10w 1,   





    000 ml @ 50 mls/hr IV .   





    [id number] [facility] Rx#:[id number]   


 


    Normal saline 240 240 240


 


    Parenteral Electrolytes  130 780





    20 ml In Amino Acid 4.25%   





    -D10w 1,000 ml @ 65 mls/   





    hr IV .BY DURATION [facility] Rx   





    #:[id number]   


 


    pressure bag 72 72 72


 


  Intake, IV Titration 244.120 179.024 64.389





  Amount   


 


    Insulin Regular 100 unit 3.367 55.770 32.716





    In Sodium Chloride 0.9%   





    100 ml @ Per Protocol IV   





    .Q0M [facility] Rx#:[id number]   


 


    Norepinephrine 8 mg In 186.126 123.254 31.673





    Sodium Chloride 0.9% 250   





    ml @ 0.05 MCG/KG/MIN 7.   





    904 mls/hr IV .Q24H [facility]   





    Rx#:[id number]   


 


    Propofol 1,000 mg In 36.127  





    Empty Bag 1 bag @ Titrate   





    IV .Q0M [facility] Rx#:   





    [id number]   


 


    Ropivacaine 400 mg 18.5  





    Hydromorphone (Pf) 5 mg   





    In Sodium Chloride 0.9%   





    170 ml @ Per Protocol   





    EPIDURAL .Q0M PRN Rx#:   





    [id number]   


 


  Oral   750


 


Output:   


 


  Urine 965 1535 1185


 


Other:   


 


  Voiding Method Indwelling Catheter Indwelling Catheter Indwelling Catheter








                       ABP, PAP, CO, CI - Last Documented











Arterial Blood Pressure        140/36

















- Labs


CBC & Chem 7: 


                                 12/10/19 04:25





                                 12/10/19 12:15


Labs: 


                  Abnormal Lab Results - Last 24 Hours (Table)











  12/09/19 12/09/19 12/09/19 Range/Units





  19:07 20:14 21:36 


 


WBC     (3.8-10.6)  k/uL


 


RBC     (3.80-5.40)  m/uL


 


Hgb     (11.4-16.0)  gm/dL


 


Hct     (34.0-46.0)  %


 


RDW     (11.5-15.5)  %


 


Plt Count     (150-450)  k/uL


 


Potassium     (3.5-5.1)  mmol/L


 


Chloride     ()  mmol/L


 


BUN     (7-17)  mg/dL


 


Creatinine     (0.52-1.04)  mg/dL


 


Glucose     (74-99)  mg/dL


 


POC Glucose (mg/dL)  166 H  177 H  185 H  (75-99)  mg/dL


 


Calcium     (8.4-10.2)  mg/dL


 


Phosphorus     (2.5-4.5)  mg/dL














  12/09/19 12/09/19 12/10/19 Range/Units





  22:08 23:16 00:04 


 


WBC     (3.8-10.6)  k/uL


 


RBC     (3.80-5.40)  m/uL


 


Hgb     (11.4-16.0)  gm/dL


 


Hct     (34.0-46.0)  %


 


RDW     (11.5-15.5)  %


 


Plt Count     (150-450)  k/uL


 


Potassium     (3.5-5.1)  mmol/L


 


Chloride     ()  mmol/L


 


BUN     (7-17)  mg/dL


 


Creatinine     (0.52-1.04)  mg/dL


 


Glucose     (74-99)  mg/dL


 


POC Glucose (mg/dL)  186 H  156 H  176 H  (75-99)  mg/dL


 


Calcium     (8.4-10.2)  mg/dL


 


Phosphorus     (2.5-4.5)  mg/dL














  12/10/19 12/10/19 12/10/19 Range/Units





  01:11 02:15 02:58 


 


WBC     (3.8-10.6)  k/uL


 


RBC     (3.80-5.40)  m/uL


 


Hgb     (11.4-16.0)  gm/dL


 


Hct     (34.0-46.0)  %


 


RDW     (11.5-15.5)  %


 


Plt Count     (150-450)  k/uL


 


Potassium     (3.5-5.1)  mmol/L


 


Chloride     ()  mmol/L


 


BUN     (7-17)  mg/dL


 


Creatinine     (0.52-1.04)  mg/dL


 


Glucose     (74-99)  mg/dL


 


POC Glucose (mg/dL)  176 H  174 H  176 H  (75-99)  mg/dL


 


Calcium     (8.4-10.2)  mg/dL


 


Phosphorus     (2.5-4.5)  mg/dL














  12/10/19 12/10/19 12/10/19 Range/Units





  04:25 04:25 05:05 


 


WBC   11.5 H   (3.8-10.6)  k/uL


 


RBC   2.42 L   (3.80-5.40)  m/uL


 


Hgb   7.3 L   (11.4-16.0)  gm/dL


 


Hct   23.4 L   (34.0-46.0)  %


 


RDW   18.4 H   (11.5-15.5)  %


 


Plt Count   149 L   (150-450)  k/uL


 


Potassium  3.3 L    (3.5-5.1)  mmol/L


 


Chloride  110 H    ()  mmol/L


 


BUN  43 H    (7-17)  mg/dL


 


Creatinine  1.63 H    (0.52-1.04)  mg/dL


 


Glucose  164 H    (74-99)  mg/dL


 


POC Glucose (mg/dL)    174 H  (75-99)  mg/dL


 


Calcium  8.0 L    (8.4-10.2)  mg/dL


 


Phosphorus  2.1 L    (2.5-4.5)  mg/dL














  12/10/19 12/10/19 12/10/19 Range/Units





  06:45 08:11 10:04 


 


WBC     (3.8-10.6)  k/uL


 


RBC     (3.80-5.40)  m/uL


 


Hgb     (11.4-16.0)  gm/dL


 


Hct     (34.0-46.0)  %


 


RDW     (11.5-15.5)  %


 


Plt Count     (150-450)  k/uL


 


Potassium     (3.5-5.1)  mmol/L


 


Chloride     ()  mmol/L


 


BUN     (7-17)  mg/dL


 


Creatinine     (0.52-1.04)  mg/dL


 


Glucose     (74-99)  mg/dL


 


POC Glucose (mg/dL)  156 H  176 H  199 H  (75-99)  mg/dL


 


Calcium     (8.4-10.2)  mg/dL


 


Phosphorus     (2.5-4.5)  mg/dL














  12/10/19 12/10/19 12/10/19 Range/Units





  12:00 14:05 16:02 


 


WBC     (3.8-10.6)  k/uL


 


RBC     (3.80-5.40)  m/uL


 


Hgb     (11.4-16.0)  gm/dL


 


Hct     (34.0-46.0)  %


 


RDW     (11.5-15.5)  %


 


Plt Count     (150-450)  k/uL


 


Potassium     (3.5-5.1)  mmol/L


 


Chloride     ()  mmol/L


 


BUN     (7-17)  mg/dL


 


Creatinine     (0.52-1.04)  mg/dL


 


Glucose     (74-99)  mg/dL


 


POC Glucose (mg/dL)  181 H  208 H  203 H  (75-99)  mg/dL


 


Calcium     (8.4-10.2)  mg/dL


 


Phosphorus     (2.5-4.5)  mg/dL














  12/10/19 Range/Units





  17:58 


 


WBC   (3.8-10.6)  k/uL


 


RBC   (3.80-5.40)  m/uL


 


Hgb   (11.4-16.0)  gm/dL


 


Hct   (34.0-46.0)  %


 


RDW   (11.5-15.5)  %


 


Plt Count   (150-450)  k/uL


 


Potassium   (3.5-5.1)  mmol/L


 


Chloride   ()  mmol/L


 


BUN   (7-17)  mg/dL


 


Creatinine   (0.52-1.04)  mg/dL


 


Glucose   (74-99)  mg/dL


 


POC Glucose (mg/dL)  178 H  (75-99)  mg/dL


 


Calcium   (8.4-10.2)  mg/dL


 


Phosphorus   (2.5-4.5)  mg/dL








                      Microbiology - Last 24 Hours (Table)











 12/08/19 17:05 Gram Stain - Preliminary





 Peritoneal Fluid Body Fluid Culture - Preliminary


 


 12/08/19 00:37 Gram Stain - Final





 Sputum Sputum Culture - Final














Assessment and Plan


(1) Abdominal mass


Current Visit: Yes   Status: Acute   Priority: High   Code(s): R19.00 - INTRA-

ABD AND PELVIC SWELLING, MASS AND LUMP, UNSP SITE   SNOMED Code(s): 475421348


   





(2) GI bleed


Current Visit: Yes   Status: Acute   Priority: High   Code(s): K92.2 - 

GASTROINTESTINAL HEMORRHAGE, UNSPECIFIED   SNOMED Code(s): 60695407


   





(3) History of colon cancer


Current Visit: Yes   Status: Acute   Code(s): Z85.038 - PERSONAL HISTORY OF 

MALIGNANT NEOPLASM OF LARGE INTESTINE   SNOMED Code(s): 864979507


   





(4) Anemia


Current Visit: Yes   Status: Chronic   Priority: Medium   Code(s): D64.9 - 

ANEMIA, UNSPECIFIED   SNOMED Code(s): 805372278


   





(5) Aortic stenosis


Current Visit: Yes   Status: Acute   Code(s): I35.0 - NONRHEUMATIC AORTIC 

(VALVE) STENOSIS   SNOMED Code(s): 82973402


   





(6) Atrial fibrillation


Current Visit: No   Status: Acute   Code(s): I48.91 - UNSPECIFIED ATRIAL 

FIBRILLATION   SNOMED Code(s): 08066741


   





(7) Atrial flutter


Current Visit: No   Status: Acute   Code(s): I48.92 - UNSPECIFIED ATRIAL FLUTTER

  SNOMED Code(s): 5800322


   





(8) Diastolic CHF, acute on chronic


Current Visit: No   Status: Acute   Code(s): I50.33 - ACUTE ON CHRONIC DIASTOLIC

(CONGESTIVE) HEART FAILURE   SNOMED Code(s): 604389642


   





(9) Systolic congestive heart failure


Current Visit: No   Status: Acute   Code(s): I50.20 - UNSPECIFIED SYSTOLIC 

(CONGESTIVE) HEART FAILURE   SNOMED Code(s): 65730054


   





(10) Cancer of ascending colon metastatic to intra-abdominal lymph node


Current Visit: Yes   Status: Acute   Code(s): C18.2 - MALIGNANT NEOPLASM OF 

ASCENDING COLON; C77.2 - SECONDARY AND UNSP MALIGNANT NEOPLASM OF INTRA-ABD 

NODES   SNOMED Code(s): 89281264

## 2019-12-10 NOTE — PN
PROGRESS NOTE



DATE OF SERVICE:

12/10/2019.



REASON FOR FOLLOWUP:

Leukocytosis in patient extensive abdominal infection.



INTERVAL HISTORY:

The patient is currently afebrile.  The patient is awake and alert.  The patient
is

breathing comfortably.  Denies having any chest pain.  Occasional cough.  No 
nausea or

vomiting.  Denies any worsening abdominal pain.  No bowel movement.



PHYSICAL EXAMINATION:

Blood pressure 118/39 with a pulse of 84, temperature 98. She is 96% on 2 L 
nasal

cannula.

General description is an elderly female lying in bed in no distress.

RESPIRATORY SYSTEM: Unlabored breathing, clear to auscultation anteriorly.  
HEART: S1,

S2.  Regular rate and rhythm.

ABDOMEN:  Soft, mildly distended and tender.

EXTREMITIES:  No edema of the feet.



LABS:

Hemoglobin 7.3, white count down to 11.5. BUN of 43, creatinine 1.6.



DIAGNOSTIC IMPRESSION AND PLAN:

Patient with leukocytosis, more likely reactive. This patient did have extensive

abdominal surgery requiring right hemicolectomy and resection of bowel.  So far

abdominal culture has been pending.  She is on Zosyn and white count has 
normalized.

We will keep the patient on Zosyn and monitor clinical course closely.  Continue
with

supportive care.





MMODL / IJN: [id number] / Job#: [id number]

[email or web address]

## 2019-12-10 NOTE — P.PN
Progress Note - Text


Progress Note Date: 12/10/19





Chief Complaint: Lower GI bleed





Interval history:


This is a very pleasant 89-year-old patient who follows with visiting physicians

Dr. Ochoa.  Chronic stable medical conditions include congestive heart failure 

with EF of 50%, aortic stenosis, mitral regurgitation, mitral stenosis, 

tricuspid regurgitation, secondary probably hypertension, diabetes, 

hypertension, osteoarthritis, hypothyroid, blind left eye, coronary artery 

disease, [place] filter..  Patient does use a walker.  Patient did have a 

computed tomography scan of the abdomen on November 26 and did show mass along 

the lateral wall of the ascending colon and also adjacent soft tissue omental 

mass and is also another additional mass present in intra-abdominally.  

Suggestive of metastatic disease.  Patient yesterday had some bleeding per the 

right rectum.  And decided to come in.  Patient's been having lower abdominal 

discomfort.  She was admitted to the ER.  Patient normally does use a walker.  

Lives alone.  Colonoscopy done on December 4 shows ascending colon mass and AV 

malformation was some slight bleeding.  On December 7 patient underwent surgical

intervention to include right hemicolectomy, resection of transverse miserable 

on to work, small bowel resection from distal jejunum to ileum over to feet, 

repair of incisional hernia, lysis of adhesions.  Patient was extubated on 

December 9.


Today-.  ICU.  Extubated yesterday.  On TPN.  Has been off levo fed.  On 3 L of 

nasal cannula.  On clear liquids.  Has a LYNDSEY drain.  Abdominal incision has a 

incision wound VAC.  Set up in a chair..





Review of systems: Was done for constitutional, cardiovascular, GI, pulmonary. 

relevant finding as above





Active Medications





Albuterol/Ipratropium (Duoneb 0.5 Mg-3 Mg/3 Ml Soln)  3 ml INHALATION RT-QID [facility]


   Last Admin: 12/10/19 19:18 Dose:  Not Given


   Documented by: 


Albuterol/Ipratropium (Duoneb 0.5 Mg-3 Mg/3 Ml Soln)  3 ml INHALATION RT-Q2H PRN


   PRN Reason: Shortness Of Breath Or Wheezing


Furosemide (Lasix)  40 mg IV Q12HR [facility]


   Last Admin: 12/10/19 20:16 Dose:  40 mg


   Documented by: 


Heparin Sodium (Porcine) (Heparin)  5,000 unit SQ Q12HR [facility]


   Last Admin: 12/10/19 20:16 Dose:  5,000 unit


   Documented by: 


Metronidazole 500 mg/ IV (Solution)  100 mls @ 100 mls/hr IVPB Q8HR [facility]


   Last Admin: 12/10/19 16:17 Dose:  100 mls/hr


   Documented by: 


Norepinephrine Bitartrate 8 mg (/ Sodium Chloride)  258 mls @ 7.904 mls/hr IV 

.Q24H [facility]; Protocol


   Last Titration: 12/10/19 14:20 Dose:  0 mcg/kg/min, 0 mls/hr


   Documented by: 


Propofol 1,000 mg/ IV Solution  100 mls @ 0 mls/hr IV .Q0M [facility]; Protocol


   Last Titration: 12/09/19 10:36 Dose:  0 mcg/kg/min, 0 mls/hr


   Documented by: 


Piperacillin Sod/Tazobactam (Sod 3.375 gm/ Sodium Chloride)  100 mls @ 25 mls/hr

IVPB Q12H [facility]


   Last Admin: 12/10/19 17:03 Dose:  25 mls/hr


   Documented by: 


Insulin Human Regular 100 unit (/ Sodium Chloride)  101 mls @ 0 mls/hr IV .Q0M 

[facility]; Protocol


   Last Titration: 12/10/19 20:05 Dose:  4.5 unit/hr, 4.545 mls/hr


   Documented by: 


Parenteral Vitamin Supplement 10 ml/ Chromium/Copper/Manganese/Seleni/Zn 1 

ml/Amino Ac/Electrol/Dextrose/Calcium  1,011 mls @ 65 mls/hr IV .BY DURATION [facility]


   Last Admin: 12/10/19 20:17 Dose:  65 mls/hr


   Documented by: 


Amino Ac/Electrol/Dextrose/Calcium (Clinimix E 4.25%-D10% Solution)  1,000 mls @

65 mls/hr IV .BY DURATION [facility]


Levothyroxine Sodium (Synthroid Ivp)  56.25 mcg IV DAILY [facility]


   Last Admin: 12/10/19 08:12 Dose:  56.25 mcg


   Documented by: 


Miscellaneous Information (Magnesium Per Protocol)  1 each MISCELLANE DAILY PRN;

Protocol


   PRN Reason: Per Protocol


Miscellaneous Information (Potassium Per Protocol)  1 each MISCELLANE DAILY PRN;

Protocol


   PRN Reason: Per Protocol


Nalbuphine HCl (Nubain)  2.5 mg IV Q4HR PRN


   PRN Reason: Itching


Naloxone HCl (Narcan)  0.2 mg IV Q2M PRN


   PRN Reason: Opioid Reversal


Ondansetron HCl (Zofran)  4 mg IVP Q8HR PRN


   PRN Reason: Nausea And Vomiting


Pantoprazole Sodium (Protonix)  40 mg IV DAILY [facility]


   Last Admin: 12/10/19 08:11 Dose:  40 mg


   Documented by: 











Physical examination:


VITAL SIGNS: 98.4, 90, 20, 122/46, 94% on 3 L


GENERAL: Sitting up in a chair, awake


EYES: Pupils equal.  Conjunctiva normal.


HEENT: External appearance of nose and ears normal, oral cavity grossly normal.


NECK: JVD not raised; masses not palpable.


HEART: First and second heart sounds are normal;  no edema.  


LUNGS: Respiratory rate normal; clear to auscultation.  


ABDOMEN: Some tenderness, dressing over the wound, bowel sounds sluggish, mild 

tenderness no guarding or rigidity.  LYNDSEY drain.-  Minimal output 


PSYCH: Alert and oriented x3;  mood  and affect normal.  


MUSCULOSKELETAL: Evidence of OA especially in the hands and knees





INVESTIGATIONS, reviewed in the clinical context:


White count 11.5 hemoglobin 7.3 progression 3.3 bun 43 creatinine 1.63





Previous testing


White count 9.6 hemoglobin 9.2 platelets 240 progression 4.7 BUN 73 creatinine 

1.79


Computed tomography scan of the abdomen-results noted as above





Assessment:


-Ascending colon mass, per coloscopy, -right hemicolectomy, resection of 

transverse miserable on to work, small bowel resection from distal jejunum to 

ileum over to feet, repair of incisional hernia, lysis of adhesions.


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


-Coronary artery disease with prior history of bypass


-Probable chronic kidney disease from diabetic nephropathy and hypertensive 

nephrosclerosis


-Normocytic anemia suspected from chronic disease of malignancy


-Diabetes mellitus type 2


-Essential hypertension


-Primary osteoarthritis


-Chronic gout


-Macular degeneration of right eye


-Colonic diverticulosis 


-obstructive sleep apnea did use CPAP machine in the past


-No code





Plan


Patient is on TPN.  Started on clear liquids.  Has not really passed flatus.  On

IV Lasix, IV Flagyl, TPN, IV Zosyn.  Care was discussed with the patient.  

Prognosis guarded.

## 2019-12-10 NOTE — PN
PROGRESS NOTE



PULMONARY/CRITICAL CARE PROGRESS NOTE:



DATE OF SERVICE:

12/10/2019



CRITICAL CARE TIME: 35 minutes



This is an 89-year-old female who was admitted to the hospital on December 2.  She came

in with a diagnosis of GI bleeding and colon cancer.  She apparently was intubated on

December 7 which is when she went to the operating room.  She had exploratory

laparotomy, lysis of adhesions, right hemicolectomy, transverse colon resection, hernia

repair, and small-bowel resection.  She did not require a colostomy.  She was

transferred back to the ICU.  She has been on the ventilator since 12/07.  Yesterday,

we decided to do a daily interruption of sedation.  The patient woke up well.  She had

excellent weaning parameters.  We did a spontaneous breathing trial on PSV 5, CPAP of

5.  She had a positive cuff leak.  For that reason, the patient was extubated.  Her

extubation was successful.  She was extubated on December 9.  Currently, the patient is

on 3 L of nasal cannula, D5W with 3 amps of bicarbonate at 50 mL an hour, insulin drip

at 3.5 units an hour, TPN at 65 mL an hour and Levophed at about 2 mcg/minute.  Her IV

is 0.9 at 20 mL an hour.  Will see if we cannot come off the norepinephrine.

Currently, the patient seems to be doing relatively well.  She is awake and alert.  She

is talkative.  She is taking some clear liquids.



We did encourage her to use the incentive spirometer q.1 hour and also take deep

breaths, cough and clear secretions.



She had a pretty uneventful night according to her nurse.



Current vital signs are reviewed. Temperature 98.1, heart rate 85, respiratory rate 16,

blood pressure 109/45 mean 66, 3 L saturation 98%.  Appears in no acute distress.

HEENT: Examination is grossly unremarkable.  Mucous membranes are moist.  No oral

lesions.

NECK:  Supple.  Full range of motion.  No adenopathy or thyromegaly.  Neck veins are

flat.

CARDIOVASCULAR: Examination reveals regular rhythm and rate.  S1, S2 normal.  No S3,

S4, or murmur.  Heart sounds are distant.

LUNGS:  Reveal a few scattered rhonchi.  No wheezes or crackles.  Breath sounds equal.

ABDOMEN:  Soft.  No bowel sounds are noted.  A midline incision is covered with a

bandage.

EXTREMITIES:  Intact.  No edema.

SKIN: Without rash.

NEUROLOGIC: Examination is brief but nonfocal.



LABS:

Reviewed.  White count 11.5, hemoglobin 7.3, hematocrit 23.4, platelet count 149,000.

Sodium 139, potassium 3.3, chloride is 110, CO2 is 24, anion gap is 5. BUN and

creatinine were 43 and 1.63.  Phosphorus 2.1, calcium 8.



Microbiology including a peritoneal fluid, sputum and urine are all negative.



A chest x-ray from today is reviewed.  It shows a pacemaker device.  The left lung is

clear except for a very small amount of atelectasis or effusion at the right lung base.

On the right side, the effusions is a little bit larger with some basilar atelectasis.

Upper lung fields appear clear.



MEDICATIONS:

Reviewed.



ASSESSMENT:

1. Postoperative day #3, status post exploratory laparotomy with lysis of adhesions,

    right hemicolectomy, transverse colon resection, hernia repair, small-bowel

    resection, and lysis of adhesions with placement of a Preven[place] wound VAC.

2. Postoperative respiratory failure with routine postoperative ventilator management,

    and successful extubation on December 9.

3. Colon cancer.

4. Gastrointestinal bleed.

5. Chronic iron deficiency anemia.

6. History of colon resection.

7. Ischemic cardiomyopathy.

8. Diastolic heart failure.

9. History of atrial fibrillation.

10.History of pulmonary embolism, status post [place] filter placement.

11.Sleep apnea syndrome.

12.Type 2 diabetes mellitus with diabetic neuropathy.

13.History of hypertensive heart disease.

14.History of bypass grafting.

15.History of aortic valve replacement.

16.Depression.

17.Gout.

18.Status post permanent pacemaker.

19.Hypothyroidism.

20.Colon cancer involving the transverse colon.



PLAN:

The patient was successfully extubated yesterday.  She remains on her IV of D5W with 3

amps of bicarb at 50 mL an hour, insulin drip at 3.5 units an hour and TPN at 65 mL an

hour.  The norepinephrine can likely be turned off today.  We encourage deep breathing,

coughing, clearing of secretions and hourly use of incentive spirometer.  Her

medication list will be reviewed.  Overall prognosis remains guarded.  She is a DNR.  I

spoke to her grandson's wife yesterday.  She understands that her overall prognosis

remains very guarded at this time.  Will continue to follow.  Continue to encourage

deep breathing as mentioned above.



CRITICAL CARE TIME:  35 minutes.





MMODL / IJN: [id number] / Job#: [id number]

## 2019-12-10 NOTE — XR
EXAMINATION TYPE: XR chest 1V portable

 

DATE OF EXAM: 12/10/2019

 

COMPARISON: 12/9/2019

 

HISTORY: Shortness of breath

 

FINDINGS:

There are bilateral pleural effusions with cardiomegaly and bibasilar infiltrate.  There is a diffuse
 interstitial pattern. Postoperative change, cardiac device and central line noted. Underlying COPD s
uspected. ET and NG tube have been removed.

 

IMPRESSION:

1. Stable pleural-parenchymal changes correlate for CHF. Underlying pneumonia not excluded.

## 2019-12-10 NOTE — PN
PROGRESS NOTE



The patient is seen for followup for acute kidney injury.  The patient was extubated

yesterday.  She is doing fairly well.  Currently she is maintained on Lasix 40 mg IV

q.12 hours.  Urine output has been  mL an hour.  Blood pressure is not

significantly low.  The patient remains on bicarb drip at 50 mL an hour.  She is off

Levophed.  She is maintained on TPN.



PHYSICAL EXAMINATION:

On examination today, blood pressure was 124/36, heart rate of 93 per minute, patient

is afebrile.  Examination of the heart S1, S2.  Examination of the lungs, decreased

breath sounds at bases.  Abdomen is soft, nontender.

Examination of lower extremities shows edema 1+ bilaterally.  CNS exam grossly intact.

A wound VAC is present in the abdomen.



LABS SHOW:

Sodium 139, potassium 3.3, chloride 110, BUN 43, creatinine 1.63, phosphorus 2.1,

hemoglobin 7.3 g/dL.



ASSESSMENT:

1. Acute kidney injury, acute tubular necrosis  currently improved from yesterday.

    Continue with the current dose of IV Lasix.  The patient has good urine output.

    Chest x-ray continues to show infiltrates suggestive of congestive heart failure .

    At this time, patient is fairly comfortable.  Therefore, I will continue with the

    same dose of Lasix.  We will repeat chest x-ray in a.m.

2. Status post explorative laparotomy with lysis of adhesions, extensive right

    hemicolectomy, takedown of hernia, small-bowel resection and repair of incisional

    hernia.

3. History of congestive heart failure, ejection fraction about 55%.

4. Hypoxic respiratory failure status post extubation.

5. Coronary artery disease.

6. Status post aortic valve replacement with tissue valve previously.

7. Metabolic acidosis, currently maintained on IV bicarb as well as oral bicarb.



PLAN:

1. DC IV bicarb.

2. Continue TPN.

3. Continue with current dose of Lasix.

4. Replace potassium.

5. Repeat labs and chest x-ray in a.m.





MMODL / IJN: [id number] / Job#: [id number]

## 2019-12-10 NOTE — P.PN
Progress Note - Text





12/10  652am


89-year-old female status post exploratory lap by Dr. Gutierrez.  Patient is 

still in the ICU awake and alert, hemodynamically stable, epidural solution 

running at 1.5 mL/h.  She has a VAS of 1 no motor or sensory deficit noted.  

Plan to DC epidural catheter and the nurse was informed

## 2019-12-11 LAB
ANION GAP SERPL CALC-SCNC: 4 MMOL/L
BASOPHILS # BLD AUTO: 0 K/UL (ref 0–0.2)
BASOPHILS NFR BLD AUTO: 0 %
BUN SERPL-SCNC: 41 MG/DL (ref 7–17)
CALCIUM SPEC-MCNC: 7.8 MG/DL (ref 8.4–10.2)
CHLORIDE SERPL-SCNC: 108 MMOL/L (ref 98–107)
CO2 SERPL-SCNC: 24 MMOL/L (ref 22–30)
EOSINOPHIL # BLD AUTO: 0.5 K/UL (ref 0–0.7)
EOSINOPHIL NFR BLD AUTO: 5 %
ERYTHROCYTE [DISTWIDTH] IN BLOOD BY AUTOMATED COUNT: 2.46 M/UL (ref 3.8–5.4)
ERYTHROCYTE [DISTWIDTH] IN BLOOD: 18.5 % (ref 11.5–15.5)
GLUCOSE BLD-MCNC: 164 MG/DL (ref 75–99)
GLUCOSE BLD-MCNC: 165 MG/DL (ref 75–99)
GLUCOSE BLD-MCNC: 169 MG/DL (ref 75–99)
GLUCOSE BLD-MCNC: 169 MG/DL (ref 75–99)
GLUCOSE BLD-MCNC: 172 MG/DL (ref 75–99)
GLUCOSE BLD-MCNC: 173 MG/DL (ref 75–99)
GLUCOSE BLD-MCNC: 175 MG/DL (ref 75–99)
GLUCOSE BLD-MCNC: 181 MG/DL (ref 75–99)
GLUCOSE BLD-MCNC: 187 MG/DL (ref 75–99)
GLUCOSE BLD-MCNC: 191 MG/DL (ref 75–99)
GLUCOSE BLD-MCNC: 197 MG/DL (ref 75–99)
GLUCOSE BLD-MCNC: 200 MG/DL (ref 75–99)
GLUCOSE SERPL-MCNC: 152 MG/DL (ref 74–99)
HCT VFR BLD AUTO: 23.7 % (ref 34–46)
HGB BLD-MCNC: 7.3 GM/DL (ref 11.4–16)
LYMPHOCYTES # SPEC AUTO: 0.9 K/UL (ref 1–4.8)
LYMPHOCYTES NFR SPEC AUTO: 9 %
MAGNESIUM SPEC-SCNC: 2 MG/DL (ref 1.6–2.3)
MCH RBC QN AUTO: 29.8 PG (ref 25–35)
MCHC RBC AUTO-ENTMCNC: 30.9 G/DL (ref 31–37)
MCV RBC AUTO: 96.3 FL (ref 80–100)
MONOCYTES # BLD AUTO: 0.7 K/UL (ref 0–1)
MONOCYTES NFR BLD AUTO: 7 %
NEUTROPHILS # BLD AUTO: 7.1 K/UL (ref 1.3–7.7)
NEUTROPHILS NFR BLD AUTO: 76 %
PLATELET # BLD AUTO: 129 K/UL (ref 150–450)
POTASSIUM SERPL-SCNC: 4 MMOL/L (ref 3.5–5.1)
SODIUM SERPL-SCNC: 136 MMOL/L (ref 137–145)
WBC # BLD AUTO: 9.5 K/UL (ref 3.8–10.6)

## 2019-12-11 RX ADMIN — IPRATROPIUM BROMIDE AND ALBUTEROL SULFATE SCH ML: .5; 3 SOLUTION RESPIRATORY (INHALATION) at 11:30

## 2019-12-11 RX ADMIN — ACETAMINOPHEN PRN MLS/HR: 10 INJECTION, SOLUTION INTRAVENOUS at 21:21

## 2019-12-11 RX ADMIN — HYDROCODONE BITARTRATE AND ACETAMINOPHEN PRN EACH: 5; 325 TABLET ORAL at 05:06

## 2019-12-11 RX ADMIN — SOYBEAN OIL SCH MLS/HR: 20 INJECTION, SOLUTION INTRAVENOUS at 14:01

## 2019-12-11 RX ADMIN — ONDANSETRON PRN MG: 2 INJECTION INTRAMUSCULAR; INTRAVENOUS at 10:44

## 2019-12-11 RX ADMIN — LEVOTHYROXINE SODIUM ANHYDROUS SCH MCG: 100 INJECTION, POWDER, LYOPHILIZED, FOR SOLUTION INTRAVENOUS at 08:16

## 2019-12-11 RX ADMIN — METRONIDAZOLE SCH MLS/HR: 500 INJECTION, SOLUTION INTRAVENOUS at 08:13

## 2019-12-11 RX ADMIN — POTASSIUM CHLORIDE SCH MLS/HR: 7.46 INJECTION, SOLUTION INTRAVENOUS at 01:31

## 2019-12-11 RX ADMIN — FUROSEMIDE SCH MG: 10 INJECTION, SOLUTION INTRAMUSCULAR; INTRAVENOUS at 08:16

## 2019-12-11 RX ADMIN — PANTOPRAZOLE SODIUM SCH MG: 40 INJECTION, POWDER, FOR SOLUTION INTRAVENOUS at 08:15

## 2019-12-11 RX ADMIN — PIPERACILLIN AND TAZOBACTAM SCH MLS/HR: 3; .375 INJECTION, POWDER, FOR SOLUTION INTRAVENOUS at 06:15

## 2019-12-11 RX ADMIN — IPRATROPIUM BROMIDE AND ALBUTEROL SULFATE SCH ML: .5; 3 SOLUTION RESPIRATORY (INHALATION) at 20:04

## 2019-12-11 RX ADMIN — HYDROCODONE BITARTRATE AND ACETAMINOPHEN PRN EACH: 5; 325 TABLET ORAL at 10:53

## 2019-12-11 RX ADMIN — POTASSIUM CHLORIDE SCH MLS/HR: 7.46 INJECTION, SOLUTION INTRAVENOUS at 00:04

## 2019-12-11 RX ADMIN — ACETAMINOPHEN PRN MLS/HR: 10 INJECTION, SOLUTION INTRAVENOUS at 12:27

## 2019-12-11 RX ADMIN — METRONIDAZOLE SCH MLS/HR: 500 INJECTION, SOLUTION INTRAVENOUS at 00:04

## 2019-12-11 RX ADMIN — FUROSEMIDE SCH MG: 10 INJECTION, SOLUTION INTRAMUSCULAR; INTRAVENOUS at 16:04

## 2019-12-11 RX ADMIN — INSULIN HUMAN SCH MLS/HR: 100 INJECTION, SOLUTION PARENTERAL at 14:12

## 2019-12-11 RX ADMIN — POTASSIUM CHLORIDE SCH MLS/HR: 7.46 INJECTION, SOLUTION INTRAVENOUS at 11:36

## 2019-12-11 RX ADMIN — PIPERACILLIN AND TAZOBACTAM SCH MLS/HR: 3; .375 INJECTION, POWDER, FOR SOLUTION INTRAVENOUS at 18:23

## 2019-12-11 RX ADMIN — HEPARIN SODIUM SCH UNIT: 5000 INJECTION, SOLUTION INTRAVENOUS; SUBCUTANEOUS at 08:43

## 2019-12-11 RX ADMIN — IPRATROPIUM BROMIDE AND ALBUTEROL SULFATE SCH ML: .5; 3 SOLUTION RESPIRATORY (INHALATION) at 07:54

## 2019-12-11 RX ADMIN — IPRATROPIUM BROMIDE AND ALBUTEROL SULFATE SCH ML: .5; 3 SOLUTION RESPIRATORY (INHALATION) at 15:31

## 2019-12-11 RX ADMIN — LEUCINE, PHENYLALANINE, LYSINE, METHIONINE, ISOLEUCINE, VALINE, HISTIDINE, THREONINE, TRYPTOPHAN, ALANINE, GLYCINE, ARGININE, PROLINE, SERINE, TYROSINE, SODIUM ACETATE, DIBASIC POTASSIUM PHOSPHATE, MAGNESIUM CHLORIDE, SODIUM CHLORIDE, CALCIUM CHLORIDE, DEXTROSE SCH MLS/HR
311; 238; 247; 170; 255; 247; 204; 179; 77; 880; 438; 489; 289; 213; 17; 297; 261; 51; 77; 33; 10 INJECTION INTRAVENOUS at 14:01

## 2019-12-11 RX ADMIN — POTASSIUM CHLORIDE SCH MLS/HR: 7.46 INJECTION, SOLUTION INTRAVENOUS at 10:14

## 2019-12-11 RX ADMIN — HEPARIN SODIUM SCH UNIT: 5000 INJECTION, SOLUTION INTRAVENOUS; SUBCUTANEOUS at 21:21

## 2019-12-11 RX ADMIN — METRONIDAZOLE SCH MLS/HR: 500 INJECTION, SOLUTION INTRAVENOUS at 16:04

## 2019-12-11 NOTE — P.PN
Progress Note - Text


Progress Note Date: 12/11/19





Chief Complaint: Lower GI bleed





Interval history:


This is a very pleasant 89-year-old patient who follows with visiting physicians

Dr. Ochoa.  Chronic stable medical conditions include congestive heart failure 

with EF of 50%, aortic stenosis, mitral regurgitation, mitral stenosis, 

tricuspid regurgitation, secondary probably hypertension, diabetes, 

hypertension, osteoarthritis, hypothyroid, blind left eye, coronary artery 

disease, [place] filter..  Patient does use a walker.  Patient did have a 

computed tomography scan of the abdomen on November 26 and did show mass along 

the lateral wall of the ascending colon and also adjacent soft tissue omental 

mass and is also another additional mass present in intra-abdominally.  

Suggestive of metastatic disease.  Patient yesterday had some bleeding per the 

right rectum.  And decided to come in.  Patient's been having lower abdominal 

discomfort.  She was admitted to the ER.  Patient normally does use a walker.  

Lives alone.  Colonoscopy done on December 4 shows ascending colon mass and AV 

malformation was some slight bleeding.  On December 7 patient underwent surgical

intervention to include right hemicolectomy, resection of transverse mesocolon 

tomor, small bowel resection from distal jejunum to ileum over 2 feet, repair of

incisional hernia, lysis of adhesions.  Patient was extubated on December 9.  On

TPN.  Abdominal incision wound VAC.


Today-.  ICU.  Tired.  Getting TPN.  Had been on clear liquids.  Pain control.





Review of systems: Was done for constitutional, cardiovascular, GI, pulmonary. 

relevant finding as above





Active Medications





Hydrocodone Bitart/Acetaminophen (Norco 5-325)  1 each PO Q4HR PRN


   PRN Reason: Pain


   Last Admin: 12/11/19 10:53 Dose:  1 each


   Documented by: 


Albuterol/Ipratropium (Duoneb 0.5 Mg-3 Mg/3 Ml Soln)  3 ml INHALATION RT-QID [facility]


   Last Admin: 12/11/19 20:04 Dose:  3 ml


   Documented by: 


Albuterol/Ipratropium (Duoneb 0.5 Mg-3 Mg/3 Ml Soln)  3 ml INHALATION RT-Q2H PRN


   PRN Reason: Shortness Of Breath Or Wheezing


Furosemide (Lasix)  40 mg IV Q8HR [facility]


   Last Admin: 12/11/19 16:04 Dose:  40 mg


   Documented by: 


Heparin Sodium (Porcine) (Heparin)  5,000 unit SQ Q12HR [facility]


   Last Admin: 12/11/19 21:21 Dose:  5,000 unit


   Documented by: 


Metronidazole 500 mg/ IV (Solution)  100 mls @ 100 mls/hr IVPB Q8HR [facility]


   Last Admin: 12/11/19 16:04 Dose:  100 mls/hr


   Documented by: 


Norepinephrine Bitartrate 8 mg (/ Sodium Chloride)  258 mls @ 7.904 mls/hr IV 

.Q24H [facility]; Protocol


   Last Titration: 12/10/19 14:20 Dose:  0 mcg/kg/min, 0 mls/hr


   Documented by: 


Propofol 1,000 mg/ IV Solution  100 mls @ 0 mls/hr IV .Q0M [facility]; Protocol


   Last Titration: 12/09/19 10:36 Dose:  0 mcg/kg/min, 0 mls/hr


   Documented by: 


Piperacillin Sod/Tazobactam (Sod 3.375 gm/ Sodium Chloride)  100 mls @ 25 mls/hr

IVPB Q12H [facility]


   Last Admin: 12/11/19 18:23 Dose:  25 mls/hr


   Documented by: 


Insulin Human Regular 100 unit (/ Sodium Chloride)  101 mls @ 0 mls/hr IV .Q0M 

[facility]; Protocol


   Last Titration: 12/11/19 18:18 Dose:  6.5 unit/hr, 6.565 mls/hr


   Documented by: 


Parenteral Vitamin Supplement 10 ml/ Chromium/Copper/Manganese/Seleni/Zn 1 

ml/Amino Ac/Electrol/Dextrose/Calcium  1,011 mls @ 65 mls/hr IV .BY DURATION [facility]


   Stop: 12/12/19 01:59


   Last Admin: 12/10/19 20:17 Dose:  65 mls/hr


   Documented by: 


Amino Ac/Electrol/Dextrose/Calcium (Clinimix E 4.25%-D10% Solution)  1,000 mls @

65 mls/hr IV .BY DURATION [facility]


   Stop: 12/12/19 01:59


   Last Admin: 12/11/19 14:01 Dose:  65 mls/hr


   Documented by: 


Acetaminophen 1,000 mg/ IV (Solution)  100 mls @ 400 mls/hr IVPB Q6HR PRN


   PRN Reason: Pain


   Stop: 12/12/19 12:14


   Last Admin: 12/11/19 21:21 Dose:  400 mls/hr


   Documented by: 


Fat Emulsion Intravenous (Lipids 20%)  250 mls @ 21 mls/hr IV Q24H [facility]


   Last Admin: 12/11/19 14:01 Dose:  21 mls/hr


   Documented by: 


Parenteral Vitamin Supplement 10 ml/ Chromium/Copper/Manganese/Seleni/Zn 1 

ml/Amino Ac/Electrol/Dextrose/Calcium  1,011 mls @ 80 mls/hr IV .BY DURATION [facility]


Amino Ac/Electrol/Dextrose/Calcium (Clinimix E 4.25%-D10% Solution)  1,000 mls @

80 mls/hr IV .BY DURATION [facility]


Levothyroxine Sodium (Synthroid Ivp)  56.25 mcg IV DAILY [facility]


   Last Admin: 12/11/19 08:16 Dose:  56.25 mcg


   Documented by: 


Miscellaneous Information (Magnesium Per Protocol)  1 each MISCELLANE DAILY PRN;

Protocol


   PRN Reason: Per Protocol


Miscellaneous Information (Potassium Per Protocol)  1 each MISCELLANE DAILY PRN;

Protocol


   PRN Reason: Per Protocol


Nalbuphine HCl (Nubain)  2.5 mg IV Q4HR PRN


   PRN Reason: Itching


Naloxone HCl (Narcan)  0.2 mg IV Q2M PRN


   PRN Reason: Opioid Reversal


Ondansetron HCl (Zofran)  4 mg IVP Q6HR PRN


   PRN Reason: Nausea And Vomiting


   Last Admin: 12/11/19 10:44 Dose:  4 mg


   Documented by: 


Pantoprazole Sodium (Protonix)  40 mg IV DAILY [facility]


   Last Admin: 12/11/19 08:15 Dose:  40 mg


   Documented by: 











Physical examination:


VITAL SIGNS: 97.7, 80, 17, 104/43, 100% on 2 L


GENERAL: Sitting up in a chair, awake


EYES: Pupils equal.  Conjunctiva normal.


HEENT: External appearance of nose and ears normal, oral cavity grossly normal.


NECK: JVD not raised; masses not palpable.


HEART: First and second heart sounds are normal;  no edema.  


LUNGS: Respiratory rate normal; clear to auscultation.  


ABDOMEN: Some tenderness, dressing over the wound, bowel sounds sluggish, mild 

tenderness no guarding or rigidity.  LYNDSEY drain.-  Minimal output 


PSYCH: Alert and oriented x3;  mood  and affect normal.  


MUSCULOSKELETAL: Evidence of OA especially in the hands and knees





INVESTIGATIONS, reviewed in the clinical context:


White count 9.5 hemoglobin 7.3 progression 4 bun 41 and creatinine 1.31





Previous testing


White count 9.6 hemoglobin 9.2 platelets 240 progression 4.7 BUN 73 creatinine 

1.79


Computed tomography scan of the abdomen-results noted as above





Assessment:


-Ascending colon mass, per colonoscopy, -right hemicolectomy, resection of 

transverse mesocolon tumor, small bowel resection from distal jejunum to ileum 

over 2feet, repair of incisional hernia, lysis of adhesions.


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


-Coronary artery disease with prior history of bypass


-Probable chronic kidney disease from diabetic nephropathy and hypertensive 

nephrosclerosis


-Normocytic anemia suspected from chronic disease of malignancy


-Diabetes mellitus type 2


-Essential hypertension


-Primary osteoarthritis


-Chronic gout


-Macular degeneration of right eye


-Colonic diverticulosis 


-obstructive sleep apnea did use CPAP machine in the past


-No code





Plan


Remains on IV antibiotics, TPN,.  Per surgery diet has been advanced to full 

liquid diet.  Care was discussed with the patient.

## 2019-12-11 NOTE — PN
PROGRESS NOTE



The patient is seen for followup for an acute kidney injury.  This morning she is

currently resting comfortably.  Renal function continues to improve.  Creatinine is

down to 1.3.  The patient denies any significant chest pains or shortness of breath.



PHYSICAL EXAMINATION:

This morning blood pressure was 115/41, heart rate of 80 per minute.  The patient is

afebrile.  Examination of the heart, S1 and S2.  Examination of the lungs, bilateral

breath sounds are heard.  Abdomen is soft.  Examination of the lower extremities shows

trace edema bilaterally.  CNS exam grossly intact.



LABS:

Show sodium of 136, potassium 4.0, chloride 108, BUN 41, creatinine 1.3, phosphorus

2.1, calcium 7.8.  Hemoglobin 7.3 g/dL.



ASSESSMENT:

1. Acute kidney injury, ischemic acute tubular necrosis, nonoliguric, currently

    improved.

2. Volume overload.  Patient is maintained on IV Lasix q.12 hours.  I will increase it

    to q.8 hours as chest x-ray continues to show evidence of pulmonary vascular

    congestion.  Patient is also maintained on TPN as she is not eating postop.

3. Status post exploratory laparotomy with right hemicolectomy, small bowel resection,

    repair of incisional hernia, and lysis of adhesions.

4. History of diastolic dysfunction and diastolic congestive heart failure.  Ejection

    fraction 55% earlier in August.

5. Hypoxic respiratory failure, currently status post extubation.

6. History of aortic valve replacement with tissue valve.

7. Metabolic acidosis, status post IV bicarbonate.



PLAN:

Increase Lasix.  Repeat labs in a.m.  Decrease the TPN once patient starts eating.





MMODL / IJN: [id number] / Job#: [id number]

## 2019-12-11 NOTE — PN
PROGRESS NOTE



DATE OF SERVICE:

12/11/2019



REASON FOR FOLLOWUP:

Leukocytosis; abdominal source.



INTERVAL HISTORY:

The patient is currently afebrile.  The patient has been breathing comfortably.  The

patient denies having any chest pain or any cough.  Abdominal pain is currently

controlled.  Denies any nausea or vomiting.



PHYSICAL EXAMINATION:

Blood pressure 112/53 with a pulse of 94, temperature 98. She is 96% on 2 L nasal

cannula.

General description is an elderly female up in the chair in no distress.

RESPIRATORY SYSTEM: Unlabored breathing with decreased breath sounds at the base. No

wheeze.

HEART: S1, S2.  Regular rate and rhythm.

ABDOMEN: Soft. Mildly distended. No guarding or rigidity.



LABS:

Hemoglobin 7.3, white count 9.5, BUN of 41. Creatinine is 1.31. Abdominal culture so

far pending.



DIAGNOSTIC IMPRESSION AND PLAN:

Patient with leukocytosis, likely abdominal source in this patient who did have

extensive abdominal surgery, but no evidence of any perforation or abscess. Currently

covered with Zosyn; to continue while waiting for oral intake to improve, as may be

switched over to oral at that point.  Continue with supportive care.





MMODL / IJN: [id number] / Job#: [id number]

## 2019-12-11 NOTE — PN
PROGRESS NOTE



PULMONARY/CRITICAL CARE PROGRESS NOTE:



DATE OF SERVICE:

12/11/2019



This is an 89-year-old female who was admitted to the hospital on December 2nd.  She

came with a diagnosis of GI bleed and colon cancer.  She went to the operating room on

December 7th.  At that time, she was intubated obviously because she had an exploratory

laparotomy, lysis of adhesions, right hemicolectomy, transverse colon resection, hernia

repair, and small bowel resection.  She did not require a colostomy.  She was

transferred back to the ICU after surgery on the 7th and had been on the ventilator

ever since.  The patient was successfully extubated on December 9th, 2 days later.  We

gave her a daily interruption of sedation, checked weaning parameters, which were

excellent, and extubated the patient.  She has remained extubated.



Currently, she is on O2 at 2 L by nasal cannula.  She is getting total parental

nutrition at 65 mL an hour, insulin drip of 4.5 units an hour and saline IV at 20 mL an

hour.



The patient is doing much better.  She is awake and alert.  She is using her incentive

spirometer.  We do recommend deep breathing, coughing and clearing of secretions.

Overall, she has done remarkably well given her age, her multitude of medical problems,

and her rather extensive surgical procedure.



PHYSICAL EXAMINATION:

VITAL SIGNS: Current vital signs are reviewed. Temperature 97.7, heart rate 80,

respiratory rate 17, blood pressure 104/43, mean 63 and saturations on 2 L are 96% to

100%.

GENERAL: Appears in no acute distress.

HEENT: Examination is grossly unremarkable.  Mucous membranes are moist.  Nasal O2

noted.

NECK:  Supple.  Full range of motion.  No adenopathy or thyromegaly.  Neck veins are

flat.

CARDIOVASCULAR: Examination reveals regular rhythm and rate.  Heart rate 80.  S1, S2

normal.  There is no murmur.

LUNGS:  Reveal a few scattered rhonchi.  No wheezes or crackles.  Breath sounds equal.

ABDOMEN:  Soft.  No bowel sounds are noted.  She has a midline incision.

EXTREMITIES: Are intact.  There is bilateral lower extremity edema and some diffuse

anasarca and edema throughout.  There is no cyanosis or clubbing.

SKIN: Without rash.

NEUROLOGIC: Examination is brief but nonfocal.



LABS:

Labs are reviewed.  White count 9.5, hemoglobin 10.3, hematocrit 23.7, platelet count

129,000.  Sodium 136, potassium 4, chloride 108, CO2 of 24. Anion gap is 4. BUN and

creatinine were 41 and 1.31.  Phosphorus is 2.1, calcium 7.8 and magnesium 2.0.



Microbiologic studies are all negative thus far.



A chest x-ray from December 11th shows evidence of fluid overload.



According to her I's and O's, she is about 9.5 L ahead over the last 3 or 4 days.



MEDICATIONS:

Medications are reviewed.



ASSESSMENT:

1. Postoperative day #4, status post exploratory laparotomy with lysis of adhesions,

    right hemicolectomy, transverse colon resection, hernia repair, small bowel

    resection, and placement of a Prevena wound VAC.

2. Postoperative respiratory failure with routine postoperative ventilator management,

    status post successful extubation on December 9.

3. History of colon cancer with colon resection on December 7.

4. Gastrointestinal bleed.

5. Chronic iron deficiency anemia.

6. History of colon resection.

7. Ischemic cardiomyopathy.

8. Diastolic heart failure.

9. History of atrial fibrillation.

10.History of pulmonary embolism, status post [place] filter placement.

11.History of sleep apnea syndrome.

12.Type 2 diabetes mellitus with diabetic neuropathy.

13.History of hypertensive heart disease.

14.History of bypass grafting.

15.Status post aortic valve replacement.

16.Depression by history.

17.Gout.

18.Status post pacemaker insertion.

19.History of hypothyroidism.



PLAN:

Currently, the patient seems to be doing reasonably well.  She is only on O2 at 2 L by

nasal cannula.  Saturations are excellent.  She is still getting TPN at 65 mL an hour

and insulin drip of 4.5 units an hour.  She is doing well on her deep breathing,

coughing, clearing of secretions and hourly use of the incentive spirometer.  Certainly

not out of the woods.  We will keep her here in the ICU for at least another day.  We

will continue to follow.  Prognosis is guarded.  She is a DO NOT INTUBATE/DO NOT

RESUSCITATE patient should she deteriorate in the future.



MMODL / IJN: [id number] / Job#: [id number]

## 2019-12-11 NOTE — P.PN
Subjective


Progress Note Date: 12/11/19





CHIEF COMPLAINT: Abdominal pain





HISTORY OF PRESENT ILLNESS: Patient is status post exploratory laparotomy with 

resection of right colon for over 10 cm lesion with invasion to abdominal wall 

including small bowel resection for small bowel obstruction performed on 

12/07/2019.  Patient examined at the bedside in the intensive care unit.  She 

reports her pain is tolerable at this time.  She is receiving Norco.  She 

reports nausea with the Norco.  She is also receiving IV Zofran.  She is 

tolerating liquid diet.  Denies passing flatus.  Vital signs are stable.  She is

afebrile. WBC 9.5. Hemoglobin 7.3.





PHYSICAL EXAM: 


VITAL SIGNS: Reviewed


GENERAL: Well-developed in no acute distress. 


HEENT:  No sclera icterus. Extraocular movements grossly intact.  Moist buccal 

mucosa. 


Head is atraumatic, normocephalic. Hears conversational speech. No nasal 

drainage.


NECK:  Supple without lymphadenopathy.


CHEST:  Non-labored respirations and equal bilateral excursions. 


CARDIOVASCULAR:  Regular rate with regular rhythm.  Palpable 2+ radial pulses.


ABDOMEN:  Soft.  Midline dressing intact with PREVENA. LYNDSEY drain with 

serosanguineous drainage


MUSCULOSKELETAL:  No clubbing or cyanosis


NEUROLOGIC:  No focal or lateralizing signs.  Cranial nerves II through XII 

grossly intact.


PSYCH:  Appropriate affect.  Alert and oriented to person, place and time.


SKIN: Well perfused.  Good skin turgor. 





ASSESSMENT: 


1.  Ascending colon cancer, recurrent metastatic


2.  Rectal bleeding





PLAN: 


Advance diet to full liquid. Await bowel function


Continue TPN


Increase activity as tolerated


Pain control


IS 10 times an hour


May transfer to [facility] from surgical standpoint


Will require VOLODYMYR at time of discharge





Nurse practitioner note has been reviewed by physician. Signing provider agrees 

with the documented findings, assessment, and plan of care. 








Objective





- Vital Signs


Vital signs: 


                                   Vital Signs











Temp  97.7 F   12/11/19 08:00


 


Pulse  89   12/11/19 13:00


 


Resp  21   12/11/19 13:00


 


BP  115/50   12/11/19 13:00


 


Pulse Ox  96   12/11/19 13:00








                                 Intake & Output











 12/10/19 12/11/19 12/11/19





 18:59 06:59 18:59


 


Intake Total 2006.389 1322.323 7348.012


 


Output Total 1185 1235 975


 


Balance 821.389 448.056 117.012


 


Weight  94.6 kg 94.6 kg


 


Intake:   


 


  IV 1192 1392 952


 


    Dextrose 5% in Water 1, 100  





    000 ml @ 50 mls/hr IV .   





    Q23H [facility] with Sodium   





    Bicarb (1 Meq/ml) 150 ml   





    Rx#:[id number]   


 


    Mvi, Adult No.4 with Vit  650 470





    K 10 ml Trace (Conc-1Ml/   





    Dose) 1 ml Parenteral   





    Electrolytes 20 ml In   





    Amino Acid 4.25%-D10w 1,   





    000 ml @ 65 mls/hr IV .BY   





    DURATION [facility] Rx#:   





    [id number]   


 


    Normal saline 240 240 140


 


    Parenteral Electrolytes 780 130 





    20 ml In Amino Acid 4.25%   





    -D10w 1,000 ml @ 65 mls/   





    hr IV .BY DURATION [facility] Rx   





    #:[id number]   


 


    Potassium Chloride 10 meq  200 200





    In Water For Injection 1   





    100ml.bag @ 100 mls/hr   





    IVPB Q1H [facility] Rx#:   





    [id number]   


 


    metroNIDAZOLE-NS   100 100





    mg In Saline 1 100ml.bag   





    @ 100 mls/hr IVPB Q8HR   





    [facility] Rx#:[id number]   


 


    pressure bag 72 72 42


 


  Intake, IV Titration 64.389 51.056 140.012





  Amount   


 


    ACETAMINOPHEN IV (For NPO   100





    ) 1,000 mg In Empty Bag 1   





    bag @ 400 mls/hr IVPB   





    Q6HR PRN Rx#:[id number]   


 


    Insulin Regular 100 unit 32.716 51.056 40.012





    In Sodium Chloride 0.9%   





    100 ml @ Per Protocol IV   





    .Q0M [facility] Rx#:[id number]   


 


    Norepinephrine 8 mg In 31.673  





    Sodium Chloride 0.9% 250   





    ml @ 0.05 MCG/KG/MIN 7.   





    904 mls/hr IV .Q24H [facility]   





    Rx#:[id number]   


 


  Oral 750 240 


 


Output:   


 


  Urine 1185 1235 975


 


Other:   


 


  Voiding Method Indwelling Catheter Indwelling Catheter Indwelling Catheter








                       ABP, PAP, CO, CI - Last Documented











Arterial Blood Pressure        95/36

















- Labs


CBC & Chem 7: 


                                 12/11/19 05:00





                                 12/11/19 05:00


Labs: 


                  Abnormal Lab Results - Last 24 Hours (Table)











  12/10/19 12/10/19 12/10/19 Range/Units





  16:02 17:58 19:58 


 


RBC     (3.80-5.40)  m/uL


 


Hgb     (11.4-16.0)  gm/dL


 


Hct     (34.0-46.0)  %


 


MCHC     (31.0-37.0)  g/dL


 


RDW     (11.5-15.5)  %


 


Plt Count     (150-450)  k/uL


 


Lymphocytes #     (1.0-4.8)  k/uL


 


Sodium     (137-145)  mmol/L


 


Chloride     ()  mmol/L


 


BUN     (7-17)  mg/dL


 


Creatinine     (0.52-1.04)  mg/dL


 


Glucose     (74-99)  mg/dL


 


POC Glucose (mg/dL)  203 H  178 H  175 H  (75-99)  mg/dL


 


Calcium     (8.4-10.2)  mg/dL


 


Phosphorus     (2.5-4.5)  mg/dL














  12/10/19 12/10/19 12/11/19 Range/Units





  21:57 23:55 02:08 


 


RBC     (3.80-5.40)  m/uL


 


Hgb     (11.4-16.0)  gm/dL


 


Hct     (34.0-46.0)  %


 


MCHC     (31.0-37.0)  g/dL


 


RDW     (11.5-15.5)  %


 


Plt Count     (150-450)  k/uL


 


Lymphocytes #     (1.0-4.8)  k/uL


 


Sodium     (137-145)  mmol/L


 


Chloride     ()  mmol/L


 


BUN     (7-17)  mg/dL


 


Creatinine     (0.52-1.04)  mg/dL


 


Glucose     (74-99)  mg/dL


 


POC Glucose (mg/dL)  156 H  175 H  164 H  (75-99)  mg/dL


 


Calcium     (8.4-10.2)  mg/dL


 


Phosphorus     (2.5-4.5)  mg/dL














  12/11/19 12/11/19 12/11/19 Range/Units





  03:58 05:00 05:00 


 


RBC    2.46 L  (3.80-5.40)  m/uL


 


Hgb    7.3 L  (11.4-16.0)  gm/dL


 


Hct    23.7 L  (34.0-46.0)  %


 


MCHC    30.9 L  (31.0-37.0)  g/dL


 


RDW    18.5 H  (11.5-15.5)  %


 


Plt Count    129 L  (150-450)  k/uL


 


Lymphocytes #    0.9 L  (1.0-4.8)  k/uL


 


Sodium   136 L   (137-145)  mmol/L


 


Chloride   108 H   ()  mmol/L


 


BUN   41 H   (7-17)  mg/dL


 


Creatinine   1.31 H   (0.52-1.04)  mg/dL


 


Glucose   152 H   (74-99)  mg/dL


 


POC Glucose (mg/dL)  165 H    (75-99)  mg/dL


 


Calcium   7.8 L   (8.4-10.2)  mg/dL


 


Phosphorus   2.1 L   (2.5-4.5)  mg/dL














  12/11/19 12/11/19 12/11/19 Range/Units





  06:21 08:24 10:26 


 


RBC     (3.80-5.40)  m/uL


 


Hgb     (11.4-16.0)  gm/dL


 


Hct     (34.0-46.0)  %


 


MCHC     (31.0-37.0)  g/dL


 


RDW     (11.5-15.5)  %


 


Plt Count     (150-450)  k/uL


 


Lymphocytes #     (1.0-4.8)  k/uL


 


Sodium     (137-145)  mmol/L


 


Chloride     ()  mmol/L


 


BUN     (7-17)  mg/dL


 


Creatinine     (0.52-1.04)  mg/dL


 


Glucose     (74-99)  mg/dL


 


POC Glucose (mg/dL)  169 H  169 H  173 H  (75-99)  mg/dL


 


Calcium     (8.4-10.2)  mg/dL


 


Phosphorus     (2.5-4.5)  mg/dL














  12/11/19 12/11/19 Range/Units





  11:45 14:04 


 


RBC    (3.80-5.40)  m/uL


 


Hgb    (11.4-16.0)  gm/dL


 


Hct    (34.0-46.0)  %


 


MCHC    (31.0-37.0)  g/dL


 


RDW    (11.5-15.5)  %


 


Plt Count    (150-450)  k/uL


 


Lymphocytes #    (1.0-4.8)  k/uL


 


Sodium    (137-145)  mmol/L


 


Chloride    ()  mmol/L


 


BUN    (7-17)  mg/dL


 


Creatinine    (0.52-1.04)  mg/dL


 


Glucose    (74-99)  mg/dL


 


POC Glucose (mg/dL)  172 H  181 H  (75-99)  mg/dL


 


Calcium    (8.4-10.2)  mg/dL


 


Phosphorus    (2.5-4.5)  mg/dL








                      Microbiology - Last 24 Hours (Table)











 12/08/19 17:05 Gram Stain - Preliminary





 Peritoneal Fluid Body Fluid Culture - Preliminary


 


 12/08/19 00:37 Gram Stain - Final





 Sputum Sputum Culture - Final

## 2019-12-11 NOTE — XR
EXAMINATION TYPE: XR chest 1V portable

 

DATE OF EXAM: 12/11/2019

 

COMPARISON: 12/10/2019

 

HISTORY: Shortness of breath. Evaluate for fluid overload.

 

TECHNIQUE: Single frontal view of the chest is obtained.

 

FINDINGS:  Similar bibasilar opacities and trace pleural effusions with bibasilar airspace disease. I
nterstitial prominence remains. Multilead left-sided cardiac device, cardiac cranioplasty, and post C
ABG changes the chest are again seen with upper limits of normal size cardiomediastinal silhouette. R
ight internal jugular central venous catheter is also unchanged.

 

IMPRESSION:  Mild fluid overload with trace pleural effusions and bibasilar airspace disease with min
imal interstitial edema.

## 2019-12-12 LAB
ALBUMIN SERPL-MCNC: 2 G/DL (ref 3.5–5)
ANION GAP SERPL CALC-SCNC: 4 MMOL/L
BUN SERPL-SCNC: 41 MG/DL (ref 7–17)
CALCIUM SPEC-MCNC: 7.8 MG/DL (ref 8.4–10.2)
CHLORIDE SERPL-SCNC: 107 MMOL/L (ref 98–107)
CO2 SERPL-SCNC: 24 MMOL/L (ref 22–30)
ERYTHROCYTE [DISTWIDTH] IN BLOOD BY AUTOMATED COUNT: 2.62 M/UL (ref 3.8–5.4)
ERYTHROCYTE [DISTWIDTH] IN BLOOD: 18.3 % (ref 11.5–15.5)
GLUCOSE BLD-MCNC: 116 MG/DL (ref 75–99)
GLUCOSE BLD-MCNC: 116 MG/DL (ref 75–99)
GLUCOSE BLD-MCNC: 147 MG/DL (ref 75–99)
GLUCOSE BLD-MCNC: 153 MG/DL (ref 75–99)
GLUCOSE BLD-MCNC: 162 MG/DL (ref 75–99)
GLUCOSE BLD-MCNC: 179 MG/DL (ref 75–99)
GLUCOSE BLD-MCNC: 180 MG/DL (ref 75–99)
GLUCOSE BLD-MCNC: 180 MG/DL (ref 75–99)
GLUCOSE BLD-MCNC: 185 MG/DL (ref 75–99)
GLUCOSE BLD-MCNC: 188 MG/DL (ref 75–99)
GLUCOSE BLD-MCNC: 189 MG/DL (ref 75–99)
GLUCOSE BLD-MCNC: 191 MG/DL (ref 75–99)
GLUCOSE BLD-MCNC: 201 MG/DL (ref 75–99)
GLUCOSE BLD-MCNC: 207 MG/DL (ref 75–99)
GLUCOSE SERPL-MCNC: 106 MG/DL (ref 74–99)
HCT VFR BLD AUTO: 25.5 % (ref 34–46)
HGB BLD-MCNC: 7.6 GM/DL (ref 11.4–16)
MAGNESIUM SPEC-SCNC: 1.9 MG/DL (ref 1.6–2.3)
MCH RBC QN AUTO: 29.2 PG (ref 25–35)
MCHC RBC AUTO-ENTMCNC: 30 G/DL (ref 31–37)
MCV RBC AUTO: 97.3 FL (ref 80–100)
PLATELET # BLD AUTO: 128 K/UL (ref 150–450)
POTASSIUM SERPL-SCNC: 3.7 MMOL/L (ref 3.5–5.1)
SODIUM SERPL-SCNC: 135 MMOL/L (ref 137–145)
WBC # BLD AUTO: 9.9 K/UL (ref 3.8–10.6)

## 2019-12-12 RX ADMIN — PIPERACILLIN AND TAZOBACTAM SCH MLS/HR: 3; .375 INJECTION, POWDER, FOR SOLUTION INTRAVENOUS at 18:15

## 2019-12-12 RX ADMIN — IPRATROPIUM BROMIDE AND ALBUTEROL SULFATE SCH: .5; 3 SOLUTION RESPIRATORY (INHALATION) at 11:26

## 2019-12-12 RX ADMIN — PANTOPRAZOLE SODIUM SCH MG: 40 INJECTION, POWDER, FOR SOLUTION INTRAVENOUS at 08:36

## 2019-12-12 RX ADMIN — METRONIDAZOLE SCH MLS/HR: 500 INJECTION, SOLUTION INTRAVENOUS at 00:27

## 2019-12-12 RX ADMIN — SOYBEAN OIL SCH MLS/HR: 20 INJECTION, SOLUTION INTRAVENOUS at 17:23

## 2019-12-12 RX ADMIN — POTASSIUM CHLORIDE SCH MLS/HR: 7.46 INJECTION, SOLUTION INTRAVENOUS at 05:19

## 2019-12-12 RX ADMIN — ACETAMINOPHEN PRN MLS/HR: 10 INJECTION, SOLUTION INTRAVENOUS at 09:50

## 2019-12-12 RX ADMIN — HEPARIN SODIUM SCH UNIT: 5000 INJECTION, SOLUTION INTRAVENOUS; SUBCUTANEOUS at 22:19

## 2019-12-12 RX ADMIN — PIPERACILLIN AND TAZOBACTAM SCH MLS/HR: 3; .375 INJECTION, POWDER, FOR SOLUTION INTRAVENOUS at 05:20

## 2019-12-12 RX ADMIN — INSULIN HUMAN SCH MLS/HR: 100 INJECTION, SOLUTION PARENTERAL at 17:21

## 2019-12-12 RX ADMIN — MAGNESIUM SULFATE IN DEXTROSE SCH MLS/HR: 10 INJECTION, SOLUTION INTRAVENOUS at 06:14

## 2019-12-12 RX ADMIN — FUROSEMIDE SCH MG: 10 INJECTION, SOLUTION INTRAMUSCULAR; INTRAVENOUS at 17:25

## 2019-12-12 RX ADMIN — LEVOTHYROXINE SODIUM ANHYDROUS SCH MCG: 100 INJECTION, POWDER, LYOPHILIZED, FOR SOLUTION INTRAVENOUS at 08:51

## 2019-12-12 RX ADMIN — SCOPALAMINE SCH PATCH: 1 PATCH, EXTENDED RELEASE TRANSDERMAL at 12:15

## 2019-12-12 RX ADMIN — FUROSEMIDE SCH MG: 10 INJECTION, SOLUTION INTRAMUSCULAR; INTRAVENOUS at 08:36

## 2019-12-12 RX ADMIN — IPRATROPIUM BROMIDE AND ALBUTEROL SULFATE SCH ML: .5; 3 SOLUTION RESPIRATORY (INHALATION) at 07:26

## 2019-12-12 RX ADMIN — ACETAMINOPHEN PRN MLS/HR: 10 INJECTION, SOLUTION INTRAVENOUS at 03:53

## 2019-12-12 RX ADMIN — MAGNESIUM SULFATE IN DEXTROSE SCH MLS/HR: 10 INJECTION, SOLUTION INTRAVENOUS at 05:19

## 2019-12-12 RX ADMIN — METOCLOPRAMIDE PRN MG: 5 INJECTION, SOLUTION INTRAMUSCULAR; INTRAVENOUS at 12:15

## 2019-12-12 RX ADMIN — LEUCINE, PHENYLALANINE, LYSINE, METHIONINE, ISOLEUCINE, VALINE, HISTIDINE, THREONINE, TRYPTOPHAN, ALANINE, GLYCINE, ARGININE, PROLINE, SERINE, TYROSINE, SODIUM ACETATE, DIBASIC POTASSIUM PHOSPHATE, MAGNESIUM CHLORIDE, SODIUM CHLORIDE, CALCIUM CHLORIDE, DEXTROSE SCH MLS/HR
311; 238; 247; 170; 255; 247; 204; 179; 77; 880; 438; 489; 289; 213; 17; 297; 261; 51; 77; 33; 10 INJECTION INTRAVENOUS at 02:59

## 2019-12-12 RX ADMIN — IPRATROPIUM BROMIDE AND ALBUTEROL SULFATE SCH ML: .5; 3 SOLUTION RESPIRATORY (INHALATION) at 15:37

## 2019-12-12 RX ADMIN — INSULIN ASPART SCH: 100 INJECTION, SOLUTION INTRAVENOUS; SUBCUTANEOUS at 15:12

## 2019-12-12 RX ADMIN — INSULIN HUMAN SCH MLS/HR: 100 INJECTION, SOLUTION PARENTERAL at 02:09

## 2019-12-12 RX ADMIN — HEPARIN SODIUM SCH UNIT: 5000 INJECTION, SOLUTION INTRAVENOUS; SUBCUTANEOUS at 08:51

## 2019-12-12 RX ADMIN — HYDROMORPHONE HYDROCHLORIDE PRN MG: 1 INJECTION, SOLUTION INTRAMUSCULAR; INTRAVENOUS; SUBCUTANEOUS at 19:36

## 2019-12-12 RX ADMIN — HYDROMORPHONE HYDROCHLORIDE PRN MG: 1 INJECTION, SOLUTION INTRAMUSCULAR; INTRAVENOUS; SUBCUTANEOUS at 22:19

## 2019-12-12 RX ADMIN — ONDANSETRON PRN MG: 2 INJECTION INTRAMUSCULAR; INTRAVENOUS at 03:19

## 2019-12-12 RX ADMIN — METRONIDAZOLE SCH MLS/HR: 500 INJECTION, SOLUTION INTRAVENOUS at 18:14

## 2019-12-12 RX ADMIN — POTASSIUM CHLORIDE SCH MLS/HR: 7.46 INJECTION, SOLUTION INTRAVENOUS at 06:15

## 2019-12-12 RX ADMIN — ONDANSETRON PRN MG: 2 INJECTION INTRAMUSCULAR; INTRAVENOUS at 08:33

## 2019-12-12 RX ADMIN — FUROSEMIDE SCH MG: 10 INJECTION, SOLUTION INTRAMUSCULAR; INTRAVENOUS at 00:26

## 2019-12-12 RX ADMIN — IPRATROPIUM BROMIDE AND ALBUTEROL SULFATE SCH ML: .5; 3 SOLUTION RESPIRATORY (INHALATION) at 20:05

## 2019-12-12 RX ADMIN — LEUCINE, PHENYLALANINE, LYSINE, METHIONINE, ISOLEUCINE, VALINE, HISTIDINE, THREONINE, TRYPTOPHAN, ALANINE, GLYCINE, ARGININE, PROLINE, SERINE, TYROSINE, SODIUM ACETATE, DIBASIC POTASSIUM PHOSPHATE, MAGNESIUM CHLORIDE, SODIUM CHLORIDE, CALCIUM CHLORIDE, DEXTROSE SCH MLS/HR
311; 238; 247; 170; 255; 247; 204; 179; 77; 880; 438; 489; 289; 213; 17; 297; 261; 51; 77; 33; 10 INJECTION INTRAVENOUS at 18:15

## 2019-12-12 RX ADMIN — METRONIDAZOLE SCH MLS/HR: 500 INJECTION, SOLUTION INTRAVENOUS at 08:48

## 2019-12-12 NOTE — P.PN
<Unique Krause LUIS ALBERTO - Last Filed: 12/12/19 14:37>





Subjective


Progress Note Date: 12/12/19





CHIEF COMPLAINT: Abdominal pain





HISTORY OF PRESENT ILLNESS: Patient is status post exploratory laparotomy with 

resection of right colon for over 10 cm lesion with invasion to abdominal wall i

ncluding small bowel resection for small bowel obstruction performed on 

12/07/2019.  Patient examined at the bedside in the intensive care unit.  

Patient reports nausea this morning as well as some dry heaves.  She is 

complaining of abdominal pain.  She is hesitant to take the Norco as she is 

attributing her nausea to the pain medication.  She is passing flatus.  She 

reports having a bowel movement this morning.  Afebrile.  WBC 9.9.  Hemoglobin 

7.6.





PHYSICAL EXAM: 


VITAL SIGNS: Reviewed


GENERAL: Well-developed in no acute distress. 


HEENT:  No sclera icterus. Extraocular movements grossly intact.  Moist buccal 

mucosa. 


Head is atraumatic, normocephalic. Hears conversational speech. No nasal 

drainage.


NECK:  Supple without lymphadenopathy.


CHEST:  Non-labored respirations and equal bilateral excursions. 


CARDIOVASCULAR:  Regular rate with regular rhythm.  Palpable 2+ radial pulses.


ABDOMEN:  Soft.  Midline dressing intact with PREVENA. LYNDSEY drain with 

serosanguineous drainage


MUSCULOSKELETAL:  No clubbing or cyanosis


NEUROLOGIC:  No focal or lateralizing signs.  Cranial nerves II through XII 

grossly intact.


PSYCH:  Appropriate affect.  Alert and oriented to person, place and time.


SKIN: Well perfused.  Good skin turgor. 





ASSESSMENT: 


1.  Ascending colon cancer, recurrent metastatic


2.  Rectal bleeding





PLAN: 


Continue full liquid diet.  Continue TPN as patient's oral intake has been 

minimal today


Continue IV Zofran as needed.  Begin Reglan IV PRN.  Will add scopolamine patch 

for nausea


Pain control.  Patient refusing Norco as it makes her nauseous.  She states 

Tylenol is not strong enough for her pain at this time.  We will attempt Ultram.

 We'll also add small dose of IV Dilaudid if needed.


Increase activity as tolerated


IS 10 times an hour








Nurse practitioner note has been reviewed by physician. Signing provider agrees 

with the documented findings, assessment, and plan of care. 








Objective





- Vital Signs


Vital signs: 


                                   Vital Signs











Temp  98.3 F   12/12/19 11:45


 


Pulse  93   12/12/19 11:45


 


Resp  21   12/12/19 09:00


 


BP  118/47   12/12/19 11:45


 


Pulse Ox  97   12/12/19 11:45








                                 Intake & Output











 12/11/19 12/12/19 12/12/19





 18:59 06:59 18:59


 


Intake Total 2044.941 986.746 678.114


 


Output Total 1380 1995 825


 


Balance 664.941 -1008.254 -146.886


 


Weight 94.6 kg 95 kg 


 


Intake:   


 


  IV 1631 924 627


 


    Fat Emulsion 20% 250 ml @ 84 252 42





    21 mls/hr IV Q24H [facility] Rx   





    #:[id number]   


 


    Magnesium Sulfate-D5w Pmx  100 





    1 gm In Dextrose/Water 1   





    100ml.bag @ 100 mls/hr   





    IVPB Q1H [facility] Rx#:   





    [id number]   


 


    Mvi, Adult No.4 with Vit 870 80 400





    K 10 ml Trace (Conc-1Ml/   





    Dose) 1 ml Parenteral   





    Electrolytes 20 ml In   





    Amino Acid 4.25%-D10w 1,   





    000 ml @ 65 mls/hr IV .BY   





    DURATION [facility] Rx#:   





    [id number]   


 


    Normal saline 205 120 70


 


    Piperacillin-Tazobactam 3  100 





    .375 gm In Sodium   





    Chloride 0.9% 100 ml @ 25   





    mls/hr IVPB Q12H [facility] Rx#   





    :[id number]   


 


    Potassium Chloride 10 meq 200  





    In Water For Injection 1   





    100ml.bag @ 100 mls/hr   





    IVPB Q1H [facility] Rx#:   





    [id number]   


 


    Potassium Chloride 10 meq  100 





    In Water For Injection 1   





    100ml.bag @ 100 mls/hr   





    IVPB Q1H [facility] Rx#:   





    [id number]   


 


    metroNIDAZOLE-NS  200 100 100





    mg In Saline 1 100ml.bag   





    @ 100 mls/hr IVPB Q8HR   





    [facility] Rx#:[id number]   


 


    pressure bag 72 72 15


 


  Intake, IV Titration 413.941 62.746 51.114





  Amount   


 


    ACETAMINOPHEN IV (For   





    ) 1,000 mg In Empty Bag 1   





    bag @ 400 mls/hr IVPB   





    Q6HR PRN Rx#:[id number]   


 


    Insulin Regular 100 unit 63.941 62.746 51.114





    In Sodium Chloride 0.9%   





    100 ml @ Per Protocol IV   





    .Q0M [facility] Rx#:[id number]   


 


    Sodium Phosphate 10 mmol 250  





    In Sodium Chloride 0.9%   





    250 ml @ 125 mls/hr IVPB   





    ONCE ONE Rx#:[id number]   


 


Output:   


 


  Drainage  0 


 


    Left Lower Abdomen  0 


 


  Urine 1380 1995 825


 


Other:   


 


  Voiding Method Indwelling Catheter Indwelling Catheter Indwelling Catheter








                       ABP, PAP, CO, CI - Last Documented











Arterial Blood Pressure        105/49

















- Labs


CBC & Chem 7: 


                                 12/12/19 04:04





                                 12/12/19 04:04


Labs: 


                  Abnormal Lab Results - Last 24 Hours (Table)











  12/11/19 12/11/19 12/11/19 Range/Units





  15:58 18:03 20:02 


 


RBC     (3.80-5.40)  m/uL


 


Hgb     (11.4-16.0)  gm/dL


 


Hct     (34.0-46.0)  %


 


MCHC     (31.0-37.0)  g/dL


 


RDW     (11.5-15.5)  %


 


Plt Count     (150-450)  k/uL


 


Sodium     (137-145)  mmol/L


 


BUN     (7-17)  mg/dL


 


Creatinine     (0.52-1.04)  mg/dL


 


Glucose     (74-99)  mg/dL


 


POC Glucose (mg/dL)  197 H  187 H  200 H  (75-99)  mg/dL


 


Calcium     (8.4-10.2)  mg/dL


 


Albumin     (3.5-5.0)  g/dL














  12/11/19 12/12/19 12/12/19 Range/Units





  22:00 00:00 02:02 


 


RBC     (3.80-5.40)  m/uL


 


Hgb     (11.4-16.0)  gm/dL


 


Hct     (34.0-46.0)  %


 


MCHC     (31.0-37.0)  g/dL


 


RDW     (11.5-15.5)  %


 


Plt Count     (150-450)  k/uL


 


Sodium     (137-145)  mmol/L


 


BUN     (7-17)  mg/dL


 


Creatinine     (0.52-1.04)  mg/dL


 


Glucose     (74-99)  mg/dL


 


POC Glucose (mg/dL)  191 H  188 H  162 H  (75-99)  mg/dL


 


Calcium     (8.4-10.2)  mg/dL


 


Albumin     (3.5-5.0)  g/dL














  12/12/19 12/12/19 12/12/19 Range/Units





  03:57 04:04 04:04 


 


RBC    2.62 L  (3.80-5.40)  m/uL


 


Hgb    7.6 L  (11.4-16.0)  gm/dL


 


Hct    25.5 L  (34.0-46.0)  %


 


MCHC    30.0 L  (31.0-37.0)  g/dL


 


RDW    18.3 H  (11.5-15.5)  %


 


Plt Count    128 L  (150-450)  k/uL


 


Sodium   135 L   (137-145)  mmol/L


 


BUN   41 H   (7-17)  mg/dL


 


Creatinine   1.27 H   (0.52-1.04)  mg/dL


 


Glucose   106 H   (74-99)  mg/dL


 


POC Glucose (mg/dL)  116 H    (75-99)  mg/dL


 


Calcium   7.8 L   (8.4-10.2)  mg/dL


 


Albumin   2.0 L   (3.5-5.0)  g/dL














  12/12/19 12/12/19 12/12/19 Range/Units





  06:18 08:26 11:30 


 


RBC     (3.80-5.40)  m/uL


 


Hgb     (11.4-16.0)  gm/dL


 


Hct     (34.0-46.0)  %


 


MCHC     (31.0-37.0)  g/dL


 


RDW     (11.5-15.5)  %


 


Plt Count     (150-450)  k/uL


 


Sodium     (137-145)  mmol/L


 


BUN     (7-17)  mg/dL


 


Creatinine     (0.52-1.04)  mg/dL


 


Glucose     (74-99)  mg/dL


 


POC Glucose (mg/dL)  179 H  189 H  180 H  (75-99)  mg/dL


 


Calcium     (8.4-10.2)  mg/dL


 


Albumin     (3.5-5.0)  g/dL














  12/12/19 Range/Units





  13:26 


 


RBC   (3.80-5.40)  m/uL


 


Hgb   (11.4-16.0)  gm/dL


 


Hct   (34.0-46.0)  %


 


MCHC   (31.0-37.0)  g/dL


 


RDW   (11.5-15.5)  %


 


Plt Count   (150-450)  k/uL


 


Sodium   (137-145)  mmol/L


 


BUN   (7-17)  mg/dL


 


Creatinine   (0.52-1.04)  mg/dL


 


Glucose   (74-99)  mg/dL


 


POC Glucose (mg/dL)  207 H  (75-99)  mg/dL


 


Calcium   (8.4-10.2)  mg/dL


 


Albumin   (3.5-5.0)  g/dL








                      Microbiology - Last 24 Hours (Table)











 12/08/19 17:05 Gram Stain - Preliminary





 Peritoneal Fluid Body Fluid Culture - Preliminary














<Minda Gutierrez N - Last Filed: 12/12/19 20:59>





Subjective





She has moderate water retention.  She is passing flatus and having multiple 

bowel movements.  She reports low appetite.  Recommend staying away from 

narcotics for upset stomach.  May advance diet as tolerated.  Would benefit from

diuresis.  Also recommend subacute rehab assessment





Objective





- Vital Signs


Vital signs: 


                                   Vital Signs











Temp  98.3 F   12/12/19 11:45


 


Pulse  90   12/12/19 20:18


 


Resp  18   12/12/19 15:04


 


BP  118/47   12/12/19 11:45


 


Pulse Ox  97   12/12/19 15:38








                                 Intake & Output











 12/12/19 12/12/19 12/13/19





 06:59 18:59 06:59


 


Intake Total 986.746 971.114 5.85


 


Output Total 1995 1025 


 


Balance -1008.254 -53.886 5.85


 


Weight 95 kg  


 


Intake:   


 


   898 


 


    Fat Emulsion 20% 250 ml @ 252 63 





    21 mls/hr IV Q24H [facility] Rx   





    #:[id number]   


 


    Magnesium Sulfate-D5w Pmx 100  





    1 gm In Dextrose/Water 1   





    100ml.bag @ 100 mls/hr   





    IVPB Q1H [facility] Rx#:   





    [id number]   


 


    Mvi, Adult No.4 with Vit 80 400 





    K 10 ml Trace (Conc-1Ml/   





    Dose) 1 ml Parenteral   





    Electrolytes 20 ml In   





    Amino Acid 4.25%-D10w 1,   





    000 ml @ 65 mls/hr IV .BY   





    DURATION [facility] Rx#:   





    [id number]   


 


    Normal saline 120 220 


 


    Piperacillin-Tazobactam 3 100 100 





    .375 gm In Sodium   





    Chloride 0.9% 100 ml @ 25   





    mls/hr IVPB Q12H [facility] Rx#   





    :[id number]   


 


    Potassium Chloride 10 meq 100  





    In Water For Injection 1   





    100ml.bag @ 100 mls/hr   





    IVPB Q1H [facility] Rx#:   





    [id number]   


 


    metroNIDAZOLE-NS  100 100 





    mg In Saline 1 100ml.bag   





    @ 100 mls/hr IVPB Q8HR   





    [facility] Rx#:[id number]   


 


    pressure bag 72 15 


 


  Intake, IV Titration 62.746 73.114 5.85





  Amount   


 


    Insulin Regular 100 unit 62.746 51.114 





    In Sodium Chloride 0.9%   





    100 ml @ Per Protocol IV   





    .Q0M [facility] Rx#:[id number]   


 


    Insulin Regular 100 unit  22 5.85





    In Sodium Chloride 0.9%   





    100 ml @ Titrate IV .Q0M   





    [facility] Rx#:[id number]   


 


Output:   


 


  Drainage 0 0 


 


    Left Lower Abdomen 0 0 


 


  Urine 1995 1025 


 


Other:   


 


  Voiding Method Indwelling Catheter Indwelling Catheter 








                       ABP, PAP, CO, CI - Last Documented











Arterial Blood Pressure        105/49

















- Labs


CBC & Chem 7: 


                                 12/12/19 04:04





                                 12/12/19 04:04


Labs: 


                  Abnormal Lab Results - Last 24 Hours (Table)











  12/11/19 12/12/19 12/12/19 Range/Units





  22:00 00:00 02:02 


 


RBC     (3.80-5.40)  m/uL


 


Hgb     (11.4-16.0)  gm/dL


 


Hct     (34.0-46.0)  %


 


MCHC     (31.0-37.0)  g/dL


 


RDW     (11.5-15.5)  %


 


Plt Count     (150-450)  k/uL


 


Sodium     (137-145)  mmol/L


 


BUN     (7-17)  mg/dL


 


Creatinine     (0.52-1.04)  mg/dL


 


Glucose     (74-99)  mg/dL


 


POC Glucose (mg/dL)  191 H  188 H  162 H  (75-99)  mg/dL


 


Calcium     (8.4-10.2)  mg/dL


 


Albumin     (3.5-5.0)  g/dL














  12/12/19 12/12/19 12/12/19 Range/Units





  03:57 04:04 04:04 


 


RBC    2.62 L  (3.80-5.40)  m/uL


 


Hgb    7.6 L  (11.4-16.0)  gm/dL


 


Hct    25.5 L  (34.0-46.0)  %


 


MCHC    30.0 L  (31.0-37.0)  g/dL


 


RDW    18.3 H  (11.5-15.5)  %


 


Plt Count    128 L  (150-450)  k/uL


 


Sodium   135 L   (137-145)  mmol/L


 


BUN   41 H   (7-17)  mg/dL


 


Creatinine   1.27 H   (0.52-1.04)  mg/dL


 


Glucose   106 H   (74-99)  mg/dL


 


POC Glucose (mg/dL)  116 H    (75-99)  mg/dL


 


Calcium   7.8 L   (8.4-10.2)  mg/dL


 


Albumin   2.0 L   (3.5-5.0)  g/dL














  12/12/19 12/12/19 12/12/19 Range/Units





  06:18 08:26 11:30 


 


RBC     (3.80-5.40)  m/uL


 


Hgb     (11.4-16.0)  gm/dL


 


Hct     (34.0-46.0)  %


 


MCHC     (31.0-37.0)  g/dL


 


RDW     (11.5-15.5)  %


 


Plt Count     (150-450)  k/uL


 


Sodium     (137-145)  mmol/L


 


BUN     (7-17)  mg/dL


 


Creatinine     (0.52-1.04)  mg/dL


 


Glucose     (74-99)  mg/dL


 


POC Glucose (mg/dL)  179 H  189 H  180 H  (75-99)  mg/dL


 


Calcium     (8.4-10.2)  mg/dL


 


Albumin     (3.5-5.0)  g/dL














  12/12/19 12/12/19 12/12/19 Range/Units





  13:26 15:10 16:08 


 


RBC     (3.80-5.40)  m/uL


 


Hgb     (11.4-16.0)  gm/dL


 


Hct     (34.0-46.0)  %


 


MCHC     (31.0-37.0)  g/dL


 


RDW     (11.5-15.5)  %


 


Plt Count     (150-450)  k/uL


 


Sodium     (137-145)  mmol/L


 


BUN     (7-17)  mg/dL


 


Creatinine     (0.52-1.04)  mg/dL


 


Glucose     (74-99)  mg/dL


 


POC Glucose (mg/dL)  207 H  191 H  180 H  (75-99)  mg/dL


 


Calcium     (8.4-10.2)  mg/dL


 


Albumin     (3.5-5.0)  g/dL














  12/12/19 12/12/19 12/12/19 Range/Units





  17:05 18:18 19:04 


 


RBC     (3.80-5.40)  m/uL


 


Hgb     (11.4-16.0)  gm/dL


 


Hct     (34.0-46.0)  %


 


MCHC     (31.0-37.0)  g/dL


 


RDW     (11.5-15.5)  %


 


Plt Count     (150-450)  k/uL


 


Sodium     (137-145)  mmol/L


 


BUN     (7-17)  mg/dL


 


Creatinine     (0.52-1.04)  mg/dL


 


Glucose     (74-99)  mg/dL


 


POC Glucose (mg/dL)  153 H  116 H  147 H  (75-99)  mg/dL


 


Calcium     (8.4-10.2)  mg/dL


 


Albumin     (3.5-5.0)  g/dL








                      Microbiology - Last 24 Hours (Table)











 12/08/19 17:05 Gram Stain - Final





 Peritoneal Fluid Body Fluid Culture - Final














Assessment and Plan


(1) Abdominal mass


Current Visit: Yes   Status: Acute   Priority: High   Code(s): R19.00 - INTRA-

ABD AND PELVIC SWELLING, MASS AND LUMP, UNSP SITE   SNOMED Code(s): 007581352


   





(2) GI bleed


Current Visit: Yes   Status: Acute   Priority: High   Code(s): K92.2 - 

GASTROINTESTINAL HEMORRHAGE, UNSPECIFIED   SNOMED Code(s): 28182144


   





(3) History of colon cancer


Current Visit: Yes   Status: Acute   Code(s): Z85.038 - PERSONAL HISTORY OF 

MALIGNANT NEOPLASM OF LARGE INTESTINE   SNOMED Code(s): 544855717


   





(4) Anemia


Current Visit: Yes   Status: Chronic   Priority: Medium   Code(s): D64.9 - 

ANEMIA, UNSPECIFIED   SNOMED Code(s): 103977570


   





(5) Aortic stenosis


Current Visit: Yes   Status: Acute   Code(s): I35.0 - NONRHEUMATIC AORTIC 

(VALVE) STENOSIS   SNOMED Code(s): 52697756


   





(6) Atrial fibrillation


Current Visit: No   Status: Acute   Code(s): I48.91 - UNSPECIFIED ATRIAL 

FIBRILLATION   SNOMED Code(s): 74622252


   





(7) Atrial flutter


Current Visit: No   Status: Acute   Code(s): I48.92 - UNSPECIFIED ATRIAL FLUTTER

  SNOMED Code(s): 1048130


   





(8) Diastolic CHF, acute on chronic


Current Visit: No   Status: Acute   Code(s): I50.33 - ACUTE ON CHRONIC DIASTOLIC

(CONGESTIVE) HEART FAILURE   SNOMED Code(s): 199388270


   





(9) Systolic congestive heart failure


Current Visit: No   Status: Acute   Code(s): I50.20 - UNSPECIFIED SYSTOLIC 

(CONGESTIVE) HEART FAILURE   SNOMED Code(s): 62797617


   





(10) Cancer of ascending colon metastatic to intra-abdominal lymph node


Current Visit: Yes   Status: Acute   Code(s): C18.2 - MALIGNANT NEOPLASM OF 

ASCENDING COLON; C77.2 - SECONDARY AND UNSP MALIGNANT NEOPLASM OF INTRA-ABD 

NODES   SNOMED Code(s): 31031590

## 2019-12-12 NOTE — XR
EXAMINATION TYPE: XR chest 1V portable

 

DATE OF EXAM: 12/12/2019

 

COMPARISON: 11/11/2019

 

HISTORY: Shortness of breath

 

TECHNIQUE:  Frontal and lateral views of the chest are obtained.

 

FINDINGS:

 

Scattered senescent parenchymal changes noted. Hyperinflation compatible with COPD. 

 

Pulmonary venous congestion with basilar infiltrates and/or atelectasis with pleural effusions remain
 unchanged.

 

Heart size is stable.

 

Mediastinal structures are stable and grossly unremarkable.

 

No evidence for hilar prominence.

 

Degenerative changes dorsal spine. 

 

IMPRESSION:

1. Stable chest

## 2019-12-12 NOTE — PN
PROGRESS NOTE



Patient is seen for followup for acute kidney injury.  She is currently sitting on a

bedside chair.  Patient is complaining of abdominal pain and nausea.  She has not been

able to eat much.  This morning, blood pressure was 118/47, heart rate 93 per minute.

She is afebrile.

EXAMINATION OF THE HEART: S1 and S2.

EXAMINATION OF LUNGS: Decreased breath sounds at bases.

ABDOMEN:  Soft, non-tender.

Examination of lower extremities shows edema 1+ bilaterally.

CNS exam is grossly intact.



LABS:

Sodium 135, potassium 3.7, chloride 107, BUN 41, creatinine 1.27, albumin 2.0,

hemoglobin 7.6 g/dL.



ASSESSMENT:

1. Acute kidney injury, acute tubular necrosis, currently improved.  Patient remains

    volume-overloaded.  She is maintained on TPN as well.  Therefore I will increase

    the Lasix to 80 mg q.8 hours.

2. Volume overload, history of diastolic heart failure.  Increase Lasix.

3. Status post explorative laparotomy and lysis of adhesions, small-bowel resection

    and right hemicolectomy.

4. History of aortic valve replacement with tissue valve.

5. Metabolic acidosis, currently resolved, status post IV bicarb.



PLAN:

Increase Lasix to 80 mg q.8 hours.  Repeat chest x-ray and repeat labs in a.m.  Monitor

electrolytes.





MMODL / IJN: [id number] / Job#: [id number]

## 2019-12-12 NOTE — P.PN
Progress Note - Text


Progress Note Date: 12/12/19





Chief Complaint: Lower GI bleed





Interval history:


This is a very pleasant 89-year-old patient who follows with visiting physicians

Dr. Ochoa.  Chronic stable medical conditions include congestive heart failure 

with EF of 50%, aortic stenosis, mitral regurgitation, mitral stenosis, 

tricuspid regurgitation, secondary probably hypertension, diabetes, 

hypertension, osteoarthritis, hypothyroid, blind left eye, coronary artery 

disease, [place] filter..  Patient does use a walker.  Patient did have a 

computed tomography scan of the abdomen on November 26 and did show mass along 

the lateral wall of the ascending colon and also adjacent soft tissue omental 

mass and is also another additional mass present in intra-abdominally.  

Suggestive of metastatic disease.  Patient yesterday had some bleeding per the 

right rectum.  And decided to come in.  Patient's been having lower abdominal 

discomfort.  She was admitted to the ER.  Patient normally does use a walker.  

Lives alone.  Colonoscopy done on December 4 shows ascending colon mass and AV 

malformation was some slight bleeding.  On December 7 patient underwent surgical

intervention to include right hemicolectomy, resection of transverse mesocolon 

tomor, small bowel resection from distal jejunum to ileum over 2 feet, repair of

incisional hernia, lysis of adhesions.  Patient was extubated on December 9.  On

TPN.  Abdominal incision wound VAC.


Today-.  ICU.  Tired. did transfer from the bed to the chair.  Did tolerate a 

full liquids.  Did have a small bowel movement yesterday and this morning.





Review of systems: Was done for constitutional, cardiovascular, GI, pulmonary. 

relevant finding as above





Active Medications





Acetaminophen (Tylenol Tab)  650 mg PO Q4HR PRN


   PRN Reason: Fever and/ or Pain


Hydrocodone Bitart/Acetaminophen (Norco 5-325)  1 each PO Q4HR PRN


   PRN Reason: Pain


   Last Admin: 12/11/19 10:53 Dose:  1 each


   Documented by: 


Albuterol/Ipratropium (Duoneb 0.5 Mg-3 Mg/3 Ml Soln)  3 ml INHALATION RT-QID DAVID


   Last Admin: 12/12/19 20:05 Dose:  3 ml


   Documented by: 


Albuterol/Ipratropium (Duoneb 0.5 Mg-3 Mg/3 Ml Soln)  3 ml INHALATION RT-Q2H PRN


   PRN Reason: Shortness Of Breath Or Wheezing


Furosemide (Lasix)  80 mg IV Q8HR [facility]


   Stop: 12/13/19 00:01


   Last Admin: 12/12/19 17:25 Dose:  80 mg


   Documented by: 


Furosemide (Lasix)  40 mg IV Q8HR [facility]


Heparin Sodium (Porcine) (Heparin)  5,000 unit SQ Q12HR [facility]


   Last Admin: 12/12/19 08:51 Dose:  5,000 unit


   Documented by: 


Hydromorphone HCl (Dilaudid)  0.5 mg IVP Q3HR PRN


   PRN Reason: Pain


   Last Admin: 12/12/19 19:36 Dose:  0.5 mg


   Documented by: 


Metronidazole 500 mg/ IV (Solution)  100 mls @ 100 mls/hr IVPB Q8HR [facility]


   Last Admin: 12/12/19 18:14 Dose:  100 mls/hr


   Documented by: 


Fat Emulsion Intravenous (Lipids 20%)  250 mls @ 21 mls/hr IV Q24H [facility]


   Last Admin: 12/12/19 17:23 Dose:  21 mls/hr


   Documented by: 


Parenteral Vitamin Supplement 10 ml/ Chromium/Copper/Manganese/Seleni/Zn 1 

ml/Amino Ac/Electrol/Dextrose/Calcium  1,011 mls @ 80 mls/hr IV .BY DURATION [facility]


   Last Admin: 12/12/19 02:59 Dose:  80 mls/hr


   Documented by: 


Amino Ac/Electrol/Dextrose/Calcium (Clinimix E 4.25%-D10% Solution)  1,000 mls @

80 mls/hr IV .BY DURATION [facility]


   Last Admin: 12/12/19 18:15 Dose:  80 mls/hr


   Documented by: 


Piperacillin Sod/Tazobactam (Sod 3.375 gm/ Sodium Chloride)  100 mls @ 25 mls/hr

IVPB Q8HR [facility]


   Last Admin: 12/12/19 18:15 Dose:  25 mls/hr


   Documented by: 


Insulin Human Regular 100 unit (/ Sodium Chloride)  101 mls @ 0 mls/hr IV .Q0M 

[facility]; Protocol


   Last Titration: 12/12/19 19:40 Dose:  2 unit/hr, 2.02 mls/hr


   Documented by: 


Levothyroxine Sodium (Synthroid Ivp)  56.25 mcg IV DAILY [facility]


   Last Admin: 12/12/19 08:51 Dose:  56.25 mcg


   Documented by: 


Metoclopramide HCl (Reglan)  10 mg IVP Q6HR PRN


   PRN Reason: Nausea And Vomiting


   Last Admin: 12/12/19 12:15 Dose:  10 mg


   Documented by: 


Miscellaneous Information (Magnesium Per Protocol)  1 each MISCELLANE DAILY PRN;

Protocol


   PRN Reason: Per Protocol


Miscellaneous Information (Potassium Per Protocol)  1 each MISCELLANE DAILY PRN;

Protocol


   PRN Reason: Per Protocol


Nalbuphine HCl (Nubain)  2.5 mg IV Q4HR PRN


   PRN Reason: Itching


Naloxone HCl (Narcan)  0.2 mg IV Q2M PRN


   PRN Reason: Opioid Reversal


Ondansetron HCl (Zofran)  4 mg IVP Q6HR PRN


   PRN Reason: Nausea And Vomiting


   Last Admin: 12/12/19 08:33 Dose:  4 mg


   Documented by: 


Pantoprazole Sodium (Protonix)  40 mg IV DAILY [facility]


   Last Admin: 12/12/19 08:36 Dose:  40 mg


   Documented by: 


Scopolamine (Transderm-Scop 1.5mg/72hr Patch)  1 patch TRANSDERM Q72H [facility]


   Last Admin: 12/12/19 12:15 Dose:  1 patch


   Documented by: 


Tramadol HCl (Ultram)  50 mg PO TID PRN


   PRN Reason: Mild Pain


   Last Admin: 12/12/19 12:31 Dose:  50 mg


   Documented by: 











Physical examination:


VITAL SIGNS: 98.3, 93, 18, 1180 47, 97% on 3 L


GENERAL: Sitting up in a chair, awake, tired


EYES: Pupils equal.  Conjunctiva normal.


HEENT: External appearance of nose and ears normal, oral cavity grossly normal.


NECK: JVD not raised; masses not palpable.


HEART: First and second heart sounds are normal;  no edema.  


LUNGS: Respiratory rate normal; decreased breath sounds.  


ABDOMEN: Some tenderness, dressing over the wound, bowel sounds sluggish, mild 

tenderness no guarding or rigidity.  LYNDSEY drain.-  Minimal output 


PSYCH: Alert and oriented x3;  mood  and affect normal.  


MUSCULOSKELETAL: Evidence of OA especially in the hands and knees





INVESTIGATIONS, reviewed in the clinical context:


white count 9.9 hemoglobin 7.6 platelets 128 bun 41 creatinine 1.27





Previous testing


White count 9.6 hemoglobin 9.2 platelets 240 progression 4.7 BUN 73 creatinine 

1.79


Computed tomography scan of the abdomen-results noted as above





Assessment:


-Ascending colon mass, per colonoscopy, -right hemicolectomy, resection of 

transverse mesocolon tumor, small bowel resection from distal jejunum to ileum 

over 2feet, repair of incisional hernia, lysis of adhesions.


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


-Coronary artery disease with prior history of bypass


-Probable chronic kidney disease from diabetic nephropathy and hypertensive 

nephrosclerosis


-Normocytic anemia suspected from chronic disease of malignancy


-Diabetes mellitus type 2


-Essential hypertension


-Primary osteoarthritis


-Chronic gout


-Macular degeneration of right eye


-Colonic diverticulosis 


-obstructive sleep apnea did use CPAP machine in the past


-Acute kidney injury, could be ATN multifactorial, improving


-No code





Plan


patient did tolerate a full liquid diet.  Having bowel movements.  Weak and 

tired.  Patient be moved out of the ICU.  Looking to transfer to rehab 

shortly.on IV Zosyn.

## 2019-12-12 NOTE — PN
PROGRESS NOTE



DATE OF SERVICE:

12/12/2019



REASON FOR FOLLOWUP:

Leukocytosis, abdominal source.



INTERVAL HISTORY:

The patient is currently afebrile.  The patient has been breathing comfortably.

Patient's abdominal pain is currently controlled.  Denies having any chest pain,

shortness of breath or cough.



PHYSICAL EXAMINATION:

Blood pressure 118/47 with a pulse of 102, temperature 98.3. She is 97% on 3 L 
nasal

cannula.

General description is an elderly female up in the chair in no distress.

RESPIRATORY SYSTEM: Unlabored breathing with decreased breath sounds at the 
base. No

wheeze.

HEART: S1, S2.  Regular rate and rhythm.

ABDOMEN: Soft. Mildly distended. No guarding or rigidity.



LABS:

Hemoglobin 7.3. White count normalized to 9.9. BUN of 41, creatinine 1.27. 
Abdominal

culture so far pending.



DIAGNOSTIC IMPRESSION AND PLAN:

Patient with leukocytosis, possible abdominal source in this patient who did 
have

extensive abdominal surgery. So far culture has been negative.  Chest x-ray 
mostly with pulmonary atelectasis.  The

patient is covered with Zosyn; to continue and monitor clinical course closely.





MMODL / IJN: [id number] / Job#: [id number]

MTDD

## 2019-12-12 NOTE — PN
PROGRESS NOTE



PULMONARY/CRITICAL CARE PROGRESS NOTE:



DATE OF SERVICE:

12/12/2019



This is an 89-year-old female who was admitted to the hospital on December 2, 2019.

She came into the hospital with a diagnosis of GI bleed and colon cancer.  The patient

had obstructing colon cancer in the transverse colon and went to the operating room on

December 7, 2019.  At that time, she had exploratory laparotomy, lysis of adhesions,

right hemicolectomy, transverse colon resection, hernia repair, and small-bowel

resection.  She did not require a colostomy.  That wound VAC was placed at that time.

She was transferred back to the ICU on the ventilator and was successfully extubated 2

days later on December 9, 2019.  The patient is doing reasonably well.  She remains on

O2 at 2 L.  She has TPN at 80 mL an hour. lipids at 21 cc an hour.  She has a basic

saline IV at 10 mL an hour and insulin drip of 5.5 units an hour.  She is postop day

#5.  The patient also carries with her a diagnosis of GI bleed, chronic iron deficiency

anemia, ischemic cardiomyopathy, diastolic heart failure, history of atrial

fibrillation, pulmonary embolism, status post [place] filter, history of sleep apnea

syndrome, type 2 diabetes with diabetic neuropathy, hypertensive heart disease,

previous bypass grafting, aortic valve replacement, depression, gout, status post

pacemaker insertion, and hypothyroidism.  All in all, the patient is doing reasonably

well.  We checked.  We encourage her to do deep breathing, coughing, clearing of

secretions and use the incentive spirometer q.1 hour.





Current vital signs are reviewed temperature is 98.5, heart rate 88, respiratory rate

21, blood pressure 124/51 with mean pf 75, CVP is 13 2 L saturations 95-96 percent.

She appears in no acute distress HEENT examination is grossly unremarkable.  Mucous

membranes are moist.  No oral lesions.

NECK:  Supple.  Full range of motion.  No adenopathy or thyromegaly.  Neck veins are

flat.

CARDIOVASCULAR: Examination reveals regular rhythm rate.  Heart rate in mid 80s.

HEART:  Sounds are distant.  Soft systolic murmur is noted.  S1, S2 normal.

LUNGS:  Reveal mostly clear breath sounds.  A few scattered rhonchi.  No wheezes or

crackles.

ABDOMEN:  Soft.  Bowel sounds are heard.  Tenderness on palpation.

EXTREMITIES are intact.  She has had some significant edema and anasarca.  It is

somewhat improved.

SKIN: Without rash.

NEUROLOGIC: Examination is brief but nonfocal.



LAB DATA:

Reviewed.  White count is 9.9, hemoglobin is 7.6, hematocrit 25.5, platelet count

128,000.  Sodium 135, potassium 3.7, chloride is 107, CO2 is 24, anion gap is 4. BUN

and creatinine were 41 and 1.27.  Calcium is 7.8, albumin is 2.



Microbiologic studies including sputum, urine and peritoneal fluid are all negative

thus far.  Chest x-ray shows a pattern of fluid overload with bilateral effusions.

Chest x-ray is stable.



MEDICATIONS:

Reviewed.  She is currently on IV Tylenol, lipids, Lasix, subcu heparin, Norco,

insulin, updrafts, levothyroxine, magnesium replacement, Flagyl, Nubain, Narcan,

Zofran, Protonix, Zosyn and potassium replacement.

.

1. Postoperative day #5, status post exploratory laparotomy with lysis of adhesions,

    right hemicolectomy, transverse colon resection, hernia repair, small-bowel

    resection, and placement of an Prevena wound VAC.

2. Postoperative respiratory failure with routine postoperative ventilator management,

    status post successful extubation on December 9, 2019.

3. History of colon cancer, colon resection on December 7, 2019.

4. Gastrointestinal bleed.

5. Chronic iron-deficiency anemia.

6. History of ischemic cardiomyopathy.

7. Diastolic heart failure.

8. History of atrial fibrillation.

9. History of pulmonary embolism, status post [place] filter placement.

10.Sleep apnea syndrome.

11.Type 2 diabetes mellitus with diabetic neuropathy.

12.Hypertensive heart disease.

13.Status post chronic arterial bypass grafting and aortic valve replacement.

14.History of depression.

15.Gout.

16.Status post pacemaker insertion.

17.Hypothyroidism.



PLAN:

The patient's medications.  This will be cleaned up.  She is on some medications that

she is currently not getting anymore.  That includes Levophed and propofol.  The

patient will be transferred to the general medical floor.  We recommend deep breathing

coughing, clearing of secretions, and hourly use of incentive spirometer.  She remains

on TPN and lipids.  Getting insulin 5.5 units an hour.  We will continue to follow.

Prognosis is guarded.  She will need significant rehab post discharge.





MMODL / IJN: [id number] / Job#: [id number]

## 2019-12-13 LAB
ANION GAP SERPL CALC-SCNC: 5 MMOL/L
BUN SERPL-SCNC: 44 MG/DL (ref 7–17)
CALCIUM SPEC-MCNC: 8 MG/DL (ref 8.4–10.2)
CHLORIDE SERPL-SCNC: 104 MMOL/L (ref 98–107)
CO2 SERPL-SCNC: 28 MMOL/L (ref 22–30)
ERYTHROCYTE [DISTWIDTH] IN BLOOD BY AUTOMATED COUNT: 2.79 M/UL (ref 3.8–5.4)
ERYTHROCYTE [DISTWIDTH] IN BLOOD: 18.2 % (ref 11.5–15.5)
GLUCOSE BLD-MCNC: 181 MG/DL (ref 75–99)
GLUCOSE BLD-MCNC: 184 MG/DL (ref 75–99)
GLUCOSE BLD-MCNC: 193 MG/DL (ref 75–99)
GLUCOSE BLD-MCNC: 194 MG/DL (ref 75–99)
GLUCOSE BLD-MCNC: 196 MG/DL (ref 75–99)
GLUCOSE BLD-MCNC: 201 MG/DL (ref 75–99)
GLUCOSE BLD-MCNC: 202 MG/DL (ref 75–99)
GLUCOSE BLD-MCNC: 202 MG/DL (ref 75–99)
GLUCOSE BLD-MCNC: 203 MG/DL (ref 75–99)
GLUCOSE BLD-MCNC: 204 MG/DL (ref 75–99)
GLUCOSE BLD-MCNC: 205 MG/DL (ref 75–99)
GLUCOSE BLD-MCNC: 206 MG/DL (ref 75–99)
GLUCOSE BLD-MCNC: 207 MG/DL (ref 75–99)
GLUCOSE BLD-MCNC: 210 MG/DL (ref 75–99)
GLUCOSE BLD-MCNC: 213 MG/DL (ref 75–99)
GLUCOSE BLD-MCNC: 215 MG/DL (ref 75–99)
GLUCOSE BLD-MCNC: 225 MG/DL (ref 75–99)
GLUCOSE BLD-MCNC: 241 MG/DL (ref 75–99)
GLUCOSE SERPL-MCNC: 178 MG/DL (ref 74–99)
HCT VFR BLD AUTO: 27.1 % (ref 34–46)
HGB BLD-MCNC: 8.2 GM/DL (ref 11.4–16)
MAGNESIUM SPEC-SCNC: 2.3 MG/DL (ref 1.6–2.3)
MCH RBC QN AUTO: 29.5 PG (ref 25–35)
MCHC RBC AUTO-ENTMCNC: 30.4 G/DL (ref 31–37)
MCV RBC AUTO: 97.2 FL (ref 80–100)
PLATELET # BLD AUTO: 137 K/UL (ref 150–450)
POTASSIUM SERPL-SCNC: 4 MMOL/L (ref 3.5–5.1)
SODIUM SERPL-SCNC: 137 MMOL/L (ref 137–145)
WBC # BLD AUTO: 12.7 K/UL (ref 3.8–10.6)

## 2019-12-13 RX ADMIN — PIPERACILLIN AND TAZOBACTAM SCH MLS/HR: 3; .375 INJECTION, POWDER, FOR SOLUTION INTRAVENOUS at 17:11

## 2019-12-13 RX ADMIN — HYDROMORPHONE HYDROCHLORIDE PRN MG: 1 INJECTION, SOLUTION INTRAMUSCULAR; INTRAVENOUS; SUBCUTANEOUS at 22:40

## 2019-12-13 RX ADMIN — HEPARIN SODIUM SCH UNIT: 5000 INJECTION, SOLUTION INTRAVENOUS; SUBCUTANEOUS at 20:02

## 2019-12-13 RX ADMIN — IPRATROPIUM BROMIDE AND ALBUTEROL SULFATE SCH ML: .5; 3 SOLUTION RESPIRATORY (INHALATION) at 12:00

## 2019-12-13 RX ADMIN — METOCLOPRAMIDE PRN MG: 5 INJECTION, SOLUTION INTRAMUSCULAR; INTRAVENOUS at 14:05

## 2019-12-13 RX ADMIN — HYDROMORPHONE HYDROCHLORIDE PRN MG: 1 INJECTION, SOLUTION INTRAMUSCULAR; INTRAVENOUS; SUBCUTANEOUS at 08:51

## 2019-12-13 RX ADMIN — FUROSEMIDE SCH MG: 10 INJECTION, SOLUTION INTRAMUSCULAR; INTRAVENOUS at 08:51

## 2019-12-13 RX ADMIN — LEVOTHYROXINE SODIUM ANHYDROUS SCH MCG: 100 INJECTION, POWDER, LYOPHILIZED, FOR SOLUTION INTRAVENOUS at 09:17

## 2019-12-13 RX ADMIN — FUROSEMIDE SCH MG: 10 INJECTION, SOLUTION INTRAMUSCULAR; INTRAVENOUS at 01:12

## 2019-12-13 RX ADMIN — LEUCINE, PHENYLALANINE, LYSINE, METHIONINE, ISOLEUCINE, VALINE, HISTIDINE, THREONINE, TRYPTOPHAN, ALANINE, GLYCINE, ARGININE, PROLINE, SERINE, TYROSINE, SODIUM ACETATE, DIBASIC POTASSIUM PHOSPHATE, MAGNESIUM CHLORIDE, SODIUM CHLORIDE, CALCIUM CHLORIDE, DEXTROSE SCH MLS/HR
311; 238; 247; 170; 255; 247; 204; 179; 77; 880; 438; 489; 289; 213; 17; 297; 261; 51; 77; 33; 10 INJECTION INTRAVENOUS at 17:11

## 2019-12-13 RX ADMIN — PIPERACILLIN AND TAZOBACTAM SCH MLS/HR: 3; .375 INJECTION, POWDER, FOR SOLUTION INTRAVENOUS at 02:54

## 2019-12-13 RX ADMIN — IPRATROPIUM BROMIDE AND ALBUTEROL SULFATE SCH ML: .5; 3 SOLUTION RESPIRATORY (INHALATION) at 19:59

## 2019-12-13 RX ADMIN — IPRATROPIUM BROMIDE AND ALBUTEROL SULFATE SCH ML: .5; 3 SOLUTION RESPIRATORY (INHALATION) at 15:43

## 2019-12-13 RX ADMIN — METRONIDAZOLE SCH MLS/HR: 500 INJECTION, SOLUTION INTRAVENOUS at 09:12

## 2019-12-13 RX ADMIN — METRONIDAZOLE SCH MLS/HR: 500 INJECTION, SOLUTION INTRAVENOUS at 22:40

## 2019-12-13 RX ADMIN — INSULIN HUMAN SCH MLS/HR: 100 INJECTION, SOLUTION PARENTERAL at 17:33

## 2019-12-13 RX ADMIN — LEUCINE, PHENYLALANINE, LYSINE, METHIONINE, ISOLEUCINE, VALINE, HISTIDINE, THREONINE, TRYPTOPHAN, ALANINE, GLYCINE, ARGININE, PROLINE, SERINE, TYROSINE, SODIUM ACETATE, DIBASIC POTASSIUM PHOSPHATE, MAGNESIUM CHLORIDE, SODIUM CHLORIDE, CALCIUM CHLORIDE, DEXTROSE SCH MLS/HR
311; 238; 247; 170; 255; 247; 204; 179; 77; 880; 438; 489; 289; 213; 17; 297; 261; 51; 77; 33; 10 INJECTION INTRAVENOUS at 05:47

## 2019-12-13 RX ADMIN — SOYBEAN OIL SCH MLS/HR: 20 INJECTION, SOLUTION INTRAVENOUS at 14:12

## 2019-12-13 RX ADMIN — Medication SCH ML: at 17:14

## 2019-12-13 RX ADMIN — FUROSEMIDE SCH MG: 10 INJECTION, SOLUTION INTRAMUSCULAR; INTRAVENOUS at 17:15

## 2019-12-13 RX ADMIN — ONDANSETRON PRN MG: 2 INJECTION INTRAMUSCULAR; INTRAVENOUS at 05:46

## 2019-12-13 RX ADMIN — Medication SCH: at 14:11

## 2019-12-13 RX ADMIN — PANTOPRAZOLE SODIUM SCH MG: 40 INJECTION, POWDER, FOR SOLUTION INTRAVENOUS at 08:52

## 2019-12-13 RX ADMIN — METRONIDAZOLE SCH MLS/HR: 500 INJECTION, SOLUTION INTRAVENOUS at 01:46

## 2019-12-13 RX ADMIN — HYDROMORPHONE HYDROCHLORIDE PRN MG: 1 INJECTION, SOLUTION INTRAMUSCULAR; INTRAVENOUS; SUBCUTANEOUS at 05:46

## 2019-12-13 RX ADMIN — IPRATROPIUM BROMIDE AND ALBUTEROL SULFATE SCH ML: .5; 3 SOLUTION RESPIRATORY (INHALATION) at 08:27

## 2019-12-13 RX ADMIN — PIPERACILLIN AND TAZOBACTAM SCH MLS/HR: 3; .375 INJECTION, POWDER, FOR SOLUTION INTRAVENOUS at 09:13

## 2019-12-13 RX ADMIN — METRONIDAZOLE SCH MLS/HR: 500 INJECTION, SOLUTION INTRAVENOUS at 14:44

## 2019-12-13 RX ADMIN — HEPARIN SODIUM SCH UNIT: 5000 INJECTION, SOLUTION INTRAVENOUS; SUBCUTANEOUS at 08:51

## 2019-12-13 NOTE — P.PN
Subjective


Progress Note Date: 12/13/19


Principal diagnosis: 





Rectal bleeding secondary to invading colon mass.  Status post exploratory 

laparotomy with lysis of adhesions, extensive right hemicolectomy, resection 

transverse colon, small bowel resection, repair of incisional hernia, placement 

of incisional wound Pre[facility]a system.





The patient is seen today 12/13/2019 in follow-up on the regular medical floor. 

She is currently resting in bed.  Awake and alert in no acute distress.  Her 

pain is currently fairly well controlled. She is still quite weak. No worsening 

shortness of breath, cough or congestion.  She is maintaining O2 saturations in 

the low 90s on 3 L/m per nasal cannula.  Afebrile. Urine culture reveals no 

growth.  Sputum culture reveals no growth.  Peritoneal fluid revealing no 

growth. White count 12.7.  Hemoglobin 8.2.  Platelet count 137,000.  Sodium 137.

 Potassium 4.0.  Creatinine 1.37. She is continued on TPN and lipids. She 

remains on bronchodilators, IV diuretics, antibiotics in the form of Zosyn along

with Flagyl. Currently in a negative balance.  She does have continued anasarca.





Objective





- Vital Signs


Vital signs: 


                                   Vital Signs











Temp  98.3 F   12/13/19 05:05


 


Pulse  92   12/13/19 08:48


 


Resp  16   12/13/19 07:46


 


BP  146/79   12/13/19 05:05


 


Pulse Ox  93 L  12/13/19 05:05








                                 Intake & Output











 12/12/19 12/13/19 12/13/19





 18:59 06:59 18:59


 


Intake Total 3666.416 2018.620 284.105


 


Output Total 1025 2700 750


 


Balance 957.114 333.620 -465.895


 


Weight   95 kg


 


Intake:   


 


   368 100


 


    Fat Emulsion 20% 250 ml @ 63 168 





    21 mls/hr IV Q24H [facility] Rx   





    #:[id number]   


 


    Mvi, Adult No.4 with Vit 400  





    K 10 ml Trace (Conc-1Ml/   





    Dose) 1 ml Parenteral   





    Electrolytes 20 ml In   





    Amino Acid 4.25%-D10w 1,   





    000 ml @ 65 mls/hr IV .BY   





    DURATION [facility] Rx#:   





    [id number]   


 


    Normal saline 220  


 


    Piperacillin-Tazobactam 3 100 200 100





    .375 gm In Sodium   





    Chloride 0.9% 100 ml @ 25   





    mls/hr IVPB Q12H [facility] Rx#   





    :[id number]   


 


    metroNIDAZOLE-NS  100  





    mg In Saline 1 100ml.bag   





    @ 100 mls/hr IVPB Q8HR   





    [facility] Rx#:[id number]   


 


    pressure bag 15  


 


  Intake, IV Titration 1084.114 531.620 184.105





  Amount   


 


    Amino Acid 4.25%-D10w+   160





    Lytes*E* 1,000 ml @ 80   





    mls/hr IV .BY DURATION   





    [facility] Rx#:[id number]   


 


    Insulin Regular 100 unit 51.114  





    In Sodium Chloride 0.9%   





    100 ml @ Per Protocol IV   





    .Q0M [facility] Rx#:[id number]   


 


    Insulin Regular 100 unit 22 51.620 24.105





    In Sodium Chloride 0.9%   





    100 ml @ Titrate IV .Q0M   





    [facility] Rx#:[id number]   


 


    Mvi, Adult No.4 with Vit 1011 480 





    K 10 ml Trace (Conc-1Ml/   





    Dose) 1 ml In Amino Acid   





    4.25%-D10w+Lytes*E* 1,000   





    ml @ 80 mls/hr IV .BY   





    DURATION [facility] Rx#:   





    [id number]   


 


  Oral  1200 


 


  TPN/PPN  640 


 


    Fat Emulsion 20% 250 ml @  640 





    21 mls/hr IV Q24H [facility] Rx   





    #:[id number]   


 


  Lipid  294 


 


    Fat Emulsion 20% 250 ml @  294 





    21 mls/hr IV Q24H [facility] Rx   





    #:[id number]   


 


Output:   


 


  Drainage 0 0 0


 


    Left Lower Abdomen 0 0 0


 


  Urine 1025 2700 750


 


Other:   


 


  Voiding Method Indwelling Catheter  Bedside Commode





   Indwelling Catheter








                       ABP, PAP, CO, CI - Last Documented











Arterial Blood Pressure        105/49

















- Exam





GENERAL EXAM: Alert, pleasant 89-year-old female patient, fairly comfortable in 

no apparent distress. On 3 L nasal cannula.


HEAD: Normocephalic.


EYES: Normal reaction of pupils, equal size.


NOSE: Clear with pink turbinates.


THROAT: No erythema or exudates.


NECK: No masses, no JVD.


CHEST: No chest wall deformity.


LUNGS: Equal air entry with few scattered rhonchi.


CVS: S1 and S2 normal with no audible murmur, regular rhythm.


ABDOMEN: Abdominal binder in place, no guarding or rigidity.


SPINE: No scoliosis or deformity


SKIN: No rashes


CENTRAL NERVOUS SYSTEM: No focal deficits, tone is normal in all 4 extremities.


EXTREMITIES: There is 1-2+ peripheral edema.  No clubbing, no cyanosis.  

Peripheral pulses are intact.





- Labs


CBC & Chem 7: 


                                 12/13/19 07:31





                                 12/13/19 07:31


Labs: 


                  Abnormal Lab Results - Last 24 Hours (Table)











  12/12/19 12/12/19 12/12/19 Range/Units





  11:30 13:26 15:10 


 


WBC     (3.8-10.6)  k/uL


 


RBC     (3.80-5.40)  m/uL


 


Hgb     (11.4-16.0)  gm/dL


 


Hct     (34.0-46.0)  %


 


MCHC     (31.0-37.0)  g/dL


 


RDW     (11.5-15.5)  %


 


Plt Count     (150-450)  k/uL


 


BUN     (7-17)  mg/dL


 


Creatinine     (0.52-1.04)  mg/dL


 


Glucose     (74-99)  mg/dL


 


POC Glucose (mg/dL)  180 H  207 H  191 H  (75-99)  mg/dL


 


Calcium     (8.4-10.2)  mg/dL














  12/12/19 12/12/19 12/12/19 Range/Units





  16:08 17:05 18:18 


 


WBC     (3.8-10.6)  k/uL


 


RBC     (3.80-5.40)  m/uL


 


Hgb     (11.4-16.0)  gm/dL


 


Hct     (34.0-46.0)  %


 


MCHC     (31.0-37.0)  g/dL


 


RDW     (11.5-15.5)  %


 


Plt Count     (150-450)  k/uL


 


BUN     (7-17)  mg/dL


 


Creatinine     (0.52-1.04)  mg/dL


 


Glucose     (74-99)  mg/dL


 


POC Glucose (mg/dL)  180 H  153 H  116 H  (75-99)  mg/dL


 


Calcium     (8.4-10.2)  mg/dL














  12/12/19 12/12/19 12/12/19 Range/Units





  19:04 21:05 22:06 


 


WBC     (3.8-10.6)  k/uL


 


RBC     (3.80-5.40)  m/uL


 


Hgb     (11.4-16.0)  gm/dL


 


Hct     (34.0-46.0)  %


 


MCHC     (31.0-37.0)  g/dL


 


RDW     (11.5-15.5)  %


 


Plt Count     (150-450)  k/uL


 


BUN     (7-17)  mg/dL


 


Creatinine     (0.52-1.04)  mg/dL


 


Glucose     (74-99)  mg/dL


 


POC Glucose (mg/dL)  147 H  185 H  201 H  (75-99)  mg/dL


 


Calcium     (8.4-10.2)  mg/dL














  12/13/19 12/13/19 12/13/19 Range/Units





  00:04 01:45 03:46 


 


WBC     (3.8-10.6)  k/uL


 


RBC     (3.80-5.40)  m/uL


 


Hgb     (11.4-16.0)  gm/dL


 


Hct     (34.0-46.0)  %


 


MCHC     (31.0-37.0)  g/dL


 


RDW     (11.5-15.5)  %


 


Plt Count     (150-450)  k/uL


 


BUN     (7-17)  mg/dL


 


Creatinine     (0.52-1.04)  mg/dL


 


Glucose     (74-99)  mg/dL


 


POC Glucose (mg/dL)  184 H  206 H  202 H  (75-99)  mg/dL


 


Calcium     (8.4-10.2)  mg/dL














  12/13/19 12/13/19 12/13/19 Range/Units





  05:58 07:27 07:31 


 


WBC     (3.8-10.6)  k/uL


 


RBC     (3.80-5.40)  m/uL


 


Hgb     (11.4-16.0)  gm/dL


 


Hct     (34.0-46.0)  %


 


MCHC     (31.0-37.0)  g/dL


 


RDW     (11.5-15.5)  %


 


Plt Count     (150-450)  k/uL


 


BUN    44 H  (7-17)  mg/dL


 


Creatinine    1.37 H  (0.52-1.04)  mg/dL


 


Glucose    178 H  (74-99)  mg/dL


 


POC Glucose (mg/dL)  210 H  241 H   (75-99)  mg/dL


 


Calcium    8.0 L  (8.4-10.2)  mg/dL














  12/13/19 12/13/19 12/13/19 Range/Units





  07:31 08:06 08:54 


 


WBC  12.7 H    (3.8-10.6)  k/uL


 


RBC  2.79 L    (3.80-5.40)  m/uL


 


Hgb  8.2 L    (11.4-16.0)  gm/dL


 


Hct  27.1 L    (34.0-46.0)  %


 


MCHC  30.4 L    (31.0-37.0)  g/dL


 


RDW  18.2 H    (11.5-15.5)  %


 


Plt Count  137 L    (150-450)  k/uL


 


BUN     (7-17)  mg/dL


 


Creatinine     (0.52-1.04)  mg/dL


 


Glucose     (74-99)  mg/dL


 


POC Glucose (mg/dL)   204 H  181 H  (75-99)  mg/dL


 


Calcium     (8.4-10.2)  mg/dL














  12/13/19 12/13/19 Range/Units





  10:06 10:58 


 


WBC    (3.8-10.6)  k/uL


 


RBC    (3.80-5.40)  m/uL


 


Hgb    (11.4-16.0)  gm/dL


 


Hct    (34.0-46.0)  %


 


MCHC    (31.0-37.0)  g/dL


 


RDW    (11.5-15.5)  %


 


Plt Count    (150-450)  k/uL


 


BUN    (7-17)  mg/dL


 


Creatinine    (0.52-1.04)  mg/dL


 


Glucose    (74-99)  mg/dL


 


POC Glucose (mg/dL)  193 H  203 H  (75-99)  mg/dL


 


Calcium    (8.4-10.2)  mg/dL








                      Microbiology - Last 24 Hours (Table)











 12/08/19 17:05 Gram Stain - Final





 Peritoneal Fluid Body Fluid Culture - Final














Assessment and Plan


Assessment: 





#1 Colonic mass status post exploratory laparotomy with lysis of adhesions, 

right hemicolectomy, transverse colon resection, hernia repair, small bowel 

resection, placement of preventive wound VAC.  Postoperative day #6.  Biopsies 

positive for poorly differentiated adenocarcinoma.





#2 Postoperative respiratory failure with routine postoperative ventilator 

management successfully extubated on 12/09/2018.  Currently on 3 L/m per nasal 

cannula.





#3 History of GI bleed.





#4 Chronic iron deficiency anemia.





#5 Ischemic cardiomyopathy.





#6 Diastolic congestive heart failure.





#7 History of atrial fibrillation.





#8 History of pulmonary embolism, status post [place] filter placement





#9 Sleep apnea syndrome.





#10 Diabetes mellitus, type II.





#11 Diabetic neuropathy.





#12 Hypertension.





#13 Previous coronary artery bypass grafting and aortic valve replacement.





#14 History of depression.





#15 History of gout.





#16 Status post permanent pacemaker implantation.





#17 Hypothyroidism.





Plan:





The patient was seen and evaluated by Dr. Ortega.  She is again encouraged 

regarding the increased use of the incentive spirometer and cough and deep 

breathing exercises.  We will titrate down the FiO2 as tolerated.  Increase her 

activity as tolerated.  Continue the current treatment plan.  We'll continue to 

follow make further recommendations to send her clinical status.





I, the cosigning physician, performed a history & physical examination of the 

patient. Lungs sounds few scattered rhonchi.  Maintaining good O2 saturations in

the 90s on 3 L/m per nasal cannula.  I discussed the assessment and plan of care

with my nurse practitioner, Cassie Cross. I attest to the above note as dictated 

by her.

## 2019-12-13 NOTE — PN
PROGRESS NOTE



DATE OF SERVICE:

12/13/2019



REASON FOR FOLLOWUP:

Leukocytosis.



INTERVAL HISTORY:

The patient is currently afebrile.  The patient is breathing comfortably.  The patient

has some cough but is not bringing up any sputum.  Still has some abdominal pain, but

no worsening. No chest pain. No shortness of breath. No vomiting.



PHYSICAL EXAMINATION:

Blood pressure 128/64 with a pulse of 102, temperature 97.6.  She is 94% on 2 L nasal

cannula.

General description is an elderly female up in the chair in no distress.

RESPIRATORY SYSTEM: Unlabored breathing with decreased breath sounds at the base. No

wheeze.

HEART: S1, S2.  Regular rate and rhythm.

ABDOMEN: Soft. Slightly distended.



LABS:

Hemoglobin 8.2, white count 12.7, BUN of 44, creatinine 0.37.



DIAGNOSTIC IMPRESSION AND PLAN:

Patient with leukocytosis which is likely reactive in the patient who did have

extensive abdominal surgery.  Initial white count _____ slight upward trend noticed

today. That will be monitored closely.  Chest x-ray did show some basilar atelectasis

but no definite pneumonia.  The patient is covered with Zosyn; to continue and monitor

clinical course closely.





MMODL / IJN: [id number] / Job#: [id number]

## 2019-12-13 NOTE — P.PN
Subjective


Progress Note Date: 12/13/19


Principal diagnosis: 





Chief Complaint: Lower GI bleed





Interval history:


This is a very pleasant 89-year-old patient who follows with visiting physicians

Dr. Ochoa.  Chronic stable medical conditions include congestive heart failure 

with EF of 50%, aortic stenosis, mitral regurgitation, mitral stenosis, 

tricuspid regurgitation, secondary probably hypertension, diabetes, 

hypertension, osteoarthritis, hypothyroid, blind left eye, coronary artery 

disease, [place] filter..  Patient does use a walker.  Patient did have a 

computed tomography scan of the abdomen on November 26 and did show mass along 

the lateral wall of the ascending colon and also adjacent soft tissue omental 

mass and is also another additional mass present in intra-abdominally.  S

uggestive of metastatic disease.  Patient yesterday had some bleeding per the 

right rectum.  And decided to come in.  Patient's been having lower abdominal 

discomfort.  She was admitted to the ER.  Patient normally does use a walker.  

Lives alone.  Colonoscopy done on December 4 shows ascending colon mass and AV 

malformation was some slight bleeding.  On December 7 patient underwent surgical

intervention to include right hemicolectomy, resection of transverse mesocolon 

tomor, small bowel resection from distal jejunum to ileum over 2 feet, repair of

incisional hernia, lysis of adhesions.  Patient was extubated on December 9.  On

TPN.  Abdominal incision wound VAC.


Today-.  Moved to medical floor.  Sitting up.  Took liquid diet.  Had a bowel 

movement.  Breathing stable.





Review of systems: Was done for constitutional, cardiovascular, GI, pulmonary. 

relevant finding as above





Active Medications





Acetaminophen (Tylenol Tab)  650 mg PO Q4HR PRN


   PRN Reason: Fever and/ or Pain


Hydrocodone Bitart/Acetaminophen (Norco 5-325)  1 each PO Q4HR PRN


   PRN Reason: Pain


   Last Admin: 12/11/19 10:53 Dose:  1 each


   Documented by: 


Albuterol/Ipratropium (Duoneb 0.5 Mg-3 Mg/3 Ml Soln)  3 ml INHALATION RT-QID [facility]


   Last Admin: 12/13/19 15:43 Dose:  3 ml


   Documented by: 


Albuterol/Ipratropium (Duoneb 0.5 Mg-3 Mg/3 Ml Soln)  3 ml INHALATION RT-Q2H PRN


   PRN Reason: Shortness Of Breath Or Wheezing


Furosemide (Lasix)  40 mg IV Q8HR [facility]


   Last Admin: 12/13/19 08:51 Dose:  40 mg


   Documented by: 


Heparin Sodium (Porcine) (Heparin)  5,000 unit SQ Q12HR [facility]


   Last Admin: 12/13/19 08:51 Dose:  5,000 unit


   Documented by: 


Hydromorphone HCl (Dilaudid)  0.5 mg IVP Q3HR PRN


   PRN Reason: Pain


   Last Admin: 12/13/19 08:51 Dose:  0.5 mg


   Documented by: 


Metronidazole 500 mg/ IV (Solution)  100 mls @ 100 mls/hr IVPB Q8HR [facility]


   Last Admin: 12/13/19 14:44 Dose:  100 mls/hr


   Documented by: 


Fat Emulsion Intravenous (Lipids 20%)  250 mls @ 21 mls/hr IV Q24H [facility]


   Last Admin: 12/13/19 14:12 Dose:  21 mls/hr


   Documented by: 


Parenteral Vitamin Supplement 10 ml/ Chromium/Copper/Manganese/Seleni/Zn 1 

ml/Amino Ac/Electrol/Dextrose/Calcium  1,011 mls @ 80 mls/hr IV .BY DURATION [facility]


   Last Admin: 12/13/19 05:47 Dose:  80 mls/hr


   Documented by: 


Amino Ac/Electrol/Dextrose/Calcium (Clinimix E 4.25%-D10% Solution)  1,000 mls @

80 mls/hr IV .BY DURATION [facility]


   Last Admin: 12/13/19 17:11 Dose:  80 mls/hr


   Documented by: 


Piperacillin Sod/Tazobactam (Sod 3.375 gm/ Sodium Chloride)  100 mls @ 25 mls/hr

IVPB Q8HR [facility]


   Last Admin: 12/13/19 17:11 Dose:  25 mls/hr


   Documented by: 


Insulin Human Regular 100 unit (/ Sodium Chloride)  101 mls @ 0 mls/hr IV .Q0M 

[facility]; Protocol


   Last Titration: 12/13/19 14:08 Dose:  5 unit/hr, 5.05 mls/hr


   Documented by: 


Levothyroxine Sodium (Synthroid Ivp)  56.25 mcg IV DAILY [facility]


   Last Admin: 12/13/19 09:17 Dose:  56.25 mcg


   Documented by: 


Metoclopramide HCl (Reglan)  10 mg IVP Q6HR PRN


   PRN Reason: Nausea And Vomiting


   Last Admin: 12/13/19 14:05 Dose:  10 mg


   Documented by: 


Miscellaneous Information (Magnesium Per Protocol)  1 each MISCELLANE DAILY PRN;

Protocol


   PRN Reason: Per Protocol


Miscellaneous Information (Potassium Per Protocol)  1 each MISCELLANE DAILY PRN;

Protocol


   PRN Reason: Per Protocol


Nalbuphine HCl (Nubain)  2.5 mg IV Q4HR PRN


   PRN Reason: Itching


Naloxone HCl (Narcan)  0.2 mg IV Q2M PRN


   PRN Reason: Opioid Reversal


Ondansetron HCl (Zofran)  4 mg IVP Q6HR PRN


   PRN Reason: Nausea And Vomiting


   Last Admin: 12/13/19 05:46 Dose:  4 mg


   Documented by: 


Pantoprazole Sodium (Protonix)  40 mg IV DAILY [facility]


   Last Admin: 12/13/19 08:52 Dose:  40 mg


   Documented by: 


Scopolamine (Transderm-Scop 1.5mg/72hr Patch)  1 patch TRANSDERM Q72H [facility]


   Last Admin: 12/12/19 12:15 Dose:  1 patch


   Documented by: 


Sodium Bicarbonate ()  10 ml PO AC-TID [facility]


   Last Admin: 12/13/19 14:11 Dose:  Not Given


   Documented by: 


Tramadol HCl (Ultram)  50 mg PO TID PRN


   PRN Reason: Mild Pain


   Last Admin: 12/13/19 14:05 Dose:  50 mg


   Documented by: 











Physical examination:


VITAL SIGNS: 97.6, 102, 18, 128/64, 94% on 2 L


GENERAL: Sitting up in a chair, awake, tired


EYES: Pupils equal.  Conjunctiva normal.


HEENT: External appearance of nose and ears normal, oral cavity grossly normal.


NECK: JVD not raised; masses not palpable.


HEART: First and second heart sounds are normal;  no edema.  


LUNGS: Respiratory rate normal; decreased breath sounds.  


ABDOMEN: Some tenderness, dressing over the wound, bowel sounds sluggish, mild 

tenderness no guarding or rigidity.  LYNDSEY drain.-  Minimal output 


PSYCH: Alert and oriented x3;  mood  and affect normal.  


MUSCULOSKELETAL: Evidence of OA especially in the hands and knees





INVESTIGATIONS, reviewed in the clinical context:


White count 12.7 hemoglobin 8.2 potassium 4.0 bun 44 creatinine 1.37





Previous testing


White count 9.6 hemoglobin 9.2 platelets 240 progression 4.7 BUN 73 creatinine 

1.79


Computed tomography scan of the abdomen-results noted as above





Assessment:


-Ascending colon mass, per colonoscopy, -right hemicolectomy, resection of 

transverse mesocolon tumor, small bowel resection from distal jejunum to ileum 

over 2feet, repair of incisional hernia, lysis of adhesions.-Invasive poorly 

differentiated adenocarcinoma


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


-Coronary artery disease with prior history of bypass


-Probable chronic kidney disease from diabetic nephropathy and hypertensive 

nephrosclerosis


-Normocytic anemia suspected from chronic disease of malignancy


-Diabetes mellitus type 2


-Essential hypertension


-Primary osteoarthritis


-Chronic gout


-Macular degeneration of right eye


-Colonic diverticulosis 


-obstructive sleep apnea did use CPAP machine in the past


-Acute kidney injury, could be ATN multifactorial, improving


-No code





Plan


Patient be advanced to regular diet by surgery.  Remains on IV antibiotics.  

Hopefully transfer to [facility] on Monday.








Objective





- Vital Signs


Vital signs: 


                                   Vital Signs











Temp  97.6 F   12/13/19 11:28


 


Pulse  96   12/13/19 15:55


 


Resp  16   12/13/19 16:47


 


BP  128/64   12/13/19 11:28


 


Pulse Ox  94 L  12/13/19 11:28








                                 Intake & Output











 12/12/19 12/13/19 12/13/19





 18:59 06:59 18:59


 


Intake Total 6551.827 2677.620 1586.716


 


Output Total 1025 2700 1450


 


Balance 957.114 333.620 136.716


 


Weight   95 kg


 


Intake:   


 


   368 494


 


    Fat Emulsion 20% 250 ml @ 63 168 44





    21 mls/hr IV Q24H [facility] Rx   





    #:[id number]   


 


    Mvi, Adult No.4 with Vit 400  





    K 10 ml Trace (Conc-1Ml/   





    Dose) 1 ml Parenteral   





    Electrolytes 20 ml In   





    Amino Acid 4.25%-D10w 1,   





    000 ml @ 65 mls/hr IV .BY   





    DURATION [facility] Rx#:   





    [id number]   


 


    Normal saline 220  250


 


    Piperacillin-Tazobactam 3 100 200 100





    .375 gm In Sodium   





    Chloride 0.9% 100 ml @ 25   





    mls/hr IVPB Q12H [facility] Rx#   





    :[id number]   


 


    metroNIDAZOLE-NS  100  100





    mg In Saline 1 100ml.bag   





    @ 100 mls/hr IVPB Q8HR   





    [facility] Rx#:[id number]   


 


    pressure bag 15  


 


  Intake, IV Titration 1084.114 531.620 942.716





  Amount   


 


    Amino Acid 4.25%-D10w+   520





    Lytes*E* 1,000 ml @ 80   





    mls/hr IV .BY DURATION   





    [facility] Rx#:[id number]   


 


    Fat Emulsion 20% 250 ml @   84





    21 mls/hr IV Q24H [facility] Rx   





    #:[id number]   


 


    Insulin Regular 100 unit 51.114  





    In Sodium Chloride 0.9%   





    100 ml @ Per Protocol IV   





    .Q0M [facility] Rx#:[id number]   


 


    Insulin Regular 100 unit 22 51.620 38.716





    In Sodium Chloride 0.9%   





    100 ml @ Titrate IV .Q0M   





    [facility] Rx#:[id number]   


 


    Mvi, Adult No.4 with Vit 1011 480 





    K 10 ml Trace (Conc-1Ml/   





    Dose) 1 ml In Amino Acid   





    4.25%-D10w+Lytes*E* 1,000   





    ml @ 80 mls/hr IV .BY   





    DURATION [facility] Rx#:   





    [id number]   


 


    Piperacillin-Tazobactam 3   100





    .375 gm In Sodium   





    Chloride 0.9% 100 ml @ 25   





    mls/hr IVPB Q8HR [facility] Rx#   





    :[id number]   


 


    metroNIDAZOLE-NS    200





    mg In Saline 1 100ml.bag   





    @ 100 mls/hr IVPB ONCE   





    ONE Rx#:[id number]   


 


  Oral  1200 150


 


  TPN/PPN  640 


 


    Fat Emulsion 20% 250 ml @  640 





    21 mls/hr IV Q24H [facility] Rx   





    #:[id number]   


 


  Lipid  294 


 


    Fat Emulsion 20% 250 ml @  294 





    21 mls/hr IV Q24H [facility] Rx   





    #:[id number]   


 


Output:   


 


  Drainage 0 0 0


 


    Left Lower Abdomen 0 0 0


 


  Urine 1025 2700 1450


 


Other:   


 


  Voiding Method Indwelling Catheter  Bedside Commode





   Indwelling Catheter








                       ABP, PAP, CO, CI - Last Documented











Arterial Blood Pressure        105/49

















- Labs


CBC & Chem 7: 


                                 12/13/19 07:31





                                 12/13/19 07:31


Labs: 


                  Abnormal Lab Results - Last 24 Hours (Table)











  12/12/19 12/12/19 12/12/19 Range/Units





  17:05 18:18 19:04 


 


WBC     (3.8-10.6)  k/uL


 


RBC     (3.80-5.40)  m/uL


 


Hgb     (11.4-16.0)  gm/dL


 


Hct     (34.0-46.0)  %


 


MCHC     (31.0-37.0)  g/dL


 


RDW     (11.5-15.5)  %


 


Plt Count     (150-450)  k/uL


 


BUN     (7-17)  mg/dL


 


Creatinine     (0.52-1.04)  mg/dL


 


Glucose     (74-99)  mg/dL


 


POC Glucose (mg/dL)  153 H  116 H  147 H  (75-99)  mg/dL


 


Calcium     (8.4-10.2)  mg/dL














  12/12/19 12/12/19 12/13/19 Range/Units





  21:05 22:06 00:04 


 


WBC     (3.8-10.6)  k/uL


 


RBC     (3.80-5.40)  m/uL


 


Hgb     (11.4-16.0)  gm/dL


 


Hct     (34.0-46.0)  %


 


MCHC     (31.0-37.0)  g/dL


 


RDW     (11.5-15.5)  %


 


Plt Count     (150-450)  k/uL


 


BUN     (7-17)  mg/dL


 


Creatinine     (0.52-1.04)  mg/dL


 


Glucose     (74-99)  mg/dL


 


POC Glucose (mg/dL)  185 H  201 H  184 H  (75-99)  mg/dL


 


Calcium     (8.4-10.2)  mg/dL














  12/13/19 12/13/19 12/13/19 Range/Units





  01:45 03:46 05:58 


 


WBC     (3.8-10.6)  k/uL


 


RBC     (3.80-5.40)  m/uL


 


Hgb     (11.4-16.0)  gm/dL


 


Hct     (34.0-46.0)  %


 


MCHC     (31.0-37.0)  g/dL


 


RDW     (11.5-15.5)  %


 


Plt Count     (150-450)  k/uL


 


BUN     (7-17)  mg/dL


 


Creatinine     (0.52-1.04)  mg/dL


 


Glucose     (74-99)  mg/dL


 


POC Glucose (mg/dL)  206 H  202 H  210 H  (75-99)  mg/dL


 


Calcium     (8.4-10.2)  mg/dL














  12/13/19 12/13/19 12/13/19 Range/Units





  07:27 07:31 07:31 


 


WBC    12.7 H  (3.8-10.6)  k/uL


 


RBC    2.79 L  (3.80-5.40)  m/uL


 


Hgb    8.2 L  (11.4-16.0)  gm/dL


 


Hct    27.1 L  (34.0-46.0)  %


 


MCHC    30.4 L  (31.0-37.0)  g/dL


 


RDW    18.2 H  (11.5-15.5)  %


 


Plt Count    137 L  (150-450)  k/uL


 


BUN   44 H   (7-17)  mg/dL


 


Creatinine   1.37 H   (0.52-1.04)  mg/dL


 


Glucose   178 H   (74-99)  mg/dL


 


POC Glucose (mg/dL)  241 H    (75-99)  mg/dL


 


Calcium   8.0 L   (8.4-10.2)  mg/dL














  12/13/19 12/13/19 12/13/19 Range/Units





  08:06 08:54 10:06 


 


WBC     (3.8-10.6)  k/uL


 


RBC     (3.80-5.40)  m/uL


 


Hgb     (11.4-16.0)  gm/dL


 


Hct     (34.0-46.0)  %


 


MCHC     (31.0-37.0)  g/dL


 


RDW     (11.5-15.5)  %


 


Plt Count     (150-450)  k/uL


 


BUN     (7-17)  mg/dL


 


Creatinine     (0.52-1.04)  mg/dL


 


Glucose     (74-99)  mg/dL


 


POC Glucose (mg/dL)  204 H  181 H  193 H  (75-99)  mg/dL


 


Calcium     (8.4-10.2)  mg/dL














  12/13/19 12/13/19 12/13/19 Range/Units





  10:58 12:19 12:56 


 


WBC     (3.8-10.6)  k/uL


 


RBC     (3.80-5.40)  m/uL


 


Hgb     (11.4-16.0)  gm/dL


 


Hct     (34.0-46.0)  %


 


MCHC     (31.0-37.0)  g/dL


 


RDW     (11.5-15.5)  %


 


Plt Count     (150-450)  k/uL


 


BUN     (7-17)  mg/dL


 


Creatinine     (0.52-1.04)  mg/dL


 


Glucose     (74-99)  mg/dL


 


POC Glucose (mg/dL)  203 H  205 H  194 H  (75-99)  mg/dL


 


Calcium     (8.4-10.2)  mg/dL














  12/13/19 12/13/19 12/13/19 Range/Units





  14:04 15:21 16:34 


 


WBC     (3.8-10.6)  k/uL


 


RBC     (3.80-5.40)  m/uL


 


Hgb     (11.4-16.0)  gm/dL


 


Hct     (34.0-46.0)  %


 


MCHC     (31.0-37.0)  g/dL


 


RDW     (11.5-15.5)  %


 


Plt Count     (150-450)  k/uL


 


BUN     (7-17)  mg/dL


 


Creatinine     (0.52-1.04)  mg/dL


 


Glucose     (74-99)  mg/dL


 


POC Glucose (mg/dL)  201 H  207 H  202 H  (75-99)  mg/dL


 


Calcium     (8.4-10.2)  mg/dL








                      Microbiology - Last 24 Hours (Table)











 12/08/19 17:05 Gram Stain - Final





 Peritoneal Fluid Body Fluid Culture - Final

## 2019-12-13 NOTE — P.PN
<Unique Krause LUIS ALBERTO - Last Filed: 12/13/19 14:02>





Subjective


Progress Note Date: 12/13/19





CHIEF COMPLAINT: Abdominal pain





HISTORY OF PRESENT ILLNESS: Patient is status post exploratory laparotomy with 

resection of right colon for over 10 cm lesion with invasion to abdominal wall i

ncluding small bowel resection for small bowel obstruction performed on 

12/07/2019.  Patient examined at the bedside. She complains of abdominal pain. 

She is unable to tolerate Norco due to nausea despite antibiotics.  She is 

taking Ultram for pain which she states is helping some.  She denies nausea or 

vomiting currently.  She is on a regular diet but has decreased appetite.  TPN 

is infusing.





PHYSICAL EXAM: 


VITAL SIGNS: Reviewed


GENERAL: Well-developed in no acute distress. 


HEENT:  No sclera icterus. Extraocular movements grossly intact.  Moist buccal 

mucosa. 


Head is atraumatic, normocephalic. Hears conversational speech. No nasal 

drainage.


NECK:  Supple without lymphadenopathy.


CHEST:  Non-labored respirations and equal bilateral excursions. 


CARDIOVASCULAR:  Regular rate with regular rhythm.  Palpable 2+ radial pulses.


ABDOMEN:  Soft.  Midline dressing intact with PREVENA. LYNDSEY drain with 

serosanguineous drainage


MUSCULOSKELETAL:  No clubbing or cyanosis


NEUROLOGIC:  No focal or lateralizing signs.  Cranial nerves II through XII 

grossly intact.


PSYCH:  Appropriate affect.  Alert and oriented to person, place and time.


SKIN: Well perfused.  Good skin turgor. 





ASSESSMENT: 


1.  Ascending colon cancer, recurrent metastatic


2.  Rectal bleeding





PLAN: 


Continue current diet.  Continue TPN as patient's oral intake is inadequate


Continue IV Zofran as needed. continue IV Reglan as needed.  Continue 

scopolamine patch.


Pain control.  Patient unable to tolerate narcotics due to nausea.  Continue 

Ultram as needed


Increase activity as tolerated


IS 10 times an hour








Nurse practitioner note has been reviewed by physician. Signing provider agrees 

with the documented findings, assessment, and plan of care. 








Objective





- Vital Signs


Vital signs: 


                                   Vital Signs











Temp  97.6 F   12/13/19 11:28


 


Pulse  92   12/13/19 12:32


 


Resp  18   12/13/19 11:28


 


BP  128/64   12/13/19 11:28


 


Pulse Ox  94 L  12/13/19 11:28








                                 Intake & Output











 12/12/19 12/13/19 12/13/19





 18:59 06:59 18:59


 


Intake Total 1505.015 5445.620 1306.696


 


Output Total 1025 2700 1450


 


Balance 957.114 333.620 -143.304


 


Weight   95 kg


 


Intake:   


 


   368 350


 


    Fat Emulsion 20% 250 ml @ 63 168 





    21 mls/hr IV Q24H [facility] Rx   





    #:[id number]   


 


    Mvi, Adult No.4 with Vit 400  





    K 10 ml Trace (Conc-1Ml/   





    Dose) 1 ml Parenteral   





    Electrolytes 20 ml In   





    Amino Acid 4.25%-D10w 1,   





    000 ml @ 65 mls/hr IV .BY   





    DURATION [facility] Rx#:   





    [id number]   


 


    Normal saline 220  250


 


    Piperacillin-Tazobactam 3 100 200 100





    .375 gm In Sodium   





    Chloride 0.9% 100 ml @ 25   





    mls/hr IVPB Q12H [facility] Rx#   





    :[id number]   


 


    metroNIDAZOLE-NS  100  





    mg In Saline 1 100ml.bag   





    @ 100 mls/hr IVPB Q8HR   





    [facility] Rx#:[id number]   


 


    pressure bag 15  


 


  Intake, IV Titration 1084.114 531.620 856.696





  Amount   


 


    Amino Acid 4.25%-D10w+   520





    Lytes*E* 1,000 ml @ 80   





    mls/hr IV .BY DURATION   





    [facility] Rx#:[id number]   


 


    Insulin Regular 100 unit 51.114  





    In Sodium Chloride 0.9%   





    100 ml @ Per Protocol IV   





    .Q0M [facility] Rx#:[id number]   


 


    Insulin Regular 100 unit 22 51.620 36.696





    In Sodium Chloride 0.9%   





    100 ml @ Titrate IV .Q0M   





    [facility] Rx#:[id number]   


 


    Mvi, Adult No.4 with Vit 1011 480 





    K 10 ml Trace (Conc-1Ml/   





    Dose) 1 ml In Amino Acid   





    4.25%-D10w+Lytes*E* 1,000   





    ml @ 80 mls/hr IV .BY   





    DURATION [facility] Rx#:   





    [id number]   


 


    Piperacillin-Tazobactam 3   100





    .375 gm In Sodium   





    Chloride 0.9% 100 ml @ 25   





    mls/hr IVPB Q8HR [facility] Rx#   





    :[id number]   


 


    metroNIDAZOLE-NS    200





    mg In Saline 1 100ml.bag   





    @ 100 mls/hr IVPB ONCE   





    [facility] Rx#:[id number]   


 


  Oral  1200 100


 


  TPN/PPN  640 


 


    Fat Emulsion 20% 250 ml @  640 





    21 mls/hr IV Q24H [facility] Rx   





    #:[id number]   


 


  Lipid  294 


 


    Fat Emulsion 20% 250 ml @  294 





    21 mls/hr IV Q24H [facility] Rx   





    #:[id number]   


 


Output:   


 


  Drainage 0 0 0


 


    Left Lower Abdomen 0 0 0


 


  Urine 1025 2700 1450


 


Other:   


 


  Voiding Method Indwelling Catheter  Bedside Commode





   Indwelling Catheter








                       ABP, PAP, CO, CI - Last Documented











Arterial Blood Pressure        105/49

















- Labs


CBC & Chem 7: 


                                 12/13/19 07:31





                                 12/13/19 07:31


Labs: 


                  Abnormal Lab Results - Last 24 Hours (Table)











  12/12/19 12/12/19 12/12/19 Range/Units





  15:10 16:08 17:05 


 


WBC     (3.8-10.6)  k/uL


 


RBC     (3.80-5.40)  m/uL


 


Hgb     (11.4-16.0)  gm/dL


 


Hct     (34.0-46.0)  %


 


MCHC     (31.0-37.0)  g/dL


 


RDW     (11.5-15.5)  %


 


Plt Count     (150-450)  k/uL


 


BUN     (7-17)  mg/dL


 


Creatinine     (0.52-1.04)  mg/dL


 


Glucose     (74-99)  mg/dL


 


POC Glucose (mg/dL)  191 H  180 H  153 H  (75-99)  mg/dL


 


Calcium     (8.4-10.2)  mg/dL














  12/12/19 12/12/19 12/12/19 Range/Units





  18:18 19:04 21:05 


 


WBC     (3.8-10.6)  k/uL


 


RBC     (3.80-5.40)  m/uL


 


Hgb     (11.4-16.0)  gm/dL


 


Hct     (34.0-46.0)  %


 


MCHC     (31.0-37.0)  g/dL


 


RDW     (11.5-15.5)  %


 


Plt Count     (150-450)  k/uL


 


BUN     (7-17)  mg/dL


 


Creatinine     (0.52-1.04)  mg/dL


 


Glucose     (74-99)  mg/dL


 


POC Glucose (mg/dL)  116 H  147 H  185 H  (75-99)  mg/dL


 


Calcium     (8.4-10.2)  mg/dL














  12/12/19 12/13/19 12/13/19 Range/Units





  22:06 00:04 01:45 


 


WBC     (3.8-10.6)  k/uL


 


RBC     (3.80-5.40)  m/uL


 


Hgb     (11.4-16.0)  gm/dL


 


Hct     (34.0-46.0)  %


 


MCHC     (31.0-37.0)  g/dL


 


RDW     (11.5-15.5)  %


 


Plt Count     (150-450)  k/uL


 


BUN     (7-17)  mg/dL


 


Creatinine     (0.52-1.04)  mg/dL


 


Glucose     (74-99)  mg/dL


 


POC Glucose (mg/dL)  201 H  184 H  206 H  (75-99)  mg/dL


 


Calcium     (8.4-10.2)  mg/dL














  12/13/19 12/13/19 12/13/19 Range/Units





  03:46 05:58 07:27 


 


WBC     (3.8-10.6)  k/uL


 


RBC     (3.80-5.40)  m/uL


 


Hgb     (11.4-16.0)  gm/dL


 


Hct     (34.0-46.0)  %


 


MCHC     (31.0-37.0)  g/dL


 


RDW     (11.5-15.5)  %


 


Plt Count     (150-450)  k/uL


 


BUN     (7-17)  mg/dL


 


Creatinine     (0.52-1.04)  mg/dL


 


Glucose     (74-99)  mg/dL


 


POC Glucose (mg/dL)  202 H  210 H  241 H  (75-99)  mg/dL


 


Calcium     (8.4-10.2)  mg/dL














  12/13/19 12/13/19 12/13/19 Range/Units





  07:31 07:31 08:06 


 


WBC   12.7 H   (3.8-10.6)  k/uL


 


RBC   2.79 L   (3.80-5.40)  m/uL


 


Hgb   8.2 L   (11.4-16.0)  gm/dL


 


Hct   27.1 L   (34.0-46.0)  %


 


MCHC   30.4 L   (31.0-37.0)  g/dL


 


RDW   18.2 H   (11.5-15.5)  %


 


Plt Count   137 L   (150-450)  k/uL


 


BUN  44 H    (7-17)  mg/dL


 


Creatinine  1.37 H    (0.52-1.04)  mg/dL


 


Glucose  178 H    (74-99)  mg/dL


 


POC Glucose (mg/dL)    204 H  (75-99)  mg/dL


 


Calcium  8.0 L    (8.4-10.2)  mg/dL














  12/13/19 12/13/19 12/13/19 Range/Units





  08:54 10:06 10:58 


 


WBC     (3.8-10.6)  k/uL


 


RBC     (3.80-5.40)  m/uL


 


Hgb     (11.4-16.0)  gm/dL


 


Hct     (34.0-46.0)  %


 


MCHC     (31.0-37.0)  g/dL


 


RDW     (11.5-15.5)  %


 


Plt Count     (150-450)  k/uL


 


BUN     (7-17)  mg/dL


 


Creatinine     (0.52-1.04)  mg/dL


 


Glucose     (74-99)  mg/dL


 


POC Glucose (mg/dL)  181 H  193 H  203 H  (75-99)  mg/dL


 


Calcium     (8.4-10.2)  mg/dL














  12/13/19 12/13/19 Range/Units





  12:19 12:56 


 


WBC    (3.8-10.6)  k/uL


 


RBC    (3.80-5.40)  m/uL


 


Hgb    (11.4-16.0)  gm/dL


 


Hct    (34.0-46.0)  %


 


MCHC    (31.0-37.0)  g/dL


 


RDW    (11.5-15.5)  %


 


Plt Count    (150-450)  k/uL


 


BUN    (7-17)  mg/dL


 


Creatinine    (0.52-1.04)  mg/dL


 


Glucose    (74-99)  mg/dL


 


POC Glucose (mg/dL)  205 H  194 H  (75-99)  mg/dL


 


Calcium    (8.4-10.2)  mg/dL








                      Microbiology - Last 24 Hours (Table)











 12/08/19 17:05 Gram Stain - Final





 Peritoneal Fluid Body Fluid Culture - Final














<Minda Gutierrez - Last Filed: 12/13/19 21:35>





Subjective





She had ice cream and had some diet.  She complains mostly of moderate edema.  

Dressings intact.  Wound VAC system functioning. Agreeable for transfer to rehab

when medically stable.





Objective





- Vital Signs


Vital signs: 


                                   Vital Signs











Temp  97.6 F   12/13/19 11:28


 


Pulse  90   12/13/19 20:11


 


Resp  16   12/13/19 16:47


 


BP  128/64   12/13/19 11:28


 


Pulse Ox  94 L  12/13/19 11:28








                                 Intake & Output











 12/13/19 12/13/19 12/14/19





 06:59 18:59 06:59


 


Intake Total 4033.620 1601.673 7.491


 


Output Total 2700 1450 


 


Balance 1333.620 151.673 7.491


 


Weight  95 kg 


 


Intake:   


 


   494 


 


    Fat Emulsion 20% 250 ml @ 168 44 





    21 mls/hr IV Q24H [facility] Rx   





    #:[id number]   


 


    Normal saline  250 


 


    Piperacillin-Tazobactam 3 200 100 





    .375 gm In Sodium   





    Chloride 0.9% 100 ml @ 25   





    mls/hr IVPB Q12H [facility] Rx#   





    :[id number]   


 


    metroNIDAZOLE-NS   100 





    mg In Saline 1 100ml.bag   





    @ 100 mls/hr IVPB Q8HR   





    [facility] Rx#:[id number]   


 


  Intake, IV Titration 1531.620 957.673 7.491





  Amount   


 


    Amino Acid 4.25%-D10w+ 1000 520 





    Lytes*E* 1,000 ml @ 80   





    mls/hr IV .BY DURATION   





    [facility] Rx#:[id number]   


 


    Fat Emulsion 20% 250 ml @  84 





    21 mls/hr IV Q24H [facility] Rx   





    #:[id number]   


 


    Insulin Regular 100 unit 51.620 53.673 7.491





    In Sodium Chloride 0.9%   





    100 ml @ Titrate IV .Q0M   





    [facility] Rx#:[id number]   


 


    Mvi, Adult No.4 with Vit 480  





    K 10 ml Trace (Conc-1Ml/   





    Dose) 1 ml In Amino Acid   





    4.25%-D10w+Lytes*E* 1,000   





    ml @ 80 mls/hr IV .BY   





    DURATION [facility] Rx#:   





    [id number]   


 


    Piperacillin-Tazobactam 3  100 





    .375 gm In Sodium   





    Chloride 0.9% 100 ml @ 25   





    mls/hr IVPB Q8HR [facility] Rx#   





    :[id number]   


 


    metroNIDAZOLE-NS   200 





    mg In Saline 1 100ml.bag   





    @ 100 mls/hr IVPB ONCE   





    [facility] Rx#:[id number]   


 


  Oral 1200 150 


 


  TPN/  


 


    Fat Emulsion 20% 250 ml @ 640  





    21 mls/hr IV Q24H [facility] Rx   





    #:[id number]   


 


  Lipid 294  


 


    Fat Emulsion 20% 250 ml @ 294  





    21 mls/hr IV Q24H [facility] Rx   





    #:[id number]   


 


Output:   


 


  Drainage 0 0 


 


    Left Lower Abdomen 0 0 


 


  Urine 2700 1450 


 


Other:   


 


  Voiding Method  Bedside Commode 





  Indwelling Catheter 








                       ABP, PAP, CO, CI - Last Documented











Arterial Blood Pressure        105/49

















- Labs


CBC & Chem 7: 


                                 12/13/19 07:31





                                 12/13/19 07:31


Labs: 


                  Abnormal Lab Results - Last 24 Hours (Table)











  12/12/19 12/13/19 12/13/19 Range/Units





  22:06 00:04 01:45 


 


WBC     (3.8-10.6)  k/uL


 


RBC     (3.80-5.40)  m/uL


 


Hgb     (11.4-16.0)  gm/dL


 


Hct     (34.0-46.0)  %


 


MCHC     (31.0-37.0)  g/dL


 


RDW     (11.5-15.5)  %


 


Plt Count     (150-450)  k/uL


 


BUN     (7-17)  mg/dL


 


Creatinine     (0.52-1.04)  mg/dL


 


Glucose     (74-99)  mg/dL


 


POC Glucose (mg/dL)  201 H  184 H  206 H  (75-99)  mg/dL


 


Calcium     (8.4-10.2)  mg/dL














  12/13/19 12/13/19 12/13/19 Range/Units





  03:46 05:58 07:27 


 


WBC     (3.8-10.6)  k/uL


 


RBC     (3.80-5.40)  m/uL


 


Hgb     (11.4-16.0)  gm/dL


 


Hct     (34.0-46.0)  %


 


MCHC     (31.0-37.0)  g/dL


 


RDW     (11.5-15.5)  %


 


Plt Count     (150-450)  k/uL


 


BUN     (7-17)  mg/dL


 


Creatinine     (0.52-1.04)  mg/dL


 


Glucose     (74-99)  mg/dL


 


POC Glucose (mg/dL)  202 H  210 H  241 H  (75-99)  mg/dL


 


Calcium     (8.4-10.2)  mg/dL














  12/13/19 12/13/19 12/13/19 Range/Units





  07:31 07:31 08:06 


 


WBC   12.7 H   (3.8-10.6)  k/uL


 


RBC   2.79 L   (3.80-5.40)  m/uL


 


Hgb   8.2 L   (11.4-16.0)  gm/dL


 


Hct   27.1 L   (34.0-46.0)  %


 


MCHC   30.4 L   (31.0-37.0)  g/dL


 


RDW   18.2 H   (11.5-15.5)  %


 


Plt Count   137 L   (150-450)  k/uL


 


BUN  44 H    (7-17)  mg/dL


 


Creatinine  1.37 H    (0.52-1.04)  mg/dL


 


Glucose  178 H    (74-99)  mg/dL


 


POC Glucose (mg/dL)    204 H  (75-99)  mg/dL


 


Calcium  8.0 L    (8.4-10.2)  mg/dL














  12/13/19 12/13/19 12/13/19 Range/Units





  08:54 10:06 10:58 


 


WBC     (3.8-10.6)  k/uL


 


RBC     (3.80-5.40)  m/uL


 


Hgb     (11.4-16.0)  gm/dL


 


Hct     (34.0-46.0)  %


 


MCHC     (31.0-37.0)  g/dL


 


RDW     (11.5-15.5)  %


 


Plt Count     (150-450)  k/uL


 


BUN     (7-17)  mg/dL


 


Creatinine     (0.52-1.04)  mg/dL


 


Glucose     (74-99)  mg/dL


 


POC Glucose (mg/dL)  181 H  193 H  203 H  (75-99)  mg/dL


 


Calcium     (8.4-10.2)  mg/dL














  12/13/19 12/13/19 12/13/19 Range/Units





  12:19 12:56 14:04 


 


WBC     (3.8-10.6)  k/uL


 


RBC     (3.80-5.40)  m/uL


 


Hgb     (11.4-16.0)  gm/dL


 


Hct     (34.0-46.0)  %


 


MCHC     (31.0-37.0)  g/dL


 


RDW     (11.5-15.5)  %


 


Plt Count     (150-450)  k/uL


 


BUN     (7-17)  mg/dL


 


Creatinine     (0.52-1.04)  mg/dL


 


Glucose     (74-99)  mg/dL


 


POC Glucose (mg/dL)  205 H  194 H  201 H  (75-99)  mg/dL


 


Calcium     (8.4-10.2)  mg/dL














  12/13/19 12/13/19 12/13/19 Range/Units





  15:21 16:34 17:05 


 


WBC     (3.8-10.6)  k/uL


 


RBC     (3.80-5.40)  m/uL


 


Hgb     (11.4-16.0)  gm/dL


 


Hct     (34.0-46.0)  %


 


MCHC     (31.0-37.0)  g/dL


 


RDW     (11.5-15.5)  %


 


Plt Count     (150-450)  k/uL


 


BUN     (7-17)  mg/dL


 


Creatinine     (0.52-1.04)  mg/dL


 


Glucose     (74-99)  mg/dL


 


POC Glucose (mg/dL)  207 H  202 H  225 H  (75-99)  mg/dL


 


Calcium     (8.4-10.2)  mg/dL














  12/13/19 12/13/19 Range/Units





  18:06 19:53 


 


WBC    (3.8-10.6)  k/uL


 


RBC    (3.80-5.40)  m/uL


 


Hgb    (11.4-16.0)  gm/dL


 


Hct    (34.0-46.0)  %


 


MCHC    (31.0-37.0)  g/dL


 


RDW    (11.5-15.5)  %


 


Plt Count    (150-450)  k/uL


 


BUN    (7-17)  mg/dL


 


Creatinine    (0.52-1.04)  mg/dL


 


Glucose    (74-99)  mg/dL


 


POC Glucose (mg/dL)  213 H  196 H  (75-99)  mg/dL


 


Calcium    (8.4-10.2)  mg/dL








                      Microbiology - Last 24 Hours (Table)











 12/08/19 17:05 Gram Stain - Final





 Peritoneal Fluid Body Fluid Culture - Final














Assessment and Plan


(1) Abdominal mass


Current Visit: Yes   Status: Acute   Priority: High   Code(s): R19.00 - INTRA-

ABD AND PELVIC SWELLING, MASS AND LUMP, UNSP SITE   SNOMED Code(s): 683298213


   





(2) GI bleed


Current Visit: Yes   Status: Acute   Priority: High   Code(s): K92.2 - 

GASTROINTESTINAL HEMORRHAGE, UNSPECIFIED   SNOMED Code(s): 05922234


   





(3) History of colon cancer


Current Visit: Yes   Status: Acute   Code(s): Z85.038 - PERSONAL HISTORY OF 

MALIGNANT NEOPLASM OF LARGE INTESTINE   SNOMED Code(s): 672146315


   





(4) Anemia


Current Visit: Yes   Status: Chronic   Priority: Medium   Code(s): D64.9 - 

ANEMIA, UNSPECIFIED   SNOMED Code(s): 757018625


   





(5) Aortic stenosis


Current Visit: Yes   Status: Acute   Code(s): I35.0 - NONRHEUMATIC AORTIC 

(VALVE) STENOSIS   SNOMED Code(s): 84055794


   





(6) Atrial fibrillation


Current Visit: No   Status: Acute   Code(s): I48.91 - UNSPECIFIED ATRIAL 

FIBRILLATION   SNOMED Code(s): 41679544


   





(7) Atrial flutter


Current Visit: No   Status: Acute   Code(s): I48.92 - UNSPECIFIED ATRIAL FLUTTER

  SNOMED Code(s): 4526836


   





(8) Diastolic CHF, acute on chronic


Current Visit: No   Status: Acute   Code(s): I50.33 - ACUTE ON CHRONIC DIASTOLIC

(CONGESTIVE) HEART FAILURE   SNOMED Code(s): 618085778


   





(9) Systolic congestive heart failure


Current Visit: No   Status: Acute   Code(s): I50.20 - UNSPECIFIED SYSTOLIC 

(CONGESTIVE) HEART FAILURE   SNOMED Code(s): 04671083


   





(10) Cancer of ascending colon metastatic to intra-abdominal lymph node


Current Visit: Yes   Status: Acute   Code(s): C18.2 - MALIGNANT NEOPLASM OF ASCE

NDING COLON; C77.2 - SECONDARY AND UNSP MALIGNANT NEOPLASM OF INTRA-ABD NODES   

SNOMED Code(s): 11637370

## 2019-12-14 LAB
ANION GAP SERPL CALC-SCNC: 7 MMOL/L
BUN SERPL-SCNC: 48 MG/DL (ref 7–17)
CALCIUM SPEC-MCNC: 8.2 MG/DL (ref 8.4–10.2)
CHLORIDE SERPL-SCNC: 104 MMOL/L (ref 98–107)
CO2 SERPL-SCNC: 27 MMOL/L (ref 22–30)
ERYTHROCYTE [DISTWIDTH] IN BLOOD BY AUTOMATED COUNT: 2.82 M/UL (ref 3.8–5.4)
ERYTHROCYTE [DISTWIDTH] IN BLOOD: 17.6 % (ref 11.5–15.5)
GLUCOSE BLD-MCNC: 118 MG/DL (ref 75–99)
GLUCOSE BLD-MCNC: 131 MG/DL (ref 75–99)
GLUCOSE BLD-MCNC: 161 MG/DL (ref 75–99)
GLUCOSE BLD-MCNC: 163 MG/DL (ref 75–99)
GLUCOSE BLD-MCNC: 165 MG/DL (ref 75–99)
GLUCOSE BLD-MCNC: 200 MG/DL (ref 75–99)
GLUCOSE BLD-MCNC: 205 MG/DL (ref 75–99)
GLUCOSE BLD-MCNC: 209 MG/DL (ref 75–99)
GLUCOSE BLD-MCNC: 228 MG/DL (ref 75–99)
GLUCOSE BLD-MCNC: 233 MG/DL (ref 75–99)
GLUCOSE SERPL-MCNC: 148 MG/DL (ref 74–99)
HCT VFR BLD AUTO: 27.3 % (ref 34–46)
HGB BLD-MCNC: 8.3 GM/DL (ref 11.4–16)
MAGNESIUM SPEC-SCNC: 2.2 MG/DL (ref 1.6–2.3)
MCH RBC QN AUTO: 29.3 PG (ref 25–35)
MCHC RBC AUTO-ENTMCNC: 30.3 G/DL (ref 31–37)
MCV RBC AUTO: 96.9 FL (ref 80–100)
PLATELET # BLD AUTO: 149 K/UL (ref 150–450)
POTASSIUM SERPL-SCNC: 4.1 MMOL/L (ref 3.5–5.1)
SODIUM SERPL-SCNC: 138 MMOL/L (ref 137–145)
WBC # BLD AUTO: 13.6 K/UL (ref 3.8–10.6)

## 2019-12-14 RX ADMIN — Medication SCH ML: at 08:30

## 2019-12-14 RX ADMIN — ONDANSETRON PRN MG: 2 INJECTION INTRAMUSCULAR; INTRAVENOUS at 05:50

## 2019-12-14 RX ADMIN — FUROSEMIDE SCH MG: 10 INJECTION, SOLUTION INTRAMUSCULAR; INTRAVENOUS at 08:29

## 2019-12-14 RX ADMIN — SOYBEAN OIL SCH MLS/HR: 20 INJECTION, SOLUTION INTRAVENOUS at 15:36

## 2019-12-14 RX ADMIN — PIPERACILLIN AND TAZOBACTAM SCH MLS/HR: 3; .375 INJECTION, POWDER, FOR SOLUTION INTRAVENOUS at 00:02

## 2019-12-14 RX ADMIN — INSULIN ASPART SCH: 100 INJECTION, SOLUTION INTRAVENOUS; SUBCUTANEOUS at 17:29

## 2019-12-14 RX ADMIN — HEPARIN SODIUM SCH UNIT: 5000 INJECTION, SOLUTION INTRAVENOUS; SUBCUTANEOUS at 20:47

## 2019-12-14 RX ADMIN — METRONIDAZOLE SCH MLS/HR: 500 INJECTION, SOLUTION INTRAVENOUS at 08:26

## 2019-12-14 RX ADMIN — HEPARIN SODIUM SCH UNIT: 5000 INJECTION, SOLUTION INTRAVENOUS; SUBCUTANEOUS at 08:29

## 2019-12-14 RX ADMIN — PIPERACILLIN AND TAZOBACTAM SCH MLS/HR: 3; .375 INJECTION, POWDER, FOR SOLUTION INTRAVENOUS at 17:24

## 2019-12-14 RX ADMIN — HYDROMORPHONE HYDROCHLORIDE PRN MG: 1 INJECTION, SOLUTION INTRAMUSCULAR; INTRAVENOUS; SUBCUTANEOUS at 21:39

## 2019-12-14 RX ADMIN — LEUCINE, PHENYLALANINE, LYSINE, METHIONINE, ISOLEUCINE, VALINE, HISTIDINE, THREONINE, TRYPTOPHAN, ALANINE, GLYCINE, ARGININE, PROLINE, SERINE, TYROSINE, SODIUM ACETATE, DIBASIC POTASSIUM PHOSPHATE, MAGNESIUM CHLORIDE, SODIUM CHLORIDE, CALCIUM CHLORIDE, DEXTROSE SCH MLS/HR
311; 238; 247; 170; 255; 247; 204; 179; 77; 880; 438; 489; 289; 213; 17; 297; 261; 51; 77; 33; 10 INJECTION INTRAVENOUS at 17:28

## 2019-12-14 RX ADMIN — LEUCINE, PHENYLALANINE, LYSINE, METHIONINE, ISOLEUCINE, VALINE, HISTIDINE, THREONINE, TRYPTOPHAN, ALANINE, GLYCINE, ARGININE, PROLINE, SERINE, TYROSINE, SODIUM ACETATE, DIBASIC POTASSIUM PHOSPHATE, MAGNESIUM CHLORIDE, SODIUM CHLORIDE, CALCIUM CHLORIDE, DEXTROSE SCH MLS/HR
311; 238; 247; 170; 255; 247; 204; 179; 77; 880; 438; 489; 289; 213; 17; 297; 261; 51; 77; 33; 10 INJECTION INTRAVENOUS at 05:50

## 2019-12-14 RX ADMIN — HYDROMORPHONE HYDROCHLORIDE PRN MG: 1 INJECTION, SOLUTION INTRAMUSCULAR; INTRAVENOUS; SUBCUTANEOUS at 15:39

## 2019-12-14 RX ADMIN — IPRATROPIUM BROMIDE AND ALBUTEROL SULFATE SCH: .5; 3 SOLUTION RESPIRATORY (INHALATION) at 12:50

## 2019-12-14 RX ADMIN — INSULIN ASPART SCH UNIT: 100 INJECTION, SOLUTION INTRAVENOUS; SUBCUTANEOUS at 20:47

## 2019-12-14 RX ADMIN — IPRATROPIUM BROMIDE AND ALBUTEROL SULFATE SCH ML: .5; 3 SOLUTION RESPIRATORY (INHALATION) at 20:16

## 2019-12-14 RX ADMIN — METRONIDAZOLE SCH MLS/HR: 500 INJECTION, SOLUTION INTRAVENOUS at 16:38

## 2019-12-14 RX ADMIN — IPRATROPIUM BROMIDE AND ALBUTEROL SULFATE SCH ML: .5; 3 SOLUTION RESPIRATORY (INHALATION) at 16:36

## 2019-12-14 RX ADMIN — Medication SCH: at 15:37

## 2019-12-14 RX ADMIN — IPRATROPIUM BROMIDE AND ALBUTEROL SULFATE SCH ML: .5; 3 SOLUTION RESPIRATORY (INHALATION) at 08:56

## 2019-12-14 RX ADMIN — Medication SCH: at 18:09

## 2019-12-14 RX ADMIN — FUROSEMIDE SCH MG: 10 INJECTION, SOLUTION INTRAMUSCULAR; INTRAVENOUS at 16:39

## 2019-12-14 RX ADMIN — LEVOTHYROXINE SODIUM ANHYDROUS SCH MCG: 100 INJECTION, POWDER, LYOPHILIZED, FOR SOLUTION INTRAVENOUS at 08:29

## 2019-12-14 RX ADMIN — PANTOPRAZOLE SODIUM SCH MG: 40 INJECTION, POWDER, FOR SOLUTION INTRAVENOUS at 08:29

## 2019-12-14 RX ADMIN — PIPERACILLIN AND TAZOBACTAM SCH MLS/HR: 3; .375 INJECTION, POWDER, FOR SOLUTION INTRAVENOUS at 10:24

## 2019-12-14 RX ADMIN — LEUCINE, PHENYLALANINE, LYSINE, METHIONINE, ISOLEUCINE, VALINE, HISTIDINE, THREONINE, TRYPTOPHAN, ALANINE, GLYCINE, ARGININE, PROLINE, SERINE, TYROSINE, SODIUM ACETATE, DIBASIC POTASSIUM PHOSPHATE, MAGNESIUM CHLORIDE, SODIUM CHLORIDE, CALCIUM CHLORIDE, DEXTROSE SCH MLS/HR
311; 238; 247; 170; 255; 247; 204; 179; 77; 880; 438; 489; 289; 213; 17; 297; 261; 51; 77; 33; 10 INJECTION INTRAVENOUS at 17:29

## 2019-12-14 RX ADMIN — FUROSEMIDE SCH MG: 10 INJECTION, SOLUTION INTRAMUSCULAR; INTRAVENOUS at 00:02

## 2019-12-14 NOTE — P.PN
Progress Note - Text


Progress Note Date: 12/14/19





Chief Complaint: Lower GI bleed





Interval history:


This is a very pleasant 89-year-old patient who follows with visiting physicians

Dr. Ochoa.  Chronic stable medical conditions include congestive heart failure 

with EF of 50%, aortic stenosis, mitral regurgitation, mitral stenosis, 

tricuspid regurgitation, secondary probably hypertension, diabetes, 

hypertension, osteoarthritis, hypothyroid, blind left eye, coronary artery 

disease, [place] filter..  Patient does use a walker.  Patient did have a 

computed tomography scan of the abdomen on November 26 and did show mass along 

the lateral wall of the ascending colon and also adjacent soft tissue omental 

mass and is also another additional mass present in intra-abdominally.  

Suggestive of metastatic disease.  Patient yesterday had some bleeding per the 

right rectum.  And decided to come in.  Patient's been having lower abdominal 

discomfort.  She was admitted to the ER.  Patient normally does use a walker.  

Lives alone.  Colonoscopy done on December 4 shows ascending colon mass and AV 

malformation was some slight bleeding.  On December 7 patient underwent surgical

intervention to include right hemicolectomy, resection of transverse mesocolon 

tomor, small bowel resection from distal jejunum to ileum over 2 feet, repair of

incisional hernia, lysis of adhesions.  Patient was extubated on December 9.  On

TPN.  Abdominal incision wound VAC.


Today-. tired.  Had a good bowel movement.  Oral intake limited.  shortness of 

breath with activity.





Review of systems: Was done for constitutional, cardiovascular, GI, pulmonary. 

relevant finding as above





Active Medications





Acetaminophen (Tylenol Tab)  650 mg PO Q4HR PRN


   PRN Reason: Fever and/ or Pain


Hydrocodone Bitart/Acetaminophen (Norco 5-325)  1 each PO Q4HR PRN


   PRN Reason: Pain


   Last Admin: 12/11/19 10:53 Dose:  1 each


   Documented by: 


Albuterol/Ipratropium (Duoneb 0.5 Mg-3 Mg/3 Ml Soln)  3 ml INHALATION RT-QID [facility]


   Last Admin: 12/14/19 20:16 Dose:  3 ml


   Documented by: 


Albuterol/Ipratropium (Duoneb 0.5 Mg-3 Mg/3 Ml Soln)  3 ml INHALATION RT-Q2H PRN


   PRN Reason: Shortness Of Breath Or Wheezing


Furosemide (Lasix)  40 mg IV Q8HR [facility]


   Last Admin: 12/14/19 16:39 Dose:  40 mg


   Documented by: 


Heparin Sodium (Porcine) (Heparin)  5,000 unit SQ Q12HR [facility]


   Last Admin: 12/14/19 20:47 Dose:  5,000 unit


   Documented by: 


Hydromorphone HCl (Dilaudid)  0.5 mg IVP Q3HR PRN


   PRN Reason: Pain


   Last Admin: 12/14/19 15:39 Dose:  0.5 mg


   Documented by: 


Metronidazole 500 mg/ IV (Solution)  100 mls @ 100 mls/hr IVPB Q8HR [facility]


   Last Admin: 12/14/19 16:38 Dose:  100 mls/hr


   Documented by: 


Fat Emulsion Intravenous (Lipids 20%)  250 mls @ 21 mls/hr IV Q24H [facility]


   Last Admin: 12/14/19 15:36 Dose:  21 mls/hr


   Documented by: 


Parenteral Vitamin Supplement 10 ml/ Chromium/Copper/Manganese/Seleni/Zn 1 

ml/Amino Ac/Electrol/Dextrose/Calcium  1,011 mls @ 80 mls/hr IV .BY DURATION [facility]


   Last Admin: 12/14/19 17:28 Dose:  80 mls/hr


   Documented by: 


Amino Ac/Electrol/Dextrose/Calcium (Clinimix E 4.25%-D10% Solution)  1,000 mls @

80 mls/hr IV .BY DURATION [facility]


   Last Admin: 12/14/19 17:29 Dose:  80 mls/hr


   Documented by: 


Piperacillin Sod/Tazobactam (Sod 3.375 gm/ Sodium Chloride)  100 mls @ 25 mls/hr

IVPB Q8HR [facility]


   Last Admin: 12/14/19 17:24 Dose:  25 mls/hr


   Documented by: 


Insulin Aspart (Novolog)  0 unit SQ ACHS [facility]; Protocol


   Last Admin: 12/14/19 20:47 Dose:  5 unit


   Documented by: 


Levothyroxine Sodium (Synthroid Ivp)  56.25 mcg IV DAILY [facility]


   Last Admin: 12/14/19 08:29 Dose:  56.25 mcg


   Documented by: 


Metoclopramide HCl (Reglan)  10 mg IVP Q6HR PRN


   PRN Reason: Nausea And Vomiting


   Last Admin: 12/13/19 14:05 Dose:  10 mg


   Documented by: 


Miscellaneous Information (Magnesium Per Protocol)  1 each MISCELLANE DAILY PRN;

Protocol


   PRN Reason: Per Protocol


Miscellaneous Information (Potassium Per Protocol)  1 each MISCELLANE DAILY PRN;

Protocol


   PRN Reason: Per Protocol


Nalbuphine HCl (Nubain)  2.5 mg IV Q4HR PRN


   PRN Reason: Itching


Naloxone HCl (Narcan)  0.2 mg IV Q2M PRN


   PRN Reason: Opioid Reversal


Ondansetron HCl (Zofran)  4 mg IVP Q6HR PRN


   PRN Reason: Nausea And Vomiting


   Last Admin: 12/14/19 05:50 Dose:  4 mg


   Documented by: 


Pantoprazole Sodium (Protonix)  40 mg IV DAILY [facility]


   Last Admin: 12/14/19 08:29 Dose:  40 mg


   Documented by: 


Scopolamine (Transderm-Scop 1.5mg/72hr Patch)  1 patch TRANSDERM Q72H [facility]


   Last Admin: 12/12/19 12:15 Dose:  1 patch


   Documented by: 


Sodium Bicarbonate ()  10 ml PO AC-TID [facility]


   Last Admin: 12/14/19 18:09 Dose:  Not Given


   Documented by: 


Tramadol HCl (Ultram)  50 mg PO TID PRN


   PRN Reason: Mild Pain


   Last Admin: 12/13/19 14:05 Dose:  50 mg


   Documented by: 

















Physical examination:


VITAL SIGNS: 97.1, 116, 17, 139/60, 92% on 3 L


GENERAL: propped up in bed, tired


EYES: Pupils equal.  Conjunctiva pale


HEENT: External appearance of nose and ears normal, oral cavity grossly normal.


NECK: JVD not raised; masses not palpable.


HEART: First and second heart sounds are normal;  no edema.  


LUNGS: Respiratory rate normal; decreased breath sounds.  


ABDOMEN: Some tenderness, dressing over the wound, bowel sounds sluggish, mild 

tenderness no guarding or rigidity.  LYNDSEY drain.-  Minimal output 


PSYCH: Alert and oriented x3;  mood  and affect normal.  


MUSCULOSKELETAL: Evidence of OA especially in the hands and knees





INVESTIGATIONS, reviewed in the clinical context:


white count 13.6 hemoglobin 8.3 bun 48 crit 1.23





Previous testing


White count 9.6 hemoglobin 9.2 platelets 240 progression 4.7 BUN 73 creatinine 

1.79


Computed tomography scan of the abdomen-results noted as above





Assessment:


-Ascending colon mass, per colonoscopy, -right hemicolectomy, resection of trans

verse mesocolon tumor, small bowel resection from distal jejunum to ileum over 

2feet, repair of incisional hernia, lysis of adhesions.-Invasive poorly 

differentiated adenocarcinoma


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


-Coronary artery disease with prior history of bypass


-Probable chronic kidney disease from diabetic nephropathy and hypertensive 

nephrosclerosis


-Normocytic anemia suspected from chronic disease of malignancy


-Diabetes mellitus type 2


-Essential hypertension


-Primary osteoarthritis


-Chronic gout


-Macular degeneration of right eye


-Colonic diverticulosis 


-obstructive sleep apnea did use CPAP machine in the past


-Acute kidney injury, could be ATN multifactorial, improving


-No code





Plan


prognosis guarded.  Continue current medication treatment plan.  Antibiotics per

ID.  Hopefully ECF on Monday.

## 2019-12-14 NOTE — P.PN
Subjective


Progress Note Date: 12/14/19


Principal diagnosis: 





Bowel obstruction





Patient complains of fatigue.  Mild nausea.  Poor oral intake.  White blood cell

count 13.6.  She is afebrile.





Objective





- Vital Signs


Vital signs: 


                                   Vital Signs











Temp  97.8 F   12/14/19 04:26


 


Pulse  104 H  12/14/19 09:12


 


Resp  18   12/14/19 08:00


 


BP  128/68   12/14/19 04:26


 


Pulse Ox  95   12/14/19 04:26








                                 Intake & Output











 12/13/19 12/14/19 12/14/19





 18:59 06:59 18:59


 


Intake Total 2612.673 1051.728 277.1


 


Output Total 1450 2582 305


 


Balance 1162.673 -1530.272 -27.9


 


Weight 95 kg  


 


Intake:   


 


   584 100


 


    Fat Emulsion 20% 250 ml @ 44 84 





    21 mls/hr IV Q24H [facility]   





    #:[id number]   


 


    Normal saline 250 80 


 


    Piperacillin-Tazobactam 3 100  





    .375 gm In Sodium   





    Chloride 0.9% 100 ml @ 25   





    mls/hr IVPB Q12H [facility] Rx#   





    :[id number]   


 


    metroNIDAZOLE-NS  100 100 100





    mg In Saline 1 100ml.bag   





    @ 100 mls/hr IVPB Q8HR   





    [facility] Rx#:[id number]   


 


    pressure bag  320 


 


  Intake, IV Titration 1968.673 47.728 117.1





  Amount   


 


    Amino Acid 4.25%-D10w+ 520  





    Lytes*E* 1,000 ml @ 80   





    mls/hr IV .BY DURATION   





    [facility] Rx#:[id number]   


 


    Fat Emulsion 20% 250 ml @ 84  





    21 mls/hr IV Q24H [facility]   





    #:[id number]   


 


    Insulin Regular 100 unit 53.673 47.728 17.1





    In Sodium Chloride 0.9%   





    100 ml @ Titrate IV .Q0M   





    [facility] Rx#:[id number]   


 


    Mvi, Adult No.4 with Vit 1011  





    K 10 ml Trace (Conc-1Ml/   





    Dose) 1 ml In Amino Acid   





    4.25%-D10w+Lytes*E* 1,000   





    ml @ 80 mls/hr IV .BY   





    DURATION [facility] Rx#:   





    [id number]   


 


    Piperacillin-Tazobactam 3 100  100





    .375 gm In Sodium   





    Chloride 0.9% 100 ml @ 25   





    mls/hr IVPB Q8HR Counts include 234 beds at the [facility] Rx#   





    :[id number]   


 


    metroNIDAZOLE-NS  200  





    mg In Saline 1 100ml.bag   





    @ 100 mls/hr IVPB ONCE   





    [facility] Rx#:[id number]   


 


  Oral 150 420 60


 


Output:   


 


  Drainage 0 2 5


 


    Left Lower Abdomen 0 2 5


 


  Urine 1450 2550 


 


  Stool  30 300


 


  Emesis  0 


 


Other:   


 


  Voiding Method Bedside Commode Indwelling Catheter Indwelling Catheter





 Indwelling Catheter  


 


  # Bowel Movements  0 








                       ABP, PAP, CO, CI - Last Documented











Arterial Blood Pressure        105/49

















- Exam





Abdomen: Soft, mild distention, mild right-sided tenderness, dressing clean and 

dry





- Labs


CBC & Chem 7: 


                                 12/14/19 07:05





                                 12/14/19 07:05


Labs: 


                  Abnormal Lab Results - Last 24 Hours (Table)











  12/13/19 12/13/19 12/13/19 Range/Units





  12:19 12:56 14:04 


 


WBC     (3.8-10.6)  k/uL


 


RBC     (3.80-5.40)  m/uL


 


Hgb     (11.4-16.0)  gm/dL


 


Hct     (34.0-46.0)  %


 


MCHC     (31.0-37.0)  g/dL


 


RDW     (11.5-15.5)  %


 


Plt Count     (150-450)  k/uL


 


BUN     (7-17)  mg/dL


 


Creatinine     (0.52-1.04)  mg/dL


 


Glucose     (74-99)  mg/dL


 


POC Glucose (mg/dL)  205 H  194 H  201 H  (75-99)  mg/dL


 


Calcium     (8.4-10.2)  mg/dL














  12/13/19 12/13/19 12/13/19 Range/Units





  15:21 16:34 17:05 


 


WBC     (3.8-10.6)  k/uL


 


RBC     (3.80-5.40)  m/uL


 


Hgb     (11.4-16.0)  gm/dL


 


Hct     (34.0-46.0)  %


 


MCHC     (31.0-37.0)  g/dL


 


RDW     (11.5-15.5)  %


 


Plt Count     (150-450)  k/uL


 


BUN     (7-17)  mg/dL


 


Creatinine     (0.52-1.04)  mg/dL


 


Glucose     (74-99)  mg/dL


 


POC Glucose (mg/dL)  207 H  202 H  225 H  (75-99)  mg/dL


 


Calcium     (8.4-10.2)  mg/dL














  12/13/19 12/13/19 12/13/19 Range/Units





  18:06 19:53 22:20 


 


WBC     (3.8-10.6)  k/uL


 


RBC     (3.80-5.40)  m/uL


 


Hgb     (11.4-16.0)  gm/dL


 


Hct     (34.0-46.0)  %


 


MCHC     (31.0-37.0)  g/dL


 


RDW     (11.5-15.5)  %


 


Plt Count     (150-450)  k/uL


 


BUN     (7-17)  mg/dL


 


Creatinine     (0.52-1.04)  mg/dL


 


Glucose     (74-99)  mg/dL


 


POC Glucose (mg/dL)  213 H  196 H  215 H  (75-99)  mg/dL


 


Calcium     (8.4-10.2)  mg/dL














  12/14/19 12/14/19 12/14/19 Range/Units





  00:06 02:16 04:22 


 


WBC     (3.8-10.6)  k/uL


 


RBC     (3.80-5.40)  m/uL


 


Hgb     (11.4-16.0)  gm/dL


 


Hct     (34.0-46.0)  %


 


MCHC     (31.0-37.0)  g/dL


 


RDW     (11.5-15.5)  %


 


Plt Count     (150-450)  k/uL


 


BUN     (7-17)  mg/dL


 


Creatinine     (0.52-1.04)  mg/dL


 


Glucose     (74-99)  mg/dL


 


POC Glucose (mg/dL)  209 H  205 H  163 H  (75-99)  mg/dL


 


Calcium     (8.4-10.2)  mg/dL














  12/14/19 12/14/19 12/14/19 Range/Units





  05:45 07:05 07:05 


 


WBC    13.6 H  (3.8-10.6)  k/uL


 


RBC    2.82 L  (3.80-5.40)  m/uL


 


Hgb    8.3 L  (11.4-16.0)  gm/dL


 


Hct    27.3 L  (34.0-46.0)  %


 


MCHC    30.3 L  (31.0-37.0)  g/dL


 


RDW    17.6 H  (11.5-15.5)  %


 


Plt Count    149 L  (150-450)  k/uL


 


BUN   48 H   (7-17)  mg/dL


 


Creatinine   1.23 H   (0.52-1.04)  mg/dL


 


Glucose   148 H   (74-99)  mg/dL


 


POC Glucose (mg/dL)  161 H    (75-99)  mg/dL


 


Calcium   8.2 L   (8.4-10.2)  mg/dL














  12/14/19 12/14/19 Range/Units





  08:09 10:09 


 


WBC    (3.8-10.6)  k/uL


 


RBC    (3.80-5.40)  m/uL


 


Hgb    (11.4-16.0)  gm/dL


 


Hct    (34.0-46.0)  %


 


MCHC    (31.0-37.0)  g/dL


 


RDW    (11.5-15.5)  %


 


Plt Count    (150-450)  k/uL


 


BUN    (7-17)  mg/dL


 


Creatinine    (0.52-1.04)  mg/dL


 


Glucose    (74-99)  mg/dL


 


POC Glucose (mg/dL)  165 H  200 H  (75-99)  mg/dL


 


Calcium    (8.4-10.2)  mg/dL














Assessment and Plan


(1) Abdominal mass


Narrative/Plan: 


Continue diet as tolerated.  Increase activity.  Weaning TPN as able.  Possible 

rehab Monday.


Current Visit: Yes   Status: Acute   Priority: High   Code(s): R19.00 - INTRA-

ABD AND PELVIC SWELLING, MASS AND LUMP, UNSP SITE   SNOMED Code(s): 715823876

## 2019-12-14 NOTE — P.PN
Subjective


Progress Note Date: 12/14/19


Principal diagnosis: 





Rectal bleeding secondary to invading colon mass.  Status post exploratory 

laparotomy with lysis of adhesions, extensive right hemicolectomy, resection 

transverse colon, small bowel resection, repair of incisional hernia, placement 

of incisional wound Prevena system.





The patient is seen today 12/13/2019 in follow-up on the regular medical floor. 

She is currently resting in bed.  Awake and alert in no acute distress.  Her 

pain is currently fairly well controlled. She is still quite weak. No worsening 

shortness of breath, cough or congestion.  She is maintaining O2 saturations in 

the low 90s on 3 L/m per nasal cannula.  Afebrile. Urine culture reveals no 

growth.  Sputum culture reveals no growth.  Peritoneal fluid revealing no 

growth. White count 12.7.  Hemoglobin 8.2.  Platelet count 137,000.  Sodium 137.

 Potassium 4.0.  Creatinine 1.37. She is continued on TPN and lipids. She 

remains on bronchodilators, IV diuretics, antibiotics in the form of Zosyn along

with Flagyl. Currently in a negative balance.  She does have continued anasarca.





The patient is seen today 12/14/2019 in follow-up on the regular medical floor. 

She is doing a bit better today.  Currently sitting up in a chair at the 

bedside.  More awake and alert.  Maintaining O2 saturations in the low 90s on 3 

L/m per nasal cannula.  She's afebrile.  Hemodynamically stable.  Sputum culture

reveals no growth.  Urine culture reveals no growth.  Peritoneal fluid reveals 

no growth.  White count 13.6.  Hemoglobin 8.3.  Creatinine 1.23.





Objective





- Vital Signs


Vital signs: 


                                   Vital Signs











Temp  97.1 F L  12/14/19 12:00


 


Pulse  116 H  12/14/19 12:00


 


Resp  17   12/14/19 12:00


 


BP  139/60   12/14/19 12:00


 


Pulse Ox  92 L  12/14/19 12:00








                                 Intake & Output











 12/13/19 12/14/19 12/14/19





 18:59 06:59 18:59


 


Intake Total 2612.673 1051.728 287.433


 


Output Total 1450 2582 305


 


Balance 1162.673 -1530.272 -17.567


 


Weight 95 kg  


 


Intake:   


 


   584 100


 


    Fat Emulsion 20% 250 ml @ 44 84 





    21 mls/hr IV Q24H [facility]   





    #:[id number]   


 


    Normal saline 250 80 


 


    Piperacillin-Tazobactam 3 100  





    .375 gm In Sodium   





    Chloride 0.9% 100 ml @ 25   





    mls/hr IVPB Q12H [facility] Rx#   





    :[id number]   


 


    metroNIDAZOLE-NS  100 100 100





    mg In Saline 1 100ml.bag   





    @ 100 mls/hr IVPB Q8HR   





    [facility] Rx#:[id number]   


 


    pressure bag  320 


 


  Intake, IV Titration 1968.673 47.728 127.433





  Amount   


 


    Amino Acid 4.25%-D10w+ 520  





    Lytes*E* 1,000 ml @ 80   





    mls/hr IV .BY DURATION   





    [facility] Rx#:[id number]   


 


    Fat Emulsion 20% 250 ml @ 84  





    21 mls/hr IV Q24H [facility] Rx   





    #:[id number]   


 


    Insulin Regular 100 unit 53.673 47.728 27.433





    In Sodium Chloride 0.9%   





    100 ml @ Titrate IV .Q0M   





    [facility] Rx#:[id number]   


 


    Mvi, Adult No.4 with Vit 1011  





    K 10 ml Trace (Conc-1Ml/   





    Dose) 1 ml In Amino Acid   





    4.25%-D10w+Lytes*E* 1,000   





    ml @ 80 mls/hr IV .BY   





    DURATION [facility] Rx#:   





    [id number]   


 


    Piperacillin-Tazobactam 3 100  100





    .375 gm In Sodium   





    Chloride 0.9% 100 ml @ 25   





    mls/hr IVPB Q8HR [facility] Rx#   





    :[id number]   


 


    metroNIDAZOLE-NS  200  





    mg In Saline 1 100ml.bag   





    @ 100 mls/hr IVPB ONCE   





    [facility] Rx#:[id number]   


 


  Oral 150 420 60


 


Output:   


 


  Drainage 0 2 5


 


    Left Lower Abdomen 0 2 5


 


  Urine 1450 2550 


 


  Stool  30 300


 


  Emesis  0 


 


Other:   


 


  Voiding Method Bedside Commode Indwelling Catheter Indwelling Catheter





 Indwelling Catheter  


 


  # Bowel Movements  0 








                       ABP, PAP, CO, CI - Last Documented











Arterial Blood Pressure        105/49

















- Exam





GENERAL EXAM: Alert, pleasant 89-year-old female patient, up in a chair at the 

bedside, fairly comfortable in no apparent distress. On 3 L nasal cannula.


HEAD: Normocephalic.


EYES: Normal reaction of pupils, equal size.


NOSE: Clear with pink turbinates.


THROAT: No erythema or exudates.


NECK: No masses, no JVD.


CHEST: No chest wall deformity.


LUNGS: Equal air entry with few scattered rhonchi.


CVS: S1 and S2 normal with no audible murmur, regular rhythm.


ABDOMEN: Abdominal binder in place, no guarding or rigidity.


SPINE: No scoliosis or deformity


SKIN: No rashes


CENTRAL NERVOUS SYSTEM: No focal deficits, tone is normal in all 4 extremities.


EXTREMITIES: There is 1-2+ peripheral edema.  No clubbing, no cyanosis.  

Peripheral pulses are intact.





- Labs


CBC & Chem 7: 


                                 12/14/19 07:05





                                 12/14/19 07:05


Labs: 


                  Abnormal Lab Results - Last 24 Hours (Table)











  12/13/19 12/13/19 12/13/19 Range/Units





  14:04 15:21 16:34 


 


WBC     (3.8-10.6)  k/uL


 


RBC     (3.80-5.40)  m/uL


 


Hgb     (11.4-16.0)  gm/dL


 


Hct     (34.0-46.0)  %


 


MCHC     (31.0-37.0)  g/dL


 


RDW     (11.5-15.5)  %


 


Plt Count     (150-450)  k/uL


 


BUN     (7-17)  mg/dL


 


Creatinine     (0.52-1.04)  mg/dL


 


Glucose     (74-99)  mg/dL


 


POC Glucose (mg/dL)  201 H  207 H  202 H  (75-99)  mg/dL


 


Calcium     (8.4-10.2)  mg/dL














  12/13/19 12/13/19 12/13/19 Range/Units





  17:05 18:06 19:53 


 


WBC     (3.8-10.6)  k/uL


 


RBC     (3.80-5.40)  m/uL


 


Hgb     (11.4-16.0)  gm/dL


 


Hct     (34.0-46.0)  %


 


MCHC     (31.0-37.0)  g/dL


 


RDW     (11.5-15.5)  %


 


Plt Count     (150-450)  k/uL


 


BUN     (7-17)  mg/dL


 


Creatinine     (0.52-1.04)  mg/dL


 


Glucose     (74-99)  mg/dL


 


POC Glucose (mg/dL)  225 H  213 H  196 H  (75-99)  mg/dL


 


Calcium     (8.4-10.2)  mg/dL














  12/13/19 12/14/19 12/14/19 Range/Units





  22:20 00:06 02:16 


 


WBC     (3.8-10.6)  k/uL


 


RBC     (3.80-5.40)  m/uL


 


Hgb     (11.4-16.0)  gm/dL


 


Hct     (34.0-46.0)  %


 


MCHC     (31.0-37.0)  g/dL


 


RDW     (11.5-15.5)  %


 


Plt Count     (150-450)  k/uL


 


BUN     (7-17)  mg/dL


 


Creatinine     (0.52-1.04)  mg/dL


 


Glucose     (74-99)  mg/dL


 


POC Glucose (mg/dL)  215 H  209 H  205 H  (75-99)  mg/dL


 


Calcium     (8.4-10.2)  mg/dL














  12/14/19 12/14/19 12/14/19 Range/Units





  04:22 05:45 07:05 


 


WBC     (3.8-10.6)  k/uL


 


RBC     (3.80-5.40)  m/uL


 


Hgb     (11.4-16.0)  gm/dL


 


Hct     (34.0-46.0)  %


 


MCHC     (31.0-37.0)  g/dL


 


RDW     (11.5-15.5)  %


 


Plt Count     (150-450)  k/uL


 


BUN    48 H  (7-17)  mg/dL


 


Creatinine    1.23 H  (0.52-1.04)  mg/dL


 


Glucose    148 H  (74-99)  mg/dL


 


POC Glucose (mg/dL)  163 H  161 H   (75-99)  mg/dL


 


Calcium    8.2 L  (8.4-10.2)  mg/dL














  12/14/19 12/14/19 12/14/19 Range/Units





  07:05 08:09 10:09 


 


WBC  13.6 H    (3.8-10.6)  k/uL


 


RBC  2.82 L    (3.80-5.40)  m/uL


 


Hgb  8.3 L    (11.4-16.0)  gm/dL


 


Hct  27.3 L    (34.0-46.0)  %


 


MCHC  30.3 L    (31.0-37.0)  g/dL


 


RDW  17.6 H    (11.5-15.5)  %


 


Plt Count  149 L    (150-450)  k/uL


 


BUN     (7-17)  mg/dL


 


Creatinine     (0.52-1.04)  mg/dL


 


Glucose     (74-99)  mg/dL


 


POC Glucose (mg/dL)   165 H  200 H  (75-99)  mg/dL


 


Calcium     (8.4-10.2)  mg/dL














  12/14/19 Range/Units





  12:59 


 


WBC   (3.8-10.6)  k/uL


 


RBC   (3.80-5.40)  m/uL


 


Hgb   (11.4-16.0)  gm/dL


 


Hct   (34.0-46.0)  %


 


MCHC   (31.0-37.0)  g/dL


 


RDW   (11.5-15.5)  %


 


Plt Count   (150-450)  k/uL


 


BUN   (7-17)  mg/dL


 


Creatinine   (0.52-1.04)  mg/dL


 


Glucose   (74-99)  mg/dL


 


POC Glucose (mg/dL)  233 H  (75-99)  mg/dL


 


Calcium   (8.4-10.2)  mg/dL














Assessment and Plan


Assessment: 





#1 Colonic mass status post exploratory laparotomy with lysis of adhesions, 

right hemicolectomy, transverse colon resection, hernia repair, small bowel 

resection, placement of preventive wound VAC.  Postoperative day #7.  Biopsies 

positive for poorly differentiated adenocarcinoma.





#2 Postoperative respiratory failure with routine postoperative ventilator 

management successfully extubated on 12/09/2019.  Currently on 3 L/m per nasal 

cannula.





#3 History of GI bleed.





#4 Chronic iron deficiency anemia.





#5 Ischemic cardiomyopathy.





#6 Diastolic congestive heart failure.





#7 History of atrial fibrillation.





#8 History of pulmonary embolism, status post Langley filter placement





#9 Sleep apnea syndrome.





#10 Diabetes mellitus, type II.





#11 Diabetic neuropathy.





#12 Hypertension.





#13 Previous coronary artery bypass grafting and aortic valve replacement.





#14 History of depression.





#15 History of gout.





#16 Status post permanent pacemaker implantation.





#17 Hypothyroidism.





Plan:





The patient was seen and evaluated by Dr. Ortega.  She is improved today compared

to yesterday. She is again encouraged regarding the increased use of the 

incentive spirometer and cough and deep breathing exercises.  We will titrate 

down the FiO2 as tolerated.  Increase her activity as tolerated.  Continue the 

current treatment plan.  We'll continue to follow and make further 

recommendations based on her clinical status.





I, the cosigning physician, performed a history & physical examination of the 

patient. Lungs sounds few scattered rhonchi.  Maintaining good O2 saturations in

the 90s on 3 L/m per nasal cannula.  I discussed the assessment and plan of care

with my nurse practitioner, Cassie Cross. I attest to the above note as dictated 

by her.

## 2019-12-14 NOTE — PN
PROGRESS NOTE



The patient is seen for followup for acute kidney injury and volume overload.  The

patient remains on IV Lasix.  It was increased to 80 mg q.8 hours and it is now back

down to 40 mg Q 8 hours.  The patient is maintained on TPN as well.  Therefore, she has

not been able to achieve significant negative balance.  However for the last 24 hours,

she was -367 mL.  Overall, nausea is somewhat improved.



PHYSICAL EXAMINATION:

On examination today, blood pressure 128/68, heart rate 104 per minute, patient is

afebrile.  Examination of the heart S1, S2.  Examination of the lungs, bilateral breath

sounds are heard.

ABDOMEN:  Soft, nontender.  Examination of lower extremities shows no significant

edema.  CNS exam grossly intact.



LABS:

Sodium 138, potassium 4.1, BUN 48, creatinine 1.23, hemoglobin 8.3 g/dL.



ASSESSMENT:

1. Acute kidney injury, currently improved, mainly acute tubular necrosis and some

    degree of cardiorenal syndrome.

2. Congestive heart failure, diastolic dysfunction, acute on top of chronic, currently

    stable and improved.  Patient remains on IV Lasix which I will continue to try and

    keep her in negative balance.

3. Status post explorative laparotomy, hemicolectomy, lysis of adhesions and small-

    bowel resection.

4. History of atrial fibrillation, controlled ventricular response.



PLAN:

Continue IV Lasix.  Decrease TPN once patient is eating.





MMODL / IJN: [id number] / Job#: [id number]

## 2019-12-15 LAB
ANION GAP SERPL CALC-SCNC: 8 MMOL/L
BUN SERPL-SCNC: 54 MG/DL (ref 7–17)
CALCIUM SPEC-MCNC: 8.4 MG/DL (ref 8.4–10.2)
CHLORIDE SERPL-SCNC: 104 MMOL/L (ref 98–107)
CO2 SERPL-SCNC: 26 MMOL/L (ref 22–30)
GLUCOSE BLD-MCNC: 164 MG/DL (ref 75–99)
GLUCOSE BLD-MCNC: 211 MG/DL (ref 75–99)
GLUCOSE BLD-MCNC: 228 MG/DL (ref 75–99)
GLUCOSE BLD-MCNC: 234 MG/DL (ref 75–99)
GLUCOSE SERPL-MCNC: 200 MG/DL (ref 74–99)
MAGNESIUM SPEC-SCNC: 2.3 MG/DL (ref 1.6–2.3)
POTASSIUM SERPL-SCNC: 3.8 MMOL/L (ref 3.5–5.1)
SODIUM SERPL-SCNC: 138 MMOL/L (ref 137–145)

## 2019-12-15 RX ADMIN — IPRATROPIUM BROMIDE AND ALBUTEROL SULFATE SCH ML: .5; 3 SOLUTION RESPIRATORY (INHALATION) at 19:05

## 2019-12-15 RX ADMIN — IPRATROPIUM BROMIDE AND ALBUTEROL SULFATE SCH ML: .5; 3 SOLUTION RESPIRATORY (INHALATION) at 15:03

## 2019-12-15 RX ADMIN — FUROSEMIDE SCH MG: 10 INJECTION, SOLUTION INTRAMUSCULAR; INTRAVENOUS at 00:27

## 2019-12-15 RX ADMIN — LEUCINE, PHENYLALANINE, LYSINE, METHIONINE, ISOLEUCINE, VALINE, HISTIDINE, THREONINE, TRYPTOPHAN, ALANINE, GLYCINE, ARGININE, PROLINE, SERINE, TYROSINE, SODIUM ACETATE, DIBASIC POTASSIUM PHOSPHATE, MAGNESIUM CHLORIDE, SODIUM CHLORIDE, CALCIUM CHLORIDE, DEXTROSE SCH
311; 238; 247; 170; 255; 247; 204; 179; 77; 880; 438; 489; 289; 213; 17; 297; 261; 51; 77; 33; 10 INJECTION INTRAVENOUS at 21:58

## 2019-12-15 RX ADMIN — METRONIDAZOLE SCH MLS/HR: 500 INJECTION, SOLUTION INTRAVENOUS at 08:30

## 2019-12-15 RX ADMIN — Medication SCH ML: at 08:31

## 2019-12-15 RX ADMIN — METOCLOPRAMIDE PRN MG: 5 INJECTION, SOLUTION INTRAMUSCULAR; INTRAVENOUS at 09:14

## 2019-12-15 RX ADMIN — INSULIN ASPART SCH UNIT: 100 INJECTION, SOLUTION INTRAVENOUS; SUBCUTANEOUS at 20:49

## 2019-12-15 RX ADMIN — HYDROCODONE BITARTRATE AND ACETAMINOPHEN PRN EACH: 5; 325 TABLET ORAL at 04:55

## 2019-12-15 RX ADMIN — IPRATROPIUM BROMIDE AND ALBUTEROL SULFATE SCH ML: .5; 3 SOLUTION RESPIRATORY (INHALATION) at 07:46

## 2019-12-15 RX ADMIN — INSULIN ASPART SCH UNIT: 100 INJECTION, SOLUTION INTRAVENOUS; SUBCUTANEOUS at 09:14

## 2019-12-15 RX ADMIN — METRONIDAZOLE SCH MLS/HR: 500 INJECTION, SOLUTION INTRAVENOUS at 23:11

## 2019-12-15 RX ADMIN — SOYBEAN OIL SCH MLS/HR: 20 INJECTION, SOLUTION INTRAVENOUS at 13:12

## 2019-12-15 RX ADMIN — PIPERACILLIN AND TAZOBACTAM SCH MLS/HR: 3; .375 INJECTION, POWDER, FOR SOLUTION INTRAVENOUS at 00:27

## 2019-12-15 RX ADMIN — Medication SCH: at 17:29

## 2019-12-15 RX ADMIN — FUROSEMIDE SCH MG: 10 INJECTION, SOLUTION INTRAMUSCULAR; INTRAVENOUS at 09:14

## 2019-12-15 RX ADMIN — IPRATROPIUM BROMIDE AND ALBUTEROL SULFATE SCH: .5; 3 SOLUTION RESPIRATORY (INHALATION) at 12:08

## 2019-12-15 RX ADMIN — PIPERACILLIN AND TAZOBACTAM SCH MLS/HR: 3; .375 INJECTION, POWDER, FOR SOLUTION INTRAVENOUS at 16:33

## 2019-12-15 RX ADMIN — INSULIN ASPART SCH UNIT: 100 INJECTION, SOLUTION INTRAVENOUS; SUBCUTANEOUS at 18:30

## 2019-12-15 RX ADMIN — LEVOTHYROXINE SODIUM ANHYDROUS SCH MCG: 100 INJECTION, POWDER, LYOPHILIZED, FOR SOLUTION INTRAVENOUS at 08:31

## 2019-12-15 RX ADMIN — PIPERACILLIN AND TAZOBACTAM SCH MLS/HR: 3; .375 INJECTION, POWDER, FOR SOLUTION INTRAVENOUS at 23:12

## 2019-12-15 RX ADMIN — Medication SCH ML: at 13:12

## 2019-12-15 RX ADMIN — METOCLOPRAMIDE PRN MG: 5 INJECTION, SOLUTION INTRAMUSCULAR; INTRAVENOUS at 16:33

## 2019-12-15 RX ADMIN — HEPARIN SODIUM SCH UNIT: 5000 INJECTION, SOLUTION INTRAVENOUS; SUBCUTANEOUS at 09:14

## 2019-12-15 RX ADMIN — FUROSEMIDE SCH MG: 10 INJECTION, SOLUTION INTRAMUSCULAR; INTRAVENOUS at 23:11

## 2019-12-15 RX ADMIN — PANTOPRAZOLE SODIUM SCH MG: 40 INJECTION, POWDER, FOR SOLUTION INTRAVENOUS at 09:14

## 2019-12-15 RX ADMIN — METRONIDAZOLE SCH MLS/HR: 500 INJECTION, SOLUTION INTRAVENOUS at 00:27

## 2019-12-15 RX ADMIN — FUROSEMIDE SCH MG: 10 INJECTION, SOLUTION INTRAMUSCULAR; INTRAVENOUS at 16:34

## 2019-12-15 RX ADMIN — LEUCINE, PHENYLALANINE, LYSINE, METHIONINE, ISOLEUCINE, VALINE, HISTIDINE, THREONINE, TRYPTOPHAN, ALANINE, GLYCINE, ARGININE, PROLINE, SERINE, TYROSINE, SODIUM ACETATE, DIBASIC POTASSIUM PHOSPHATE, MAGNESIUM CHLORIDE, SODIUM CHLORIDE, CALCIUM CHLORIDE, DEXTROSE SCH MLS/HR
311; 238; 247; 170; 255; 247; 204; 179; 77; 880; 438; 489; 289; 213; 17; 297; 261; 51; 77; 33; 10 INJECTION INTRAVENOUS at 04:52

## 2019-12-15 RX ADMIN — INSULIN ASPART SCH UNIT: 100 INJECTION, SOLUTION INTRAVENOUS; SUBCUTANEOUS at 13:11

## 2019-12-15 RX ADMIN — HEPARIN SODIUM SCH UNIT: 5000 INJECTION, SOLUTION INTRAVENOUS; SUBCUTANEOUS at 20:49

## 2019-12-15 RX ADMIN — SCOPALAMINE SCH PATCH: 1 PATCH, EXTENDED RELEASE TRANSDERMAL at 08:31

## 2019-12-15 RX ADMIN — LEUCINE, PHENYLALANINE, LYSINE, METHIONINE, ISOLEUCINE, VALINE, HISTIDINE, THREONINE, TRYPTOPHAN, ALANINE, GLYCINE, ARGININE, PROLINE, SERINE, TYROSINE, SODIUM ACETATE, DIBASIC POTASSIUM PHOSPHATE, MAGNESIUM CHLORIDE, SODIUM CHLORIDE, CALCIUM CHLORIDE, DEXTROSE SCH MLS/HR
311; 238; 247; 170; 255; 247; 204; 179; 77; 880; 438; 489; 289; 213; 17; 297; 261; 51; 77; 33; 10 INJECTION INTRAVENOUS at 21:52

## 2019-12-15 RX ADMIN — PIPERACILLIN AND TAZOBACTAM SCH MLS/HR: 3; .375 INJECTION, POWDER, FOR SOLUTION INTRAVENOUS at 08:30

## 2019-12-15 NOTE — P.PN
Subjective


Progress Note Date: 12/15/19


Principal diagnosis: 





Rectal bleeding secondary to invading colon mass.  Status post exploratory 

laparotomy with lysis of adhesions, extensive right hemicolectomy, resection 

transverse colon, small bowel resection, repair of incisional hernia, placement 

of incisional wound Prevena system.





The patient is seen today 12/13/2019 in follow-up on the regular medical floor. 

She is currently resting in bed.  Awake and alert in no acute distress.  Her 

pain is currently fairly well controlled. She is still quite weak. No worsening 

shortness of breath, cough or congestion.  She is maintaining O2 saturations in 

the low 90s on 3 L/m per nasal cannula.  Afebrile. Urine culture reveals no 

growth.  Sputum culture reveals no growth.  Peritoneal fluid revealing no 

growth. White count 12.7.  Hemoglobin 8.2.  Platelet count 137,000.  Sodium 137.

 Potassium 4.0.  Creatinine 1.37. She is continued on TPN and lipids. She 

remains on bronchodilators, IV diuretics, antibiotics in the form of Zosyn along

with Flagyl. Currently in a negative balance.  She does have continued anasarca.





The patient is seen today 12/14/2019 in follow-up on the regular medical floor. 

She is doing a bit better today.  Currently sitting up in a chair at the 

bedside.  More awake and alert.  Maintaining O2 saturations in the low 90s on 3 

L/m per nasal cannula.  She's afebrile.  Hemodynamically stable.  Sputum culture

reveals no growth.  Urine culture reveals no growth.  Peritoneal fluid reveals 

no growth.  White count 13.6.  Hemoglobin 8.3.  Creatinine 1.23.





The patient is seen today 12/15/2019 in follow-up on the regular medical floor. 

She is currently sitting up at the bedside.  Awake and alert in no acute 

distress.  Currently maintaining O2 saturations in the mid 90s on 3 L/m per 

nasal cannula.  She's been afebrile.  Urine, sputum and peritoneal fluid 

cultures all reveal no growth.  Sodium 138.  Potassium 3.8.  BUN 54.  Creatinine

1.41.  Receiving TPN and lipids for nutritional support.  Remains on IV 

diuretics.  Continued on bronchodilators and IV Zosyn.





Objective





- Vital Signs


Vital signs: 


                                   Vital Signs











Temp  98.6 F   12/15/19 05:00


 


Pulse  92   12/15/19 07:55


 


Resp  16   12/15/19 05:00


 


BP  132/74   12/15/19 05:00


 


Pulse Ox  96   12/15/19 05:00








                                 Intake & Output











 12/14/19 12/15/19 12/15/19





 18:59 06:59 18:59


 


Intake Total 2290.117 912 


 


Output Total 2410 962 


 


Balance -119.883 -50 


 


Intake:   


 


    


 


    metroNIDAZOLE-NS  100  





    mg In Saline 1 100ml.bag   





    @ 100 mls/hr IVPB Q8HR   





    DAVID Rx#:[id number]   


 


  Intake, IV Titration 2070.117 912 





  Amount   


 


    Amino Acid 4.25%-D10w+ 1000  





    Lytes*E* 1,000 ml @ 80   





    mls/hr IV .BY DURATION   





    DAVID Rx#:[id number]   


 


    Insulin Regular 100 unit 39.450  





    In Sodium Chloride 0.9%   





    100 ml @ Titrate IV .Q0M   





    DAVID Rx#:[id number]   


 


    Mvi, Adult No.4 with Vit 930.667 912 





    K 10 ml Trace (Conc-1Ml/   





    Dose) 1 ml In Amino Acid   





    4.25%-D10w+Lytes*E* 1,000   





    ml @ 80 mls/hr IV .BY   





    DURATION DAVID Rx#:   





    [id number]   


 


    Piperacillin-Tazobactam 3 100  





    .375 gm In Sodium   





    Chloride 0.9% 100 ml @ 25   





    mls/hr IVPB Q8HR DAVID Rx#   





    :[id number]   


 


  Oral 120  


 


Output:   


 


  Drainage 10 12 


 


    Left Lower Abdomen 10 12 


 


  Urine 1500 950 


 


  Stool 900  


 


Other:   


 


  Voiding Method Indwelling Catheter Indwelling Catheter 


 


  # Voids 1  


 


  # Bowel Movements  1 1








                       ABP, PAP, CO, CI - Last Documented











Arterial Blood Pressure        105/49

















- Exam





GENERAL EXAM: Alert, pleasant 89-year-old female patient, up in a chair at the 

bedside, fairly comfortable in no apparent distress. On 3 L nasal cannula.


HEAD: Normocephalic.


EYES: Normal reaction of pupils, equal size.


NOSE: Clear with pink turbinates.


THROAT: No erythema or exudates.


NECK: No masses, no JVD.


CHEST: No chest wall deformity.


LUNGS: Equal air entry with few scattered rhonchi.


CVS: S1 and S2 normal with no audible murmur, regular rhythm.


ABDOMEN: Abdominal binder in place, no guarding or rigidity.


SPINE: No scoliosis or deformity


SKIN: No rashes


CENTRAL NERVOUS SYSTEM: No focal deficits, tone is normal in all 4 extremities.


EXTREMITIES: There is 1-2+ peripheral edema.  No clubbing, no cyanosis.  

Peripheral pulses are intact.





- Labs


CBC & Chem 7: 


                                 12/14/19 07:05





                                 12/15/19 05:45


Labs: 


                  Abnormal Lab Results - Last 24 Hours (Table)











  12/14/19 12/14/19 12/14/19 Range/Units





  12:59 13:58 16:27 


 


BUN     (7-17)  mg/dL


 


Creatinine     (0.52-1.04)  mg/dL


 


Glucose     (74-99)  mg/dL


 


POC Glucose (mg/dL)  233 H  131 H  118 H  (75-99)  mg/dL














  12/14/19 12/15/19 12/15/19 Range/Units





  20:35 05:45 07:31 


 


BUN   54 H   (7-17)  mg/dL


 


Creatinine   1.41 H   (0.52-1.04)  mg/dL


 


Glucose   200 H   (74-99)  mg/dL


 


POC Glucose (mg/dL)  228 H   211 H  (75-99)  mg/dL














Assessment and Plan


Assessment: 





#1 Colonic mass status post exploratory laparotomy with lysis of adhesions, 

right hemicolectomy, transverse colon resection, hernia repair, small bowel 

resection, placement of preventive wound VAC.  Postoperative day #8.  Biopsies 

positive for poorly differentiated adenocarcinoma.





#2 Postoperative respiratory failure with routine postoperative ventilator 

management successfully extubated on 12/09/2019.  Currently on 3 L/m per nasal 

cannula.





#3 History of GI bleed.





#4 Chronic iron deficiency anemia.





#5 Ischemic cardiomyopathy.





#6 Diastolic congestive heart failure.





#7 History of atrial fibrillation.





#8 History of pulmonary embolism, status post Carley filter placement





#9 Sleep apnea syndrome.





#10 Diabetes mellitus, type II.





#11 Diabetic neuropathy.





#12 Hypertension.





#13 Previous coronary artery bypass grafting and aortic valve replacement.





#14 History of depression.





#15 History of gout.





#16 Status post permanent pacemaker implantation.





#17 Hypothyroidism.





Plan:





The patient was seen and evaluated by Dr. Ortega. She is again encouraged 

regarding the increased use of the incentive spirometer and cough and deep 

breathing exercises.  We will titrate down the FiO2 as tolerated.  Increase her 

activity as tolerated.  Continue the current treatment plan.  We'll continue to 

follow and make further recommendations based on her clinical status.





I, the cosigning physician, performed a history & physical examination of the 

patient. Lungs sounds few scattered rhonchi.  Maintaining good O2 saturations in

the 90s on 3 L/m per nasal cannula.  I discussed the assessment and plan of care

with my nurse practitioner, Cassie Cross. I attest to the above note as dictated 

by her.

## 2019-12-15 NOTE — P.PN
Progress Note - Text


Progress Note Date: 12/15/19





Chief Complaint: Lower GI bleed





Interval history:


This is a very pleasant 89-year-old patient who follows with visiting physicians

Dr. Ochoa.  Chronic stable medical conditions include congestive heart failure 

with EF of 50%, aortic stenosis, mitral regurgitation, mitral stenosis, 

tricuspid regurgitation, secondary probably hypertension, diabetes, 

hypertension, osteoarthritis, hypothyroid, blind left eye, coronary artery 

disease, Guttenberg filter..  Patient does use a walker.  Patient did have a 

computed tomography scan of the abdomen on November 26 and did show mass along 

the lateral wall of the ascending colon and also adjacent soft tissue omental 

mass and is also another additional mass present in intra-abdominally.  

Suggestive of metastatic disease.  Patient yesterday had some bleeding per the 

right rectum.  And decided to come in.  Patient's been having lower abdominal 

discomfort.  She was admitted to the ER.  Patient normally does use a walker.  

Lives alone.  Colonoscopy done on December 4 shows ascending colon mass and AV 

malformation was some slight bleeding.  On December 7 patient underwent surgical

intervention to include right hemicolectomy, resection of transverse mesocolon 

tomor, small bowel resection from distal jejunum to ileum over 2 feet, repair of

incisional hernia, lysis of adhesions.  Patient was extubated on December 9.  On

TPN.  Abdominal incision wound VAC.


Today-.  Remains tired.  With minimal activity.  Decreased oral intake.  

Patient's son and daughter the bedside..





Review of systems: Was done for constitutional, cardiovascular, GI, pulmonary. 

relevant finding as above





Active Medications





Acetaminophen (Tylenol Tab)  650 mg PO Q4HR PRN


   PRN Reason: Fever and/ or Pain


Hydrocodone Bitart/Acetaminophen (Norco 5-325)  1 each PO Q4HR PRN


   PRN Reason: Pain


   Last Admin: 12/15/19 04:55 Dose:  1 each


   Documented by: 


Albuterol/Ipratropium (Duoneb 0.5 Mg-3 Mg/3 Ml Soln)  3 ml INHALATION RT-QID [facility]


   Last Admin: 12/15/19 19:05 Dose:  3 ml


   Documented by: 


Albuterol/Ipratropium (Duoneb 0.5 Mg-3 Mg/3 Ml Soln)  3 ml INHALATION RT-Q2H PRN


   PRN Reason: Shortness Of Breath Or Wheezing


Furosemide (Lasix)  40 mg IV Q8HR [facility]


   Last Admin: 12/15/19 16:34 Dose:  40 mg


   Documented by: 


Heparin Sodium (Porcine) (Heparin)  5,000 unit SQ Q12HR [facility]


   Last Admin: 12/15/19 09:14 Dose:  5,000 unit


   Documented by: 


Hydromorphone HCl (Dilaudid)  0.5 mg IVP Q3HR PRN


   PRN Reason: Pain


   Last Admin: 12/14/19 21:39 Dose:  0.5 mg


   Documented by: 


Fat Emulsion Intravenous (Lipids 20%)  250 mls @ 21 mls/hr IV Q24H [facility]


   Last Admin: 12/15/19 13:12 Dose:  21 mls/hr


   Documented by: 


Parenteral Vitamin Supplement 10 ml/ Chromium/Copper/Manganese/Seleni/Zn 1 

ml/Amino Ac/Electrol/Dextrose/Calcium  1,011 mls @ 80 mls/hr IV .BY DURATION [facility]


   Last Admin: 12/15/19 04:52 Dose:  80 mls/hr


   Documented by: 


Amino Ac/Electrol/Dextrose/Calcium (Clinimix E 4.25%-D10% Solution)  1,000 mls @

80 mls/hr IV .BY DURATION [facility]


   Last Admin: 12/14/19 17:29 Dose:  80 mls/hr


   Documented by: 


Piperacillin Sod/Tazobactam (Sod 3.375 gm/ Sodium Chloride)  100 mls @ 25 mls/hr

IVPB Q8HR [facility]


   Last Admin: 12/15/19 16:33 Dose:  25 mls/hr


   Documented by: 


Insulin Aspart (Novolog)  0 unit SQ ACHS [facility]; Protocol


   Last Admin: 12/15/19 18:30 Dose:  1 unit


   Documented by: 


Levothyroxine Sodium (Synthroid)  75 mcg PO DAILY@0630 [facility]


Metoclopramide HCl (Reglan)  10 mg IVP Q6HR PRN


   PRN Reason: Nausea And Vomiting


   Last Admin: 12/15/19 16:33 Dose:  10 mg


   Documented by: 


Metronidazole (Flagyl)  500 mg PO Q8HR [facility]


   Last Admin: 12/15/19 16:42 Dose:  500 mg


   Documented by: 


Miscellaneous Information (Magnesium Per Protocol)  1 each MISCELLANE DAILY PRN;

Protocol


   PRN Reason: Per Protocol


Miscellaneous Information (Potassium Per Protocol)  1 each MISCELLANE DAILY PRN;

Protocol


   PRN Reason: Per Protocol


Nalbuphine HCl (Nubain)  2.5 mg IV Q4HR PRN


   PRN Reason: Itching


Naloxone HCl (Narcan)  0.2 mg IV Q2M PRN


   PRN Reason: Opioid Reversal


Ondansetron HCl (Zofran)  4 mg IVP Q6HR PRN


   PRN Reason: Nausea And Vomiting


   Last Admin: 12/14/19 05:50 Dose:  4 mg


   Documented by: 


Pantoprazole Sodium (Protonix)  40 mg PO AC-BRKFST [facility]


Scopolamine (Transderm-Scop 1.5mg/72hr Patch)  1 patch TRANSDERM Q72H [facility]


   Last Admin: 12/15/19 08:31 Dose:  1 patch


   Documented by: 


Sodium Bicarbonate ()  10 ml PO AC-TID [facility]


   Last Admin: 12/15/19 17:29 Dose:  Not Given


   Documented by: 


Tramadol HCl (Ultram)  50 mg PO TID PRN


   PRN Reason: Mild Pain


   Last Admin: 12/15/19 09:13 Dose:  50 mg


   Documented by: 











Physical examination:


VITAL SIGNS: 97.4, 98, 16, 121/68, 96% room air


GENERAL: reclined in bed, tired


EYES: Pupils equal.  Conjunctiva pale


HEENT: External appearance of nose and ears normal, oral cavity grossly normal.


NECK: JVD not raised; masses not palpable.


HEART: First and second heart sounds are normal;  no edema.  


LUNGS: Respiratory rate normal; decreased breath sounds.  


ABDOMEN: Some tenderness, dressing over the wound, bowel sounds sluggish, mild 

tenderness no guarding or rigidity.  LYNDSEY drain.-  Minimal output 


PSYCH: Alert and oriented x3;  mood  and affect normal.  


MUSCULOSKELETAL: Evidence of OA especially in the hands and knees





INVESTIGATIONS, reviewed in the clinical context:


Bun 54 creatinine 1.4 on





Previous testing


White count 9.6 hemoglobin 9.2 platelets 240 progression 4.7 BUN 73 creatinine 

1.79


Computed tomography scan of the abdomen-results noted as above





Assessment:


-Ascending colon mass, per colonoscopy, -right hemicolectomy, resection of 

transverse mesocolon tumor, small bowel resection from distal jejunum to ileum 

over 2feet, repair of incisional hernia, lysis of adhesions.-Invasive poorly 

differentiated adenocarcinoma


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


-Coronary artery disease with prior history of bypass


-Probable chronic kidney disease from diabetic nephropathy and hypertensive 

nephrosclerosis


-Normocytic anemia suspected from chronic disease of malignancy


-Diabetes mellitus type 2


-Essential hypertension


-Primary osteoarthritis


-Chronic gout


-Macular degeneration of right eye


-Colonic diverticulosis 


-obstructive sleep apnea did use CPAP machine in the past


-Acute kidney injury, could be ATN multifactorial, improving


-No code





Plan


Spoke to the patient's son and daughter.  And the patient.  Patient somewhat 

disinclined to proceed with further treatment with the cancer.  Her decided a 

discussion oncologist.  Earlier patient to the ECF tomorrow.  DC TPN lipids.

## 2019-12-15 NOTE — P.PN
Subjective


Progress Note Date: 12/15/19


Principal diagnosis: 





Bowel obstruction





patient is doing well today.  More alert.  Tolerating some of her diet.  She is 

having bowel activity with flatus and bowel movements.  Slight confusion today 

per family.





Objective





- Vital Signs


Vital signs: 


                                   Vital Signs











Temp  97.4 F L  12/15/19 12:38


 


Pulse  98   12/15/19 12:38


 


Resp  16   12/15/19 12:38


 


BP  121/68   12/15/19 12:38


 


Pulse Ox  96   12/15/19 12:38








                                 Intake & Output











 12/14/19 12/15/19 12/15/19





 18:59 06:59 18:59


 


Intake Total 2290.117 912 


 


Output Total 2410 962 4


 


Balance -119.883 -50 -4


 


Intake:   


 


    


 


    metroNIDAZOLE-NS  100  





    mg In Saline 1 100ml.bag   





    @ 100 mls/hr IVPB Q8HR   





    DAVID Rx#:[id number]   


 


  Intake, IV Titration 2070.117 912 





  Amount   


 


    Amino Acid 4.25%-D10w+ 1000  





    Lytes*E* 1,000 ml @ 80   





    mls/hr IV .BY DURATION   





    DAVID Rx#:[id number]   


 


    Insulin Regular 100 unit 39.450  





    In Sodium Chloride 0.9%   





    100 ml @ Titrate IV .Q0M   





    DAVID Rx#:[id number]   


 


    Mvi, Adult No.4 with Vit 930.667 912 





    K 10 ml Trace (Conc-1Ml/   





    Dose) 1 ml In Amino Acid   





    4.25%-D10w+Lytes*E* 1,000   





    ml @ 80 mls/hr IV .BY   





    DURATION DAVID Rx#:   





    [id number]   


 


    Piperacillin-Tazobactam 3 100  





    .375 gm In Sodium   





    Chloride 0.9% 100 ml @ 25   





    mls/hr IVPB Q8HR [facility] Rx#   





    :[id number]   


 


  Oral 120  


 


Output:   


 


  Drainage 10 12 4


 


    Left Lower Abdomen 10 12 4


 


  Urine 1500 950 


 


  Stool 900  


 


Other:   


 


  Voiding Method Indwelling Catheter Indwelling Catheter Indwelling Catheter


 


  # Voids 1  


 


  # Bowel Movements  1 1








                       ABP, PAP, CO, CI - Last Documented











Arterial Blood Pressure        105/49

















- Exam





abdomen: Soft, nondistended, incision clean and dry, nontender





- Labs


CBC & Chem 7: 


                                 12/14/19 07:05





                                 12/15/19 05:45


Labs: 


                  Abnormal Lab Results - Last 24 Hours (Table)











  12/14/19 12/14/19 12/14/19 Range/Units





  12:59 13:58 16:27 


 


BUN     (7-17)  mg/dL


 


Creatinine     (0.52-1.04)  mg/dL


 


Glucose     (74-99)  mg/dL


 


POC Glucose (mg/dL)  233 H  131 H  118 H  (75-99)  mg/dL














  12/14/19 12/15/19 12/15/19 Range/Units





  20:35 05:45 07:31 


 


BUN   54 H   (7-17)  mg/dL


 


Creatinine   1.41 H   (0.52-1.04)  mg/dL


 


Glucose   200 H   (74-99)  mg/dL


 


POC Glucose (mg/dL)  228 H   211 H  (75-99)  mg/dL














  12/15/19 Range/Units





  10:58 


 


BUN   (7-17)  mg/dL


 


Creatinine   (0.52-1.04)  mg/dL


 


Glucose   (74-99)  mg/dL


 


POC Glucose (mg/dL)  234 H  (75-99)  mg/dL














Assessment and Plan


(1) Abdominal mass


Narrative/Plan: 


patient doing better today.  Continue diet as tolerated.  Continue physical 

therapy.  Ambulate.  Possible transfer tomorrow.  Wean off TPN if oral intake 

improved.


Current Visit: Yes   Status: Acute   Priority: High   Code(s): R19.00 - INTRA-

ABD AND PELVIC SWELLING, MASS AND LUMP, UNSP SITE   SNOMED Code(s): 386076755

## 2019-12-15 NOTE — PN
PROGRESS NOTE



The patient is seen for followup for acute kidney injury.  The patient is currently

being diuresed as she is maintained on TPN and has a history of CHF with evidence of

volume overload.  She has not been eating much and is therefore maintained on TPN.

Ejection fraction was 50-55 percent in August of 2019.

Patient is status post explorative laparotomy, right hemicolectomy for colon mass and

small-bowel resection and repair of incisional hernia on 12/7/2019.



On examination today, blood pressure was 121/68, heart rate 98 per minute, patient is

afebrile.  Examination of the heart S1, S2.  Examination of the lungs, bilateral breath

sounds are heard.  Decreased breath sounds at bases.  Abdomen is soft.  Some tenderness

is noted, generalized.  No rebound tenderness noted.  Exam of lower extremities shows

edema 1+ bilaterally.



LABS SHOW:

Sodium 138, potassium 3.8, BUN 54, creatinine 1.4.



ASSESSMENT:

1. Acute kidney injury associated with hypotension, hypoperfusion and cardiorenal.

    Serum creatinine slightly higher than yesterday.  Continue to monitor renal

    function for now.  The patient is not in significant negative balance due to TPN.

    She is maintained on IV Lasix which I will continue.  There are no other

    nephrotoxic agents on board.

2. Status post right hemicolectomy for colonic mass with small-bowel resection and

    repair of incisional hernia, currently maintained on TPN.

3. Gastroesophageal reflux disease.

4. Volume overload.

5. Diastolic heart failure, currently being diuresed.

6. History of atrial fibrillation with controlled ventricular response.



PLAN:

Continue current dose of IV Lasix.  Repeat labs in a.m.





MMODL / IJN: [id number] / Job#: [id number]

## 2019-12-16 VITALS — TEMPERATURE: 98.3 F | DIASTOLIC BLOOD PRESSURE: 55 MMHG | SYSTOLIC BLOOD PRESSURE: 122 MMHG | RESPIRATION RATE: 22 BRPM

## 2019-12-16 VITALS — HEART RATE: 96 BPM

## 2019-12-16 LAB
ANION GAP SERPL CALC-SCNC: 7 MMOL/L
BUN SERPL-SCNC: 56 MG/DL (ref 7–17)
CALCIUM SPEC-MCNC: 8.5 MG/DL (ref 8.4–10.2)
CHLORIDE SERPL-SCNC: 106 MMOL/L (ref 98–107)
CO2 SERPL-SCNC: 26 MMOL/L (ref 22–30)
GLUCOSE BLD-MCNC: 190 MG/DL (ref 75–99)
GLUCOSE BLD-MCNC: 208 MG/DL (ref 75–99)
GLUCOSE SERPL-MCNC: 183 MG/DL (ref 74–99)
MAGNESIUM SPEC-SCNC: 2.3 MG/DL (ref 1.6–2.3)
POTASSIUM SERPL-SCNC: 4.1 MMOL/L (ref 3.5–5.1)
SODIUM SERPL-SCNC: 139 MMOL/L (ref 137–145)

## 2019-12-16 RX ADMIN — METOCLOPRAMIDE PRN MG: 5 INJECTION, SOLUTION INTRAMUSCULAR; INTRAVENOUS at 07:53

## 2019-12-16 RX ADMIN — HEPARIN SODIUM SCH UNIT: 5000 INJECTION, SOLUTION INTRAVENOUS; SUBCUTANEOUS at 07:53

## 2019-12-16 RX ADMIN — IPRATROPIUM BROMIDE AND ALBUTEROL SULFATE SCH ML: .5; 3 SOLUTION RESPIRATORY (INHALATION) at 09:14

## 2019-12-16 RX ADMIN — IPRATROPIUM BROMIDE AND ALBUTEROL SULFATE SCH ML: .5; 3 SOLUTION RESPIRATORY (INHALATION) at 13:17

## 2019-12-16 RX ADMIN — PIPERACILLIN AND TAZOBACTAM SCH MLS/HR: 3; .375 INJECTION, POWDER, FOR SOLUTION INTRAVENOUS at 07:53

## 2019-12-16 RX ADMIN — Medication SCH ML: at 07:54

## 2019-12-16 RX ADMIN — Medication SCH ML: at 12:37

## 2019-12-16 RX ADMIN — INSULIN ASPART SCH UNIT: 100 INJECTION, SOLUTION INTRAVENOUS; SUBCUTANEOUS at 12:36

## 2019-12-16 RX ADMIN — FUROSEMIDE SCH MG: 10 INJECTION, SOLUTION INTRAMUSCULAR; INTRAVENOUS at 07:54

## 2019-12-16 RX ADMIN — INSULIN ASPART SCH UNIT: 100 INJECTION, SOLUTION INTRAVENOUS; SUBCUTANEOUS at 07:54

## 2019-12-16 NOTE — P.PN
Subjective


Progress Note Date: 12/16/19


Principal diagnosis: 


colonic mass, status post exploratory laparotomy with lysis of adhesions, right 

hemicolectomy, transverse colon resection, hernia repair small bowel resection





 Rectal bleeding secondary to invading colon mass.  Status post exploratory 

laparotomy with lysis of adhesions, extensive right hemicolectomy, resection 

transverse colon, small bowel resection, repair of incisional hernia, placement 

of incisional wound Prevena system.





The patient is seen today 12/13/2019 in follow-up on the regular medical floor. 

She is currently resting in bed.  Awake and alert in no acute distress.  Her 

pain is currently fairly well controlled. She is still quite weak. No worsening 

shortness of breath, cough or congestion.  She is maintaining O2 saturations in 

the low 90s on 3 L/m per nasal cannula.  Afebrile. Urine culture reveals no 

growth.  Sputum culture reveals no growth.  Peritoneal fluid revealing no 

growth. White count 12.7.  Hemoglobin 8.2.  Platelet count 137,000.  Sodium 137.

 Potassium 4.0.  Creatinine 1.37. She is continued on TPN and lipids. She 

remains on bronchodilators, IV diuretics, antibiotics in the form of Zosyn along

with Flagyl. Currently in a negative balance.  She does have continued anasarca.





The patient is seen today 12/14/2019 in follow-up on the regular medical floor. 

She is doing a bit better today.  Currently sitting up in a chair at the 

bedside.  More awake and alert.  Maintaining O2 saturations in the low 90s on 3 

L/m per nasal cannula.  She's afebrile.  Hemodynamically stable.  Sputum culture

reveals no growth.  Urine culture reveals no growth.  Peritoneal fluid reveals 

no growth.  White count 13.6.  Hemoglobin 8.3.  Creatinine 1.23.





The patient is seen today 12/15/2019 in follow-up on the regular medical floor. 

She is currently sitting up at the bedside.  Awake and alert in no acute 

distress.  Currently maintaining O2 saturations in the mid 90s on 3 L/m per 

nasal cannula.  She's been afebrile.  Urine, sputum and peritoneal fluid 

cultures all reveal no growth.  Sodium 138.  Potassium 3.8.  BUN 54.  Creatinine

1.41.  Receiving TPN and lipids for nutritional support.  Remains on IV 

diuretics.  Continued on bronchodilators and IV Zosyn.





On 12/16/2019 patient seen in follow-up and regular medical surgical floor, she 

is doing well, sitting up in the chair, and 3 L of oxygen with a pulse ox of 

94%, she is afebrile, hemodynamically stable, she states she is feeling 

fatigued, but overall denies any acute distress, no nausea, no vomiting, no 

abdominal pain, patient is tolerating consistent carb diet, remains on IV Lasix,

40 mg every 8 hours, and generalized edema is improving. patient remains on 

Flagyl, and Zosyn. she is working on incentive spirometer, her pain is 

controlled, patient is anticipated to go to acute rehab today, today's labs have

been reviewed, electrolytes are within normal limits, B1 is 66, creatinine is 

1.49.








Objective





- Vital Signs


Vital signs: 


                                   Vital Signs











Temp  98.3 F   12/16/19 11:22


 


Pulse  96   12/16/19 13:17


 


Resp  22   12/16/19 11:22


 


BP  122/55   12/16/19 11:22


 


Pulse Ox  94 L  12/16/19 11:22








                                 Intake & Output











 12/15/19 12/16/19 12/16/19





 18:59 06:59 18:59


 


Intake Total 1261 860 950


 


Output Total 956 1252 790


 


Balance 305 -392 160


 


Weight   95 kg


 


Intake:   


 


    


 


    Fat Emulsion 20% 250 ml @ 150  





    21 mls/hr IV Q24H DAVID Rx   





    #:[id number]   


 


  Intake, IV Titration 1111 200 300





  Amount   


 


    Fat Emulsion 20% 250 ml @   200





    21 mls/hr IV Q24H DAVID Rx   





    #:[id number]   


 


    Mvi, Adult No.4 with Vit 1011  





    K 10 ml Trace (Conc-1Ml/   





    Dose) 1 ml In Amino Acid   





    4.25%-D10w+Lytes*E* 1,000   





    ml @ 80 mls/hr IV .BY   





    DURATION DAVID Rx#:   





    [id number]   


 


    Piperacillin-Tazobactam 3 100 200 100





    .375 gm In Sodium   





    Chloride 0.9% 100 ml @ 25   





    mls/hr IVPB Q8HR DAVID Rx#   





    :[id number]   


 


  Oral  60 650


 


  TPN/PPN  600 


 


    metroNIDAZOLE-NS   300 





    mg In Saline 1 100ml.bag   





    @ 100 mls/hr IVPB Q8HR   





    DAVID Rx#:[id number]   


 


    pressure bag  300 


 


Output:   


 


  Drainage 6 2 0


 


    Left Lower Abdomen 6 2 0


 


  Urine 650 650 790


 


    Uretheral (Louise)   790


 


  Stool 300 600 


 


Other:   


 


  Voiding Method Indwelling Catheter Indwelling Catheter Indwelling Catheter


 


  # Voids 1 1 


 


  # Bowel Movements 1 1 








                       ABP, PAP, CO, CI - Last Documented











Arterial Blood Pressure        105/49

















- Exam


 GENERAL EXAM: Alert, very pleasant, 89-year-old white female,3 L of oxygen and 

the pulse ox of 94%, comfortable in no apparent distress.


HEAD: Normocephalic/atraumatic.


EYES: Normal reaction of pupils, equal size.  Conjunctiva pink, sclera white.


NOSE: Clear with pink turbinates.


THROAT: No erythema or exudates.


NECK: No masses, no JVD, no thyroid enlargement, no adenopathy.


CHEST: No chest wall deformity.  Symmetrical expansion. 


LUNGS: Equal air entry with no crackles, wheeze, rhonchi or dullness.


CVS: Regular rate and rhythm, normal S1 and S2, no gallops, no murmurs, no rubs


ABDOMEN: Soft, nontender.  No hepatosplenomegaly, normal bowel sounds, no 

guarding or rigidity. abdominal incision is clean dry and intact, left lower 

quadrant LYNDSEY drain minimal amount of serosanguineous drainage


EXTREMITIES: No clubbing, 1+ lower and upper extremity edema, no cyanosis, 2+ 

pulses and upper and lower extremities.


MUSCULOSKELETAL: Muscle strength and tone normal.


SPINE: No scoliosis or deformity


SKIN: No rashes


CENTRAL NERVOUS SYSTEM: Alert and oriented -3.  No focal deficits, tone is 

normal in all 4 extremities.


PSYCHIATRIC: Alert and oriented -3.  Appropriate affect.  Intact judgment and 

insight.











- Labs


CBC & Chem 7: 


                                 12/14/19 07:05





                                 12/16/19 06:10


Labs: 


                  Abnormal Lab Results - Last 24 Hours (Table)











  12/15/19 12/15/19 12/16/19 Range/Units





  16:48 20:18 06:10 


 


BUN    56 H  (7-17)  mg/dL


 


Creatinine    1.49 H  (0.52-1.04)  mg/dL


 


Glucose    183 H  (74-99)  mg/dL


 


POC Glucose (mg/dL)  164 H  228 H   (75-99)  mg/dL














  12/16/19 12/16/19 Range/Units





  07:06 11:11 


 


BUN    (7-17)  mg/dL


 


Creatinine    (0.52-1.04)  mg/dL


 


Glucose    (74-99)  mg/dL


 


POC Glucose (mg/dL)  190 H  208 H  (75-99)  mg/dL














Assessment and Plan


Plan: 


 assessment:


#1 Colonic mass status post exploratory laparotomy with lysis of adhesions, 

right hemicolectomy, transverse colon resection, hernia repair, small bowel 

resection, placement of preventive wound VAC.  Postoperative day #9.  Biopsies 

positive for poorly differentiated adenocarcinoma.





#2 Postoperative respiratory failure with routine postoperative ventilator 

management successfully extubated on 12/09/2019.  Currently on 3 L/m per nasal 

cannula.





#3 History of GI bleed.





#4 Chronic iron deficiency anemia.





#5 Ischemic cardiomyopathy.





#6 Diastolic congestive heart failure.





#7 History of atrial fibrillation.





#8 History of pulmonary embolism, status post Carley filter placement





#9 Sleep apnea syndrome.





#10 Diabetes mellitus, type II.





#11 Diabetic neuropathy.





#12 Hypertension.





#13 Previous coronary artery bypass grafting and aortic valve replacement.





#14 History of depression.





#15 History of gout.





#16 Status post permanent pacemaker implantation.





#17 Hypothyroidism.





plan:





Patient is doing well, continue encouraging deep breathing and coughing, no 

acute issues overnight, pain is controlled, generalized edema is improving, 

continue with diuretics, increase activity as tolerated, patient is being 

discharged to subacute rehab today.  Stable for discharge to rehab from 

pulmonary perspective, Dr. Bonds in the office in one or 2 weeks





I performed a history & physical examination of the patient and discussed their 

management with my nurse practitioner, Leena Goldman.  I reviewed the nurse 

practitioner's note and agree with the documented findings and plan of care.  

Lung sounds are positive for diminished breath sounds throughout the lung 

fields.  The findings and the impression was discussed with the patient.  I 

attest to the documentation by the nurse practitioner. 





Time with Patient: Less than 30

## 2019-12-16 NOTE — P.DS
Providers


Date of admission: 


12/02/19 15:52





Expected date of discharge: 12/16/19


Attending physician: 


Jean Claude Arzate





Consults: 





                                        





12/02/19 15:51


Consult Physician Urgent 


   Consulting Provider: Kyle Shannon


   Consult Reason/Comments: Colon cancer


   Do you want consulting provider notified?: Yes


Consult Physician Urgent 


   Consulting Provider: Kaila Roman


   Consult Reason/Comments: GI bleed


   Do you want consulting provider notified?: Yes





12/02/19 22:29


Consult Physician Routine 


   Consulting Provider: Demond Nelson


   Consult Reason/Comments: Abdominal malignancy


   Do you want consulting provider notified?: Yes


Consult Physician Routine 


   Consulting Provider: Minda Gutierrez


   Consult Reason/Comments: Intra-abdominal malignancy


   Do you want consulting provider notified?: Yes





12/02/19 22:30


Consult Physician Routine 


   Consulting Provider: Kaila Roman


   Consult Reason/Comments: Possible colonic mass


   Do you want consulting provider notified?: Yes





12/07/19 18:15


Consult Physician Stat 


   Consulting Provider: Donn Liu


   Consult Reason/Comments: ICU management


   Do you want consulting provider notified?: Yes





12/08/19 09:11


Consult Physician Routine 


   Consulting Provider: Ayde Solares


   Consult Reason/Comments: acute kidne injury


   Do you want consulting provider notified?: Yes





12/08/19 16:56


Consult Physician Routine 


   Consulting Provider: Gretchen Enriquez


   Consult Reason/Comments: Antibiotic management


   Do you want consulting provider notified?: Yes











Primary care physician: 


Reji Ochoa





Hospital Course: 





Chief Complaint: Lower GI bleed





Hospital course:


This is a very pleasant 89-year-old patient who follows with visiting physicians

Dr. Ochoa.  Chronic stable medical conditions include congestive heart failure 

with EF of 50%, aortic stenosis, mitral regurgitation, mitral stenosis, 

tricuspid regurgitation, secondary probably hypertension, diabetes, 

hypertension, osteoarthritis, hypothyroid, blind left eye, coronary artery 

disease, Shipman filter..  Patient does use a walker.  Patient did have a 

computed tomography scan of the abdomen on November 26 and did show mass along 

the lateral wall of the ascending colon and also adjacent soft tissue omental 

mass and is also another additional mass present in intra-abdominally.  

Suggestive of metastatic disease.  Patient yesterday had some bleeding per the 

right rectum.  And decided to come in.  Patient's been having lower abdominal 

discomfort.  She was admitted to the ER.  Patient normally does use a walker.  

Lives alone.  Colonoscopy done on December 4 shows ascending colon mass and AV 

malformation was some slight bleeding.  On December 7 patient underwent surgical

intervention to include right hemicolectomy, resection of transverse mesocolon 

tomor, small bowel resection from distal jejunum to ileum over 2 feet, repair of

incisional hernia, lysis of adhesions.  Patient was extubated on December 9.  On

TPN.  Abdominal incision wound VAC.patient is doing okay.  Did tolerate some 

diet.  Gets easily short winded.  Patient seemed rather reluctant to proceed 

with any further treatment but will discuss it with oncology.  Discussed with 

patient today.  Spoke to [facility] from surgery okay to DC to ECF.discussed with 

Dr. Hester.  Okay to DC antibiotics.


Discussion and discharge planning more than 35 minutes





consultants:


Dr. enriquez from ID


Dr. Solares at all from nephrology


Dr. Gutierrez from general surgery 


Dr. Ortega at all from critical care








Physical examination:


VITAL SIGNS: 98.3, 113, 22, 122/55, 94% on 3 L


GENERAL: sitting up in a recliner, tired


EYES: Pupils equal.  Conjunctiva pale


HEENT: External appearance of nose and ears normal, oral cavity grossly normal.


NECK: JVD not raised; masses not palpable.


HEART: First and second heart sounds are normal;  no edema.  


LUNGS: Respiratory rate normal; decreased breath sounds.  


ABDOMEN: Some tenderness, dressing over the wound, bowel sounds sluggish, mild 

tenderness no guarding or rigidity. 


PSYCH: Alert and oriented x3;  mood  and affect normal.  


MUSCULOSKELETAL: Evidence of OA especially in the hands and knees





INVESTIGATIONS, reviewed in the clinical context:


Bun 54 creatinine 1.4 on





Previous testing


White count 9.6 hemoglobin 9.2 platelets 240 progression 4.7 BUN 73 creatinine 

1.79


Computed tomography scan of the abdomen-results noted as above





Assessment:


-Ascending colon mass, per colonoscopy, -right hemicolectomy, resection of 

transverse mesocolon tumor, small bowel resection from distal jejunum to ileum 

over 2feet, repair of incisional hernia, lysis of adhesions.-Invasive poorly 

differentiated adenocarcinoma


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


-Coronary artery disease with prior history of bypass


-Probable chronic kidney disease from diabetic nephropathy and hypertensive 

nephrosclerosis


-Normocytic anemia suspected from chronic disease of malignancy


-Diabetes mellitus type 2


-Essential hypertension


-Primary osteoarthritis


-Chronic gout


-Macular degeneration of right eye


-Colonic diverticulosis 


-obstructive sleep apnea did use CPAP machine in the past


-Acute kidney injury, could be ATN multifactorial, improving


-No code





disposition:


formerly [facility]





Patient Condition at Discharge: Undetermined





Plan - Discharge Summary


Discharge Rx Participant: No


New Discharge Prescriptions: 


No Action


   Carvedilol [Coreg] 3.125 mg PO BID


   Allopurinol 100 mg PO BID


   Levothyroxine Sodium [Synthroid] 75 mcg PO DAILY


   Insulin Lispro [humaLOG Kwikpen] 5 unit SQ AC-BID


   Spironolactone [Aldactone] 25 mg PO BID


   Potassium Chloride ER [K-Dur 10] 10 meq PO BID


   Insulin Detemir (Levemir) [Levemir] 20 unit SQ AC-BRKFST


   Furosemide [Lasix] 80 mg PO BID


   Naproxen Sodium [Aleve] 440 mg PO Q12HR PRN


     PRN Reason: Pain


   Sodium Bicarbonate Tab 650 mg PO BID


   Meclizine HCl 12.5 mg PO TID PRN


     PRN Reason: NAUSEA AND DIZZINESS


Discharge Medication List





Carvedilol [Coreg] 3.125 mg PO BID 06/21/14 [History]


Allopurinol 100 mg PO BID 06/03/15 [History]


Levothyroxine Sodium [Synthroid] 75 mcg PO DAILY 01/30/18 [History]


Insulin Lispro [humaLOG Kwikpen] 5 unit SQ AC-BID 03/27/18 [History]


Furosemide [Lasix] 80 mg PO BID 08/17/19 [History]


Insulin Detemir (Levemir) [Levemir] 20 unit SQ AC-BRKFST 08/17/19 [History]


Potassium Chloride ER [K-Dur 10] 10 meq PO BID 08/17/19 [History]


Spironolactone [Aldactone] 25 mg PO BID 08/17/19 [History]


Meclizine HCl 12.5 mg PO TID PRN 12/02/19 [History]


Naproxen Sodium [Aleve] 440 mg PO Q12HR PRN 12/02/19 [History]


Sodium Bicarbonate Tab 650 mg PO BID 12/02/19 [History]








Follow up Appointment(s)/Referral(s): 


Demond Nelson MD [STAFF PHYSICIAN] - 6 Weeks (Please call for appt if interested in 

discussing treatment )


Reji Ochoa MD [Primary Care Provider] - 1-2 days


[facility] Visiting Nurse, [NON-STAFF] - 1 Week

## 2019-12-16 NOTE — PN
PROGRESS NOTE



DATE OF SERVICE:

12/15/2019.



REASON FOR FOLLOWUP:

Leukocytosis abdominal source.



INTERVAL HISTORY:

The patient is currently afebrile.  Patient has been feeling better.  Breathing

comfortably.  The patient denies having any chest pain.  Occasional cough.  Abdominal

pain has improved.  No nausea or vomiting and no diarrhea.



PHYSICAL EXAMINATION:

Blood pressure 146/67 with a pulse of 110, temperature 98.4.  She is 93% on 2 L nasal

cannula.

General description is an elderly female up in the chair in no distress.  Respiratory

system: Unlabored breathing, clear to auscultation anteriorly.  Heart S1, S2.  Regular

rate and rhythm. Abdomen soft.  Extremities:  No edema of the feet.



LABS:

BUN of 54, creatinine 1.41.



DIAGNOSTIC IMPRESSION AND PLAN:

Patient with leukocytosis.  The patient did have extensive abdominal surgery, possible

abdominal source.  The patient's white count initially improved, _____ slightly upward

trend.  Recommend discontinuation of the IG once the TPN is weened off to decrease risk

of line related sepsis on Zosyn. Maybe start a short course of oral antibiotic on

discharge.  Continue supportive care.





MMODL / IJN: [id number] / Job#: [id number]

## 2019-12-16 NOTE — P.PN
Subjective





Patient is seen in follow-up for acute kidney injury.  Renal function is fairly 

stable.  She is maintained on IV Lasix as well as TPN.  She had breakfast this 

morning.  No vomiting or diarrhea.





Vital signs are stable.


General: The patient appeared well nourished and normally developed. 


HEENT: Head exam is unremarkable. Neck is without jugular venous distension.


LUNGS: Lungs are clear to auscultation and percussion. Breath sounds decreased.


HEART: Rate and Rhythm are regular. First and second heart sounds normal. No 

murmurs, rubs or gallops. 


ABDOMEN: Bowel sounds present.  Mild tenderness.


EXTREMITITES: 1+ edema.





Objective





- Vital Signs


Vital signs: 


                                   Vital Signs











Temp  98.0 F   12/16/19 05:00


 


Pulse  108 H  12/16/19 09:30


 


Resp  20   12/16/19 05:00


 


BP  130/78   12/16/19 05:00


 


Pulse Ox  95   12/16/19 09:17








                                 Intake & Output











 12/15/19 12/16/19 12/16/19





 18:59 06:59 18:59


 


Intake Total 1261 860 


 


Output Total 956 1252 


 


Balance 305 -392 


 


Intake:   


 


    


 


    Fat Emulsion 20% 250 ml @ 150  





    21 mls/hr IV Q24H DAVID Rx   





    #:[id number]   


 


  Intake, IV Titration 1111 200 





  Amount   


 


    Mvi, Adult No.4 with Vit 1011  





    K 10 ml Trace (Conc-1Ml/   





    Dose) 1 ml In Amino Acid   





    4.25%-D10w+Lytes*E* 1,000   





    ml @ 80 mls/hr IV .BY   





    DURATION DAVID Rx#:   





    [id number]   


 


    Piperacillin-Tazobactam 3 100 200 





    .375 gm In Sodium   





    Chloride 0.9% 100 ml @ 25   





    mls/hr IVPB Q8HR DAVID Rx#   





    :[id number]   


 


  Oral  60 


 


  TPN/PPN  600 


 


    metroNIDAZOLE-NS   300 





    mg In Saline 1 100ml.bag   





    @ 100 mls/hr IVPB Q8HR   





    DAVID Rx#:[id number]   


 


    pressure bag  300 


 


Output:   


 


  Drainage 6 2 


 


    Left Lower Abdomen 6 2 


 


  Urine 650 650 


 


  Stool 300 600 


 


Other:   


 


  Voiding Method Indwelling Catheter Indwelling Catheter 


 


  # Voids 1 1 


 


  # Bowel Movements 1 1 








                       ABP, PAP, CO, CI - Last Documented











Arterial Blood Pressure        105/49

















- Labs


CBC & Chem 7: 


                                 12/14/19 07:05





                                 12/16/19 06:10


Labs: 


                  Abnormal Lab Results - Last 24 Hours (Table)











  12/15/19 12/15/19 12/15/19 Range/Units





  10:58 16:48 20:18 


 


BUN     (7-17)  mg/dL


 


Creatinine     (0.52-1.04)  mg/dL


 


Glucose     (74-99)  mg/dL


 


POC Glucose (mg/dL)  234 H  164 H  228 H  (75-99)  mg/dL














  12/16/19 12/16/19 Range/Units





  06:10 07:06 


 


BUN  56 H   (7-17)  mg/dL


 


Creatinine  1.49 H   (0.52-1.04)  mg/dL


 


Glucose  183 H   (74-99)  mg/dL


 


POC Glucose (mg/dL)   190 H  (75-99)  mg/dL














Assessment and Plan


Plan: 





Assessment:


1.  Acute kidney injury secondary to ATN secondary to cardiorenal syndrome.  

Renal function stable.  Creatinine 1.49 today.


2.  Volume overload.  Improving with diuresis.


3.  Colon mass status post exploratory laparotomy and right hemicolectomy on 

December 7.


4.  Acute on chronic diastolic CHF with moderate to severe tricuspid 

regurgitation, severe mitral regurgitation and severe pulmonary hypertension.


5.  Chronic kidney disease stage III with baseline creatinine in the range of 

1.2-1.4.  Etiology is diabetic kidney disease.


6.  Insulin-dependent diabetes mellitus.


7.  Anemia of chronic kidney disease.  Rule out iron deficiency.


8.  Metabolic acidosis secondary to acute kidney injury maintained on bicarb.





Plan:


Maintain IV Lasix 40 mg 3 times daily.


Potential discontinuation of TPN today.  Patient tolerated breakfast well.


Check iron studies.


Avoid nephrotoxins.


Continue to monitor renal function and urine output.

## 2019-12-16 NOTE — PN
PROGRESS NOTE



DATE OF SERVICE:

12/16/2019



REASON FOR FOLLOWUP:

Leukocytosis.



INTERVAL HISTORY:

The patient is currently afebrile.  The patient has been breathing comfortably.

Patient denies having any chest pain.  Occasional cough.  Abdominal pain is currently

controlled.  No nausea, no vomiting.



PHYSICAL EXAMINATION:

Blood pressure 122/55 with a pulse of 108, temperature 98.3. She is 94% on 3 L nasal

cannula.

General description is an elderly female up in the chair in no distress.

RESPIRATORY SYSTEM: Unlabored breathing with decreased breath sounds at the bases. No

wheeze.

HEART: S1, S2.  Regular rate and rhythm.

ABDOMEN: Soft.



LABS:

Creatinine 1.49.  No CBC was done today.



DIAGNOSTIC IMPRESSION AND PLAN:

Patient with leukocytosis more likely reactive.  This patient did have extensive

abdominal surgery without evidence of any perforation.  Abdominal cultures were

negative.  The patient's white count initially improved, subsequently went up more

likely secondary to the right _____ which will be discontinued.  The patient will be

monitored closely off antibiotics.  Discussed with the admitting physician working on

the discharge.





MMODL / IJN: [id number] / Job#: [id number]

## 2019-12-16 NOTE — P.PN
<Unique Krause A - Last Filed: 12/16/19 13:19>





Subjective


Progress Note Date: 12/16/19





CHIEF COMPLAINT: Abdominal pain





HISTORY OF PRESENT ILLNESS: Patient is status post exploratory laparotomy with 

resection of right colon for over 10 cm lesion with invasion to abdominal wall i

ncluding small bowel resection for small bowel obstruction performed on 

12/07/2019.  Patient examined at the bedside. Abdominal pain is tolerable. 

Tolerating diet. No nausea or vomiting.





PHYSICAL EXAM: 


VITAL SIGNS: Reviewed


GENERAL: Well-developed in no acute distress. 


HEENT:  No sclera icterus. Extraocular movements grossly intact.  Moist buccal 

mucosa. 


Head is atraumatic, normocephalic. Hears conversational speech. No nasal 

drainage.


NECK:  Supple without lymphadenopathy.


CHEST:  Non-labored respirations and equal bilateral excursions. 


CARDIOVASCULAR:  Regular rate with regular rhythm.  Palpable 2+ radial pulses.


ABDOMEN:  Soft.  Midline dressing intact with PREVENA. LYNDSEY drain with minimal 

serosanguineous drainage


MUSCULOSKELETAL:  No clubbing or cyanosis


NEUROLOGIC:  No focal or lateralizing signs.  Cranial nerves II through XII 

grossly intact.


PSYCH:  Appropriate affect.  Alert and oriented to person, place and time.


SKIN: Well perfused.  Good skin turgor. 





ASSESSMENT: 


1.  Ascending colon cancer, recurrent metastatic


2.  Rectal bleeding





PLAN: 


Continue diet as tolerated


Wean off TPN


Prevena DC. Optifoam ordered


Nursing to DC LYNDSEY drain


Stable for discharge today from a surgical standpoint





Nurse practitioner note has been reviewed by physician. Signing provider agrees 

with the documented findings, assessment, and plan of care. 








Objective





- Vital Signs


Vital signs: 


                                   Vital Signs











Temp  98.3 F   12/16/19 11:22


 


Pulse  96   12/16/19 13:17


 


Resp  22   12/16/19 11:22


 


BP  122/55   12/16/19 11:22


 


Pulse Ox  94 L  12/16/19 11:22








                                 Intake & Output











 12/15/19 12/16/19 12/16/19





 18:59 06:59 18:59


 


Intake Total 1261 860 


 


Output Total 121 4656 790


 


Balance 466 -515 -218


 


Weight   95 kg


 


Intake:   


 


    


 


    Fat Emulsion 20% 250 ml @ 150  





    21 mls/hr IV Q24H [facility]   





    #:[id number]   


 


  Intake, IV Titration 1111 200 





  Amount   


 


    Mvi, Adult No.4 with Vit 1011  





    K 10 ml Trace (Conc-1Ml/   





    Dose) 1 ml In Amino Acid   





    4.25%-D10w+Lytes*E* 1,000   





    ml @ 80 mls/hr IV .BY   





    DURATION [facility] Rx#:   





    [id number]   


 


    Piperacillin-Tazobactam 3 100 200 





    .375 gm In Sodium   





    Chloride 0.9% 100 ml @ 25   





    mls/hr IVPB Q8HR [facility] Rx#   





    :[id number]   


 


  Oral  60 


 


  TPN/PPN  600 


 


    metroNIDAZOLE-NS   300 





    mg In Saline 1 100ml.bag   





    @ 100 mls/hr IVPB Q8HR   





    [facility] Rx#:[id number]   


 


    pressure bag  300 


 


Output:   


 


  Drainage 6 2 


 


    Left Lower Abdomen 6 2 


 


  Urine 650 650 790


 


    Uretheral (Louise)   790


 


  Stool 300 600 


 


Other:   


 


  Voiding Method Indwelling Catheter Indwelling Catheter 


 


  # Voids 1 1 


 


  # Bowel Movements 1 1 








                       ABP, PAP, CO, CI - Last Documented











Arterial Blood Pressure        105/49

















- Labs


CBC & Chem 7: 


                                 12/14/19 07:05





                                 12/16/19 06:10


Labs: 


                  Abnormal Lab Results - Last 24 Hours (Table)











  12/15/19 12/15/19 12/16/19 Range/Units





  16:48 20:18 06:10 


 


BUN    56 H  (7-17)  mg/dL


 


Creatinine    1.49 H  (0.52-1.04)  mg/dL


 


Glucose    183 H  (74-99)  mg/dL


 


POC Glucose (mg/dL)  164 H  228 H   (75-99)  mg/dL














  12/16/19 12/16/19 Range/Units





  07:06 11:11 


 


BUN    (7-17)  mg/dL


 


Creatinine    (0.52-1.04)  mg/dL


 


Glucose    (74-99)  mg/dL


 


POC Glucose (mg/dL)  190 H  208 H  (75-99)  mg/dL














<Minda Gutierrez N - Last Filed: 12/28/19 18:20>





Subjective


As above. Recommend rehab assessment.








Objective





- Vital Signs


Vital signs: 


                                   Vital Signs











Temp  98.3 F   12/16/19 11:22


 


Pulse  96   12/16/19 13:17


 


Resp  22   12/16/19 11:22


 


BP  122/55   12/16/19 11:22


 


Pulse Ox  94 L  12/16/19 11:22








                       ABP, PAP, CO, CI - Last Documented











Arterial Blood Pressure        105/49

















- Labs


CBC & Chem 7: 


                                 12/14/19 07:05





                                 12/16/19 06:10





Assessment and Plan


(1) Abdominal mass


Status: Acute   Priority: High   Code(s): R19.00 - INTRA-ABD AND PELVIC 

SWELLING, MASS AND LUMP, UNSP SITE   SNOMED Code(s): 087562667


   





(2) GI bleed


Status: Acute   Priority: High   Code(s): K92.2 - GASTROINTESTINAL HEMORRHAGE, 

UNSPECIFIED   SNOMED Code(s): 76085013


   





(3) History of colon cancer


Status: Acute   Code(s): Z85.038 - PERSONAL HISTORY OF MALIGNANT NEOPLASM OF 

LARGE INTESTINE   SNOMED Code(s): 032286222


   





(4) Anemia


Status: Chronic   Priority: Medium   Code(s): D64.9 - ANEMIA, UNSPECIFIED   

SNOMED Code(s): 886573547


   





(5) Aortic stenosis


Status: Acute   Code(s): I35.0 - NONRHEUMATIC AORTIC (VALVE) STENOSIS   SNOMED 

Code(s): 35616720


   





(6) Atrial fibrillation


Status: Acute   Code(s): I48.91 - UNSPECIFIED ATRIAL FIBRILLATION   SNOMED 

Code(s): 63476617


   





(7) Atrial flutter


Status: Acute   Code(s): I48.92 - UNSPECIFIED ATRIAL FLUTTER   SNOMED Code(s): 

1689077


   





(8) Diastolic CHF, acute on chronic


Status: Acute   Code(s): I50.33 - ACUTE ON CHRONIC DIASTOLIC (CONGESTIVE) HEART 

FAILURE   SNOMED Code(s): 925069871


   





(9) Systolic congestive heart failure


Status: Acute   Code(s): I50.20 - UNSPECIFIED SYSTOLIC (CONGESTIVE) HEART 

FAILURE   SNOMED Code(s): 58365543


   





(10) Cancer of ascending colon metastatic to intra-abdominal lymph node


Status: Acute   Code(s): C18.2 - MALIGNANT NEOPLASM OF ASCENDING COLON; C77.2 - 

SECONDARY AND UNSP MALIGNANT NEOPLASM OF INTRA-ABD NODES   SNOMED Code(s): 

01825162

## 2019-12-18 NOTE — CDI
Documentation Clarification Form



Date: 12/18/19

From: Lee Ann Olivera

Phone: If you have a question about this query, please contact Joanne Isidro, 
 at [phone] between 8am and 5pm.

Admit Date: 12/2/19

Discharge Date: 12/16/19 

Patient Name: Christina Ortiz

Visit Number: [id number]



ATTENTION: The Clinical Documentation Specialists (CDI) and [facility] Coding Staff 
appreciate your assistance in clarifying documentation. Please respond to the 
clarification below the line at the bottom and electronically sign. The CDI & 
[facility] Coding staff will review the response and follow-up if needed. Please note: 
Queries are made part of the Legal Health Record. If you have any questions, 
please contact the author of this message via ITS.



Dear Dr. Jean Claude Arzate,



Atrial Flutter is documented in 5 PNs (12/8, 12/9, 12/10, 12/12 & 12/13).  



History/Risk factors: Ascending/transverse cancer, mets to intra-abd LNs, mets 
to peritoneal,ac on chr diastolic CHF, ATN, 

Clinical Indicators: Developed atrial fibrillation during surgery on 12/7. 12/8 
PN states patient had atrial flutter.

EKG/telemetry: Per surgery atrial fibrillation

Treatment: Cardioverted at the end of surgery, 



In your professional opinion, in order to capture the severity of condition; can
you please clarify the type of Atrial Flutter if known?  



Typical/Type I

Atypical/Type II

Other, please specify ________

Unable to determine

____________________





Unable to determine ______________________________________________________

MTDD

## 2022-05-13 NOTE — PN
PROGRESS NOTE



Patient is seen for followup for acute kidney injury.  Her renal function continues to

improve.  Serum creatinine is down to 1.37.  Diuretics were increased yesterday, as

patient continues to show evidence of volume overload.  She has had good urine output;

however, she is maintained on TPN.  Twenty-four-hour urine output was about 3.7 L.



PHYSICAL EXAMINATION:

On examination today, blood pressure was a 146/79, heart rate 102 per minute. Patient

is afebrile.

EXAMINATION OF THE HEART: S1 and S2.

EXAMINATION OF LUNGS: Bilateral breath sounds are heard. Decreased breath sounds at

bases.

ABDOMEN:  Soft, but tenderness noted.

Examination of lower extremities shows edema 1+ bilaterally.

CNS exam is grossly intact.



LABS:

Sodium 137, potassium 4.0, chloride 104, BUN 44, creatinine 1.37, hemoglobin 8.2 g/dL.



ASSESSMENT:

1. Acute kidney injury, acute tubular necrosis and secondary to hypotension and

    hypoperfusion, currently improved.

2. Volume overload with history of diastolic heart failure.  Lasix was increased

    yesterday.  I will continue with the current dose of diuretics.

3. Colonic mass, status post explorative laparotomy with lysis of adhesions, right

    hemicolectomy, hernia repair, small-bowel resection, currently with wound V.A.C.,

    postoperative day number 6.

4. Hypoxic respiratory failure, currently extubated.

5. History of atrial fibrillation with controlled ventricular response.

6. History of coronary artery bypass surgery and aortic valve replacement with tissue

    valve.

7. Hypokalemia, status post replacement.



PLAN:

Continue with current dose of IV Lasix.  Try to wean down TPN.  Repeat chest x-ray in

a.m. We can discontinue the sodium bicarb.



MMODL / IJN: [id number] / Job#: [id number] no chest pain and no edema.